# Patient Record
Sex: FEMALE | Race: BLACK OR AFRICAN AMERICAN | NOT HISPANIC OR LATINO | Employment: OTHER | RURAL
[De-identification: names, ages, dates, MRNs, and addresses within clinical notes are randomized per-mention and may not be internally consistent; named-entity substitution may affect disease eponyms.]

---

## 2017-01-19 RX ORDER — METOPROLOL SUCCINATE 25 MG/1
TABLET, EXTENDED RELEASE ORAL
Qty: 30 TABLET | Refills: 0 | Status: SHIPPED | OUTPATIENT
Start: 2017-01-19 | End: 2017-02-16 | Stop reason: SDUPTHER

## 2017-02-16 RX ORDER — METOPROLOL SUCCINATE 25 MG/1
TABLET, EXTENDED RELEASE ORAL
Qty: 30 TABLET | Refills: 0 | Status: SHIPPED | OUTPATIENT
Start: 2017-02-16 | End: 2017-04-13 | Stop reason: SDUPTHER

## 2017-03-13 ENCOUNTER — OFFICE VISIT (OUTPATIENT)
Dept: FAMILY MEDICINE CLINIC | Facility: CLINIC | Age: 82
End: 2017-03-13

## 2017-03-13 VITALS
DIASTOLIC BLOOD PRESSURE: 60 MMHG | TEMPERATURE: 97.9 F | BODY MASS INDEX: 24.66 KG/M2 | HEART RATE: 64 BPM | RESPIRATION RATE: 16 BRPM | WEIGHT: 134 LBS | HEIGHT: 62 IN | SYSTOLIC BLOOD PRESSURE: 120 MMHG

## 2017-03-13 DIAGNOSIS — R53.1 WEAKNESS: Primary | ICD-10-CM

## 2017-03-13 DIAGNOSIS — G89.29 CHRONIC LOW BACK PAIN, UNSPECIFIED BACK PAIN LATERALITY, WITH SCIATICA PRESENCE UNSPECIFIED: ICD-10-CM

## 2017-03-13 DIAGNOSIS — M54.5 CHRONIC LOW BACK PAIN, UNSPECIFIED BACK PAIN LATERALITY, WITH SCIATICA PRESENCE UNSPECIFIED: ICD-10-CM

## 2017-03-13 PROCEDURE — 99213 OFFICE O/P EST LOW 20 MIN: CPT | Performed by: NURSE PRACTITIONER

## 2017-03-14 PROBLEM — R53.1 WEAKNESS: Status: ACTIVE | Noted: 2017-03-14

## 2017-03-14 PROBLEM — G89.29 CHRONIC LOW BACK PAIN: Status: ACTIVE | Noted: 2017-03-14

## 2017-03-14 PROBLEM — M54.50 CHRONIC LOW BACK PAIN: Status: ACTIVE | Noted: 2017-03-14

## 2017-03-14 RX ORDER — METFORMIN HYDROCHLORIDE 500 MG/1
500 TABLET, EXTENDED RELEASE ORAL
COMMUNITY
End: 2017-07-11 | Stop reason: SDUPTHER

## 2017-03-14 RX ORDER — AMLODIPINE BESYLATE 5 MG/1
5 TABLET ORAL DAILY
COMMUNITY
End: 2017-04-13 | Stop reason: SDUPTHER

## 2017-03-14 RX ORDER — OXYBUTYNIN CHLORIDE 10 MG/1
10 TABLET, EXTENDED RELEASE ORAL DAILY
COMMUNITY
End: 2017-04-13

## 2017-03-14 RX ORDER — GABAPENTIN 250 MG/5ML
3 SOLUTION ORAL NIGHTLY
COMMUNITY
End: 2017-10-19

## 2017-03-14 RX ORDER — OMEPRAZOLE 20 MG/1
20 CAPSULE, DELAYED RELEASE ORAL DAILY
COMMUNITY
End: 2017-04-13 | Stop reason: SDUPTHER

## 2017-03-14 NOTE — PROGRESS NOTES
Subjective   Kristy Ramirez is a 83 y.o. female.     HPI Comments: Here today for wili she has recently had back surgery.  Then had some complications from the surgery which required additional hospitalizations and an admission in the nursing home.  She apparently had a severe infection.  She is improved and is home now with the assistance of her daughter and home health.    Back Pain   This is a chronic (recent surgery) problem. The current episode started more than 1 month ago. The problem occurs intermittently. The problem has been gradually improving since onset. The pain does not radiate. The pain is at a severity of 3/10. The pain is moderate. The pain is the same all the time. The symptoms are aggravated by position. Stiffness is present in the morning. Associated symptoms include weight loss. Pertinent negatives include no abdominal pain, bladder incontinence, bowel incontinence, chest pain, dysuria, fever, headaches, leg pain, numbness, paresis, paresthesias, pelvic pain, perianal numbness, tingling or weakness. Treatments tried: surgery. The treatment provided moderate relief.   Weakness - Generalized   This is a new problem. The current episode started more than 1 month ago. The problem occurs constantly. The problem has been gradually improving. Pertinent negatives include no abdominal pain, chest pain, fever, headaches, numbness or weakness. Exacerbated by: recent illness. Treatments tried: physical therapy. The treatment provided significant relief.        The following portions of the patient's history were reviewed and updated as appropriate: allergies, current medications, past family history, past medical history, past social history, past surgical history and problem list.    Review of Systems   Constitutional: Positive for weight loss. Negative for fever.        Weakness   HENT: Negative.    Respiratory: Negative.    Cardiovascular: Negative for chest pain.   Gastrointestinal: Negative for  abdominal pain and bowel incontinence.   Genitourinary: Negative for bladder incontinence, dysuria and pelvic pain.   Musculoskeletal: Positive for back pain.   Skin: Negative.    Neurological: Negative for tingling, weakness, numbness, headaches and paresthesias.   Psychiatric/Behavioral: Negative.        Objective   Physical Exam   Constitutional: She is oriented to person, place, and time. She appears well-developed and well-nourished. No distress.   HENT:   Head: Normocephalic.   Eyes: Pupils are equal, round, and reactive to light.   Neck: Normal range of motion. Neck supple. No thyromegaly present.   Cardiovascular: Normal rate, regular rhythm and normal heart sounds.  Exam reveals no friction rub.    No murmur heard.  Pulmonary/Chest: Breath sounds normal. No respiratory distress. She has no wheezes. She has no rales.   Abdominal: Soft.   Musculoskeletal: Normal range of motion.   Has well healed incision lumbar spine.  Is able to take steps with assistance.   Neurological: She is alert and oriented to person, place, and time.   Skin: Skin is warm and dry.   Psychiatric: She has a normal mood and affect. Thought content normal.   Nursing note and vitals reviewed.      Assessment/Plan   Kristy was seen today for med refill.    Diagnoses and all orders for this visit:    Weakness    Chronic low back pain, unspecified back pain laterality, with sciatica presence unspecified      She will receive home health for physical therapy and poss personal care services.

## 2017-04-13 ENCOUNTER — OFFICE VISIT (OUTPATIENT)
Dept: FAMILY MEDICINE CLINIC | Facility: CLINIC | Age: 82
End: 2017-04-13

## 2017-04-13 VITALS
RESPIRATION RATE: 16 BRPM | OXYGEN SATURATION: 98 % | DIASTOLIC BLOOD PRESSURE: 72 MMHG | HEIGHT: 62 IN | TEMPERATURE: 95.9 F | BODY MASS INDEX: 25.76 KG/M2 | HEART RATE: 64 BPM | SYSTOLIC BLOOD PRESSURE: 164 MMHG | WEIGHT: 140 LBS

## 2017-04-13 DIAGNOSIS — K21.9 GASTROESOPHAGEAL REFLUX DISEASE, ESOPHAGITIS PRESENCE NOT SPECIFIED: ICD-10-CM

## 2017-04-13 DIAGNOSIS — M10.9 GOUT, UNSPECIFIED CAUSE, UNSPECIFIED CHRONICITY, UNSPECIFIED SITE: ICD-10-CM

## 2017-04-13 DIAGNOSIS — M25.551 PAIN OF RIGHT HIP JOINT: ICD-10-CM

## 2017-04-13 DIAGNOSIS — N39.3 STRESS INCONTINENCE: ICD-10-CM

## 2017-04-13 DIAGNOSIS — M25.551 RIGHT HIP PAIN: Primary | ICD-10-CM

## 2017-04-13 DIAGNOSIS — IMO0002 HORMONE DEFICIENCY: ICD-10-CM

## 2017-04-13 DIAGNOSIS — L98.9 SKIN LESION: ICD-10-CM

## 2017-04-13 DIAGNOSIS — Z79.890 ON HORMONE REPLACEMENT THERAPY: ICD-10-CM

## 2017-04-13 DIAGNOSIS — R19.7 DIARRHEA, UNSPECIFIED TYPE: ICD-10-CM

## 2017-04-13 DIAGNOSIS — E83.40 MAGNESIUM DISORDER: ICD-10-CM

## 2017-04-13 DIAGNOSIS — I10 ESSENTIAL HYPERTENSION: ICD-10-CM

## 2017-04-13 DIAGNOSIS — T17.308A CHOKING, INITIAL ENCOUNTER: ICD-10-CM

## 2017-04-13 PROBLEM — E34.9 HORMONE DEFICIENCY: Status: ACTIVE | Noted: 2017-04-13

## 2017-04-13 PROCEDURE — 99214 OFFICE O/P EST MOD 30 MIN: CPT | Performed by: NURSE PRACTITIONER

## 2017-04-13 RX ORDER — ALLOPURINOL 100 MG/1
100 TABLET ORAL DAILY
Qty: 90 TABLET | Refills: 1 | Status: SHIPPED | OUTPATIENT
Start: 2017-04-13 | End: 2018-01-15 | Stop reason: SDUPTHER

## 2017-04-13 RX ORDER — COLCHICINE 0.6 MG/1
0.6 TABLET ORAL DAILY
Qty: 180 TABLET | Refills: 1 | Status: SHIPPED | OUTPATIENT
Start: 2017-04-13 | End: 2018-04-30

## 2017-04-13 RX ORDER — AMLODIPINE BESYLATE 5 MG/1
5 TABLET ORAL DAILY
Qty: 90 TABLET | Refills: 1 | Status: SHIPPED | OUTPATIENT
Start: 2017-04-13 | End: 2017-07-11 | Stop reason: SDUPTHER

## 2017-04-13 RX ORDER — TOLTERODINE 4 MG/1
4 CAPSULE, EXTENDED RELEASE ORAL DAILY
Qty: 90 CAPSULE | Refills: 1 | Status: SHIPPED | OUTPATIENT
Start: 2017-04-13 | End: 2017-07-11 | Stop reason: SDUPTHER

## 2017-04-13 RX ORDER — ESTRADIOL 1 MG/1
1 TABLET ORAL DAILY
Qty: 90 TABLET | Refills: 1 | Status: SHIPPED | OUTPATIENT
Start: 2017-04-13 | End: 2017-07-11 | Stop reason: SDUPTHER

## 2017-04-13 RX ORDER — TRAMADOL HYDROCHLORIDE 50 MG/1
50 TABLET ORAL NIGHTLY
Qty: 30 TABLET | Refills: 0 | Status: SHIPPED | OUTPATIENT
Start: 2017-04-13 | End: 2017-11-27

## 2017-04-13 RX ORDER — OMEPRAZOLE 20 MG/1
20 CAPSULE, DELAYED RELEASE ORAL DAILY
Qty: 90 CAPSULE | Refills: 1 | Status: SHIPPED | OUTPATIENT
Start: 2017-04-13 | End: 2017-07-11 | Stop reason: SDUPTHER

## 2017-04-13 RX ORDER — LISINOPRIL AND HYDROCHLOROTHIAZIDE 25; 20 MG/1; MG/1
1 TABLET ORAL DAILY
Qty: 90 TABLET | Refills: 1 | Status: SHIPPED | OUTPATIENT
Start: 2017-04-13 | End: 2017-06-16 | Stop reason: SINTOL

## 2017-04-13 RX ORDER — METOPROLOL SUCCINATE 25 MG/1
25 TABLET, EXTENDED RELEASE ORAL DAILY
Qty: 90 TABLET | Refills: 1 | Status: SHIPPED | OUTPATIENT
Start: 2017-04-13 | End: 2017-05-15 | Stop reason: SDUPTHER

## 2017-04-13 NOTE — PROGRESS NOTES
Subjective   Kristy Ramirez is a 83 y.o. female.     HPI Comments: Here today for wili.  Has has recent back surgery.  However is having more pain in her right hip.      She has had problems with constipation, however is now having diarrhea.  Took linzess at one time, but is not taking it at present.    She says she has choking sensations when she tries to eat.  She has extremely dry sensation in her throat.    She has a lesion on the top of her head that is very sensitive and there is no hair growing in that area.  She says years ago she touched it with a hot curling iron and thinks this is what caused the problem.    Hip Pain    The incident occurred more than 1 week ago. There was no injury mechanism. The pain is present in the right hip. The quality of the pain is described as aching and shooting. The pain is at a severity of 7/10. The pain is severe. The pain has been constant (worse at night) since onset. Associated symptoms include an inability to bear weight. Pertinent negatives include no loss of motion, loss of sensation, muscle weakness, numbness or tingling. The symptoms are aggravated by weight bearing and movement. Treatments tried: analgesic. The treatment provided moderate relief.   Diarrhea    This is a recurrent problem. The current episode started 1 to 4 weeks ago. Episode frequency: several times a day. The problem has been waxing and waning. The stool consistency is described as watery. Associated symptoms include arthralgias. Pertinent negatives include no abdominal pain, bloating, chills, coughing, fever, headaches, increased  flatus, myalgias, sweats, URI, vomiting or weight loss. She has tried nothing for the symptoms. The treatment provided no relief.   Choking   This is a new problem. The current episode started 1 to 4 weeks ago. The problem occurs constantly. Associated symptoms include arthralgias and a rash. Pertinent negatives include no abdominal pain, chills, coughing, fever,  headaches, myalgias, numbness or vomiting. Nothing aggravates the symptoms. She has tried nothing for the symptoms. The treatment provided no relief.   Rash   This is a chronic (old lesion on the top of her head) problem. The current episode started more than 1 year ago. The problem is unchanged. The affected locations include the scalp. Rash characteristics: painful. She was exposed to nothing. Associated symptoms include diarrhea. Pertinent negatives include no cough, fever or vomiting. Past treatments include nothing. The treatment provided no relief.        The following portions of the patient's history were reviewed and updated as appropriate: allergies, current medications, past family history, past medical history, past social history, past surgical history and problem list.    Review of Systems   Constitutional: Negative.  Negative for chills, fever and weight loss.   HENT: Negative.    Respiratory: Positive for choking. Negative for cough.    Cardiovascular: Negative.    Gastrointestinal: Positive for diarrhea. Negative for abdominal pain, bloating, flatus and vomiting.   Musculoskeletal: Positive for arthralgias and back pain. Negative for myalgias.   Skin: Positive for rash.   Neurological: Negative.  Negative for tingling, numbness and headaches.   Psychiatric/Behavioral: Negative.        Objective   Physical Exam   Constitutional: She is oriented to person, place, and time. She appears well-developed and well-nourished.   HENT:   Head: Normocephalic.   Eyes: Pupils are equal, round, and reactive to light.   Neck: Normal range of motion. Neck supple. No thyromegaly present.   Cardiovascular: Normal rate, regular rhythm and normal heart sounds.  Exam reveals no friction rub.    No murmur heard.  Pulmonary/Chest: Effort normal and breath sounds normal. No respiratory distress. She has no wheezes. She has no rales.   Abdominal: Soft.   Musculoskeletal:   Mod degenerative changes noted on xray of right hip.    Neurological: She is alert and oriented to person, place, and time.   Skin: Skin is warm and dry.   Has a tender area on scalp that has no hair growing.  Is larger that the size of a quarter.  There is no discoloration and no pustules or vesicles.  No open areas noted.   Psychiatric: She has a normal mood and affect. Thought content normal.   Nursing note and vitals reviewed.      Assessment/Plan   Kristy was seen today for follow-up.    Diagnoses and all orders for this visit:    Right hip pain  -     XR Hip With or Without Pelvis 2 - 3 View Right    Stress incontinence  -     tolterodine LA (DETROL LA) 4 MG 24 hr capsule; Take 1 capsule by mouth Daily.    Essential hypertension  -     metoprolol succinate XL (TOPROL-XL) 25 MG 24 hr tablet; Take 1 tablet by mouth Daily.  -     lisinopril-hydrochlorothiazide (PRINZIDE,ZESTORETIC) 20-25 MG per tablet; Take 1 tablet by mouth Daily.  -     amLODIPine (NORVASC) 5 MG tablet; Take 1 tablet by mouth Daily.    Gastroesophageal reflux disease, esophagitis presence not specified  -     omeprazole (priLOSEC) 20 MG capsule; Take 1 capsule by mouth Daily.    Gout, unspecified cause, unspecified chronicity, unspecified site  -     allopurinol (ZYLOPRIM) 100 MG tablet; Take 1 tablet by mouth Daily.  -     colchicine 0.6 MG tablet; Take 1 tablet by mouth Daily.    Hormone deficiency    On hormone replacement therapy  -     estradiol (ESTRACE) 1 MG tablet; Take 1 tablet by mouth Daily.    Magnesium disorder  -     magnesium oxide (MAGOX) 400 (241.3 MG) MG tablet tablet; Take 1 tablet by mouth 2 (Two) Times a Day.    Pain of right hip joint  -     traMADol (ULTRAM) 50 MG tablet; Take 1 tablet by mouth Every Night.    Diarrhea, unspecified type  -     Ambulatory Referral to Gastroenterology    Choking, initial encounter  -     Ambulatory Referral to Gastroenterology    Skin lesion  -     Ambulatory Referral to Dermatology      She is referred to gastro and to derm.  She is given  tramadol for her hip pain since the gabapentin does not seem to help.

## 2017-05-15 ENCOUNTER — OFFICE VISIT (OUTPATIENT)
Dept: FAMILY MEDICINE CLINIC | Facility: CLINIC | Age: 82
End: 2017-05-15

## 2017-05-15 VITALS
HEIGHT: 62 IN | WEIGHT: 141 LBS | BODY MASS INDEX: 25.95 KG/M2 | TEMPERATURE: 98.4 F | HEART RATE: 96 BPM | RESPIRATION RATE: 18 BRPM | OXYGEN SATURATION: 96 % | SYSTOLIC BLOOD PRESSURE: 122 MMHG | DIASTOLIC BLOOD PRESSURE: 64 MMHG

## 2017-05-15 DIAGNOSIS — M54.5 LOW BACK PAIN, UNSPECIFIED BACK PAIN LATERALITY, UNSPECIFIED CHRONICITY, WITH SCIATICA PRESENCE UNSPECIFIED: Primary | ICD-10-CM

## 2017-05-15 DIAGNOSIS — I10 ESSENTIAL HYPERTENSION: ICD-10-CM

## 2017-05-15 PROBLEM — M54.50 LOW BACK PAIN: Status: ACTIVE | Noted: 2017-05-15

## 2017-05-15 LAB
BILIRUB BLD-MCNC: NEGATIVE MG/DL
CLARITY, POC: CLEAR
COLOR UR: YELLOW
GLUCOSE UR STRIP-MCNC: NEGATIVE MG/DL
KETONES UR QL: NEGATIVE
LEUKOCYTE EST, POC: NEGATIVE
NITRITE UR-MCNC: NEGATIVE MG/ML
PH UR: 6 [PH] (ref 5–8)
PROT UR STRIP-MCNC: NEGATIVE MG/DL
RBC # UR STRIP: NEGATIVE /UL
SP GR UR: 1.01 (ref 1–1.03)
UROBILINOGEN UR QL: NORMAL

## 2017-05-15 PROCEDURE — 99213 OFFICE O/P EST LOW 20 MIN: CPT | Performed by: NURSE PRACTITIONER

## 2017-05-15 PROCEDURE — 81002 URINALYSIS NONAUTO W/O SCOPE: CPT | Performed by: NURSE PRACTITIONER

## 2017-05-15 RX ORDER — METHOCARBAMOL 500 MG/1
500 TABLET, FILM COATED ORAL 3 TIMES DAILY
Qty: 30 TABLET | Refills: 3 | Status: SHIPPED | OUTPATIENT
Start: 2017-05-15 | End: 2017-07-11 | Stop reason: SDUPTHER

## 2017-05-15 RX ORDER — METHOCARBAMOL 500 MG/1
500 TABLET, FILM COATED ORAL 3 TIMES DAILY
Qty: 30 TABLET | Refills: 3 | Status: SHIPPED | OUTPATIENT
Start: 2017-05-15 | End: 2017-05-15 | Stop reason: SDUPTHER

## 2017-05-15 RX ORDER — FUROSEMIDE 20 MG/1
20 TABLET ORAL DAILY
Qty: 90 TABLET | Refills: 0 | Status: SHIPPED | OUTPATIENT
Start: 2017-05-15 | End: 2017-07-11 | Stop reason: SDUPTHER

## 2017-05-15 RX ORDER — FLUCONAZOLE 150 MG/1
150 TABLET ORAL ONCE
Qty: 1 TABLET | Refills: 1 | Status: SHIPPED | OUTPATIENT
Start: 2017-05-15 | End: 2017-05-15

## 2017-05-15 RX ORDER — METOPROLOL SUCCINATE 25 MG/1
25 TABLET, EXTENDED RELEASE ORAL DAILY
Qty: 90 TABLET | Refills: 1 | Status: SHIPPED | OUTPATIENT
Start: 2017-05-15 | End: 2017-07-11 | Stop reason: SDUPTHER

## 2017-05-17 ENCOUNTER — TELEPHONE (OUTPATIENT)
Dept: FAMILY MEDICINE CLINIC | Facility: CLINIC | Age: 82
End: 2017-05-17

## 2017-06-16 ENCOUNTER — OFFICE VISIT (OUTPATIENT)
Dept: FAMILY MEDICINE CLINIC | Facility: CLINIC | Age: 82
End: 2017-06-16

## 2017-06-16 VITALS
BODY MASS INDEX: 25.03 KG/M2 | TEMPERATURE: 98.5 F | DIASTOLIC BLOOD PRESSURE: 58 MMHG | WEIGHT: 136 LBS | OXYGEN SATURATION: 94 % | SYSTOLIC BLOOD PRESSURE: 117 MMHG | RESPIRATION RATE: 18 BRPM | HEART RATE: 84 BPM | HEIGHT: 62 IN

## 2017-06-16 DIAGNOSIS — I10 ESSENTIAL HYPERTENSION: ICD-10-CM

## 2017-06-16 DIAGNOSIS — T78.3XXA ANGIOEDEMA, INITIAL ENCOUNTER: Primary | ICD-10-CM

## 2017-06-16 DIAGNOSIS — J02.9 SORE THROAT: ICD-10-CM

## 2017-06-16 DIAGNOSIS — T78.40XA ALLERGIC REACTION, INITIAL ENCOUNTER: ICD-10-CM

## 2017-06-16 LAB
EXPIRATION DATE: NORMAL
INTERNAL CONTROL: NORMAL
Lab: NORMAL
S PYO AG THROAT QL: NEGATIVE

## 2017-06-16 PROCEDURE — 87081 CULTURE SCREEN ONLY: CPT | Performed by: NURSE PRACTITIONER

## 2017-06-16 PROCEDURE — 87880 STREP A ASSAY W/OPTIC: CPT | Performed by: NURSE PRACTITIONER

## 2017-06-16 PROCEDURE — 99214 OFFICE O/P EST MOD 30 MIN: CPT | Performed by: NURSE PRACTITIONER

## 2017-06-16 RX ORDER — DIPHENHYDRAMINE HCL 25 MG
25 TABLET ORAL EVERY 6 HOURS PRN
Qty: 30 TABLET | Refills: 0 | Status: SHIPPED | OUTPATIENT
Start: 2017-06-16 | End: 2017-11-17

## 2017-06-16 RX ORDER — LOSARTAN POTASSIUM AND HYDROCHLOROTHIAZIDE 12.5; 5 MG/1; MG/1
TABLET ORAL
Qty: 90 TABLET | Refills: 3 | Status: SHIPPED | OUTPATIENT
Start: 2017-06-16 | End: 2017-07-11 | Stop reason: SDUPTHER

## 2017-06-16 RX ORDER — LOSARTAN POTASSIUM AND HYDROCHLOROTHIAZIDE 12.5; 5 MG/1; MG/1
1 TABLET ORAL DAILY
Qty: 30 TABLET | Refills: 3 | Status: SHIPPED | OUTPATIENT
Start: 2017-06-16 | End: 2017-06-16 | Stop reason: SDUPTHER

## 2017-06-16 NOTE — PROGRESS NOTES
Subjective   Kristy Ramirez is a 83 y.o. female.     HPI Comments: began having a sore throat about 3 days ago.  She also has swollen lips and says it feels difficult to swallow.  No report of fever.    Sore Throat    This is a new problem. Episode onset: 3 days ago. The problem has been waxing and waning. There has been no fever. The patient is experiencing no pain (has more swelling). Pertinent negatives include no abdominal pain, congestion, coughing, diarrhea, drooling, ear discharge, ear pain, headaches, hoarse voice, plugged ear sensation, neck pain, shortness of breath, stridor, swollen glands, trouble swallowing or vomiting. She has had exposure to strep. She has tried nothing for the symptoms. The treatment provided no relief.        The following portions of the patient's history were reviewed and updated as appropriate: allergies, current medications, past family history, past medical history, past social history, past surgical history and problem list.    Review of Systems   Constitutional: Negative.    HENT: Positive for sore throat. Negative for congestion, drooling, ear discharge, ear pain, hoarse voice and trouble swallowing.    Respiratory: Negative.  Negative for cough, shortness of breath and stridor.    Cardiovascular: Negative.    Gastrointestinal: Negative for abdominal pain, diarrhea and vomiting.   Musculoskeletal: Negative.  Negative for neck pain.   Skin: Negative.    Neurological: Negative.  Negative for headaches.   Psychiatric/Behavioral: Negative.        Objective   Physical Exam   Constitutional: She is oriented to person, place, and time. She appears well-developed and well-nourished. No distress.   HENT:   Head: Normocephalic.   Lips are mildly swollen.  Also pharynix is injected, but there is no swelling on her tongue.   Eyes: Pupils are equal, round, and reactive to light.   Neck: Normal range of motion.   Cardiovascular: Normal rate, regular rhythm and normal heart sounds.  Exam  reveals no friction rub.    No murmur heard.  Pulmonary/Chest: Effort normal and breath sounds normal. No respiratory distress. She has no wheezes. She has no rales.   Abdominal: Soft.   Musculoskeletal: Normal range of motion.   Neurological: She is alert and oriented to person, place, and time.   Skin: Skin is warm and dry.   Psychiatric: She has a normal mood and affect. Thought content normal.   Nursing note and vitals reviewed.      Assessment/Plan   Kristy was seen today for sore throat.    Diagnoses and all orders for this visit:    Angioedema, initial encounter    Essential hypertension  -     losartan-hydrochlorothiazide (HYZAAR) 50-12.5 MG per tablet; Take 1 tablet by mouth Daily.    Allergic reaction, initial encounter  -     diphenhydrAMINE (BENADRYL) 25 MG tablet; Take 1 tablet by mouth Every 6 (Six) Hours As Needed for Itching.    Sore throat  -     Beta Strep Culture, Throat  -     POC Rapid Strep A    have done a strep as a precaution, but as suspected it is negative.  Suspect that her lisinopril is causing angioedema.  Will stop it and begin losartan.  Will wili in 3 days.

## 2017-06-20 ENCOUNTER — OFFICE VISIT (OUTPATIENT)
Dept: FAMILY MEDICINE CLINIC | Facility: CLINIC | Age: 82
End: 2017-06-20

## 2017-06-20 VITALS
WEIGHT: 139 LBS | BODY MASS INDEX: 25.58 KG/M2 | HEIGHT: 62 IN | DIASTOLIC BLOOD PRESSURE: 60 MMHG | TEMPERATURE: 98.6 F | OXYGEN SATURATION: 97 % | SYSTOLIC BLOOD PRESSURE: 128 MMHG | RESPIRATION RATE: 16 BRPM | HEART RATE: 69 BPM

## 2017-06-20 DIAGNOSIS — I10 ESSENTIAL HYPERTENSION: ICD-10-CM

## 2017-06-20 DIAGNOSIS — T78.3XXA ANGIOEDEMA, INITIAL ENCOUNTER: ICD-10-CM

## 2017-06-20 DIAGNOSIS — H61.21 IMPACTED CERUMEN OF RIGHT EAR: Primary | ICD-10-CM

## 2017-06-20 LAB — BACTERIA SPEC AEROBE CULT: NORMAL

## 2017-06-20 PROCEDURE — 99213 OFFICE O/P EST LOW 20 MIN: CPT | Performed by: NURSE PRACTITIONER

## 2017-06-20 RX ORDER — HYDROCODONE BITARTRATE AND ACETAMINOPHEN 5; 325 MG/1; MG/1
TABLET ORAL
Refills: 0 | COMMUNITY
Start: 2017-06-08 | End: 2017-10-19

## 2017-06-20 RX ORDER — DIPHENHYDRAMINE HYDROCHLORIDE 25 MG/1
CAPSULE ORAL
Refills: 0 | COMMUNITY
Start: 2017-06-16 | End: 2017-10-23

## 2017-06-20 NOTE — PROGRESS NOTES
Subjective   Kristy Ramirez is a 83 y.o. female.     HPI Comments: Here for wili on her b/p and angioedema.  She was asked to stop her lisinopril due to her lips swelling.  Is much improved.  Is tolerating the losartan and the angioedema is now gone.    Feels full in her right ear.    Hypertension   This is a chronic problem. The current episode started more than 1 year ago. The problem is controlled. Pertinent negatives include no anxiety, blurred vision, chest pain, headaches, malaise/fatigue, neck pain, orthopnea, palpitations, peripheral edema, PND, shortness of breath or sweats. There are no associated agents to hypertension. Risk factors for coronary artery disease include post-menopausal state and sedentary lifestyle. Past treatments include angiotensin blockers and diuretics. The current treatment provides significant improvement. There are no compliance problems.  Hypertensive end-organ damage includes kidney disease. There is no history of angina, CAD/MI, CVA, heart failure, left ventricular hypertrophy, PVD or retinopathy.   Ear Fullness    There is pain in the right ear. This is a new problem. The current episode started more than 1 month ago. The problem occurs constantly. The problem has been unchanged. There has been no fever. Pertinent negatives include no headaches or neck pain. She has tried nothing for the symptoms. The treatment provided no relief.        The following portions of the patient's history were reviewed and updated as appropriate: allergies, current medications, past family history, past medical history, past social history, past surgical history and problem list.    Review of Systems   Constitutional: Negative.  Negative for malaise/fatigue.   HENT: Negative.    Eyes: Negative for blurred vision.   Respiratory: Negative.  Negative for shortness of breath.    Cardiovascular: Negative.  Negative for chest pain, palpitations, orthopnea and PND.   Musculoskeletal: Negative.  Negative  for neck pain.   Skin: Negative.    Neurological: Negative.  Negative for headaches.       Objective   Physical Exam   Constitutional: She is oriented to person, place, and time. She appears well-developed and well-nourished. No distress.   HENT:   Head: Normocephalic.   No longer has any swelling of lips noted.    Cerumen impaction noted right ear.   Eyes: Pupils are equal, round, and reactive to light.   Neck: Normal range of motion.   Cardiovascular: Normal rate, regular rhythm and normal heart sounds.  Exam reveals no friction rub.    No murmur heard.  Pulmonary/Chest: Effort normal and breath sounds normal. No respiratory distress. She has no wheezes. She has no rales.   Abdominal: Soft.   Musculoskeletal: Normal range of motion.   Neurological: She is alert and oriented to person, place, and time.   Skin: Skin is warm and dry.   Psychiatric: She has a normal mood and affect. Thought content normal.   Nursing note and vitals reviewed.      Assessment/Plan   Kristy was seen today for hypertension.    Diagnoses and all orders for this visit:    Impacted cerumen of right ear  -     carbamide peroxide (DEBROX) 6.5 % otic solution; Administer 5 drops to the right ear 2 (Two) Times a Day.    Essential hypertension    Angioedema, initial encounter

## 2017-06-22 ENCOUNTER — OFFICE VISIT (OUTPATIENT)
Dept: FAMILY MEDICINE CLINIC | Facility: CLINIC | Age: 82
End: 2017-06-22

## 2017-06-22 VITALS
OXYGEN SATURATION: 97 % | TEMPERATURE: 98.6 F | RESPIRATION RATE: 16 BRPM | BODY MASS INDEX: 25.58 KG/M2 | DIASTOLIC BLOOD PRESSURE: 60 MMHG | HEART RATE: 64 BPM | SYSTOLIC BLOOD PRESSURE: 128 MMHG | HEIGHT: 62 IN | WEIGHT: 139 LBS

## 2017-06-22 DIAGNOSIS — H61.21 IMPACTED CERUMEN OF RIGHT EAR: Primary | ICD-10-CM

## 2017-06-22 PROCEDURE — 99213 OFFICE O/P EST LOW 20 MIN: CPT | Performed by: NURSE PRACTITIONER

## 2017-06-22 NOTE — PROGRESS NOTES
Subjective   Kristy Ramirez is a 83 y.o. female.     HPI Comments: Has cerumen impaction on the right.  She has been putting gtts in for the past few days and would like to have her right ear flushed out.    Earache    There is pain in the right ear. This is a recurrent problem. The current episode started 1 to 4 weeks ago. The problem occurs constantly. The problem has been unchanged. There has been no fever. The pain is mild. Pertinent negatives include no abdominal pain, coughing, diarrhea, ear discharge, headaches, hearing loss, neck pain, rash, rhinorrhea, sore throat or vomiting. She has tried ear drops for the symptoms. The treatment provided mild relief.        The following portions of the patient's history were reviewed and updated as appropriate: allergies, current medications, past family history, past medical history, past social history, past surgical history and problem list.    Review of Systems   Constitutional: Negative.    HENT: Positive for ear pain. Negative for ear discharge, hearing loss, rhinorrhea and sore throat.    Respiratory: Negative.  Negative for cough.    Cardiovascular: Negative.    Gastrointestinal: Negative for abdominal pain, diarrhea and vomiting.   Musculoskeletal: Negative.  Negative for neck pain.   Skin: Negative.  Negative for rash.   Neurological: Negative.  Negative for headaches.   Psychiatric/Behavioral: Negative.        Objective   Physical Exam   Constitutional: She is oriented to person, place, and time. She appears well-developed and well-nourished. No distress.   HENT:   Head: Normocephalic.   Right canal is blocked with wax.   Neck: Normal range of motion.   Cardiovascular: Normal rate, regular rhythm and normal heart sounds.  Exam reveals no friction rub.    No murmur heard.  Pulmonary/Chest: Effort normal and breath sounds normal. No respiratory distress. She has no wheezes. She has no rales.   Abdominal: Soft.   Musculoskeletal: Normal range of motion.    Neurological: She is alert and oriented to person, place, and time.   Skin: Skin is warm and dry.   Psychiatric: She has a normal mood and affect. Thought content normal.   Nursing note and vitals reviewed.      Assessment/Plan   Kristy was seen today for ear wash.    Diagnoses and all orders for this visit:    Impacted cerumen of right ear    Right ear was flushed with warm water and the cerumen impaction was relieved.  She tolerated well and stated she felt much better.

## 2017-06-30 ENCOUNTER — OFFICE VISIT (OUTPATIENT)
Dept: FAMILY MEDICINE CLINIC | Facility: CLINIC | Age: 82
End: 2017-06-30

## 2017-06-30 VITALS
SYSTOLIC BLOOD PRESSURE: 122 MMHG | DIASTOLIC BLOOD PRESSURE: 70 MMHG | BODY MASS INDEX: 25.21 KG/M2 | RESPIRATION RATE: 16 BRPM | TEMPERATURE: 98 F | WEIGHT: 137 LBS | OXYGEN SATURATION: 98 % | HEART RATE: 63 BPM | HEIGHT: 62 IN

## 2017-06-30 DIAGNOSIS — H60.501 ACUTE OTITIS EXTERNA OF RIGHT EAR, UNSPECIFIED TYPE: Primary | ICD-10-CM

## 2017-06-30 DIAGNOSIS — H61.21 IMPACTED CERUMEN OF RIGHT EAR: ICD-10-CM

## 2017-06-30 PROCEDURE — 99213 OFFICE O/P EST LOW 20 MIN: CPT | Performed by: NURSE PRACTITIONER

## 2017-06-30 RX ORDER — CHLORTHALIDONE 25 MG/1
25 TABLET ORAL DAILY
Qty: 90 TABLET | Refills: 3 | Status: SHIPPED | OUTPATIENT
Start: 2017-06-30 | End: 2017-07-11 | Stop reason: SDUPTHER

## 2017-06-30 NOTE — PROGRESS NOTES
Subjective   Kristy Ramirez is a 83 y.o. female.     HPI Comments: Cont to have some fullness in her right ear.  Has had ear flushed a few days ago.  Was feeling better and is now feeling full again.    Earache    There is pain in the right ear. This is a recurrent problem. The current episode started in the past 7 days. The problem occurs constantly. The problem has been unchanged. There has been no fever. The patient is experiencing no pain. Treatments tried: had ear flushed a few days ago. The treatment provided mild relief.        The following portions of the patient's history were reviewed and updated as appropriate: allergies, current medications, past family history, past medical history, past social history, past surgical history and problem list.    Review of Systems   Constitutional: Negative.    HENT: Positive for ear pain.    Cardiovascular: Negative.    Musculoskeletal: Negative.    Skin: Negative.    Neurological: Negative.    Psychiatric/Behavioral: Negative.        Objective   Physical Exam   Constitutional: She is oriented to person, place, and time. She appears well-developed and well-nourished. No distress.   HENT:   Head: Normocephalic.   Cont to have some cerumen in right ear.   Eyes: Pupils are equal, round, and reactive to light.   Neck: Normal range of motion.   Cardiovascular: Normal rate, regular rhythm and normal heart sounds.  Exam reveals no friction rub.    No murmur heard.  Pulmonary/Chest: Effort normal and breath sounds normal. No respiratory distress. She has no wheezes. She has no rales.   Musculoskeletal: Normal range of motion.   Neurological: She is alert and oriented to person, place, and time.   Skin: Skin is warm and dry.   Psychiatric: She has a normal mood and affect. Thought content normal.   Nursing note and vitals reviewed.      Assessment/Plan   Kristy was seen today for ear issues.    Diagnoses and all orders for this visit:    Acute otitis externa of right ear,  unspecified type  -     neomycin-polymyxin-hydrocortisone (CORTISPORIN) 3.5-47236-3 otic solution; Administer 3 drops to the right ear 4 (Four) Times a Day.    Impacted cerumen of right ear    Other orders  -     chlorthalidone (HYGROTON) 25 MG tablet; Take 1 tablet by mouth Daily.      There remainder of the cerumen is flushed from the right ear.

## 2017-07-11 ENCOUNTER — OFFICE VISIT (OUTPATIENT)
Dept: FAMILY MEDICINE CLINIC | Facility: CLINIC | Age: 82
End: 2017-07-11

## 2017-07-11 VITALS
HEIGHT: 62 IN | DIASTOLIC BLOOD PRESSURE: 59 MMHG | WEIGHT: 134 LBS | TEMPERATURE: 98.1 F | HEART RATE: 63 BPM | SYSTOLIC BLOOD PRESSURE: 136 MMHG | RESPIRATION RATE: 16 BRPM | BODY MASS INDEX: 24.66 KG/M2 | OXYGEN SATURATION: 98 %

## 2017-07-11 DIAGNOSIS — E83.40 MAGNESIUM DISORDER: ICD-10-CM

## 2017-07-11 DIAGNOSIS — Z79.890 ON HORMONE REPLACEMENT THERAPY: ICD-10-CM

## 2017-07-11 DIAGNOSIS — N39.3 STRESS INCONTINENCE: ICD-10-CM

## 2017-07-11 DIAGNOSIS — F41.9 ANXIETY: ICD-10-CM

## 2017-07-11 DIAGNOSIS — E11.9 TYPE 2 DIABETES MELLITUS WITHOUT COMPLICATION, WITHOUT LONG-TERM CURRENT USE OF INSULIN (HCC): ICD-10-CM

## 2017-07-11 DIAGNOSIS — M54.5 LOW BACK PAIN, UNSPECIFIED BACK PAIN LATERALITY, UNSPECIFIED CHRONICITY, WITH SCIATICA PRESENCE UNSPECIFIED: ICD-10-CM

## 2017-07-11 DIAGNOSIS — I10 ESSENTIAL HYPERTENSION: ICD-10-CM

## 2017-07-11 DIAGNOSIS — M1A.0710 CHRONIC GOUT OF RIGHT ANKLE, UNSPECIFIED CAUSE: Primary | ICD-10-CM

## 2017-07-11 DIAGNOSIS — K21.9 GASTROESOPHAGEAL REFLUX DISEASE, ESOPHAGITIS PRESENCE NOT SPECIFIED: ICD-10-CM

## 2017-07-11 PROCEDURE — 99214 OFFICE O/P EST MOD 30 MIN: CPT | Performed by: NURSE PRACTITIONER

## 2017-07-11 RX ORDER — FUROSEMIDE 20 MG/1
20 TABLET ORAL DAILY
Qty: 90 TABLET | Refills: 0 | Status: SHIPPED | OUTPATIENT
Start: 2017-07-11 | End: 2018-01-23

## 2017-07-11 RX ORDER — OMEPRAZOLE 20 MG/1
20 CAPSULE, DELAYED RELEASE ORAL DAILY
Qty: 90 CAPSULE | Refills: 1 | Status: SHIPPED | OUTPATIENT
Start: 2017-07-11 | End: 2018-01-15 | Stop reason: SDUPTHER

## 2017-07-11 RX ORDER — AMLODIPINE BESYLATE 5 MG/1
5 TABLET ORAL DAILY
Qty: 90 TABLET | Refills: 1 | Status: SHIPPED | OUTPATIENT
Start: 2017-07-11 | End: 2018-06-18 | Stop reason: SDUPTHER

## 2017-07-11 RX ORDER — CHLORTHALIDONE 25 MG/1
25 TABLET ORAL DAILY
Qty: 90 TABLET | Refills: 3 | Status: SHIPPED | OUTPATIENT
Start: 2017-07-11 | End: 2018-04-30

## 2017-07-11 RX ORDER — TOLTERODINE 4 MG/1
4 CAPSULE, EXTENDED RELEASE ORAL DAILY
Qty: 90 CAPSULE | Refills: 1 | Status: SHIPPED | OUTPATIENT
Start: 2017-07-11 | End: 2018-04-30

## 2017-07-11 RX ORDER — METHYLPREDNISOLONE 4 MG/1
TABLET ORAL
Qty: 21 TABLET | Refills: 0 | Status: SHIPPED | OUTPATIENT
Start: 2017-07-11 | End: 2017-10-19

## 2017-07-11 RX ORDER — METFORMIN HYDROCHLORIDE 500 MG/1
500 TABLET, EXTENDED RELEASE ORAL
Qty: 90 TABLET | Refills: 1 | Status: SHIPPED | OUTPATIENT
Start: 2017-07-11 | End: 2018-04-26 | Stop reason: SDUPTHER

## 2017-07-11 RX ORDER — LOSARTAN POTASSIUM AND HYDROCHLOROTHIAZIDE 12.5; 5 MG/1; MG/1
1 TABLET ORAL DAILY
Qty: 90 TABLET | Refills: 3 | Status: SHIPPED | OUTPATIENT
Start: 2017-07-11 | End: 2018-06-18 | Stop reason: SDUPTHER

## 2017-07-11 RX ORDER — METOPROLOL SUCCINATE 25 MG/1
25 TABLET, EXTENDED RELEASE ORAL DAILY
Qty: 90 TABLET | Refills: 1 | Status: SHIPPED | OUTPATIENT
Start: 2017-07-11 | End: 2018-06-18 | Stop reason: SDUPTHER

## 2017-07-11 RX ORDER — BUSPIRONE HYDROCHLORIDE 10 MG/1
10 TABLET ORAL 3 TIMES DAILY
Qty: 180 TABLET | Refills: 1 | Status: SHIPPED | OUTPATIENT
Start: 2017-07-11 | End: 2018-06-18 | Stop reason: SDUPTHER

## 2017-07-11 RX ORDER — METHOCARBAMOL 500 MG/1
500 TABLET, FILM COATED ORAL 3 TIMES DAILY
Qty: 30 TABLET | Refills: 3 | Status: SHIPPED | OUTPATIENT
Start: 2017-07-11 | End: 2018-03-08

## 2017-07-11 RX ORDER — ESTRADIOL 1 MG/1
1 TABLET ORAL DAILY
Qty: 90 TABLET | Refills: 1 | Status: SHIPPED | OUTPATIENT
Start: 2017-07-11 | End: 2018-03-08 | Stop reason: SDUPTHER

## 2017-07-11 NOTE — PROGRESS NOTES
Subjective   Kristy Ramirez is a 83 y.o. female.     HPI Comments: Here today with a flair up of gout in her right ankle.  Has had before.  Started about a week ago.  He was on colchicine and allopurinol at one time and is not taking.  She just started back on it a few days ago and sx are starting to get better.    B/s is elevated as of last week.  Usually runs from 110 to 130.  Last week when the gout flair started her b/s was up to 160.  Is coming back down now.    Diabetes   She presents for her follow-up diabetic visit. She has type 2 diabetes mellitus. Her disease course has been stable. There are no hypoglycemic associated symptoms. Pertinent negatives for hypoglycemia include no headaches. There are no diabetic associated symptoms. Pertinent negatives for diabetes include no chest pain, no fatigue, no visual change and no weakness. There are no hypoglycemic complications. Symptoms are stable. There are no diabetic complications. Risk factors for coronary artery disease include diabetes mellitus, hypertension, sedentary lifestyle and post-menopausal. Current diabetic treatment includes oral agent (monotherapy). She is compliant with treatment all of the time. Her weight is stable. She is following a diabetic diet. When asked about meal planning, she reported none. She has not had a previous visit with a dietitian. She rarely participates in exercise. She monitors blood glucose at home 1-2 x per day. Her breakfast blood glucose is taken between 6-7 am. Her breakfast blood glucose range is generally 110-130 mg/dl. An ACE inhibitor/angiotensin II receptor blocker is being taken. She does not see a podiatrist.Eye exam is current.   Lower Extremity Issue   This is a recurrent (swelling of right ankle) problem. The current episode started in the past 7 days. The problem occurs constantly. The problem has been gradually improving. Associated symptoms include arthralgias. Pertinent negatives include no abdominal  pain, anorexia, change in bowel habit, chest pain, chills, congestion, coughing, diaphoresis, fatigue, fever, headaches, joint swelling, myalgias, nausea, neck pain, numbness, rash, sore throat, swollen glands, urinary symptoms, vertigo, visual change, vomiting or weakness. The symptoms are aggravated by walking. Treatments tried: allopurinol and colchcine. The treatment provided mild relief.        The following portions of the patient's history were reviewed and updated as appropriate: allergies, current medications, past family history, past medical history, past social history, past surgical history and problem list.    Review of Systems   Constitutional: Negative.  Negative for chills, diaphoresis, fatigue and fever.   HENT: Negative.  Negative for congestion and sore throat.    Respiratory: Negative.  Negative for cough.    Cardiovascular: Negative.  Negative for chest pain.   Gastrointestinal: Negative for abdominal pain, anorexia, change in bowel habit, nausea and vomiting.   Musculoskeletal: Positive for arthralgias. Negative for joint swelling, myalgias and neck pain.        Swelling and pain right ankle.   Skin: Negative.  Negative for rash.   Neurological: Negative.  Negative for vertigo, weakness, numbness and headaches.   Psychiatric/Behavioral: Negative.        Objective   Physical Exam   Constitutional: She is oriented to person, place, and time. She appears well-developed and well-nourished. No distress.   HENT:   Head: Normocephalic.   Eyes: Pupils are equal, round, and reactive to light.   Neck: Normal range of motion. Neck supple. No thyromegaly present.   Cardiovascular: Normal rate, regular rhythm and normal heart sounds.  Exam reveals no friction rub.    No murmur heard.  Pulmonary/Chest: Effort normal and breath sounds normal. No respiratory distress. She has no wheezes. She has no rales.   Abdominal: Soft.   Musculoskeletal:        Right ankle: She exhibits decreased range of motion and  swelling. Tenderness.   The whole right ankle is still swollen.  Is slightly tender to the touch.   Neurological: She is alert and oriented to person, place, and time.   Skin: Skin is warm and dry.   Psychiatric: She has a normal mood and affect. Thought content normal.   Nursing note and vitals reviewed.      Assessment/Plan   Kristy was seen today for rt foot swollen and diabetes.    Diagnoses and all orders for this visit:    Chronic gout of right ankle, unspecified cause  -     MethylPREDNISolone (MEDROLRICARDO,) 4 MG tablet; Take as directed on package instructions.    Type 2 diabetes mellitus without complication, without long-term current use of insulin      She should cont to take the allopurinol and she may stop the colchicine when the flair subsides.  She is made aware that the medrol most likely will run her b/s up some.  She verbalizes understanding.

## 2017-08-01 ENCOUNTER — OFFICE VISIT (OUTPATIENT)
Dept: FAMILY MEDICINE CLINIC | Facility: CLINIC | Age: 82
End: 2017-08-01

## 2017-08-01 VITALS
SYSTOLIC BLOOD PRESSURE: 143 MMHG | WEIGHT: 137 LBS | BODY MASS INDEX: 25.21 KG/M2 | HEART RATE: 63 BPM | HEIGHT: 62 IN | RESPIRATION RATE: 16 BRPM | TEMPERATURE: 98.3 F | OXYGEN SATURATION: 97 % | DIASTOLIC BLOOD PRESSURE: 64 MMHG

## 2017-08-01 DIAGNOSIS — H60.501 ACUTE OTITIS EXTERNA OF RIGHT EAR, UNSPECIFIED TYPE: Primary | ICD-10-CM

## 2017-08-01 PROCEDURE — 99213 OFFICE O/P EST LOW 20 MIN: CPT | Performed by: NURSE PRACTITIONER

## 2017-08-01 RX ORDER — CEPHALEXIN 500 MG/1
500 CAPSULE ORAL 3 TIMES DAILY
Qty: 21 CAPSULE | Refills: 0 | Status: SHIPPED | OUTPATIENT
Start: 2017-08-01 | End: 2017-10-19

## 2017-08-02 NOTE — PROGRESS NOTES
Subjective   Kristy Ramirez is a 83 y.o. female.     HPI Comments: Here today with pain in her right ear.  Started up again a few days ago.  She has a hx of otitis ext.    Earache    There is pain in the right ear. This is a recurrent problem. The current episode started in the past 7 days. The problem occurs constantly. The problem has been unchanged. There has been no fever. The pain is at a severity of 5/10. The pain is moderate. Pertinent negatives include no abdominal pain, coughing, diarrhea, ear discharge, headaches, hearing loss, neck pain, rash, rhinorrhea, sore throat or vomiting. She has tried ear drops for the symptoms. The treatment provided mild relief. Her past medical history is significant for a chronic ear infection.        The following portions of the patient's history were reviewed and updated as appropriate: allergies, current medications, past family history, past medical history, past social history, past surgical history and problem list.    Review of Systems   Constitutional: Negative.    HENT: Positive for ear pain. Negative for ear discharge, hearing loss, rhinorrhea and sore throat.    Respiratory: Negative.  Negative for cough.    Cardiovascular: Negative.    Gastrointestinal: Negative for abdominal pain, diarrhea and vomiting.   Musculoskeletal: Negative.  Negative for neck pain.   Skin: Negative.  Negative for rash.   Neurological: Negative.  Negative for headaches.   Psychiatric/Behavioral: Negative.        Objective   Physical Exam   Constitutional: She is oriented to person, place, and time. She appears well-developed and well-nourished. No distress.   HENT:   Head: Normocephalic.   Right canal is injected and slightly swollen   Eyes: Pupils are equal, round, and reactive to light.   Neck: Normal range of motion. Neck supple. No thyromegaly present.   Cardiovascular: Normal rate, regular rhythm and normal heart sounds.  Exam reveals no friction rub.    No murmur  heard.  Pulmonary/Chest: Effort normal and breath sounds normal. No respiratory distress. She has no wheezes. She has no rales.   Abdominal: Soft.   Musculoskeletal: Normal range of motion.   Neurological: She is alert and oriented to person, place, and time.   Skin: Skin is warm and dry.   Psychiatric: She has a normal mood and affect. Thought content normal.   Nursing note and vitals reviewed.      Assessment/Plan   Kristy was seen today for earache and foot swelling.    Diagnoses and all orders for this visit:    Acute otitis externa of right ear, unspecified type  -     ciprofloxacin-hydrocortisone (CIPRO HC) 0.2-1 % otic suspension; Administer 3 drops to the right ear 2 (Two) Times a Day.  -     cephalexin (KEFLEX) 500 MG capsule; Take 1 capsule by mouth 3 (Three) Times a Day.      Will change her to cipro gtts and wili in 5 days.

## 2017-08-08 ENCOUNTER — OFFICE VISIT (OUTPATIENT)
Dept: FAMILY MEDICINE CLINIC | Facility: CLINIC | Age: 82
End: 2017-08-08

## 2017-08-08 VITALS
RESPIRATION RATE: 16 BRPM | WEIGHT: 139 LBS | HEART RATE: 64 BPM | OXYGEN SATURATION: 98 % | TEMPERATURE: 98.4 F | HEIGHT: 62 IN | SYSTOLIC BLOOD PRESSURE: 140 MMHG | DIASTOLIC BLOOD PRESSURE: 62 MMHG | BODY MASS INDEX: 25.58 KG/M2

## 2017-08-08 DIAGNOSIS — H60.501 ACUTE OTITIS EXTERNA OF RIGHT EAR, UNSPECIFIED TYPE: Primary | ICD-10-CM

## 2017-08-08 PROCEDURE — 99213 OFFICE O/P EST LOW 20 MIN: CPT | Performed by: NURSE PRACTITIONER

## 2017-08-08 NOTE — PROGRESS NOTES
Subjective   Kristy Ramirez is a 83 y.o. female.     HPI Comments: Right ear pain is improved with the use of the ear drops    Earache    There is pain in the right ear. The current episode started in the past 7 days. The problem has been resolved. There has been no fever. The pain is at a severity of 0/10. The patient is experiencing no pain. Pertinent negatives include no abdominal pain, coughing, diarrhea, ear discharge, headaches, hearing loss, neck pain, rash, rhinorrhea, sore throat or vomiting. She has tried ear drops for the symptoms. The treatment provided significant relief.        The following portions of the patient's history were reviewed and updated as appropriate: allergies, current medications, past family history, past medical history, past social history, past surgical history and problem list.    Review of Systems   Constitutional: Negative.    HENT: Positive for ear pain. Negative for ear discharge, hearing loss, rhinorrhea and sore throat.    Respiratory: Negative.  Negative for cough.    Cardiovascular: Negative.    Gastrointestinal: Negative for abdominal pain, diarrhea and vomiting.   Musculoskeletal: Negative.  Negative for neck pain.   Skin: Negative.  Negative for rash.   Neurological: Negative.  Negative for headaches.   Psychiatric/Behavioral: Negative.        Objective   Physical Exam   Constitutional: She is oriented to person, place, and time. She appears well-developed and well-nourished. No distress.   HENT:   Head: Normocephalic.   Right Ear: Hearing, tympanic membrane, external ear and ear canal normal.   Left Ear: Hearing, tympanic membrane, external ear and ear canal normal.   Nose: Nose normal.   Mouth/Throat: Oropharynx is clear and moist.   There is no longer any swelling or redness right canal.   Neck: Normal range of motion. Neck supple.   Cardiovascular: Normal rate, regular rhythm and normal heart sounds.  Exam reveals no friction rub.    No murmur  heard.  Pulmonary/Chest: Effort normal and breath sounds normal. No respiratory distress. She has no wheezes. She has no rales.   Neurological: She is alert and oriented to person, place, and time.   Skin: Skin is warm and dry.   Psychiatric: She has a normal mood and affect. Thought content normal.   Nursing note and vitals reviewed.      Assessment/Plan   Kristy was seen today for earache.    Diagnoses and all orders for this visit:    Acute otitis externa of right ear, unspecified type    No further need for the drops since the right canal is normal appearing.

## 2017-10-19 ENCOUNTER — OFFICE VISIT (OUTPATIENT)
Dept: FAMILY MEDICINE CLINIC | Facility: CLINIC | Age: 82
End: 2017-10-19

## 2017-10-19 VITALS
HEART RATE: 67 BPM | HEIGHT: 60 IN | BODY MASS INDEX: 26.11 KG/M2 | OXYGEN SATURATION: 98 % | DIASTOLIC BLOOD PRESSURE: 73 MMHG | SYSTOLIC BLOOD PRESSURE: 150 MMHG | WEIGHT: 133 LBS | TEMPERATURE: 97.9 F

## 2017-10-19 DIAGNOSIS — J06.9 ACUTE URI: Primary | ICD-10-CM

## 2017-10-19 DIAGNOSIS — R05.9 COUGH: ICD-10-CM

## 2017-10-19 DIAGNOSIS — H61.21 IMPACTED CERUMEN OF RIGHT EAR: ICD-10-CM

## 2017-10-19 PROCEDURE — 99213 OFFICE O/P EST LOW 20 MIN: CPT | Performed by: NURSE PRACTITIONER

## 2017-10-19 RX ORDER — AZITHROMYCIN 250 MG/1
TABLET, FILM COATED ORAL
Qty: 6 TABLET | Refills: 0 | Status: SHIPPED | OUTPATIENT
Start: 2017-10-19 | End: 2017-11-17

## 2017-10-19 RX ORDER — GABAPENTIN 600 MG/1
TABLET ORAL
Refills: 5 | COMMUNITY
Start: 2017-10-18 | End: 2018-06-12

## 2017-10-19 RX ORDER — LORATADINE 10 MG/1
10 TABLET ORAL DAILY
Qty: 30 TABLET | Refills: 0 | Status: SHIPPED | OUTPATIENT
Start: 2017-10-19 | End: 2018-01-23

## 2017-10-19 RX ORDER — LANCETS 33 GAUGE
EACH MISCELLANEOUS
Refills: 3 | COMMUNITY
Start: 2017-07-31 | End: 2018-01-31 | Stop reason: ALTCHOICE

## 2017-10-19 RX ORDER — BENZONATATE 100 MG/1
100 CAPSULE ORAL 3 TIMES DAILY PRN
Qty: 30 CAPSULE | Refills: 0 | Status: SHIPPED | OUTPATIENT
Start: 2017-10-19 | End: 2017-11-17

## 2017-10-19 NOTE — PATIENT INSTRUCTIONS
"Upper Respiratory Infection, Adult  Most upper respiratory infections (URIs) are a viral infection of the air passages leading to the lungs. A URI affects the nose, throat, and upper air passages. The most common type of URI is nasopharyngitis and is typically referred to as \"the common cold.\"  URIs run their course and usually go away on their own. Most of the time, a URI does not require medical attention, but sometimes a bacterial infection in the upper airways can follow a viral infection. This is called a secondary infection. Sinus and middle ear infections are common types of secondary upper respiratory infections.  Bacterial pneumonia can also complicate a URI. A URI can worsen asthma and chronic obstructive pulmonary disease (COPD). Sometimes, these complications can require emergency medical care and may be life threatening.   CAUSES  Almost all URIs are caused by viruses. A virus is a type of germ and can spread from one person to another.   RISKS FACTORS  You may be at risk for a URI if:   · You smoke.    · You have chronic heart or lung disease.  · You have a weakened defense (immune) system.    · You are very young or very old.    · You have nasal allergies or asthma.  · You work in crowded or poorly ventilated areas.  · You work in health care facilities or schools.  SIGNS AND SYMPTOMS   Symptoms typically develop 2-3 days after you come in contact with a cold virus. Most viral URIs last 7-10 days. However, viral URIs from the influenza virus (flu virus) can last 14-18 days and are typically more severe. Symptoms may include:   · Runny or stuffy (congested) nose.    · Sneezing.    · Cough.    · Sore throat.    · Headache.    · Fatigue.    · Fever.    · Loss of appetite.    · Pain in your forehead, behind your eyes, and over your cheekbones (sinus pain).  · Muscle aches.    DIAGNOSIS   Your health care provider may diagnose a URI by:  · Physical exam.  · Tests to check that your symptoms are not due to " another condition such as:  ¨ Strep throat.  ¨ Sinusitis.  ¨ Pneumonia.  ¨ Asthma.  TREATMENT   A URI goes away on its own with time. It cannot be cured with medicines, but medicines may be prescribed or recommended to relieve symptoms. Medicines may help:  · Reduce your fever.  · Reduce your cough.  · Relieve nasal congestion.  HOME CARE INSTRUCTIONS   · Take medicines only as directed by your health care provider.    · Gargle warm saltwater or take cough drops to comfort your throat as directed by your health care provider.  · Use a warm mist humidifier or inhale steam from a shower to increase air moisture. This may make it easier to breathe.  · Drink enough fluid to keep your urine clear or pale yellow.    · Eat soups and other clear broths and maintain good nutrition.    · Rest as needed.    · Return to work when your temperature has returned to normal or as your health care provider advises. You may need to stay home longer to avoid infecting others. You can also use a face mask and careful hand washing to prevent spread of the virus.  · Increase the usage of your inhaler if you have asthma.    · Do not use any tobacco products, including cigarettes, chewing tobacco, or electronic cigarettes. If you need help quitting, ask your health care provider.  PREVENTION   The best way to protect yourself from getting a cold is to practice good hygiene.   · Avoid oral or hand contact with people with cold symptoms.    · Wash your hands often if contact occurs.    There is no clear evidence that vitamin C, vitamin E, echinacea, or exercise reduces the chance of developing a cold. However, it is always recommended to get plenty of rest, exercise, and practice good nutrition.   SEEK MEDICAL CARE IF:   · You are getting worse rather than better.    · Your symptoms are not controlled by medicine.    · You have chills.  · You have worsening shortness of breath.  · You have brown or red mucus.  · You have yellow or brown nasal  discharge.  · You have pain in your face, especially when you bend forward.  · You have a fever.  · You have swollen neck glands.  · You have pain while swallowing.  · You have white areas in the back of your throat.  SEEK IMMEDIATE MEDICAL CARE IF:   · You have severe or persistent:    Headache.    Ear pain.    Sinus pain.    Chest pain.  · You have chronic lung disease and any of the following:    Wheezing.    Prolonged cough.    Coughing up blood.    A change in your usual mucus.  · You have a stiff neck.  · You have changes in your:    Vision.    Hearing.    Thinking.    Mood.  MAKE SURE YOU:   · Understand these instructions.  · Will watch your condition.  · Will get help right away if you are not doing well or get worse.     This information is not intended to replace advice given to you by your health care provider. Make sure you discuss any questions you have with your health care provider.     Document Released: 06/13/2002 Document Revised: 05/03/2016 Document Reviewed: 03/25/2015  [employer] Interactive Patient Education ©2017 [employer]

## 2017-10-19 NOTE — PROGRESS NOTES
Subjective   Kristy Ramirez is a 83 y.o. female.     URI    This is a new problem. The current episode started in the past 7 days. The problem has been gradually worsening. Maximum temperature: not measured, but felt feverish at times. Associated symptoms include coughing ( dry), a plugged ear sensation ( right), rhinorrhea, sinus pain ( mild) and a sore throat. Pertinent negatives include no abdominal pain, chest pain, congestion, diarrhea, dysuria, ear pain, headaches, joint pain, joint swelling, nausea, neck pain, rash, sneezing, swollen glands, vomiting or wheezing. Treatments tried: cough drops. The treatment provided mild relief.   Cough   This is a new problem. Episode onset: x 2-3 days. The problem has been gradually worsening. The cough is non-productive. Associated symptoms include chills, ear congestion, myalgias ( mild), rhinorrhea and a sore throat. Pertinent negatives include no chest pain, ear pain, headaches, heartburn, hemoptysis, nasal congestion, postnasal drip, rash, shortness of breath, sweats, weight loss or wheezing. Fever:  not measured, but felt feverish earlier today.        The following portions of the patient's history were reviewed and updated as appropriate: allergies, current medications, past medical history, past social history, past surgical history and problem list.    Review of Systems   Constitutional: Positive for chills. Negative for activity change, appetite change, fatigue and weight loss. Fever:  not measured, but felt feverish earlier today.   HENT: Positive for rhinorrhea, sinus pain ( mild), sinus pressure ( mild maxillary) and sore throat. Negative for congestion, ear pain, postnasal drip and sneezing.    Eyes: Negative for discharge and itching.   Respiratory: Positive for cough ( dry) and chest tightness ( mild). Negative for hemoptysis, shortness of breath and wheezing.    Cardiovascular: Negative for chest pain.   Gastrointestinal: Negative for abdominal pain,  "diarrhea, heartburn, nausea and vomiting.   Genitourinary: Negative for dysuria.   Musculoskeletal: Positive for myalgias ( mild). Negative for joint pain and neck pain.   Skin: Negative for rash.   Neurological: Negative for dizziness and headaches.   Hematological: Negative for adenopathy.       Objective    /73 (BP Location: Left arm, Patient Position: Sitting, Cuff Size: Adult)  Pulse 67  Temp 97.9 °F (36.6 °C) (Tympanic)   Ht 60\" (152.4 cm)  Wt 133 lb (60.3 kg)  SpO2 98%  BMI 25.97 kg/m2    Physical Exam   Constitutional: She is oriented to person, place, and time. No distress.   HENT:   Head: Normocephalic and atraumatic.   Left Ear: Tympanic membrane and ear canal normal.   Nose: Mucosal edema present. Right sinus exhibits maxillary sinus tenderness ( mild). Right sinus exhibits no frontal sinus tenderness. Left sinus exhibits maxillary sinus tenderness ( mild). Left sinus exhibits no frontal sinus tenderness.   Mouth/Throat: Mucous membranes are normal. Posterior oropharyngeal erythema ( mild injection with PND) present.   Right canal impacted with cerumen.  Irrigation with 1:1 solution of warm water/peroxide performed by nurse. Complete removal of cerumen noted.  TM intact, light reflex present, no erythema.  Patient tolerated well.    Eyes: Right eye exhibits no discharge. Left eye exhibits no discharge.   Cardiovascular: Normal rate and regular rhythm.    Pulmonary/Chest: Effort normal. She has no wheezes. She has no rales.   Dry, tight cough noted     Lymphadenopathy:     She has no cervical adenopathy.   Neurological: She is alert and oriented to person, place, and time.   Nursing note and vitals reviewed.      Assessment/Plan   Kristy was seen today for nasal congestion, uri, sore throat, cough and fever.    Diagnoses and all orders for this visit:    Acute URI  -     azithromycin (ZITHROMAX Z-RICARDO) 250 MG tablet; Take 2 tablets the first day, then 1 tablet daily for 4 days.  -     loratadine " (CLARITIN) 10 MG tablet; Take 1 tablet by mouth Daily.    Cough  -     benzonatate (TESSALON PERLES) 100 MG capsule; Take 1 capsule by mouth 3 (Three) Times a Day As Needed for Cough.    Impacted cerumen of right ear    Push fluids  Rest  Rx for Zithromax, tessalon perles, claritin.  May resume Flonase you have at home.     See PCP or RTC if symptoms persist/worsen.

## 2017-10-23 ENCOUNTER — OFFICE VISIT (OUTPATIENT)
Dept: FAMILY MEDICINE CLINIC | Facility: CLINIC | Age: 82
End: 2017-10-23

## 2017-10-23 VITALS
BODY MASS INDEX: 25.97 KG/M2 | TEMPERATURE: 97.7 F | HEART RATE: 67 BPM | SYSTOLIC BLOOD PRESSURE: 161 MMHG | WEIGHT: 133 LBS | DIASTOLIC BLOOD PRESSURE: 73 MMHG

## 2017-10-23 DIAGNOSIS — L29.9 ITCHING: Primary | ICD-10-CM

## 2017-10-23 DIAGNOSIS — R20.9 BILATERAL COLD FEET: ICD-10-CM

## 2017-10-23 PROCEDURE — 99213 OFFICE O/P EST LOW 20 MIN: CPT | Performed by: NURSE PRACTITIONER

## 2017-10-23 RX ORDER — HYDROXYZINE PAMOATE 25 MG/1
25 CAPSULE ORAL
Qty: 30 CAPSULE | Refills: 0 | Status: SHIPPED | OUTPATIENT
Start: 2017-10-23 | End: 2018-04-30

## 2017-10-23 NOTE — PROGRESS NOTES
Subjective   Kristy Ramirez is a 83 y.o. female.     HPI Comments: C/O cold feet, especially at night.  Reports hx of anemia, but not currently not on any type of iron supplement.  Sees Dr. Huffman (nephrology) and reports labs done at that office about a month ago.      Itching   This is a chronic (for several months) problem. Episode frequency: daily, but mostly at night--keeps her awake and only getting 3-4 hours of sleep. The problem has been unchanged. Associated symptoms include coughing ( improving) and fatigue ( daytime fatigue). Pertinent negatives include no abdominal pain, anorexia, arthralgias, change in bowel habit, chest pain, chills, fever, headaches, nausea, rash, urinary symptoms or weakness. Associated symptoms comments: Recent URI, symptoms improving  . Treatments tried: benadryl. The treatment provided no relief.        The following portions of the patient's history were reviewed and updated as appropriate: allergies, current medications, past medical history, past social history, past surgical history and problem list.    Review of Systems   Constitutional: Positive for fatigue ( daytime fatigue). Negative for appetite change, chills and fever.   HENT:        URI symptoms improving significantly     Eyes: Negative for discharge and itching.   Respiratory: Positive for cough ( improving). Negative for chest tightness, shortness of breath and wheezing.    Cardiovascular: Negative for chest pain and leg swelling.   Gastrointestinal: Negative for abdominal pain, anorexia, change in bowel habit, diarrhea and nausea.   Genitourinary: Negative for difficulty urinating.   Musculoskeletal: Negative for arthralgias.        Chronic back pain--sees neurology     Skin: Positive for itching. Negative for rash.        Chronic itching of feet/legs     Neurological: Negative for dizziness, weakness, light-headedness and headaches.   Hematological: Negative for adenopathy.       Objective    /73 (BP  Location: Left arm, Patient Position: Sitting, Cuff Size: Adult)  Pulse 67  Temp 97.7 °F (36.5 °C) (Tympanic)   Wt 133 lb (60.3 kg)  BMI 25.97 kg/m2    Physical Exam   Constitutional: She is oriented to person, place, and time. She appears well-developed and well-nourished. No distress.   HENT:   Head: Normocephalic and atraumatic.   Right Ear: Tympanic membrane and ear canal normal.   Left Ear: Tympanic membrane and ear canal normal.   Eyes: Conjunctivae are normal. Right eye exhibits no discharge. Left eye exhibits no discharge.   Neck: No thyromegaly present.   Cardiovascular: Normal rate and regular rhythm.    Pulmonary/Chest: Effort normal and breath sounds normal. She has no wheezes. She has no rales.   Lymphadenopathy:     She has no cervical adenopathy.   Neurological: She is alert and oriented to person, place, and time.   Skin:   Exam of bilateral feet/lower legs: No rash or scaling noted.  Skin slightly cool to touch. Dorsalis Pedal pulses present bilaterally.     Nursing note and vitals reviewed.      Assessment/Plan   Kristy was seen today for itching and cold feet.    Diagnoses and all orders for this visit:    Itching  -     hydrOXYzine (VISTARIL) 25 MG capsule; Take 1 capsule by mouth every night at bedtime.    Bilateral cold feet    D/C Benadryl, will try Vistaril at bedtime only for itching.    Will request recent labs from nephrologist and review.  Will call patient with those results and discuss any recommended further lab testing.

## 2017-10-26 ENCOUNTER — DOCUMENTATION (OUTPATIENT)
Dept: FAMILY MEDICINE CLINIC | Facility: CLINIC | Age: 82
End: 2017-10-26

## 2017-10-26 NOTE — PROGRESS NOTES
Reviewed recent lab work:   9/28/17: BMP WNL except BUN 24/CR 1.27                CBC: WNL except Hgb/Hct 10.5/33.5    6/20/17:  BMP: BUN/CR: 30/1.78                 CBC: Hgb/Hct: 11.1/34.7    Called and discussed results with patient that labs appear fairly stable.  She is due for repeat blood work in December.    Still c/o itching and reports that Vistaril hasn't helped much.  Reports that she is normally using bath and body works lotions and I have suggested stopping these and switching to Aveeno anti itch lotion daily to see if it of benefit.     Will schedule appt for f/u of itching with CATHERINE Barney for recheck.

## 2017-10-27 DIAGNOSIS — E11.9 TYPE 2 DIABETES MELLITUS WITHOUT COMPLICATION, WITHOUT LONG-TERM CURRENT USE OF INSULIN (HCC): Primary | ICD-10-CM

## 2017-11-07 ENCOUNTER — OFFICE VISIT (OUTPATIENT)
Dept: FAMILY MEDICINE CLINIC | Facility: CLINIC | Age: 82
End: 2017-11-07

## 2017-11-07 VITALS
HEIGHT: 60 IN | TEMPERATURE: 98.5 F | SYSTOLIC BLOOD PRESSURE: 122 MMHG | HEART RATE: 66 BPM | WEIGHT: 138 LBS | RESPIRATION RATE: 18 BRPM | OXYGEN SATURATION: 98 % | BODY MASS INDEX: 27.09 KG/M2 | DIASTOLIC BLOOD PRESSURE: 64 MMHG

## 2017-11-07 DIAGNOSIS — E11.8 TYPE 2 DIABETES MELLITUS WITH COMPLICATION, WITHOUT LONG-TERM CURRENT USE OF INSULIN (HCC): ICD-10-CM

## 2017-11-07 DIAGNOSIS — R09.81 NASAL CONGESTION: Primary | ICD-10-CM

## 2017-11-07 DIAGNOSIS — I10 ESSENTIAL HYPERTENSION: ICD-10-CM

## 2017-11-07 PROCEDURE — 82043 UR ALBUMIN QUANTITATIVE: CPT | Performed by: NURSE PRACTITIONER

## 2017-11-07 PROCEDURE — 83036 HEMOGLOBIN GLYCOSYLATED A1C: CPT | Performed by: NURSE PRACTITIONER

## 2017-11-07 PROCEDURE — 80053 COMPREHEN METABOLIC PANEL: CPT | Performed by: NURSE PRACTITIONER

## 2017-11-07 PROCEDURE — 99214 OFFICE O/P EST MOD 30 MIN: CPT | Performed by: NURSE PRACTITIONER

## 2017-11-07 PROCEDURE — 36415 COLL VENOUS BLD VENIPUNCTURE: CPT | Performed by: NURSE PRACTITIONER

## 2017-11-07 RX ORDER — GUAIFENESIN 600 MG/1
1200 TABLET, EXTENDED RELEASE ORAL 2 TIMES DAILY
Qty: 20 TABLET | Refills: 1 | Status: SHIPPED | OUTPATIENT
Start: 2017-11-07 | End: 2017-11-17

## 2017-11-07 NOTE — PROGRESS NOTES
Subjective   Kristy Ramirez is a 83 y.o. female.     HPI Comments: Here today for wili on her dm.  Her b/s is reported to run about 120 to 130.  She has had an upper resp infection and was treated by the walk in provider.  She still has some cough, but nasal drainage and sputum is clear.    Hypertension   This is a chronic problem. The current episode started more than 1 year ago. The problem is controlled. Pertinent negatives include no anxiety, blurred vision, chest pain, headaches, malaise/fatigue, neck pain, orthopnea, palpitations, peripheral edema, PND, shortness of breath or sweats. There are no associated agents to hypertension. Risk factors for coronary artery disease include diabetes mellitus, post-menopausal state and sedentary lifestyle. Past treatments include angiotensin blockers, calcium channel blockers and diuretics. The current treatment provides significant improvement. There are no compliance problems.  There is no history of angina, kidney disease, CAD/MI, CVA, heart failure, left ventricular hypertrophy, PVD or retinopathy.   URI    This is a new problem. The current episode started in the past 7 days. The problem has been waxing and waning. There has been no fever. Associated symptoms include congestion, coughing and rhinorrhea. Pertinent negatives include no abdominal pain, chest pain, diarrhea, dysuria, ear pain, headaches, joint pain, joint swelling, nausea, neck pain, plugged ear sensation, rash, sinus pain, sneezing, sore throat, swollen glands, vomiting or wheezing.   Diabetes   She presents for her follow-up diabetic visit. She has type 2 diabetes mellitus. Her disease course has been stable. There are no hypoglycemic associated symptoms. Pertinent negatives for hypoglycemia include no headaches or sweats. There are no diabetic associated symptoms. Pertinent negatives for diabetes include no blurred vision and no chest pain. There are no hypoglycemic complications. Symptoms are stable.  There are no diabetic complications. Pertinent negatives for diabetic complications include no CVA, PVD or retinopathy. Risk factors for coronary artery disease include diabetes mellitus, hypertension, sedentary lifestyle and post-menopausal. Current diabetic treatment includes oral agent (monotherapy). She is compliant with treatment all of the time. She is following a diabetic diet. Meal planning includes avoidance of concentrated sweets. She has not had a previous visit with a dietitian. She rarely participates in exercise. She monitors blood glucose at home 1-2 x per day. Her breakfast blood glucose is taken between 7-8 am. Her breakfast blood glucose range is generally 130-140 mg/dl. An ACE inhibitor/angiotensin II receptor blocker is being taken. She does not see a podiatrist.Eye exam is not current.        The following portions of the patient's history were reviewed and updated as appropriate: allergies, current medications, past family history, past medical history, past social history, past surgical history and problem list.    Review of Systems   Constitutional: Negative.  Negative for malaise/fatigue.   HENT: Positive for congestion and rhinorrhea. Negative for ear pain, sinus pain, sinus pressure, sneezing and sore throat.    Eyes: Negative for blurred vision.   Respiratory: Positive for cough. Negative for shortness of breath and wheezing.    Cardiovascular: Negative.  Negative for chest pain, palpitations, orthopnea and PND.   Gastrointestinal: Negative for abdominal pain, diarrhea, nausea and vomiting.   Genitourinary: Negative for dysuria.   Musculoskeletal: Negative.  Negative for joint pain and neck pain.   Skin: Negative.  Negative for rash.   Neurological: Negative for headaches.   Psychiatric/Behavioral: Negative.        Objective   Physical Exam   Constitutional: She is oriented to person, place, and time. She appears well-developed and well-nourished. No distress.   HENT:   Head:  Normocephalic.   Right Ear: Hearing, tympanic membrane, external ear and ear canal normal. Tympanic membrane is not injected.   Left Ear: Hearing, tympanic membrane, external ear and ear canal normal. Tympanic membrane is not injected.   Nose: Rhinorrhea present. Right sinus exhibits no maxillary sinus tenderness and no frontal sinus tenderness. Left sinus exhibits no maxillary sinus tenderness and no frontal sinus tenderness.   Mouth/Throat: Oropharynx is clear and moist. No oropharyngeal exudate.   Eyes: EOM are normal. Pupils are equal, round, and reactive to light.   Neck: Normal range of motion. Neck supple. No thyromegaly present.   Cardiovascular: Normal rate, regular rhythm and normal heart sounds.  Exam reveals no friction rub.    No murmur heard.  Pulmonary/Chest: Effort normal and breath sounds normal. No respiratory distress. She has no wheezes. She has no rales.   Abdominal: Soft.   Musculoskeletal: Normal range of motion.   Neurological: She is alert and oriented to person, place, and time.   Skin: Skin is warm and dry.   Psychiatric: She has a normal mood and affect. Thought content normal.   Nursing note and vitals reviewed.      Assessment/Plan   Kristy was seen today for hypertension, depression and heartburn.    Diagnoses and all orders for this visit:    Nasal congestion  -     guaiFENesin (MUCINEX) 600 MG 12 hr tablet; Take 2 tablets by mouth 2 (Two) Times a Day.    Type 2 diabetes mellitus with complication, without long-term current use of insulin  -     Hemoglobin A1c  -     Comprehensive metabolic panel  -     MicroAlbumin, Urine, Random - Urine, Clean Catch    Essential hypertension      She reports she will make her own eye dr appointment.

## 2017-11-08 LAB
ALBUMIN SERPL-MCNC: 4 G/DL (ref 3.4–4.8)
ALBUMIN UR-MCNC: 1.6 MG/L
ALBUMIN/GLOB SERPL: 1.3 G/DL (ref 1.1–1.8)
ALP SERPL-CCNC: 92 U/L (ref 38–126)
ALT SERPL W P-5'-P-CCNC: 19 U/L (ref 9–52)
ANION GAP SERPL CALCULATED.3IONS-SCNC: 14 MMOL/L (ref 5–15)
AST SERPL-CCNC: 18 U/L (ref 14–36)
BILIRUB SERPL-MCNC: 0.3 MG/DL (ref 0.2–1.3)
BUN BLD-MCNC: 17 MG/DL (ref 7–21)
BUN/CREAT SERPL: 16 (ref 7–25)
CALCIUM SPEC-SCNC: 9.8 MG/DL (ref 8.4–10.2)
CHLORIDE SERPL-SCNC: 99 MMOL/L (ref 95–110)
CO2 SERPL-SCNC: 27 MMOL/L (ref 22–31)
CREAT BLD-MCNC: 1.06 MG/DL (ref 0.5–1)
GFR SERPL CREATININE-BSD FRML MDRD: 60 ML/MIN/1.73 (ref 39–90)
GLOBULIN UR ELPH-MCNC: 3.2 GM/DL (ref 2.3–3.5)
GLUCOSE BLD-MCNC: 104 MG/DL (ref 60–100)
HBA1C MFR BLD: 6 % (ref 4–5.6)
POTASSIUM BLD-SCNC: 3.7 MMOL/L (ref 3.5–5.1)
PROT SERPL-MCNC: 7.2 G/DL (ref 6.3–8.6)
SODIUM BLD-SCNC: 140 MMOL/L (ref 137–145)

## 2017-11-17 ENCOUNTER — OFFICE VISIT (OUTPATIENT)
Dept: FAMILY MEDICINE CLINIC | Facility: CLINIC | Age: 82
End: 2017-11-17

## 2017-11-17 VITALS
BODY MASS INDEX: 26.5 KG/M2 | SYSTOLIC BLOOD PRESSURE: 147 MMHG | HEART RATE: 70 BPM | HEIGHT: 60 IN | DIASTOLIC BLOOD PRESSURE: 69 MMHG | TEMPERATURE: 97.8 F | WEIGHT: 135 LBS | OXYGEN SATURATION: 98 %

## 2017-11-17 DIAGNOSIS — B37.0 ORAL THRUSH: Primary | ICD-10-CM

## 2017-11-17 PROCEDURE — 99213 OFFICE O/P EST LOW 20 MIN: CPT | Performed by: NURSE PRACTITIONER

## 2017-11-17 RX ORDER — FLUCONAZOLE 100 MG/1
100 TABLET ORAL EVERY OTHER DAY
Qty: 3 TABLET | Refills: 0 | Status: SHIPPED | OUTPATIENT
Start: 2017-11-17 | End: 2017-11-27

## 2017-11-17 NOTE — PROGRESS NOTES
"Subjective   Kristy Ramirez is a 83 y.o. female.     HPI Comments: Recently released from hospital for workup from acute onset right arm/shoulder/neck/head pain.  Reports that CT scan was normal and temporal artery biopsy was negative.  Pain is better and has f/u with neurology (Dr. Clark) in a couple of weeks.     Woke up this AM with irritation in throat, roof of mouth, tongue and on lips.  Does wear dentures.     Sore Throat    This is a new problem. The current episode started today. The problem has been unchanged. There has been no fever. The patient is experiencing no pain (\"irritation\"). Associated symptoms include headaches ( getting better). Pertinent negatives include no abdominal pain, congestion, coughing, diarrhea, drooling, ear discharge, ear pain, hoarse voice, plugged ear sensation, neck pain, shortness of breath, stridor, swollen glands, trouble swallowing or vomiting. She has had no exposure to strep or mono. She has tried nothing for the symptoms.        The following portions of the patient's history were reviewed and updated as appropriate: allergies, current medications, past medical history, past social history, past surgical history and problem list.    Review of Systems   Constitutional: Negative for activity change, appetite change, fatigue and fever.   HENT: Positive for mouth sores ( palate) and sore throat. Negative for congestion, drooling, ear discharge, ear pain, hoarse voice and trouble swallowing.         Lips dry and irritated     Eyes: Negative for discharge and itching.   Respiratory: Negative for cough, chest tightness, shortness of breath and stridor.    Cardiovascular: Negative for chest pain.   Gastrointestinal: Negative for abdominal pain, diarrhea, nausea and vomiting.   Musculoskeletal: Negative for neck pain.   Skin: Positive for rash ( around mouth).   Neurological: Positive for headaches ( getting better).       Objective    /69 (BP Location: Left arm, Patient " "Position: Sitting, Cuff Size: Adult)  Pulse 70  Temp 97.8 °F (36.6 °C) (Tympanic)   Ht 60\" (152.4 cm)  Wt 135 lb (61.2 kg)  SpO2 98%  BMI 26.37 kg/m2    Physical Exam   Constitutional: She is oriented to person, place, and time. No distress.   HENT:   Head:       Mouth/Throat: Uvula is midline. She has dentures ( upper removed for exam).       Cardiovascular: Normal rate and regular rhythm.    Pulmonary/Chest: Effort normal and breath sounds normal. She has no wheezes. She has no rales.   Musculoskeletal:   Using cane to help ambulate today     Lymphadenopathy:     She has no cervical adenopathy.   Neurological: She is alert and oriented to person, place, and time.   Nursing note and vitals reviewed.      Assessment/Plan   Kristy was seen today for allergic reaction and sore throat.    Diagnoses and all orders for this visit:    Oral thrush  -     nystatin (MYCOSTATIN) 773451 UNIT/ML suspension; Swish and swallow 5 mL 4 (Four) Times a Day.  -     fluconazole (DIFLUCAN) 100 MG tablet; Take 1 tablet by mouth Every Other Day.    Rx for Diflucan and Nystatin oral rinse.  May soak cotton ball in Nystatin and apply to outer lips following oral rinses.     See PCP if symptoms persist/worsen.            "

## 2017-11-17 NOTE — PATIENT INSTRUCTIONS
Thrush, Adult  Thrush, also called oral candidiasis, is a fungal infection that develops in the mouth and throat and on the tongue. It causes white patches to form on the mouth and tongue. Thrush is most common in older adults, but it can occur at any age.   Many cases of thrush are mild, but this infection can also be more serious. Thrush can be a recurring problem for people who have chronic illnesses or who take medicines that limit the body's ability to fight infection. Because these people have difficulty fighting infections, the fungus that causes thrush can spread throughout the body. This can cause life-threatening blood or organ infections.  CAUSES   Thrush is usually caused by a yeast called Leyla albicans. This fungus is normally present in small amounts in the mouth and on other mucous membranes. It usually causes no harm. However, when conditions are present that allow the fungus to grow uncontrolled, it invades surrounding tissues and becomes an infection. Less often, other Candida species can also lead to thrush.   RISK FACTORS  Thrush is more likely to develop in the following people:  · People with an impaired ability to fight infection (weakened immune system).    · Older adults.    · People with HIV.    · People with diabetes.    · People with dry mouth (xerostomia).    · Pregnant women.    · People with poor dental care, especially those who have false teeth.    · People who use antibiotic medicines.    SIGNS AND SYMPTOMS   Thrush can be a mild infection that causes no symptoms. If symptoms develop, they may include:   · A burning feeling in the mouth and throat. This can occur at the start of a thrush infection.    · White patches that adhere to the mouth and tongue. The tissue around the patches may be red, raw, and painful. If rubbed (during tooth brushing, for example), the patches and the tissue of the mouth may bleed easily.    · A bad taste in the mouth or difficulty tasting foods.     · Cottony feeling in the mouth.    · Pain during eating and swallowing.  DIAGNOSIS   Your health care provider can usually diagnose thrush by looking in your mouth and asking you questions about your health.   TREATMENT   Medicines that help prevent the growth of fungi (antifungals) are the standard treatment for thrush. These medicines are either applied directly to the affected area (topical) or swallowed (oral). The treatment will depend on the severity of the condition.   Mild Thrush  Mild cases of thrush may clear up with the use of an antifungal mouth rinse or lozenges. Treatment usually lasts about 14 days.   Moderate to Severe Thrush  · More severe thrush infections that have spread to the esophagus are treated with an oral antifungal medicine. A topical antifungal medicine may also be used.    · For some severe infections, a treatment period longer than 14 days may be needed.    · Oral antifungal medicines are almost never used during pregnancy because the fetus may be harmed. However, if a pregnant woman has a rare, severe thrush infection that has spread to her blood, oral antifungal medicines may be used. In this case, the risk of harm to the mother and fetus from the severe thrush infection may be greater than the risk posed by the use of antifungal medicines.    Persistent or Recurrent Thrush  For cases of thrush that do not go away or keep coming back, treatment may involve the following:   · Treatment may be needed twice as long as the symptoms last.    · Treatment will include both oral and topical antifungal medicines.    · People with weakened immune systems can take an antifungal medicine on a continuous basis to prevent thrush infections.    It is important to treat conditions that make you more likely to get thrush, such as diabetes or HIV.   HOME CARE INSTRUCTIONS   ·  Only take over-the-counter or prescription medicine as directed by your health care provider. Talk to your health care  provider about an over-the-counter medicine called gentian violet, which kills bacteria and fungi.    · Eat plain, unflavored yogurt as directed by your health care provider. Check the label to make sure the yogurt contains live cultures. This yogurt can help healthy bacteria grow in the mouth that can stop the growth of the fungus that causes thrush.    · Try these measures to help reduce the discomfort of thrush:      Drink cold liquids such as water or iced tea.      Try flavored ice treats or frozen juices.      Eat foods that are easy to swallow, such as gelatin, ice cream, or custard.      If the patches in your mouth are painful, try drinking from a straw.    · Rinse your mouth several times a day with a warm saltwater rinse. You can make the saltwater mixture with 1 tsp (6 g) of salt in 8 fl oz (0.2 L) of warm water.  · If you wear dentures, remove the dentures before going to bed, brush them vigorously, and soak them in a cleaning solution as directed by your health care provider.    · Women who are breastfeeding should clean their nipples with an antifungal medicine as directed by their health care provider. Dry the nipples after breastfeeding. Applying lanolin-containing body lotion may help relieve nipple soreness.    SEEK MEDICAL CARE IF:  · Your symptoms are getting worse or are not improving within 7 days of starting treatment.    · You have symptoms of spreading infection, such as white patches on the skin outside of the mouth.    · You are nursing and you have redness, burning, or pain in the nipples that is not relieved with treatment.    MAKE SURE YOU:  · Understand these instructions.  · Will watch your condition.  · Will get help right away if you are not doing well or get worse.     This information is not intended to replace advice given to you by your health care provider. Make sure you discuss any questions you have with your health care provider.     Document Released: 09/12/2005 Document  Revised: 04/10/2017 Document Reviewed: 07/21/2014  Elsevier Interactive Patient Education ©2017 [employer]

## 2017-11-27 ENCOUNTER — OFFICE VISIT (OUTPATIENT)
Dept: FAMILY MEDICINE CLINIC | Facility: CLINIC | Age: 82
End: 2017-11-27

## 2017-11-27 VITALS
HEIGHT: 60 IN | TEMPERATURE: 98.6 F | HEART RATE: 75 BPM | SYSTOLIC BLOOD PRESSURE: 147 MMHG | OXYGEN SATURATION: 98 % | WEIGHT: 135 LBS | DIASTOLIC BLOOD PRESSURE: 75 MMHG | BODY MASS INDEX: 26.5 KG/M2

## 2017-11-27 DIAGNOSIS — R53.1 WEAKNESS: ICD-10-CM

## 2017-11-27 DIAGNOSIS — R52 PAIN: Primary | ICD-10-CM

## 2017-11-27 DIAGNOSIS — R58 ECCHYMOSIS: ICD-10-CM

## 2017-11-27 PROCEDURE — 99212 OFFICE O/P EST SF 10 MIN: CPT | Performed by: NURSE PRACTITIONER

## 2017-11-27 NOTE — PROGRESS NOTES
Subjective   Kristy Ramirez is a 83 y.o. female.     HPI Comments: She is brought in by her granddaughter who is very concerned about her as she has had intermittent pain over the last year and head, neck and upper back pain the last few weeks.  Was hospitalized for 5 days earlier in the month at [facility] and had temporal artery biopsy done that was negative and was scheduled for f/u appt with neurology tomorrow.  Granddaughter reports worsening pain, weakness and weight loss and that she noted some bruising over her back and neck this AM for the first time and became very concerned.     Pain   This is a recurrent problem. The current episode started more than 1 year ago. Episode frequency: has developed head, neck and back pain over the last few weeks that is constant and has become debilitating. The problem has been rapidly worsening. Associated symptoms include anorexia ( no appetite), arthralgias, fatigue, headaches, neck pain and weakness. Pertinent negatives include no abdominal pain, chest pain, chills, congestion, coughing, diaphoresis, fever, joint swelling, myalgias, nausea, numbness, rash, sore throat, swollen glands, urinary symptoms, vertigo, visual change or vomiting. Treatments tried: gabapentin is the only thing that has been helping.        The following portions of the patient's history were reviewed and updated as appropriate: allergies, current medications, past medical history, past social history, past surgical history and problem list.    Review of Systems   Constitutional: Positive for activity change, appetite change, fatigue and unexpected weight change. Negative for chills, diaphoresis and fever.   HENT: Negative for congestion and sore throat.    Respiratory: Negative for cough, shortness of breath and wheezing.    Cardiovascular: Negative for chest pain, palpitations and leg swelling.   Gastrointestinal: Positive for anorexia ( no appetite). Negative for abdominal pain, nausea and vomiting.  "  Musculoskeletal: Positive for arthralgias, back pain, gait problem ( having to use cane all the time now), neck pain and neck stiffness. Negative for joint swelling and myalgias.   Skin: Negative for rash.        +bruising beginning today     Neurological: Positive for weakness and headaches. Negative for vertigo and numbness.       Objective    /75 (BP Location: Left arm, Patient Position: Sitting, Cuff Size: Adult)  Pulse 75  Temp 98.6 °F (37 °C) (Tympanic)   Ht 60\" (152.4 cm)  Wt 135 lb (61.2 kg)  SpO2 98%  BMI 26.37 kg/m2    Physical Exam   Constitutional: She is oriented to person, place, and time. Distressed: appears chronically ill.   Musculoskeletal:   Very weak, using cane to ambulate--refused wheelchair     Neurological: She is alert and oriented to person, place, and time.   Skin:        Nursing note and vitals reviewed.      Assessment/Plan   Kristy was seen today for back pain and neck pain.    Diagnoses and all orders for this visit:    Pain    Weakness    Ecchymosis      Have recommended f/u in ER for further work up and evaluation especially in light of worsening pain and development of ecchymosis today.  Granddaughter and patient are agreeable and will transfer by private vehicle.  Granddaughter reports she plans to take her to [facility] in [place].         "

## 2017-12-01 ENCOUNTER — OFFICE VISIT (OUTPATIENT)
Dept: FAMILY MEDICINE CLINIC | Facility: CLINIC | Age: 82
End: 2017-12-01

## 2017-12-01 VITALS
TEMPERATURE: 98.1 F | HEIGHT: 60 IN | WEIGHT: 138 LBS | OXYGEN SATURATION: 98 % | DIASTOLIC BLOOD PRESSURE: 64 MMHG | SYSTOLIC BLOOD PRESSURE: 148 MMHG | RESPIRATION RATE: 20 BRPM | BODY MASS INDEX: 27.09 KG/M2 | HEART RATE: 72 BPM

## 2017-12-01 DIAGNOSIS — M54.2 NECK PAIN: Primary | ICD-10-CM

## 2017-12-01 PROCEDURE — 99214 OFFICE O/P EST MOD 30 MIN: CPT | Performed by: NURSE PRACTITIONER

## 2017-12-01 RX ORDER — PREDNISONE 20 MG/1
20 TABLET ORAL
Refills: 1 | COMMUNITY
Start: 2017-11-28 | End: 2018-04-30

## 2017-12-01 RX ORDER — OXYCODONE HYDROCHLORIDE 5 MG/1
5 TABLET ORAL AS NEEDED
Refills: 0 | COMMUNITY
Start: 2017-11-28 | End: 2018-06-08

## 2017-12-01 NOTE — PROGRESS NOTES
Subjective   Kristy Ramirez is a 83 y.o. female.     HPI Comments: Here today for wili.  She has seen dr lane for her neck pain and she is in the process of getting and appointment with a neurosurgeon.    Neck Pain    This is a chronic problem. The current episode started more than 1 year ago. The problem occurs constantly. The problem has been unchanged. The pain is associated with nothing. The pain is present in the anterior neck. The quality of the pain is described as aching. The pain is at a severity of 5/10. The pain is moderate. The symptoms are aggravated by position. The pain is same all the time. Stiffness is present in the morning. Pertinent negatives include no chest pain, fever, headaches, leg pain, pain with swallowing, paresis, photophobia, syncope, trouble swallowing, visual change, weakness or weight loss. She has tried muscle relaxants and oral narcotics for the symptoms. The treatment provided moderate relief.        The following portions of the patient's history were reviewed and updated as appropriate: allergies, current medications, past family history, past medical history, past social history, past surgical history and problem list.    Review of Systems   Constitutional: Negative.  Negative for fever and weight loss.   HENT: Negative.  Negative for trouble swallowing.    Eyes: Negative for photophobia.   Respiratory: Negative.    Cardiovascular: Negative.  Negative for chest pain and syncope.   Musculoskeletal: Positive for neck pain.   Skin: Negative.    Neurological: Negative.  Negative for weakness and headaches.   Psychiatric/Behavioral: Negative.        Objective   Physical Exam   Constitutional: She is oriented to person, place, and time. She appears well-developed and well-nourished. No distress.   HENT:   Head: Normocephalic.   Eyes: Pupils are equal, round, and reactive to light.   Neck: Normal range of motion. No thyromegaly present.   Cardiovascular: Normal rate, regular rhythm  and normal heart sounds.  Exam reveals no friction rub.    No murmur heard.  Pulmonary/Chest: Effort normal and breath sounds normal. No respiratory distress. She has no wheezes. She has no rales.   Abdominal: Soft.   Musculoskeletal:        Cervical back: She exhibits decreased range of motion, tenderness, pain and spasm.   Neurological: She is alert and oriented to person, place, and time.   Skin: Skin is warm and dry.   Psychiatric: She has a normal mood and affect. Thought content normal.   Nursing note and vitals reviewed.      Assessment/Plan   Kristy was seen today for neck pain.    Diagnoses and all orders for this visit:    Neck pain    Have reviewed her mri done by dr lane.  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

## 2017-12-12 ENCOUNTER — TELEPHONE (OUTPATIENT)
Dept: FAMILY MEDICINE CLINIC | Facility: CLINIC | Age: 82
End: 2017-12-12

## 2017-12-12 NOTE — TELEPHONE ENCOUNTER
Patient called left message on answering machine but no updated number tried both numbers on account both dis connected

## 2017-12-14 ENCOUNTER — TELEPHONE (OUTPATIENT)
Dept: FAMILY MEDICINE CLINIC | Facility: CLINIC | Age: 82
End: 2017-12-14

## 2017-12-14 NOTE — TELEPHONE ENCOUNTER
----- Message from Myranda Iniguez LPN sent at 12/14/2017 10:42 AM CST -----  Regarding: Request Call  Patient called requesting a call back re: med for fibromyalgia and unable to sleep, feet hot and cold.    Patient gave call back number:  [phone]

## 2017-12-14 NOTE — TELEPHONE ENCOUNTER
Contacted patient back her feet and legs are bothering her more, can not sleep a lot of tingling and they feel hot one minute and cold the next is there anything else you can give her.

## 2018-01-11 DIAGNOSIS — E11.9 TYPE 2 DIABETES MELLITUS WITHOUT COMPLICATION, WITHOUT LONG-TERM CURRENT USE OF INSULIN (HCC): ICD-10-CM

## 2018-01-15 DIAGNOSIS — M10.9 GOUT, UNSPECIFIED CAUSE, UNSPECIFIED CHRONICITY, UNSPECIFIED SITE: ICD-10-CM

## 2018-01-15 DIAGNOSIS — K21.9 GASTROESOPHAGEAL REFLUX DISEASE, ESOPHAGITIS PRESENCE NOT SPECIFIED: ICD-10-CM

## 2018-01-15 RX ORDER — OMEPRAZOLE 20 MG/1
CAPSULE, DELAYED RELEASE ORAL
Qty: 90 CAPSULE | Refills: 1 | Status: SHIPPED | OUTPATIENT
Start: 2018-01-15 | End: 2018-01-25 | Stop reason: DRUGHIGH

## 2018-01-15 RX ORDER — ALLOPURINOL 100 MG/1
TABLET ORAL
Qty: 90 TABLET | Refills: 1 | Status: SHIPPED | OUTPATIENT
Start: 2018-01-15 | End: 2018-06-18 | Stop reason: SDUPTHER

## 2018-01-18 DIAGNOSIS — E11.9 TYPE 2 DIABETES MELLITUS WITHOUT COMPLICATION, WITHOUT LONG-TERM CURRENT USE OF INSULIN (HCC): Primary | ICD-10-CM

## 2018-01-18 RX ORDER — DIPHENHYDRAMINE HYDROCHLORIDE 25 MG/1
1 CAPSULE, LIQUID FILLED ORAL ONCE
Qty: 1 EACH | Refills: 0 | Status: SHIPPED | OUTPATIENT
Start: 2018-01-18 | End: 2018-01-18

## 2018-01-23 ENCOUNTER — OFFICE VISIT (OUTPATIENT)
Dept: FAMILY MEDICINE CLINIC | Facility: CLINIC | Age: 83
End: 2018-01-23

## 2018-01-23 VITALS
WEIGHT: 151 LBS | HEIGHT: 60 IN | SYSTOLIC BLOOD PRESSURE: 145 MMHG | TEMPERATURE: 97.6 F | DIASTOLIC BLOOD PRESSURE: 66 MMHG | RESPIRATION RATE: 20 BRPM | HEART RATE: 64 BPM | OXYGEN SATURATION: 98 % | BODY MASS INDEX: 29.64 KG/M2

## 2018-01-23 DIAGNOSIS — K21.9 GASTROESOPHAGEAL REFLUX DISEASE, ESOPHAGITIS PRESENCE NOT SPECIFIED: ICD-10-CM

## 2018-01-23 DIAGNOSIS — E11.9 TYPE 2 DIABETES MELLITUS WITHOUT COMPLICATION, WITHOUT LONG-TERM CURRENT USE OF INSULIN (HCC): Primary | ICD-10-CM

## 2018-01-23 DIAGNOSIS — Z12.12 SCREENING FOR RECTAL CANCER: ICD-10-CM

## 2018-01-23 DIAGNOSIS — I10 ESSENTIAL HYPERTENSION: ICD-10-CM

## 2018-01-23 DIAGNOSIS — Z12.39 SCREENING FOR BREAST CANCER: ICD-10-CM

## 2018-01-23 DIAGNOSIS — Z12.72 SCREENING FOR VAGINAL CANCER: ICD-10-CM

## 2018-01-23 LAB
GLUCOSE BLDC GLUCOMTR-MCNC: 115 MG/DL (ref 70–130)
MAGNESIUM SERPL-MCNC: 1.5 MG/DL (ref 1.6–2.3)

## 2018-01-23 PROCEDURE — 99214 OFFICE O/P EST MOD 30 MIN: CPT | Performed by: NURSE PRACTITIONER

## 2018-01-23 PROCEDURE — 36415 COLL VENOUS BLD VENIPUNCTURE: CPT | Performed by: NURSE PRACTITIONER

## 2018-01-23 PROCEDURE — 82962 GLUCOSE BLOOD TEST: CPT | Performed by: NURSE PRACTITIONER

## 2018-01-23 PROCEDURE — 86677 HELICOBACTER PYLORI ANTIBODY: CPT | Performed by: NURSE PRACTITIONER

## 2018-01-23 PROCEDURE — 83735 ASSAY OF MAGNESIUM: CPT | Performed by: NURSE PRACTITIONER

## 2018-01-23 NOTE — PROGRESS NOTES
Subjective   Kristy Ramirez is a 83 y.o. female.     HPI Comments: Here today for wili.  She is having more heart burn than usual.  She has taken 2 or the prilosec at a time and has been somewhat beneficial.  Also family wants her to have update of some of her health screenings.    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  She is requesting a hospital bed for positioning.  There have also been increasing risk of falls due to this .    Heartburn   She complains of belching and heartburn. She reports no abdominal pain, no chest pain, no choking, no coughing, no dysphagia, no early satiety, no globus sensation, no hoarse voice, no nausea, no sore throat, no stridor, no tooth decay, no water brash or no wheezing. This is a chronic problem. The current episode started 1 to 4 weeks ago. The problem occurs constantly. The problem has been unchanged. The heartburn is of moderate intensity. The heartburn does not wake her from sleep. The heartburn does not limit her activity. The heartburn doesn't change with position. Nothing aggravates the symptoms. Pertinent negatives include no anemia, fatigue, melena, muscle weakness, orthopnea or weight loss. There are no known risk factors. She has tried a PPI for the symptoms. The treatment provided moderate relief. Past procedures do not include an abdominal ultrasound, an EGD, esophageal manometry, esophageal pH monitoring, H. pylori antibody titer or a UGI. Past invasive treatments do not include gastroplasty, gastroplication or reflux surgery.        The following portions of the patient's history were reviewed and updated as appropriate: allergies, current medications, past family history, past medical history, past social history, past surgical history and problem list.    Review of Systems   Constitutional: Negative.  Negative for fatigue and weight loss.   HENT: Negative.  Negative for hoarse voice and sore throat.    Respiratory: Negative.  Negative for  cough, choking and wheezing.    Cardiovascular: Negative.  Negative for chest pain.   Gastrointestinal: Positive for heartburn. Negative for abdominal pain, dysphagia, melena and nausea.   Musculoskeletal: Negative.  Negative for muscle weakness.   Skin: Negative.    Neurological: Negative.    Psychiatric/Behavioral: Negative.        Objective   Physical Exam   Constitutional: She appears well-developed and well-nourished. No distress.   HENT:   Head: Normocephalic.   Eyes: Pupils are equal, round, and reactive to light.   Neck: Normal range of motion. Neck supple. No thyromegaly present.   Cardiovascular: Normal rate, regular rhythm and normal heart sounds.  Exam reveals no friction rub.    No murmur heard.  Pulmonary/Chest: Effort normal and breath sounds normal. No respiratory distress. She has no wheezes. She has no rales.   Musculoskeletal: Normal range of motion.   Neurological: She is alert.   Skin: Skin is warm and dry.   Psychiatric: She has a normal mood and affect. Thought content normal.   Nursing note and vitals reviewed.      Assessment/Plan   Kristy was seen today for heartburn.    Diagnoses and all orders for this visit:    Type 2 diabetes mellitus without complication, without long-term current use of insulin  -     POC Glucose  -     glucose blood test strip; Use as instructed to check b/s 1x a day.  Please give strip compatible with monitor.    Gastroesophageal reflux disease, esophagitis presence not specified  -     H.pylori,IgG / IgA Antibodies    Essential hypertension  -     Magnesium

## 2018-01-24 LAB
H PYLORI IGA SER IA-ACNC: <9 UNITS (ref 0–8.9)
H PYLORI IGG SER IA-ACNC: 0.79 INDEX VALUE (ref 0–0.79)

## 2018-01-25 ENCOUNTER — TELEPHONE (OUTPATIENT)
Dept: FAMILY MEDICINE CLINIC | Facility: CLINIC | Age: 83
End: 2018-01-25

## 2018-01-25 DIAGNOSIS — K21.9 GASTROESOPHAGEAL REFLUX DISEASE, ESOPHAGITIS PRESENCE NOT SPECIFIED: ICD-10-CM

## 2018-01-25 RX ORDER — OMEPRAZOLE 20 MG/1
20 CAPSULE, DELAYED RELEASE ORAL 2 TIMES DAILY
Qty: 60 CAPSULE | Refills: 3 | Status: SHIPPED | OUTPATIENT
Start: 2018-01-25 | End: 2018-06-18 | Stop reason: SDUPTHER

## 2018-01-25 NOTE — TELEPHONE ENCOUNTER
----- Message from NADYA Taylor sent at 1/25/2018  7:54 AM CST -----  Let her know she does not have h pylori.  She needs to cont with her prilosec.

## 2018-01-31 DIAGNOSIS — E11.9 TYPE 2 DIABETES MELLITUS WITHOUT COMPLICATION, WITHOUT LONG-TERM CURRENT USE OF INSULIN (HCC): Primary | ICD-10-CM

## 2018-01-31 RX ORDER — LANCETS
1 EACH MISCELLANEOUS DAILY
Qty: 102 EACH | Refills: 12 | Status: SHIPPED | OUTPATIENT
Start: 2018-01-31 | End: 2018-06-12 | Stop reason: SDUPTHER

## 2018-02-02 RX ORDER — FOLIC ACID 1 MG/1
1 TABLET ORAL DAILY
Qty: 90 TABLET | Refills: 0 | Status: SHIPPED | OUTPATIENT
Start: 2018-02-02 | End: 2018-06-12

## 2018-02-02 RX ORDER — MAGNESIUM OXIDE 400 MG/1
400 TABLET ORAL DAILY
Qty: 90 TABLET | Refills: 2 | Status: SHIPPED | OUTPATIENT
Start: 2018-02-02 | End: 2018-10-17

## 2018-02-14 ENCOUNTER — TELEPHONE (OUTPATIENT)
Dept: FAMILY MEDICINE CLINIC | Facility: CLINIC | Age: 83
End: 2018-02-14

## 2018-02-14 DIAGNOSIS — Z12.39 SCREENING FOR BREAST CANCER: Primary | ICD-10-CM

## 2018-02-19 ENCOUNTER — OFFICE VISIT (OUTPATIENT)
Dept: FAMILY MEDICINE CLINIC | Facility: CLINIC | Age: 83
End: 2018-02-19

## 2018-02-19 VITALS
RESPIRATION RATE: 20 BRPM | WEIGHT: 142 LBS | OXYGEN SATURATION: 95 % | HEIGHT: 60 IN | HEART RATE: 67 BPM | SYSTOLIC BLOOD PRESSURE: 146 MMHG | BODY MASS INDEX: 27.88 KG/M2 | DIASTOLIC BLOOD PRESSURE: 66 MMHG | TEMPERATURE: 97.9 F

## 2018-02-19 DIAGNOSIS — J06.9 UPPER RESPIRATORY TRACT INFECTION, UNSPECIFIED TYPE: Primary | ICD-10-CM

## 2018-02-19 PROCEDURE — 99214 OFFICE O/P EST MOD 30 MIN: CPT | Performed by: NURSE PRACTITIONER

## 2018-02-19 RX ORDER — GUAIFENESIN 600 MG/1
600 TABLET, EXTENDED RELEASE ORAL EVERY 12 HOURS SCHEDULED
Qty: 20 TABLET | Refills: 1 | Status: SHIPPED | OUTPATIENT
Start: 2018-02-19 | End: 2018-04-30

## 2018-02-19 RX ORDER — AZITHROMYCIN 250 MG/1
TABLET, FILM COATED ORAL
Qty: 6 TABLET | Refills: 0 | Status: SHIPPED | OUTPATIENT
Start: 2018-02-19 | End: 2018-03-12

## 2018-02-19 NOTE — PROGRESS NOTES
Subjective   Kristy Ramirez is a 84 y.o. female.     HPI Comments: Here today with cough, congestion for the past 1-2 weeks.  Cough is occ productive, and is discolored.  Does not think she has had a fever.  Has taken otc cold tabs and they have not helped.    Cough   This is a new problem. The current episode started in the past 7 days. The problem has been waxing and waning. The problem occurs every few hours. The cough is productive of sputum. Associated symptoms include nasal congestion, rhinorrhea and wheezing. Pertinent negatives include no chest pain, chills, ear congestion, ear pain, fever, headaches, heartburn, hemoptysis, myalgias, postnasal drip, rash, sore throat, shortness of breath, sweats or weight loss. The symptoms are aggravated by cold air and exercise. Treatments tried: otc cough med. The treatment provided no relief. There is no history of asthma, bronchiectasis, bronchitis, COPD, emphysema, environmental allergies or pneumonia.   URI    This is a new problem. The current episode started in the past 7 days. The problem has been waxing and waning. There has been no fever. Associated symptoms include congestion, coughing, rhinorrhea and wheezing. Pertinent negatives include no abdominal pain, chest pain, diarrhea, dysuria, ear pain, headaches, joint pain, joint swelling, nausea, plugged ear sensation, rash, sinus pain, sore throat, swollen glands or vomiting. She has tried antihistamine for the symptoms. The treatment provided no relief.        The following portions of the patient's history were reviewed and updated as appropriate: allergies, current medications, past family history, past medical history, past social history, past surgical history and problem list.    Review of Systems   Constitutional: Negative.  Negative for chills, fever and weight loss.   HENT: Positive for congestion and rhinorrhea. Negative for ear pain, postnasal drip, sinus pain and sore throat.    Respiratory: Positive  for cough and wheezing. Negative for hemoptysis and shortness of breath.    Cardiovascular: Negative.  Negative for chest pain.   Gastrointestinal: Negative for abdominal pain, diarrhea, heartburn, nausea and vomiting.   Genitourinary: Negative for dysuria.   Musculoskeletal: Negative.  Negative for joint pain and myalgias.   Skin: Negative.  Negative for rash.   Allergic/Immunologic: Negative for environmental allergies.   Neurological: Negative.  Negative for headaches.   Psychiatric/Behavioral: Negative.        Objective   Physical Exam   Constitutional: She is oriented to person, place, and time. She appears well-developed and well-nourished. No distress.   HENT:   Head: Normocephalic.   Right Ear: Hearing, tympanic membrane, external ear and ear canal normal. Tympanic membrane is not injected.   Left Ear: Hearing, tympanic membrane, external ear and ear canal normal. Tympanic membrane is not injected.   Nose: Nose normal. Right sinus exhibits no maxillary sinus tenderness and no frontal sinus tenderness. Left sinus exhibits no maxillary sinus tenderness and no frontal sinus tenderness.   Mouth/Throat: Oropharynx is clear and moist. No oropharyngeal exudate.   Eyes: Pupils are equal, round, and reactive to light.   Neck: Normal range of motion. Neck supple. No thyromegaly present.   Cardiovascular: Normal rate, regular rhythm and normal heart sounds.  Exam reveals no friction rub.    No murmur heard.  Pulmonary/Chest: Effort normal and breath sounds normal. No respiratory distress. She has no wheezes.   Abdominal: Soft. She exhibits no distension.   Musculoskeletal: Normal range of motion.   Neurological: She is alert and oriented to person, place, and time.   Skin: Skin is warm.   Psychiatric: She has a normal mood and affect. Thought content normal.   Nursing note and vitals reviewed.      Assessment/Plan   Kristy was seen today for cough and nasal congestion.    Diagnoses and all orders for this visit:    Upper  respiratory tract infection, unspecified type  -     azithromycin (ZITHROMAX Z-RICARDO) 250 MG tablet; Take 2 tablets the first day, then 1 tablet daily for 4 days.  -     guaiFENesin (MUCINEX) 600 MG 12 hr tablet; Take 1 tablet by mouth Every 12 (Twelve) Hours.

## 2018-02-28 ENCOUNTER — TRANSCRIBE ORDERS (OUTPATIENT)
Dept: PHYSICAL THERAPY | Facility: HOSPITAL | Age: 83
End: 2018-02-28

## 2018-02-28 DIAGNOSIS — M54.2 CERVICALGIA: Primary | ICD-10-CM

## 2018-03-01 ENCOUNTER — TELEPHONE (OUTPATIENT)
Dept: FAMILY MEDICINE CLINIC | Facility: CLINIC | Age: 83
End: 2018-03-01

## 2018-03-01 NOTE — TELEPHONE ENCOUNTER
Mole is behind right ear   And also needs referral for pain management her surgeon has put her on coming back every three months and she will need he pain medication

## 2018-03-03 DIAGNOSIS — B37.0 ORAL THRUSH: ICD-10-CM

## 2018-03-07 ENCOUNTER — TELEPHONE (OUTPATIENT)
Dept: FAMILY MEDICINE CLINIC | Facility: CLINIC | Age: 83
End: 2018-03-07

## 2018-03-08 DIAGNOSIS — Z79.890 ON HORMONE REPLACEMENT THERAPY: ICD-10-CM

## 2018-03-08 RX ORDER — TIZANIDINE HYDROCHLORIDE 4 MG/1
4 CAPSULE, GELATIN COATED ORAL 2 TIMES DAILY
Qty: 60 CAPSULE | Refills: 3 | Status: SHIPPED | OUTPATIENT
Start: 2018-03-08 | End: 2018-03-08 | Stop reason: SDUPTHER

## 2018-03-09 ENCOUNTER — OFFICE VISIT (OUTPATIENT)
Dept: FAMILY MEDICINE CLINIC | Facility: CLINIC | Age: 83
End: 2018-03-09

## 2018-03-09 VITALS
HEART RATE: 77 BPM | RESPIRATION RATE: 20 BRPM | DIASTOLIC BLOOD PRESSURE: 62 MMHG | WEIGHT: 149 LBS | TEMPERATURE: 98.1 F | HEIGHT: 60 IN | OXYGEN SATURATION: 98 % | BODY MASS INDEX: 29.25 KG/M2 | SYSTOLIC BLOOD PRESSURE: 133 MMHG

## 2018-03-09 DIAGNOSIS — M54.2 NECK PAIN: Primary | ICD-10-CM

## 2018-03-09 DIAGNOSIS — H91.90 HEARING PROBLEM, UNSPECIFIED LATERALITY: ICD-10-CM

## 2018-03-09 PROCEDURE — 99213 OFFICE O/P EST LOW 20 MIN: CPT | Performed by: NURSE PRACTITIONER

## 2018-03-09 RX ORDER — ESTRADIOL 1 MG/1
TABLET ORAL
Qty: 90 TABLET | Refills: 0 | Status: SHIPPED | OUTPATIENT
Start: 2018-03-09 | End: 2018-12-20 | Stop reason: SDUPTHER

## 2018-03-09 RX ORDER — TIZANIDINE HYDROCHLORIDE 4 MG/1
CAPSULE, GELATIN COATED ORAL
Qty: 180 CAPSULE | Refills: 3 | Status: SHIPPED | OUTPATIENT
Start: 2018-03-09 | End: 2018-04-30

## 2018-03-09 NOTE — PROGRESS NOTES
Subjective   Kristy Ramirez is a 84 y.o. female.     HPI Comments: Here today wanting to be referred to pain management for chronic neck pain.  The neurosurgeon she was seeing was prescribing her oxycodone for her neck pain.  She has had surgery and there is documentation from [place].  Also she is having a hearing problem and wants to see ent/audiology for this.    Neck Pain    This is a chronic problem. The current episode started more than 1 month ago. The problem occurs constantly. The problem has been unchanged. The pain is present in the anterior neck. The quality of the pain is described as aching. The pain is at a severity of 5/10. The pain is moderate. The symptoms are aggravated by position. The pain is same all the time. Stiffness is present in the morning. Pertinent negatives include no chest pain, fever, headaches, leg pain, numbness, pain with swallowing, paresis, photophobia, syncope, tingling, trouble swallowing, visual change, weakness or weight loss. She has tried muscle relaxants and oral narcotics for the symptoms. The treatment provided significant relief.   Hearing Problem   This is a chronic problem. The current episode started more than 1 month ago. The problem occurs constantly. The problem has been unchanged. Associated symptoms include neck pain. Pertinent negatives include no abdominal pain, anorexia, arthralgias, change in bowel habit, chest pain, chills, congestion, coughing, diaphoresis, fatigue, fever, headaches, joint swelling, myalgias, nausea, numbness, rash, sore throat, swollen glands, urinary symptoms, vertigo, visual change, vomiting or weakness. Nothing aggravates the symptoms. She has tried nothing for the symptoms. The treatment provided no relief.        The following portions of the patient's history were reviewed and updated as appropriate: allergies, current medications, past family history, past medical history, past social history, past surgical history and problem  list.    Review of Systems   Constitutional: Negative.  Negative for chills, diaphoresis, fatigue, fever and weight loss.   HENT: Negative.  Negative for congestion, sore throat and trouble swallowing.    Eyes: Negative for photophobia.   Respiratory: Negative.  Negative for cough.    Cardiovascular: Negative.  Negative for chest pain and syncope.   Gastrointestinal: Negative for abdominal pain, anorexia, change in bowel habit, nausea and vomiting.   Musculoskeletal: Positive for neck pain. Negative for arthralgias, joint swelling and myalgias.   Skin: Negative.  Negative for rash.   Neurological: Negative.  Negative for vertigo, tingling, weakness, numbness and headaches.   Psychiatric/Behavioral: Negative.        Objective   Physical Exam   Constitutional: She is oriented to person, place, and time. She appears well-developed and well-nourished. No distress.   HENT:   Head: Normocephalic.   Right Ear: External ear normal.   Left Ear: External ear normal.   Nose: Nose normal.   Mouth/Throat: Oropharynx is clear and moist. No oropharyngeal exudate.   Eyes: Pupils are equal, round, and reactive to light.   Neck: Normal range of motion. Neck supple. No thyromegaly present.   Cardiovascular: Normal rate, regular rhythm and normal heart sounds.  Exam reveals no friction rub.    No murmur heard.  Pulmonary/Chest: Effort normal and breath sounds normal. No respiratory distress. She has no wheezes. She has no rales.   Abdominal: Soft.   Musculoskeletal:        Cervical back: She exhibits decreased range of motion, tenderness, pain and spasm.   Neurological: She is alert and oriented to person, place, and time.   Skin: Skin is warm and dry.   Psychiatric: She has a normal mood and affect. Thought content normal.   Nursing note and vitals reviewed.      Assessment/Plan   Kristy was seen today for suspicious skin lesion.    Diagnoses and all orders for this visit:    Neck pain  -     Ambulatory Referral to Pain  Management    Hearing problem, unspecified laterality  -     Ambulatory Referral to Audiology

## 2018-03-12 ENCOUNTER — OFFICE VISIT (OUTPATIENT)
Dept: FAMILY MEDICINE CLINIC | Facility: CLINIC | Age: 83
End: 2018-03-12

## 2018-03-12 ENCOUNTER — OFFICE VISIT (OUTPATIENT)
Dept: OBSTETRICS AND GYNECOLOGY | Facility: CLINIC | Age: 83
End: 2018-03-12

## 2018-03-12 VITALS
HEIGHT: 60 IN | SYSTOLIC BLOOD PRESSURE: 133 MMHG | BODY MASS INDEX: 30.43 KG/M2 | HEART RATE: 67 BPM | RESPIRATION RATE: 20 BRPM | TEMPERATURE: 97.7 F | DIASTOLIC BLOOD PRESSURE: 59 MMHG | WEIGHT: 155 LBS | OXYGEN SATURATION: 98 %

## 2018-03-12 VITALS
SYSTOLIC BLOOD PRESSURE: 132 MMHG | BODY MASS INDEX: 30.43 KG/M2 | WEIGHT: 155 LBS | HEIGHT: 60 IN | DIASTOLIC BLOOD PRESSURE: 75 MMHG

## 2018-03-12 DIAGNOSIS — R59.9 ADENOPATHY: Primary | ICD-10-CM

## 2018-03-12 DIAGNOSIS — N39.3 STRESS INCONTINENCE: ICD-10-CM

## 2018-03-12 DIAGNOSIS — Z46.89 ENCOUNTER FOR FITTING AND ADJUSTMENT OF PESSARY: ICD-10-CM

## 2018-03-12 DIAGNOSIS — N81.6 POP-Q STAGE 2 RECTOCELE: Chronic | ICD-10-CM

## 2018-03-12 DIAGNOSIS — K64.1 GRADE II HEMORRHOIDS: Chronic | ICD-10-CM

## 2018-03-12 DIAGNOSIS — N81.10 POP-Q STAGE 4 CYSTOCELE: Primary | Chronic | ICD-10-CM

## 2018-03-12 DIAGNOSIS — E66.9 OBESITY (BMI 30.0-34.9): Chronic | ICD-10-CM

## 2018-03-12 PROBLEM — Z96.0 PRESENCE OF PESSARY: Status: ACTIVE | Noted: 2018-03-12

## 2018-03-12 PROCEDURE — 57160 INSERT PESSARY/OTHER DEVICE: CPT | Performed by: OBSTETRICS & GYNECOLOGY

## 2018-03-12 PROCEDURE — A4561 PESSARY RUBBER, ANY TYPE: HCPCS | Performed by: OBSTETRICS & GYNECOLOGY

## 2018-03-12 PROCEDURE — 99204 OFFICE O/P NEW MOD 45 MIN: CPT | Performed by: OBSTETRICS & GYNECOLOGY

## 2018-03-12 PROCEDURE — 99214 OFFICE O/P EST MOD 30 MIN: CPT | Performed by: NURSE PRACTITIONER

## 2018-03-12 RX ORDER — PENICILLIN V POTASSIUM 500 MG/1
500 TABLET ORAL 4 TIMES DAILY
Qty: 40 TABLET | Refills: 0 | Status: SHIPPED | OUTPATIENT
Start: 2018-03-12 | End: 2018-04-30

## 2018-03-12 NOTE — PROGRESS NOTES
Subjective   Kristy Ramirez is a 84 y.o. female.     Here today with c/o a painful swollen area on the right side of her neck that came up suddenly yesterday.      Neck Pain    This is a new problem. The current episode started yesterday. The problem occurs constantly. The problem has been unchanged. The pain is associated with nothing. Pain location: right tonsilar area. The quality of the pain is described as aching (sore). The pain is at a severity of 5/10. The pain is moderate. Nothing aggravates the symptoms. The pain is same all the time. Stiffness is present all day. Associated symptoms include pain with swallowing. Pertinent negatives include no chest pain, fever, headaches, leg pain, numbness, paresis, photophobia, syncope, trouble swallowing, visual change, weakness or weight loss. She has tried heat for the symptoms. The treatment provided no relief.        The following portions of the patient's history were reviewed and updated as appropriate: allergies, current medications, past family history, past medical history, past social history, past surgical history and problem list.    Review of Systems   Constitutional: Negative.  Negative for fever and unexpected weight loss.   HENT: Positive for congestion and rhinorrhea. Negative for trouble swallowing.    Eyes: Negative for photophobia.   Respiratory: Negative.    Cardiovascular: Negative.  Negative for chest pain and syncope.   Musculoskeletal: Positive for neck pain.   Skin: Negative.    Neurological: Negative.  Negative for weakness, numbness and headaches.   Psychiatric/Behavioral: Negative.        Objective   Physical Exam   Constitutional: She is oriented to person, place, and time. She appears well-developed and well-nourished. No distress.   HENT:   Head: Normocephalic.   Right Ear: External ear normal.   Left Ear: External ear normal.   Nose: Nose normal.   Mouth/Throat: Oropharynx is clear and moist. No oropharyngeal exudate.   Eyes: Pupils  are equal, round, and reactive to light.   Neck: Normal range of motion. Neck supple.   Cardiovascular: Normal rate, regular rhythm and normal heart sounds.  Exam reveals no friction rub.    No murmur heard.  Pulmonary/Chest: Effort normal and breath sounds normal. No respiratory distress. She has no rales.   Abdominal: Soft.   Musculoskeletal: Normal range of motion.   Lymphadenopathy:        Head (right side): Tonsillar adenopathy present. No submental, no submandibular, no preauricular, no posterior auricular and no occipital adenopathy present.        Head (left side): No submental, no submandibular, no tonsillar, no preauricular, no posterior auricular and no occipital adenopathy present.   Neurological: She is alert and oriented to person, place, and time.   Skin: Skin is warm and dry.   Psychiatric: She has a normal mood and affect. Thought content normal.   Nursing note and vitals reviewed.        Assessment/Plan   Kristy was seen today for mass.    Diagnoses and all orders for this visit:    Adenopathy    Other orders  -     penicillin v potassium (VEETID) 500 MG tablet; Take 1 tablet by mouth 4 (Four) Times a Day.      Will wili in 1 week.

## 2018-03-12 NOTE — PROGRESS NOTES
Kristy Ramirez is a 84 y.o. y/o female     Chief Complaint: Establish care, women's health issues, pelvic organ prolapse with pressure, stress urinary incontinence, and constipation and large and uncomfortable bulge    HPI: 84-year-old  with seven prior vaginal deliveries who had a hysterectomy with BSO in  for bleeding problems.  She is here to establish care.  She comes to this appointment with her daughter who has strongly encouraged her to seek gynecologic care.    NADYA Gonzales is her primary care provider.      At first she did not want to have a pelvic exam because she was embarrassed that something was falling out.  After a long discussion, she agreed to a complete female exam.    She had a mammogram in 2017.    Medical history significant for hypertension, diabetes, osteoarthritis, obesity, and stress urinary incontinence.    Past surgical history include hysterectomy, surgery, neck surgery, back surgery, ankle surgery.    She is  and retired.    She denies smoking or smokeless tobacco use.    Family history is significant for mother who  at age 95 and a house fire.  Father  at age 76 heart attack.  2 brothers had  of heart attack.  She has 7 children.  There is no family history of breast cancer, uterine cancer, ovarian cancer, or colon cancer.    She has not been sexually active for some time.    After her physical exam, she agreed to be fitted for a pessary.  She was fitted for a #6 ring pessary with support.  She was able to sit and walk comfortably and emptied her bladder without problem.  She was given instructions on what to do if the pessary comes out or if it causes her pain.  I will see her back in a week.    She states that without the pessary, she does not feel that she empties her bladder completely, and she has stress urinary incontinence.  She also complains of constipation and hemorrhoids.    Review of Systems   Constitutional: Positive for fatigue and  unexpected weight change ( Gain). Negative for activity change, appetite change, chills, diaphoresis and fever.   Gastrointestinal: Negative for abdominal pain, constipation, diarrhea and nausea.   Genitourinary: Negative for difficulty urinating, dysuria, pelvic pain, urgency, vaginal bleeding, vaginal discharge and vaginal pain.   Musculoskeletal: Positive for arthralgias, back pain, joint swelling, myalgias, neck pain and neck stiffness.   Neurological: Negative for headaches.   Psychiatric/Behavioral: Positive for sleep disturbance. Negative for dysphoric mood. The patient is not nervous/anxious.    All other systems reviewed and are negative.     Breast ROS: negative    The following portions of the patient's history were reviewed and updated as appropriate: allergies, current medications, past family history, past medical history, past social history, past surgical history and problem list.    Allergies   Allergen Reactions   • Acetaminophen Itching   • Aleve [Naproxen Sodium]    • Atenolol    • Keflex [Cephalexin]    • Lisinopril Angioedema   • Relafen [Nabumetone]    • Sulfa Antibiotics Rash          Current Outpatient Prescriptions:   •  ACCU-CHEK FASTCLIX LANCETS misc, 1 each Daily., Disp: 102 each, Rfl: 12  •  allopurinol (ZYLOPRIM) 100 MG tablet, TAKE 1 TABLET BY MOUTH EVERY DAY, Disp: 90 tablet, Rfl: 1  •  amLODIPine (NORVASC) 5 MG tablet, Take 1 tablet by mouth Daily., Disp: 90 tablet, Rfl: 1  •  aspirin 81 MG tablet, Take 1 tablet by mouth Daily., Disp: 90 tablet, Rfl: 1  •  busPIRone (BUSPAR) 10 MG tablet, Take 1 tablet by mouth 3 (Three) Times a Day., Disp: 180 tablet, Rfl: 1  •  chlorthalidone (HYGROTON) 25 MG tablet, Take 1 tablet by mouth Daily., Disp: 90 tablet, Rfl: 3  •  colchicine 0.6 MG tablet, Take 1 tablet by mouth Daily., Disp: 180 tablet, Rfl: 1  •  estradiol (ESTRACE) 1 MG tablet, TAKE 1 TABLET BY MOUTH DAILY, Disp: 90 tablet, Rfl: 0  •  folic acid (FOLVITE) 1 MG tablet, Take 1 tablet  by mouth Daily., Disp: 90 tablet, Rfl: 0  •  gabapentin (NEURONTIN) 600 MG tablet, TK 1/2 T TO 1 T PO QHS PRN P, Disp: , Rfl: 5  •  glucose blood test strip, Use as instructed to check b/s 1x a day.  Please give strip compatible with monitor., Disp: 100 each, Rfl: 12  •  guaiFENesin (MUCINEX) 600 MG 12 hr tablet, Take 1 tablet by mouth Every 12 (Twelve) Hours., Disp: 20 tablet, Rfl: 1  •  hydrOXYzine (VISTARIL) 25 MG capsule, Take 1 capsule by mouth every night at bedtime., Disp: 30 capsule, Rfl: 0  •  linaclotide (LINZESS) 145 MCG capsule capsule, Take 1 capsule by mouth Every Morning Before Breakfast., Disp: 30 capsule, Rfl: 3  •  losartan-hydrochlorothiazide (HYZAAR) 50-12.5 MG per tablet, Take 1 tablet by mouth Daily., Disp: 90 tablet, Rfl: 3  •  magnesium oxide (MAG-OX) 400 MG tablet, Take 1 tablet by mouth Daily., Disp: 90 tablet, Rfl: 2  •  metFORMIN ER (GLUCOPHAGE-XR) 500 MG 24 hr tablet, Take 1 tablet by mouth Daily With Breakfast., Disp: 90 tablet, Rfl: 1  •  metoprolol succinate XL (TOPROL-XL) 25 MG 24 hr tablet, Take 1 tablet by mouth Daily., Disp: 90 tablet, Rfl: 1  •  nystatin (MYCOSTATIN) 245624 UNIT/ML suspension, SWISH AND SWALLOW 5 ML BY MOUTH FOUR TIMES DAILY, Disp: 240 mL, Rfl: 0  •  omeprazole (PRILOSEC) 20 MG capsule, Take 1 capsule by mouth 2 (Two) Times a Day., Disp: 60 capsule, Rfl: 3  •  oxyCODONE (ROXICODONE) 5 MG immediate release tablet, Take 5 mg by mouth As Needed., Disp: , Rfl: 0  •  penicillin v potassium (VEETID) 500 MG tablet, Take 1 tablet by mouth 4 (Four) Times a Day., Disp: 40 tablet, Rfl: 0  •  predniSONE (DELTASONE) 20 MG tablet, Take 20 mg by mouth., Disp: , Rfl: 1  •  TiZANidine (ZANAFLEX) 4 MG capsule, TAKE 1 CAPSULE BY MOUTH TWICE DAILY, Disp: 180 capsule, Rfl: 3  •  tolterodine LA (DETROL LA) 4 MG 24 hr capsule, Take 1 capsule by mouth Daily., Disp: 90 capsule, Rfl: 1     The patient has a family history of   Family History   Problem Relation Age of Onset   • Diabetes  Other    • Heart disease Other    • Stroke Other         Past Medical History:   Diagnosis Date   • Abnormal CT scan     per patient   • Acute bronchitis    • Acute maxillary sinusitis    • Acute otitis externa    • Acute sinusitis    • Allergic conjunctivitis    • Angular cheilitis    • Ankle edema    • Backache     improved   • Blood in urine      UTI resolved      • Bradycardia    • Chest discomfort     described as heart racing      • Chronic low back pain     RIGHT leg radiation      • Cold feet     c/o - and lowerlegs      • Contusion     of dorsum of foot   • Cough    • Depressive disorder    • Dizziness and giddiness    • Dyspnea    • Dysuria    • Edema    • Edema of foot    • Edema of lower extremity    • Essential hypertension    • Exanthematous disorder    • Fatigue    • Foot pain, left    • Hematoma    • Hip pain, right    • History of palpitations    • Hormone replacement therapy (postmenopausal)    • Hypertensive disorder    • Impacted cerumen    • Joint pain    • Knee pain    • Low back pain    • Malaise and fatigue    • Multiple joint pain    • Muscle pain    • Muscle weakness    • Neck pain    • Obesity (BMI 30.0-34.9) 3/12/2018   • Osteoarthritis of knee    • Osteoarthritis of multiple joints    • Otitis externa    • Pain in lower limb    • Peripheral venous insufficiency    • POP-Q stage 2 rectocele 3/12/2018   • POP-Q stage 4 cystocele 3/12/2018   • Sacroiliitis, not elsewhere classified     R LE   • Shoulder pain    • Upper respiratory infection    • Urinary frequency    • Urinary tract infectious disease    • Wheezing         OB History      Para Term  AB Living    9 7   2 7    SAB TAB Ectopic Molar Multiple Live Births                    Social History     Social History   • Marital status:      Spouse name: N/A   • Number of children: N/A   • Years of education: N/A     Occupational History   • Not on file.     Social History Main Topics   • Smoking status: Never Smoker  "  • Smokeless tobacco: Never Used   • Alcohol use No   • Drug use: No   • Sexual activity: Not on file      Comment: Hysterectomy     Other Topics Concern   • Not on file     Social History Narrative   • No narrative on file        Past Surgical History:   Procedure Laterality Date   • ANKLE SURGERY     • COLONOSCOPY  12/14/2009   • HAMMER TOE REPAIR  08/02/2011    Hammertoe repair, left second toe.   • HYSTERECTOMY     • INCISION AND DRAINAGE ABSCESS  01/21/2015   • INJECTION OF MEDICATION  11/15/2013   • INJECTION OF MEDICATION  05/06/2013    Celestone (betamethasone) (1)      • INJECTION OF MEDICATION  03/17/2016    Kenalog (3)      • UPPER GASTROINTESTINAL ENDOSCOPY  12/14/2009        Patient Active Problem List   Diagnosis   • Weakness   • Chronic low back pain   • Skin lesion   • Choking   • Diarrhea   • Pain of right hip joint   • Magnesium disorder   • On hormone replacement therapy   • Hormone deficiency   • Gout   • Gastroesophageal reflux disease   • Essential hypertension   • Stress incontinence   • Right hip pain   • Low back pain   • Angioedema   • Allergic reaction   • Sore throat   • Impacted cerumen of right ear   • Acute otitis externa of right ear   • Neck pain   • Type 2 diabetes mellitus without complication, without long-term current use of insulin   • Screening for breast cancer   • POP-Q stage 4 cystocele   • POP-Q stage 2 rectocele   • Presence of pessary   • Obesity (BMI 30.0-34.9)        Documented Vitals    03/12/18 1031   BP: 132/75   Weight: 70.3 kg (155 lb)   Height: 152.4 cm (60\")   PainSc: 0-No pain       Physical Exam   Constitutional: She is oriented to person, place, and time. No distress.   Slightly obese black female weighing 155 pounds with BMI 30.3   HENT:   Head: Normocephalic and atraumatic.   Eyes: Conjunctivae and EOM are normal. Pupils are equal, round, and reactive to light.   Neck: Normal range of motion. Neck supple. No JVD present. No tracheal deviation present. No " thyromegaly present.   Cardiovascular: Normal rate, regular rhythm, normal heart sounds and intact distal pulses.  Exam reveals no gallop and no friction rub.    No murmur heard.  Pulmonary/Chest: Effort normal and breath sounds normal. No stridor. No respiratory distress. She has no wheezes. She has no rales. She exhibits no tenderness. Right breast exhibits no inverted nipple, no mass, no nipple discharge, no skin change and no tenderness. Left breast exhibits no inverted nipple, no mass, no nipple discharge, no skin change and no tenderness. Breasts are symmetrical. There is no breast swelling.   Abdominal: Soft. Bowel sounds are normal. She exhibits no distension and no mass. There is no tenderness. There is no rebound and no guarding. No hernia. Hernia confirmed negative in the right inguinal area and confirmed negative in the left inguinal area.   Genitourinary: Rectal exam shows external hemorrhoid. Rectal exam shows no internal hemorrhoid, no fissure, no mass, no tenderness, anal tone normal and guaiac negative stool. No breast tenderness, discharge or bleeding. No labial fusion. There is no rash, tenderness, lesion or injury on the right labia. There is no rash, tenderness, lesion or injury on the left labia. No erythema, tenderness or bleeding in the vagina. No foreign body in the vagina. No signs of injury around the vagina. No vaginal discharge found.   Genitourinary Comments: Cervix surgically absent.  Uterus surgically absent.  Adnexa surgically absent.  No pelvic masses palpated.  Urethral meatus without erythema or prolapse.  Fourth degree cystocele.  Urethrovesical angle change of greater than 30° with Valsalva.  Second-degree rectocele.  Palpable enterocele.  Fair support vaginal cuff.  Grade 2 hemorrhoids.  Vaginal atrophy.   Musculoskeletal: Normal range of motion. She exhibits no edema, tenderness or deformity.   Lymphadenopathy:     She has no cervical adenopathy.        Right: No inguinal  adenopathy present.        Left: No inguinal adenopathy present.   Neurological: She is alert and oriented to person, place, and time. She has normal reflexes. She displays normal reflexes. No cranial nerve deficit. She exhibits normal muscle tone. Coordination normal.   Skin: Skin is warm and dry. No rash noted. She is not diaphoretic. No erythema. No pallor.   Psychiatric: She has a normal mood and affect. Her behavior is normal. Judgment and thought content normal.   Nursing note and vitals reviewed.     She was fitted for a #6 ring pessary with support.  This was inserted with Premarin vaginal cream.  She tolerated insertion without issue.  She was able to sit comfortably.  She was able to ambulate without difficulty.  She emptied her bladder without problem.    Assessment        Diagnosis Plan   1. POP-Q stage 4 cystocele     2. POP-Q stage 2 rectocele     3. Stress incontinence     4. Encounter for fitting and adjustment of pessary     5. Obesity (BMI 30.0-34.9)           Plan      1. We discussed over-the-counter treatments for constipation and hemorrhoids.  2. Kegel exercises.  3. She was instructed on what to do with the pessary comes out or causes pain.  4. Encouraged in diet and exercise.  5. Handouts on depression, hot flashes, exercise, and vitamin use.   6. Follow-up in 1 week.  Follow-up sooner as needed.            This document has been electronically signed by Jack Rosario MD on March 12, 2018 6:50 PM

## 2018-03-14 ENCOUNTER — OFFICE VISIT (OUTPATIENT)
Dept: OBSTETRICS AND GYNECOLOGY | Facility: CLINIC | Age: 83
End: 2018-03-14

## 2018-03-14 ENCOUNTER — HOSPITAL ENCOUNTER (OUTPATIENT)
Dept: PHYSICAL THERAPY | Facility: HOSPITAL | Age: 83
Setting detail: THERAPIES SERIES
Discharge: HOME | End: 2018-03-14

## 2018-03-14 VITALS
HEIGHT: 60 IN | BODY MASS INDEX: 30.43 KG/M2 | DIASTOLIC BLOOD PRESSURE: 76 MMHG | SYSTOLIC BLOOD PRESSURE: 142 MMHG | WEIGHT: 155 LBS

## 2018-03-14 DIAGNOSIS — N81.10 POP-Q STAGE 4 CYSTOCELE: Primary | ICD-10-CM

## 2018-03-14 DIAGNOSIS — N81.6 POP-Q STAGE 2 RECTOCELE: ICD-10-CM

## 2018-03-14 DIAGNOSIS — K64.1 GRADE II HEMORRHOIDS: ICD-10-CM

## 2018-03-14 DIAGNOSIS — M54.2 CERVICALGIA: Primary | ICD-10-CM

## 2018-03-14 DIAGNOSIS — N39.3 STRESS INCONTINENCE: ICD-10-CM

## 2018-03-14 DIAGNOSIS — Z98.1 S/P CERVICAL SPINAL FUSION: ICD-10-CM

## 2018-03-14 DIAGNOSIS — Z46.89 ENCOUNTER FOR FITTING AND ADJUSTMENT OF PESSARY: ICD-10-CM

## 2018-03-14 PROCEDURE — G8982 BODY POS GOAL STATUS: HCPCS | Performed by: PHYSICAL THERAPIST

## 2018-03-14 PROCEDURE — G8981 BODY POS CURRENT STATUS: HCPCS | Performed by: PHYSICAL THERAPIST

## 2018-03-14 PROCEDURE — 97110 THERAPEUTIC EXERCISES: CPT | Performed by: PHYSICAL THERAPIST

## 2018-03-14 PROCEDURE — 99214 OFFICE O/P EST MOD 30 MIN: CPT | Performed by: OBSTETRICS & GYNECOLOGY

## 2018-03-14 PROCEDURE — 97162 PT EVAL MOD COMPLEX 30 MIN: CPT | Performed by: PHYSICAL THERAPIST

## 2018-03-14 NOTE — THERAPY EVALUATION
Outpatient Physical Therapy Ortho Initial Evaluation  [facility]  Hollie Tony, PT, DPT, CSCS       Patient Name: Kristy Ramirez  : 1934  MRN: [id number]  Today's Date: 3/14/2018      Visit Date: 2018     Pt reports 3/10 pain pre treatment, 2/10 pain post treatment  Reports N/A% of improvement.  Attended  visits.  Insurance available: 26 visits  Next MD appt: 2018.  Recertification: 2018.    Patient Active Problem List   Diagnosis   • Weakness   • Chronic low back pain   • Skin lesion   • Choking   • Diarrhea   • Pain of right hip joint   • Magnesium disorder   • On hormone replacement therapy   • Hormone deficiency   • Gout   • Gastroesophageal reflux disease   • Essential hypertension   • Stress incontinence   • Right hip pain   • Low back pain   • Angioedema   • Allergic reaction   • Sore throat   • Impacted cerumen of right ear   • Acute otitis externa of right ear   • Neck pain   • Type 2 diabetes mellitus without complication, without long-term current use of insulin   • Screening for breast cancer   • POP-Q stage 4 cystocele   • POP-Q stage 2 rectocele   • Presence of pessary   • Obesity (BMI 30.0-34.9)   • Grade II hemorrhoids        Past Medical History:   Diagnosis Date   • Abnormal CT scan     per patient   • Acute bronchitis    • Acute maxillary sinusitis    • Acute otitis externa    • Acute sinusitis    • Allergic conjunctivitis    • Angular cheilitis    • Ankle edema    • Backache     improved   • Blood in urine      UTI resolved      • Bradycardia    • Chest discomfort     described as heart racing      • Chronic low back pain     RIGHT leg radiation      • Cold feet     c/o - and lowerlegs      • Contusion     of dorsum of foot   • Cough    • Depressive disorder    • Diabetes mellitus    • Dizziness and giddiness    • Dyspnea    • Dysuria    • Edema    • Edema of foot    • Edema of lower extremity    • Essential hypertension    •  Exanthematous disorder    • Fatigue    • Foot pain, left    • Grade II hemorrhoids 3/12/2018   • Hematoma    • Hip pain, right    • History of palpitations    • Hormone replacement therapy (postmenopausal)    • Hypertensive disorder    • Impacted cerumen    • Joint pain    • Knee pain    • Low back pain    • Malaise and fatigue    • Multiple joint pain    • Muscle pain    • Muscle weakness    • Neck pain    • Obesity (BMI 30.0-34.9) 3/12/2018   • Osteoarthritis of knee    • Osteoarthritis of multiple joints    • Otitis externa    • Pain in lower limb    • Peripheral venous insufficiency    • POP-Q stage 2 rectocele 3/12/2018   • POP-Q stage 4 cystocele 3/12/2018   • Sacroiliitis, not elsewhere classified     R LE   • Shoulder pain    • Upper respiratory infection    • Urinary frequency    • Urinary tract infectious disease    • Wheezing         Past Surgical History:   Procedure Laterality Date   • ANKLE SURGERY     • BACK SURGERY     • COLONOSCOPY  12/14/2009   • EYE SURGERY Bilateral 02/2018    B cataract   • HAMMER TOE REPAIR  08/02/2011    Hammertoe repair, left second toe.   • HYSTERECTOMY     • INCISION AND DRAINAGE ABSCESS  01/21/2015   • INJECTION OF MEDICATION  11/15/2013   • INJECTION OF MEDICATION  05/06/2013    Celestone (betamethasone) (1)      • INJECTION OF MEDICATION  03/17/2016    Kenalog (3)      • NECK SURGERY  01/2018   • UPPER GASTROINTESTINAL ENDOSCOPY  12/14/2009       Visit Dx:     ICD-10-CM ICD-9-CM   1. Cervicalgia M54.2 723.1   2. S/P cervical spinal fusion Z98.1 V45.4     Number of days off work: N/A    Patient is .    Patient has grown children.    Allergies: Tylenol, Aleve, Atenol, Keflex, Lisinopril, Relafen, Sulfa    Medications     ACCU-CHEK FASTCLIX LANCETS misc     allopurinol (ZYLOPRIM) 100 MG tablet     amLODIPine (NORVASC) 5 MG tablet     aspirin 81 MG tablet     busPIRone (BUSPAR) 10 MG tablet     chlorthalidone (HYGROTON) 25 MG tablet     colchicine 0.6 MG tablet      estradiol (ESTRACE) 1 MG tablet     folic acid (FOLVITE) 1 MG tablet     gabapentin (NEURONTIN) 600 MG tablet     glucose blood test strip     guaiFENesin (MUCINEX) 600 MG 12 hr tablet     hydrOXYzine (VISTARIL) 25 MG capsule     linaclotide (LINZESS) 145 MCG capsule capsule     losartan-hydrochlorothiazide (HYZAAR) 50-12.5 MG per tablet     magnesium oxide (MAG-OX) 400 MG tablet     metFORMIN ER (GLUCOPHAGE-XR) 500 MG 24 hr tablet     metoprolol succinate XL (TOPROL-XL) 25 MG 24 hr tablet     nystatin (MYCOSTATIN) 375790 UNIT/ML suspension     omeprazole (PRILOSEC) 20 MG capsule     oxyCODONE (ROXICODONE) 5 MG immediate release tablet     penicillin v potassium (VEETID) 500 MG tablet     predniSONE (DELTASONE) 20 MG tablet     TiZANidine (ZANAFLEX) 4 MG capsule     tolterodine LA (DETROL LA) 4 MG 24 hr capsule                Patient History     Row Name 03/14/18 1300             History    Chief Complaint Pain  -      Type of Pain Neck pain  -      Date Current Problem(s) Began --   January 2018  -AJ      Brief Description of Current Complaint Patient reports about a month of problems prior to surgery. She reports it started with her head. She reports she went to Dr. Pringle and he sent her to [place].. SHe reprots they did a fusion with a plate and 6 screws. Main issue is stiffness and occasional HAs.  -      Previous treatment for THIS PROBLEM Surgery  -      Surgery Date: --   January 2018  -      Patient/Caregiver Goals Improve mobility;Improve strength  -AJ      Current Tobacco Use None  -      Smoking Status Non-smoker  -      Patient's Rating of General Health Good  -      Occupation/sports/leisure activities Occupation: Retired; Hobbies: crossowrd puzzles, coloring, walking  -AJ      Patient seeing anyone else for problem(s)? Yes  -AJ      How has patient tried to help current problem? neurosurgery  -      What clinical tests have you had for this problem? X-ray;CT scan;MRI  -AJ       Results of Clinical Tests DDD and other arthritic changes  -AJ      History of Previous Related Injuries None  -AJ      Are you or can you be pregnant No  -AJ         Pain     Pain Location Neck  -AJ      Pain at Present 3  -AJ      Pain at Best 0  -AJ      Pain at Worst 6  -AJ      Pain Frequency Intermittent  -AJ      Pain Description Aching   Stffness  -AJ      What Performance Factors Make the Current Problem(s) WORSE? Being still too long  -AJ      What Performance Factors Make the Current Problem(s) BETTER? Pain pill  -AJ      Is your sleep disturbed? Yes   every once in a while  -AJ      Is medication used to assist with sleep? Yes  -AJ      What position do you sleep in? Right sidelying;Left sidelying  -AJ      Difficulties at work? N/A  -AJ      Difficulties with ADL's? Slower and more careful  -AJ      Difficulties with recreational activities? Sitting too long  -AJ        User Key  (r) = Recorded By, (t) = Taken By, (c) = Cosigned By    Initials Name Provider Type    AJ Hollie Tony, PT Physical Therapist                PT Ortho     Row Name 03/14/18 1400       Subjective Comments    Subjective Comments Patient reports that she wants to decreasae the stifness fo the neck and improve mobility.  -AJ       Subjective Pain    Able to rate subjective pain? yes  -AJ    Pre-Treatment Pain Level 3  -AJ    Post-Treatment Pain Level 2  -AJ       Posture/Observations    Alignment Options Forward head;Cervical lordosis;Thoracic kyphosis;Rounded shoulders  -AJ    Forward Head Mild;Increased  -AJ    Cervical Lordosis Mild;Increased  -AJ    Thoracic Kyphosis Mild;Moderate;Increased  -AJ    Rounded Shoulders Bilateral:;Mild;Increased  -AJ    Posture/Observations Comments No acute distress, some decrease in posture, incision well healed.  -AJ       Cervical/Thoracic Special Tests    Spurlings (Foraminal Compression) Negative  -AJ    Cervical Compression (Forarminal Compression vs. Facet Pain)  Right:;Positive;Left:;Negative  -AJ    Cervical Distraction (Foraminal Compression vs. Facet Pain) Bilateral:;Negative  -AJ    Vertebral Artery Test (VBI Sign) Bilateral:;Negative  -AJ       Head/Neck/Trunk    Neck Extension AROM 34°  -AJ    Neck Flexion AROM 30°  -AJ    Neck Lt Lateral Flexion AROM 32°  -AJ    Neck Rt Lateral Flexion AROM 20°  -AJ    Neck Lt Rotation AROM 57°  -AJ    Neck Rt Rotation AROM 52°  -AJ       General Assessment (Manual Muscle Testing)    Comment, General Manual Muscle Testing (MMT) Assessment B UE flex/abd 4/5 with increase in R sided neck pain. C-spine guarded grossly 4-/5  -AJ       Sensation    Sensation WNL? WNL  -AJ    Light Touch No apparent deficits  -       Transfers    Comment (Transfers) I with all transfers.  -       Gait/Stairs Assessment/Training    Comment (Gait/Stairs) Ambulating with SC , FWB, non-antalgic gait.  -      User Key  (r) = Recorded By, (t) = Taken By, (c) = Cosigned By    Initials Name Provider Type    ROBERTO CARLOS Tony, PT Physical Therapist              Therapy Education  Given: HEP, Symptoms/condition management, Pain management, Posture/body mechanics (POC)  Program: New  How Provided: Verbal, Demonstration  Provided to: Patient  Level of Understanding: Verbalized, Demonstrated           PT OP Goals     Row Name 03/14/18 1400          PT Short Term Goals    STG Date to Achieve 04/04/18  -     STG 1 I with HEP and have additions/changes by next recertification.  -     STG 2 AROM cervical flex/ext >= 40°.  -     STG 3 AROM cervical SB B >= 30°.  -     STG 4 AROM cervical ROT >=60°.  -     STG 5 Patient to be more aware of posture and posture correction technique.  -        Long Term Goals    LTG Date to Achieve 04/27/18  -     LTG 1 AROM for c-spine all WFL for age and surgical changes with no increase in pain.  -     LTG 2 B UE 5/5.  -     LTG 3 C-spine 4+/5 or greater in all directions.  -     LTG 4 I with self management  of s/s.  -     LTG 5 I with final HEP.  -     LTG 6 D/C with a final HEP and free 30 day fitness formula membership.  -        Time Calculation    PT Goal Re-Cert Due Date 04/04/18  -       User Key  (r) = Recorded By, (t) = Taken By, (c) = Cosigned By    Initials Name Provider Type    ROBERTO CARLOS Tony, PT Physical Therapist         Barriers to Rehab:Include significant or possible arthritic/degenerative changes that have occurred within the spine.    Safety Issues: None noted.          PT Assessment/Plan     Row Name 03/14/18 1400          PT Assessment    Functional Limitations Limitations in community activities;Performance in leisure activities;Performance in self-care ADL  -     Impairments Endurance;Impaired flexibility;Impaired muscle endurance;Impaired muscle length;Impaired muscle power  -     Assessment Comments Patient did wel lwith all ther ex and given written copy of HEP exercises.  -     Rehab Potential Good  -     Patient/caregiver participated in establishment of treatment plan and goals Yes  -     Patient would benefit from skilled therapy intervention Yes  -AJ        PT Plan    PT Frequency 2x/week  -     Predicted Duration of Therapy Intervention (OT Eval) 6 weeks, 12 visits  -     Planned CPT's? PT EVAL MOD COMPLELITY: 19447;PT RE-EVAL: 22597;PT THER PROC EA 15 MIN: 91285;PT THER ACT EA 15 MIN: 76708;PT ELECTRICAL STIM UNATTEND: ;PT THER SUPP EA 15 MIN  -     Physical Therapy Interventions (Optional Details) gross motor skills;home exercise program;manual therapy techniques;modalities;patient/family education;postural re-education;ROM (Range of Motion);stretching;strengthening  -     PT Plan Comments Review HEP and progress overall posture, ROM, and strength  -       User Key  (r) = Recorded By, (t) = Taken By, (c) = Cosigned By    Initials Name Provider Type    ROBERTO CARLOS Tony, PT Physical Therapist       Other therapeutic activities and/or  "exercises will be prescribed depending on the patients progress or lack there of.          Modalities     Row Name 03/14/18 1400             Ice    Patient denies application of Ice Yes  -AJ        User Key  (r) = Recorded By, (t) = Taken By, (c) = Cosigned By    Initials Name Provider Type    ROBERTO CARLOS Tony PT Physical Therapist              Exercises     Row Name 03/14/18 1400             Subjective Comments    Subjective Comments Patient reports that she wants to decreasae the stifness fo the neck and improve mobility.  -AJ         Subjective Pain    Able to rate subjective pain? yes  -AJ      Pre-Treatment Pain Level 3  -AJ      Post-Treatment Pain Level 2  -AJ         Exercise 1    Exercise Name 1 AROm cervical flex/ext  -AJ      Reps 1 10  -AJ      Time 1 3\" hold end of range  -AJ         Exercise 2    Exercise Name 2 AROm cervical SB/ROT  -AJ      Reps 2 10  -AJ      Time 2 3\" hold end of saurabh  -AJ         Exercise 3    Exercise Name 3 AROM B cervical ROT  -AJ      Reps 3 10  -AJ      Time 3 3\" hold end of range  -AJ         Exercise 4    Exercise Name 4 B UT S  -AJ      Reps 4 2  -AJ      Time 4 30 seconds  -AJ      Additional Comments No UE A  -AJ         Exercise 5    Exercise Name 5 B LS S  -AJ      Reps 5 2  -AJ      Time 5 30 seconds  -AJ      Additional Comments No UE A  -AJ        User Key  (r) = Recorded By, (t) = Taken By, (c) = Cosigned By    Initials Name Provider Type    ROBERTO CARLOS Tony PT Physical Therapist                        Outcome Measure Options: Neck Disability Index (NDI)  Neck Disability Index  Section 1 - Pain Intensity: The pain is fairly severe at the moment.  Section 2 - Personal Care: It is painful to look after myself, and I am slow and careful.  Section 3 - Lifting: I can lift only very light weights.  Section 4 - Work: I can't do my usual work.  Section 5 - Headaches: I have moderate headaches that come infrequently.  Section 6 - Concentration: I have a fair " degree of difficulty concentrating.  Section 7 - Sleeping: My sleep is mildly disturbed for up to 1-2 hours.  Section 8 - Driving: I can't drive as long as I want because of moderate neck pain.  Section 9 - Reading: I can't read as much as I want because of moderate neck pain.  Section 10 - Recreation: I can hardly do recreational activities due to neck pain.  Neck Disability Index Score: 28  Neck Disability Index Comments: 56%      Time Calculation:   Start Time: 1350  Stop Time: 1427  Time Calculation (min): 37 min  Total Timed Code Minutes- PT: 10 minute(s)     Therapy Charges for Today     Code Description Service Date Service Provider Modifiers Qty    99619050079 HC PT CHNG MAIN POS CURRENT 3/14/2018 Hollie Tony, PT GP, CK 1    80670989731 HC PT NG MAIN POS PROJECTED 3/14/2018 Hollie Tony PT GP, CJ 1    02853497220 HC PT EVAL MOD COMPLEXITY 2 3/14/2018 Hollie Tony, PT GP 1    54644126087 HC PT THER PROC EA 15 MIN 3/14/2018 Hollie Tony PT GP 1    26412862136 HC PT THER SUPP EA 15 MIN 3/14/2018 Hollie Tony, PT GP 1          PT G-Codes  Outcome Measure Options: Neck Disability Index (NDI)  Functional Limitation: Changing and maintaining body position  Changing and Maintaining Body Position Current Status (): At least 40 percent but less than 60 percent impaired, limited or restricted  Changing and Maintaining Body Position Goal Status (): At least 20 percent but less than 40 percent impaired, limited or restricted         Hollie Tony, PT, DPT, CSCS  3/14/2018

## 2018-03-19 ENCOUNTER — OFFICE VISIT (OUTPATIENT)
Dept: OBSTETRICS AND GYNECOLOGY | Facility: CLINIC | Age: 83
End: 2018-03-19

## 2018-03-19 ENCOUNTER — HOSPITAL ENCOUNTER (OUTPATIENT)
Dept: PHYSICAL THERAPY | Facility: HOSPITAL | Age: 83
Setting detail: THERAPIES SERIES
Discharge: HOME | End: 2018-03-19

## 2018-03-19 ENCOUNTER — OFFICE VISIT (OUTPATIENT)
Dept: FAMILY MEDICINE CLINIC | Facility: CLINIC | Age: 83
End: 2018-03-19

## 2018-03-19 VITALS
WEIGHT: 156 LBS | SYSTOLIC BLOOD PRESSURE: 129 MMHG | HEART RATE: 67 BPM | TEMPERATURE: 97 F | BODY MASS INDEX: 30.63 KG/M2 | HEIGHT: 60 IN | OXYGEN SATURATION: 98 % | RESPIRATION RATE: 20 BRPM | DIASTOLIC BLOOD PRESSURE: 65 MMHG

## 2018-03-19 VITALS
BODY MASS INDEX: 30.63 KG/M2 | WEIGHT: 156 LBS | HEIGHT: 60 IN | DIASTOLIC BLOOD PRESSURE: 73 MMHG | SYSTOLIC BLOOD PRESSURE: 115 MMHG

## 2018-03-19 DIAGNOSIS — Z46.89 ENCOUNTER FOR FITTING AND ADJUSTMENT OF PESSARY: ICD-10-CM

## 2018-03-19 DIAGNOSIS — Z98.1 S/P CERVICAL SPINAL FUSION: ICD-10-CM

## 2018-03-19 DIAGNOSIS — N81.10 POP-Q STAGE 4 CYSTOCELE: Primary | ICD-10-CM

## 2018-03-19 DIAGNOSIS — K64.1 GRADE II HEMORRHOIDS: ICD-10-CM

## 2018-03-19 DIAGNOSIS — E11.9 TYPE 2 DIABETES MELLITUS WITHOUT COMPLICATION, WITHOUT LONG-TERM CURRENT USE OF INSULIN (HCC): Primary | ICD-10-CM

## 2018-03-19 DIAGNOSIS — M54.2 CERVICALGIA: Primary | ICD-10-CM

## 2018-03-19 DIAGNOSIS — N39.3 STRESS INCONTINENCE: ICD-10-CM

## 2018-03-19 DIAGNOSIS — N81.6 POP-Q STAGE 2 RECTOCELE: ICD-10-CM

## 2018-03-19 PROCEDURE — 99213 OFFICE O/P EST LOW 20 MIN: CPT | Performed by: OBSTETRICS & GYNECOLOGY

## 2018-03-19 PROCEDURE — 99214 OFFICE O/P EST MOD 30 MIN: CPT | Performed by: NURSE PRACTITIONER

## 2018-03-19 PROCEDURE — 97110 THERAPEUTIC EXERCISES: CPT

## 2018-03-19 RX ORDER — DOXYCYCLINE HYCLATE 100 MG/1
100 TABLET, DELAYED RELEASE ORAL 2 TIMES DAILY
Qty: 20 TABLET | Refills: 0 | Status: SHIPPED | OUTPATIENT
Start: 2018-03-19 | End: 2018-04-30

## 2018-03-19 NOTE — THERAPY TREATMENT NOTE
Outpatient Physical Therapy Ortho Treatment Note  [facility]  Azul Trinh PTA       Patient Name: Kristy Ramirez  : 1934  MRN: [id number]  Today's Date: 3/19/2018      Visit Date: 2018     Visits: 2/2  Insurance Visits Approved: 26 visits  Recert Due: 2018  MD Appt: 2018  Pain: pretreatment 4/10; post treatment 0/10  Improvement: pt is subjectively reporting 0% improvement since initial evaluation    Visit Dx:    ICD-10-CM ICD-9-CM   1. Cervicalgia M54.2 723.1   2. S/P cervical spinal fusion Z98.1 V45.4       Patient Active Problem List   Diagnosis   • Weakness   • Chronic low back pain   • Skin lesion   • Choking   • Diarrhea   • Pain of right hip joint   • Magnesium disorder   • On hormone replacement therapy   • Hormone deficiency   • Gout   • Gastroesophageal reflux disease   • Essential hypertension   • Stress incontinence   • Right hip pain   • Low back pain   • Angioedema   • Allergic reaction   • Sore throat   • Impacted cerumen of right ear   • Acute otitis externa of right ear   • Neck pain   • Type 2 diabetes mellitus without complication, without long-term current use of insulin   • Screening for breast cancer   • POP-Q stage 4 cystocele   • POP-Q stage 2 rectocele   • Presence of pessary   • Obesity (BMI 30.0-34.9)   • Grade II hemorrhoids        Past Medical History:   Diagnosis Date   • Abnormal CT scan     per patient   • Acute bronchitis    • Acute maxillary sinusitis    • Acute otitis externa    • Acute sinusitis    • Allergic conjunctivitis    • Angular cheilitis    • Ankle edema    • Backache     improved   • Blood in urine      UTI resolved      • Bradycardia    • Chest discomfort     described as heart racing      • Chronic low back pain     RIGHT leg radiation      • Cold feet     c/o - and lowerlegs      • Contusion     of dorsum of foot   • Cough    • Depressive disorder    • Diabetes mellitus    • Dizziness and giddiness    • Dyspnea    •  Dysuria    • Edema    • Edema of foot    • Edema of lower extremity    • Essential hypertension    • Exanthematous disorder    • Fatigue    • Foot pain, left    • Grade II hemorrhoids 3/12/2018   • Hematoma    • Hip pain, right    • History of palpitations    • Hormone replacement therapy (postmenopausal)    • Hypertensive disorder    • Impacted cerumen    • Joint pain    • Knee pain    • Low back pain    • Malaise and fatigue    • Multiple joint pain    • Muscle pain    • Muscle weakness    • Neck pain    • Obesity (BMI 30.0-34.9) 3/12/2018   • Osteoarthritis of knee    • Osteoarthritis of multiple joints    • Otitis externa    • Pain in lower limb    • Peripheral venous insufficiency    • POP-Q stage 2 rectocele 3/12/2018   • POP-Q stage 4 cystocele 3/12/2018   • Sacroiliitis, not elsewhere classified     R LE   • Shoulder pain    • Upper respiratory infection    • Urinary frequency    • Urinary tract infectious disease    • Wheezing         Past Surgical History:   Procedure Laterality Date   • ANKLE SURGERY     • BACK SURGERY     • COLONOSCOPY  12/14/2009   • EYE SURGERY Bilateral 02/2018    B cataract   • HAMMER TOE REPAIR  08/02/2011    Hammertoe repair, left second toe.   • HYSTERECTOMY     • INCISION AND DRAINAGE ABSCESS  01/21/2015   • INJECTION OF MEDICATION  11/15/2013   • INJECTION OF MEDICATION  05/06/2013    Celestone (betamethasone) (1)      • INJECTION OF MEDICATION  03/17/2016    Kenalog (3)      • NECK SURGERY  01/2018   • UPPER GASTROINTESTINAL ENDOSCOPY  12/14/2009             PT Ortho     Row Name 03/19/18 1400       Subjective Comments    Subjective Comments reports that she has a little pulling at the right side of her neck just behind the ear with the initiation of the Pro II today.   -       Precautions and Contraindications    Contraindications pt reports has recent infection if lymph nodes on right side of neck currently  -       Subjective Pain    Able to rate subjective pain? yes   "-    Pre-Treatment Pain Level 4  -    Post-Treatment Pain Level 0  -       Posture/Observations    Posture/Observations Comments pt arrives 10 minutes late for treatment.   -      User Key  (r) = Recorded By, (t) = Taken By, (c) = Cosigned By    Initials Name Provider Type    LUIS ELLISON URVASHI Trinh Physical Therapy Assistant                            PT Assessment/Plan     Row Name 03/19/18 1400          PT Assessment    Assessment Comments utilized ice today vs heat secondary to patient's reports of current infection in lymph nodes on right side. patient requires a lot of verbal and tactile cueing for chin tucks today.   -        PT Plan    PT Frequency 2x/week  -     PT Plan Comments next visit continue to work on chin tucks  -       User Key  (r) = Recorded By, (t) = Taken By, (c) = Cosigned By    Initials Name Provider Type    LUIS ELLISON URVASHI Trinh Physical Therapy Assistant                Modalities     Row Name 03/19/18 1400             Ice    Ice Applied Yes  -      Rx Minutes 15 mins  -      Ice S/P Rx Yes  -        User Key  (r) = Recorded By, (t) = Taken By, (c) = Cosigned By    Initials Name Provider Type    LUIS ELLISON URVASHI Trinh Physical Therapy Assistant                Exercises     Row Name 03/19/18 1400             Subjective Comments    Subjective Comments reports that she has a little pulling at the right side of her neck just behind the ear with the initiation of the Pro II today.   -         Subjective Pain    Able to rate subjective pain? yes  -      Pre-Treatment Pain Level 4  -      Post-Treatment Pain Level 0  -         Exercise 1    Exercise Name 1 Pro II UE/LE  -      Reps 1 8 mins  -      Additional Comments L 1.0  -         Exercise 2    Exercise Name 2 AROM Cervical Flex/Ext \"Yes's\"  -      Reps 2 20  -      Time 2 3 sec hold end of range  -         Exercise 3    Exercise Name 3 AROM C Spine Rotation \"No's\"  -      Reps 3 10  -MH      Time 3 3 sec " hold end of range  -         Exercise 4    Exercise Name 4 AROM C Spine SB/Rot  -      Reps 4 10  -      Time 4 3 sec hold end of range  -         Exercise 5    Exercise Name 5 B UT S   -      Reps 5 2  -      Time 5 30 sec hold   -      Additional Comments No UE Overpressure  -         Exercise 6    Exercise Name 6 B Levator S  -      Reps 6 2  -MH      Time 6 30 sec hold  -      Additional Comments No UE Overpressure  -         Exercise 7    Exercise Name 7 posterior shoulder rolls   -      Reps 7 20   -         Exercise 8    Exercise Name 8 scap squeezes  -      Reps 8 20  -MH         Exercise 9    Exercise Name 9 Chin Tucks  -      Cueing 9 Verbal;Tactile  -      Reps 9 15  -      Time 9 5 sec hold  -        User Key  (r) = Recorded By, (t) = Taken By, (c) = Cosigned By    Initials Name Provider Type    LUIS Trinh, PTA Physical Therapy Assistant                               PT OP Goals     Row Name 03/19/18 1400          PT Short Term Goals    STG Date to Achieve 04/04/18  -     STG 1 I with HEP and have additions/changes by next recertification.  -     STG 1 Progress Progressing  -     STG 2 AROM cervical flex/ext >= 40°.  -     STG 2 Progress Ongoing  -     STG 3 AROM cervical SB B >= 30°.  -     STG 3 Progress Ongoing  -     STG 4 AROM cervical ROT >=60°.  -     STG 4 Progress Ongoing  -     STG 5 Patient to be more aware of posture and posture correction technique.  -     STG 5 Progress Ongoing  -        Long Term Goals    LTG Date to Achieve 04/27/18  -     LTG 1 AROM for c-spine all WFL for age and surgical changes with no increase in pain.  -     LTG 1 Progress Ongoing  -     LTG 2 B UE 5/5.  -     LTG 2 Progress Ongoing  -     LTG 3 C-spine 4+/5 or greater in all directions.  -     LTG 3 Progress Ongoing  -     LTG 4 I with self management of s/s.  -     LTG 4 Progress Ongoing  -     LTG 5 I with final HEP.  -     LTG 5  Progress Ongoing  -     LTG 6 D/C with a final HEP and free 30 day fitness formula membership.  -     LTG 6 Progress Ongoing  -        Time Calculation    PT Goal Re-Cert Due Date 04/04/18  -       User Key  (r) = Recorded By, (t) = Taken By, (c) = Cosigned By    Initials Name Provider Type     Azul Trinh PTA Physical Therapy Assistant          Therapy Education  Given: HEP, Symptoms/condition management, Pain management, Posture/body mechanics  Program: Reinforced  How Provided: Verbal, Demonstration  Provided to: Patient  Level of Understanding: Verbalized, Demonstrated              Time Calculation:   Start Time: 1440  Stop Time: 1543  Time Calculation (min): 63 min  PT Non-Billable Time (min): 15 min  Total Timed Code Minutes- PT: 48 minute(s)    Therapy Charges for Today     Code Description Service Date Service Provider Modifiers Qty    83648907325 HC PT THER PROC EA 15 MIN 3/19/2018 Azul Trinh PTA GP 3    71272421523 HC PT THER SUPP EA 15 MIN 3/19/2018 Azul Trinh PTA GP 1                    Azul Trinh PTA  3/19/2018

## 2018-03-20 NOTE — PROGRESS NOTES
Subjective   Kristy Ramirez is a 84 y.o. female.     Here today stating that she had one episode of her b/s going up to 200.  Did not stay that high long, came back down to 130.  She was concerned.      Diabetes   She presents for her follow-up diabetic visit. She has type 2 diabetes mellitus. Her disease course has been stable. There are no hypoglycemic associated symptoms. There are no diabetic associated symptoms. There are no hypoglycemic complications. Symptoms are stable. There are no diabetic complications. Risk factors for coronary artery disease include diabetes mellitus, dyslipidemia, hypertension, sedentary lifestyle and post-menopausal. Current diabetic treatment includes oral agent (monotherapy). She is compliant with treatment all of the time. Her weight is stable. She is following a diabetic diet. Meal planning includes avoidance of concentrated sweets. She has not had a previous visit with a dietitian. She rarely participates in exercise. She monitors blood glucose at home 1-2 x per day. Her breakfast blood glucose is taken between 7-8 am. Her breakfast blood glucose range is generally 130-140 mg/dl. Her highest blood glucose is >200 mg/dl. An ACE inhibitor/angiotensin II receptor blocker is being taken. She does not see a podiatrist.Eye exam is current.        The following portions of the patient's history were reviewed and updated as appropriate: allergies, current medications, past family history, past medical history, past social history, past surgical history and problem list.    Review of Systems   Constitutional: Negative.    HENT: Negative.    Respiratory: Negative.    Cardiovascular: Negative.    Musculoskeletal: Negative.    Skin: Negative.    Neurological: Negative.    Psychiatric/Behavioral: Negative.        Objective   Physical Exam   Constitutional: She is oriented to person, place, and time. She appears well-developed and well-nourished. No distress.   HENT:   Head: Normocephalic.    Eyes: Pupils are equal, round, and reactive to light.   Neck: Normal range of motion. Neck supple. No thyromegaly present.   Cardiovascular: Normal rate, regular rhythm and normal heart sounds.  Exam reveals no friction rub.    No murmur heard.  Pulmonary/Chest: Effort normal and breath sounds normal. No respiratory distress. She has no wheezes. She has no rales.   Abdominal: Soft.   Musculoskeletal: Normal range of motion.   Neurological: She is alert and oriented to person, place, and time.   Skin: Skin is warm and dry.   Psychiatric: She has a normal mood and affect. Thought content normal.   Nursing note and vitals reviewed.        Assessment/Plan   Kristy was seen today for diabetes.    Diagnoses and all orders for this visit:    Type 2 diabetes mellitus without complication, without long-term current use of insulin    Other orders  -     doxycycline (DORYX) 100 MG enteric coated tablet; Take 1 tablet by mouth 2 (Two) Times a Day.      Was unable to tolerate the pcn for her enlarged lymph node.   Will give her doxy instead.  Also is explained to her that as long as her b/s does not stay at 200, she will be ok.  Will occ spike depending on several factors.  She and her daughter verbalized understanding.

## 2018-03-21 ENCOUNTER — OFFICE VISIT (OUTPATIENT)
Dept: OBSTETRICS AND GYNECOLOGY | Facility: CLINIC | Age: 83
End: 2018-03-21

## 2018-03-21 ENCOUNTER — HOSPITAL ENCOUNTER (OUTPATIENT)
Dept: PHYSICAL THERAPY | Facility: HOSPITAL | Age: 83
Setting detail: THERAPIES SERIES
Discharge: HOME | End: 2018-03-21

## 2018-03-21 VITALS
HEIGHT: 60 IN | WEIGHT: 156 LBS | SYSTOLIC BLOOD PRESSURE: 100 MMHG | BODY MASS INDEX: 30.63 KG/M2 | DIASTOLIC BLOOD PRESSURE: 62 MMHG

## 2018-03-21 DIAGNOSIS — N39.3 STRESS INCONTINENCE: ICD-10-CM

## 2018-03-21 DIAGNOSIS — Z46.89 ENCOUNTER FOR FITTING AND ADJUSTMENT OF PESSARY: ICD-10-CM

## 2018-03-21 DIAGNOSIS — N81.10 POP-Q STAGE 4 CYSTOCELE: Primary | ICD-10-CM

## 2018-03-21 DIAGNOSIS — M54.2 CERVICALGIA: Primary | ICD-10-CM

## 2018-03-21 DIAGNOSIS — K64.1 GRADE II HEMORRHOIDS: ICD-10-CM

## 2018-03-21 DIAGNOSIS — N81.6 POP-Q STAGE 2 RECTOCELE: ICD-10-CM

## 2018-03-21 DIAGNOSIS — Z98.1 S/P CERVICAL SPINAL FUSION: ICD-10-CM

## 2018-03-21 PROCEDURE — 99213 OFFICE O/P EST LOW 20 MIN: CPT | Performed by: OBSTETRICS & GYNECOLOGY

## 2018-03-21 PROCEDURE — 97110 THERAPEUTIC EXERCISES: CPT

## 2018-03-21 NOTE — THERAPY TREATMENT NOTE
Outpatient Physical Therapy Ortho Treatment Note  [facility]     Patient Name: Kristy Ramirez  : 1934  MRN: [id number]  Today's Date: 3/21/2018      Visit Date: 2018  Pt reports 5/10 pain pre treatment, 6/10 pain post treatment  Reports 0% of improvement.  Attended 3/3 visits.  Insurance available:26 visits   Next MD appt:May  2018.  Recertification: 2018.  Visit Dx:    ICD-10-CM ICD-9-CM   1. Cervicalgia M54.2 723.1   2. S/P cervical spinal fusion Z98.1 V45.4       Patient Active Problem List   Diagnosis   • Weakness   • Chronic low back pain   • Skin lesion   • Choking   • Diarrhea   • Pain of right hip joint   • Magnesium disorder   • On hormone replacement therapy   • Hormone deficiency   • Gout   • Gastroesophageal reflux disease   • Essential hypertension   • Stress incontinence   • Right hip pain   • Low back pain   • Angioedema   • Allergic reaction   • Sore throat   • Impacted cerumen of right ear   • Acute otitis externa of right ear   • Neck pain   • Type 2 diabetes mellitus without complication, without long-term current use of insulin   • Screening for breast cancer   • POP-Q stage 4 cystocele   • POP-Q stage 2 rectocele   • Presence of pessary   • Obesity (BMI 30.0-34.9)   • Grade II hemorrhoids        Past Medical History:   Diagnosis Date   • Abnormal CT scan     per patient   • Acute bronchitis    • Acute maxillary sinusitis    • Acute otitis externa    • Acute sinusitis    • Allergic conjunctivitis    • Angular cheilitis    • Ankle edema    • Backache     improved   • Blood in urine      UTI resolved      • Bradycardia    • Chest discomfort     described as heart racing      • Chronic low back pain     RIGHT leg radiation      • Cold feet     c/o - and lowerlegs      • Contusion     of dorsum of foot   • Cough    • Depressive disorder    • Diabetes mellitus    • Dizziness and giddiness    • Dyspnea    • Dysuria    • Edema    • Edema of foot    • Edema of  lower extremity    • Essential hypertension    • Exanthematous disorder    • Fatigue    • Foot pain, left    • Grade II hemorrhoids 3/12/2018   • Hematoma    • Hip pain, right    • History of palpitations    • Hormone replacement therapy (postmenopausal)    • Hypertensive disorder    • Impacted cerumen    • Joint pain    • Knee pain    • Low back pain    • Malaise and fatigue    • Multiple joint pain    • Muscle pain    • Muscle weakness    • Neck pain    • Obesity (BMI 30.0-34.9) 3/12/2018   • Osteoarthritis of knee    • Osteoarthritis of multiple joints    • Otitis externa    • Pain in lower limb    • Peripheral venous insufficiency    • POP-Q stage 2 rectocele 3/12/2018   • POP-Q stage 4 cystocele 3/12/2018   • Sacroiliitis, not elsewhere classified     R LE   • Shoulder pain    • Upper respiratory infection    • Urinary frequency    • Urinary tract infectious disease    • Wheezing         Past Surgical History:   Procedure Laterality Date   • ANKLE SURGERY     • BACK SURGERY     • COLONOSCOPY  12/14/2009   • EYE SURGERY Bilateral 02/2018    B cataract   • HAMMER TOE REPAIR  08/02/2011    Hammertoe repair, left second toe.   • HYSTERECTOMY     • INCISION AND DRAINAGE ABSCESS  01/21/2015   • INJECTION OF MEDICATION  11/15/2013   • INJECTION OF MEDICATION  05/06/2013    Celestone (betamethasone) (1)      • INJECTION OF MEDICATION  03/17/2016    Kenalog (3)      • NECK SURGERY  01/2018   • UPPER GASTROINTESTINAL ENDOSCOPY  12/14/2009             PT Ortho     Row Name 03/21/18 1500       Subjective Comments    Subjective Comments pt stated that she has had a long day with appts.   -TL       Subjective Pain    Able to rate subjective pain? yes  -TL    Pre-Treatment Pain Level 5  -TL    Row Name 03/19/18 1400       Subjective Comments    Subjective Comments reports that she has a little pulling at the right side of her neck just behind the ear with the initiation of the Pro II today.   -       Precautions and  Contraindications    Contraindications pt reports has recent infection if lymph nodes on right side of neck currently  -       Subjective Pain    Able to rate subjective pain? yes  -    Pre-Treatment Pain Level 4  -    Post-Treatment Pain Level 0  -       Posture/Observations    Posture/Observations Comments pt arrives 10 minutes late for treatment.   -      User Key  (r) = Recorded By, (t) = Taken By, (c) = Cosigned By    Initials Name Provider Type     Azul Trinh PTA Physical Therapy Assistant     Nusrat Beckham PTA Physical Therapy Assistant                            PT Assessment/Plan     Row Name 03/21/18 1600          PT Assessment    Assessment Comments pt tolerated new isometric ex of flexion/ext/SB. No new goals met but progressing. Pt given new ex this date.  -TL        PT Plan    PT Frequency 2x/week  -TL     PT Plan Comments start no money ex and cue pt to keep chin neutral vs elevated.  -TL       User Key  (r) = Recorded By, (t) = Taken By, (c) = Cosigned By    Initials Name Provider Type    CATHERINE Beckham PTA Physical Therapy Assistant                Modalities     Row Name 03/21/18 1500             Ice    Ice Applied Yes  -TL      Location c-spine  -TL      Rx Minutes 15 mins  -TL      Ice S/P Rx Yes  -TL        User Key  (r) = Recorded By, (t) = Taken By, (c) = Cosigned By    Initials Name Provider Type    CATHERINE Beckham PTA Physical Therapy Assistant                Exercises     Row Name 03/21/18 1500             Subjective Comments    Subjective Comments pt stated that she has had a long day with appts.   -TL         Subjective Pain    Able to rate subjective pain? yes  -TL      Pre-Treatment Pain Level 5  -TL         Exercise 1    Exercise Name 1 Pro ll UE/LE  -TL      Reps 1 10 min  -TL      Additional Comments 2 level  -TL         Exercise 2    Exercise Name 2 Chin tucks  -TL      Reps 2 10  -TL      Time 2 5 sec hold  -TL         Exercise 3    Exercise Name 3 AROM  c-spine flexion / ext  -TL      Reps 3 15  -TL         Exercise 4    Exercise Name 4 arom c-spine rotation  -TL      Reps 4 15  -TL         Exercise 5    Exercise Name 5 AROM c-spine SB  -TL      Reps 5 10  -TL      Additional Comments secondary to SB hurting right side  -TL         Exercise 6    Exercise Name 6 isometric c-spine flex/ext  -TL      Reps 6 10  -TL      Time 6 5 sec hold  -TL         Exercise 7    Exercise Name 7 isometric c-spine Alt SB  -TL      Reps 7 10  -TL      Time 7 5 sec hold  -TL         Exercise 8    Exercise Name 8 scap squeezes  -TL      Reps 8 20  -TL      Time 8 5 sec hold  -TL         Exercise 9    Exercise Name 9 shld rows posterior  -TL      Reps 9 10  -TL        User Key  (r) = Recorded By, (t) = Taken By, (c) = Cosigned By    Initials Name Provider Type    CATHERINE Beckham, PTA Physical Therapy Assistant                               PT OP Goals     Row Name 03/21/18 1515          PT Short Term Goals    STG Date to Achieve 04/04/18  -TL     STG 1 I with HEP and have additions/changes by next recertification.  -TL     STG 1 Progress Progressing  -TL     STG 2 AROM cervical flex/ext >= 40°.  -TL     STG 2 Progress Ongoing;Progressing  -TL     STG 3 AROM cervical SB B >= 30°.  -TL     STG 3 Progress Ongoing;Progressing  -TL     STG 4 AROM cervical ROT >=60°.  -TL     STG 4 Progress Ongoing;Progressing  -TL     STG 5 Patient to be more aware of posture and posture correction technique.  -TL     STG 5 Progress Ongoing;Progressing  -TL        Long Term Goals    LTG Date to Achieve 04/27/18  -TL     LTG 1 AROM for c-spine all WFL for age and surgical changes with no increase in pain.  -TL     LTG 1 Progress Ongoing  -TL     LTG 2 B UE 5/5.  -TL     LTG 2 Progress Ongoing  -TL     LTG 3 C-spine 4+/5 or greater in all directions.  -TL     LTG 3 Progress Ongoing  -TL     LTG 4 I with self management of s/s.  -TL     LTG 4 Progress Ongoing  -TL     LTG 5 I with final HEP.  -TL     LTG 5  Progress Ongoing  -TL     LTG 6 D/C with a final HEP and free 30 day fitness formula membership.  -TL     LTG 6 Progress Ongoing  -TL        Time Calculation    PT Goal Re-Cert Due Date 04/04/18  -TL       User Key  (r) = Recorded By, (t) = Taken By, (c) = Cosigned By    Initials Name Provider Type    TL Nusrat Beckham PTA Physical Therapy Assistant          Therapy Education  Education Details: isometric ex of flex/ext/sb  Given: HEP, Posture/body mechanics  Program: New  How Provided: Verbal, Demonstration, Written  Provided to: Patient  Level of Understanding: Teach back education performed, Verbalized, Demonstrated              Time Calculation:   Start Time: 1515  Stop Time: 1617  Time Calculation (min): 62 min  PT Non-Billable Time (min): 15 min  Total Timed Code Minutes- PT: 47 minute(s)    Therapy Charges for Today     Code Description Service Date Service Provider Modifiers Qty    96912013221 HC PT THER PROC EA 15 MIN 3/21/2018 Nusrat Beckham PTA GP 3    82893584951 HC PT THER SUPP EA 15 MIN 3/21/2018 Nusrat Beckham PTA GP 1                    Nusrat Beckham PTA  3/21/2018

## 2018-03-25 NOTE — PROGRESS NOTES
Kristy Ramirez is a 84 y.o. y/o female     Chief Complaint: discomfort with pessary    HPI: 84-year-old  with seven prior vaginal deliveries who had a hysterectomy with BSO in  for bleeding problems.  She is here to establish care.  She comes to this appointment with her daughter who has strongly encouraged her to seek gynecologic care.     NADYA Gonzales is her primary care provider.       At first she did not want to have a pelvic exam because she was embarrassed that something was falling out.  After a long discussion, she agreed to a complete female exam.     She had a mammogram in 2017.     Medical history significant for hypertension, diabetes, osteoarthritis, obesity, and stress urinary incontinence.     Past surgical history include hysterectomy, surgery, neck surgery, back surgery, ankle surgery.     She is  and retired.     She denies smoking or smokeless tobacco use.     Family history is significant for mother who  at age 95 and a house fire.  Father  at age 76 heart attack.  2 brothers had  of heart attack.  She has 7 children.  There is no family history of breast cancer, uterine cancer, ovarian cancer, or colon cancer.     She has not been sexually active for some time.     After her physical exam, she agreed to be fitted for a pessary.  She was fitted for a #6 ring pessary with support on 2018.  She was able to sit and walk comfortably and emptied her bladder without problem.  She was given instructions on what to do if the pessary comes out or if it causes her pain.  I will see her back in a week.     She states that without the pessary, she does not feel that she empties her bladder completely, and she has stress urinary incontinence.  She also complains of constipation and hemorrhoids.     She came in 2018 after the pessary came out.  She was fitted with a #7 ring pessary with support and did well with that.    2018 the pessary  was adjusted, and immediately she felt better.    Review of Systems   Constitutional: Positive for fatigue. Negative for activity change, appetite change, chills, diaphoresis, fever and unexpected weight change.   Gastrointestinal: Negative for abdominal pain, constipation, diarrhea and nausea.   Genitourinary: Negative for difficulty urinating, dysuria, pelvic pain, urgency, vaginal bleeding, vaginal discharge and vaginal pain.   Neurological: Negative for headaches.   Psychiatric/Behavioral: Negative for dysphoric mood. The patient is not nervous/anxious.    All other systems reviewed and are negative.     Breast ROS: negative    The following portions of the patient's history were reviewed and updated as appropriate: allergies, current medications, past family history, past medical history, past social history, past surgical history and problem list.    Allergies   Allergen Reactions   • Aleve [Naproxen Sodium]    • Atenolol    • Keflex [Cephalexin]    • Lisinopril Angioedema   • Relafen [Nabumetone]    • Acetaminophen Itching   • Sulfa Antibiotics Rash          Current Outpatient Prescriptions:   •  ACCU-CHEK FASTCLIX LANCETS misc, 1 each Daily., Disp: 102 each, Rfl: 12  •  allopurinol (ZYLOPRIM) 100 MG tablet, TAKE 1 TABLET BY MOUTH EVERY DAY, Disp: 90 tablet, Rfl: 1  •  amLODIPine (NORVASC) 5 MG tablet, Take 1 tablet by mouth Daily., Disp: 90 tablet, Rfl: 1  •  aspirin 81 MG tablet, Take 1 tablet by mouth Daily., Disp: 90 tablet, Rfl: 1  •  busPIRone (BUSPAR) 10 MG tablet, Take 1 tablet by mouth 3 (Three) Times a Day., Disp: 180 tablet, Rfl: 1  •  chlorthalidone (HYGROTON) 25 MG tablet, Take 1 tablet by mouth Daily., Disp: 90 tablet, Rfl: 3  •  colchicine 0.6 MG tablet, Take 1 tablet by mouth Daily., Disp: 180 tablet, Rfl: 1  •  estradiol (ESTRACE) 1 MG tablet, TAKE 1 TABLET BY MOUTH DAILY, Disp: 90 tablet, Rfl: 0  •  folic acid (FOLVITE) 1 MG tablet, Take 1 tablet by mouth Daily., Disp: 90 tablet, Rfl: 0  •   gabapentin (NEURONTIN) 600 MG tablet, TK 1/2 T TO 1 T PO QHS PRN P, Disp: , Rfl: 5  •  glucose blood test strip, Use as instructed to check b/s 1x a day.  Please give strip compatible with monitor., Disp: 100 each, Rfl: 12  •  guaiFENesin (MUCINEX) 600 MG 12 hr tablet, Take 1 tablet by mouth Every 12 (Twelve) Hours., Disp: 20 tablet, Rfl: 1  •  hydrOXYzine (VISTARIL) 25 MG capsule, Take 1 capsule by mouth every night at bedtime., Disp: 30 capsule, Rfl: 0  •  linaclotide (LINZESS) 145 MCG capsule capsule, Take 1 capsule by mouth Every Morning Before Breakfast., Disp: 30 capsule, Rfl: 3  •  losartan-hydrochlorothiazide (HYZAAR) 50-12.5 MG per tablet, Take 1 tablet by mouth Daily., Disp: 90 tablet, Rfl: 3  •  magnesium oxide (MAG-OX) 400 MG tablet, Take 1 tablet by mouth Daily., Disp: 90 tablet, Rfl: 2  •  metFORMIN ER (GLUCOPHAGE-XR) 500 MG 24 hr tablet, Take 1 tablet by mouth Daily With Breakfast., Disp: 90 tablet, Rfl: 1  •  metoprolol succinate XL (TOPROL-XL) 25 MG 24 hr tablet, Take 1 tablet by mouth Daily., Disp: 90 tablet, Rfl: 1  •  nystatin (MYCOSTATIN) 057127 UNIT/ML suspension, SWISH AND SWALLOW 5 ML BY MOUTH FOUR TIMES DAILY, Disp: 240 mL, Rfl: 0  •  omeprazole (PRILOSEC) 20 MG capsule, Take 1 capsule by mouth 2 (Two) Times a Day., Disp: 60 capsule, Rfl: 3  •  oxyCODONE (ROXICODONE) 5 MG immediate release tablet, Take 5 mg by mouth As Needed., Disp: , Rfl: 0  •  penicillin v potassium (VEETID) 500 MG tablet, Take 1 tablet by mouth 4 (Four) Times a Day., Disp: 40 tablet, Rfl: 0  •  predniSONE (DELTASONE) 20 MG tablet, Take 20 mg by mouth., Disp: , Rfl: 1  •  TiZANidine (ZANAFLEX) 4 MG capsule, TAKE 1 CAPSULE BY MOUTH TWICE DAILY, Disp: 180 capsule, Rfl: 3  •  tolterodine LA (DETROL LA) 4 MG 24 hr capsule, Take 1 capsule by mouth Daily., Disp: 90 capsule, Rfl: 1  •  doxycycline (DORYX) 100 MG enteric coated tablet, Take 1 tablet by mouth 2 (Two) Times a Day., Disp: 20 tablet, Rfl: 0     The patient has a  family history of   Family History   Problem Relation Age of Onset   • Diabetes Other    • Heart disease Other    • Stroke Other         Past Medical History:   Diagnosis Date   • Abnormal CT scan     per patient   • Acute bronchitis    • Acute maxillary sinusitis    • Acute otitis externa    • Acute sinusitis    • Allergic conjunctivitis    • Angular cheilitis    • Ankle edema    • Backache     improved   • Blood in urine      UTI resolved      • Bradycardia    • Chest discomfort     described as heart racing      • Chronic low back pain     RIGHT leg radiation      • Cold feet     c/o - and lowerlegs      • Contusion     of dorsum of foot   • Cough    • Depressive disorder    • Diabetes mellitus    • Dizziness and giddiness    • Dyspnea    • Dysuria    • Edema    • Edema of foot    • Edema of lower extremity    • Essential hypertension    • Exanthematous disorder    • Fatigue    • Foot pain, left    • Grade II hemorrhoids 3/12/2018   • Hematoma    • Hip pain, right    • History of palpitations    • Hormone replacement therapy (postmenopausal)    • Hypertensive disorder    • Impacted cerumen    • Joint pain    • Knee pain    • Low back pain    • Malaise and fatigue    • Multiple joint pain    • Muscle pain    • Muscle weakness    • Neck pain    • Obesity (BMI 30.0-34.9) 3/12/2018   • Osteoarthritis of knee    • Osteoarthritis of multiple joints    • Otitis externa    • Pain in lower limb    • Peripheral venous insufficiency    • POP-Q stage 2 rectocele 3/12/2018   • POP-Q stage 4 cystocele 3/12/2018   • Sacroiliitis, not elsewhere classified     R LE   • Shoulder pain    • Upper respiratory infection    • Urinary frequency    • Urinary tract infectious disease    • Wheezing         OB History      Para Term  AB Living    9 7   2 7    SAB TAB Ectopic Molar Multiple Live Births                    Social History     Social History   • Marital status:      Spouse name: N/A   • Number of children:  "N/A   • Years of education: N/A     Occupational History   • Not on file.     Social History Main Topics   • Smoking status: Never Smoker   • Smokeless tobacco: Never Used   • Alcohol use No   • Drug use: No   • Sexual activity: Not on file      Comment: Hysterectomy     Other Topics Concern   • Not on file     Social History Narrative   • No narrative on file        Past Surgical History:   Procedure Laterality Date   • ANKLE SURGERY     • BACK SURGERY     • COLONOSCOPY  12/14/2009   • EYE SURGERY Bilateral 02/2018    B cataract   • HAMMER TOE REPAIR  08/02/2011    Hammertoe repair, left second toe.   • HYSTERECTOMY     • INCISION AND DRAINAGE ABSCESS  01/21/2015   • INJECTION OF MEDICATION  11/15/2013   • INJECTION OF MEDICATION  05/06/2013    Celestone (betamethasone) (1)      • INJECTION OF MEDICATION  03/17/2016    Kenalog (3)      • NECK SURGERY  01/2018   • UPPER GASTROINTESTINAL ENDOSCOPY  12/14/2009        Patient Active Problem List   Diagnosis   • Weakness   • Chronic low back pain   • Skin lesion   • Choking   • Diarrhea   • Pain of right hip joint   • Magnesium disorder   • On hormone replacement therapy   • Hormone deficiency   • Gout   • Gastroesophageal reflux disease   • Essential hypertension   • Stress incontinence   • Right hip pain   • Low back pain   • Angioedema   • Allergic reaction   • Sore throat   • Impacted cerumen of right ear   • Acute otitis externa of right ear   • Neck pain   • Type 2 diabetes mellitus without complication, without long-term current use of insulin   • Screening for breast cancer   • POP-Q stage 4 cystocele   • POP-Q stage 2 rectocele   • Presence of pessary   • Obesity (BMI 30.0-34.9)   • Grade II hemorrhoids        Documented Vitals    03/19/18 1400   BP: 115/73   Weight: 70.8 kg (156 lb)   Height: 152.4 cm (60\")   PainSc: 0-No pain       Physical Exam   Constitutional: She is oriented to person, place, and time. No distress.   Obese black female weighing 156 pounds " with BMI 30.5.   HENT:   Head: Normocephalic and atraumatic.   Eyes: Conjunctivae and EOM are normal. Pupils are equal, round, and reactive to light.   Neck: Normal range of motion. Neck supple. No JVD present. No tracheal deviation present. No thyromegaly present.   Cardiovascular: Normal rate, regular rhythm, normal heart sounds and intact distal pulses.  Exam reveals no gallop and no friction rub.    No murmur heard.  Pulmonary/Chest: Effort normal and breath sounds normal. No stridor. No respiratory distress. She has no wheezes. She has no rales. She exhibits no tenderness.   Abdominal: Soft. Bowel sounds are normal. She exhibits no distension and no mass. There is no tenderness. There is no rebound and no guarding. No hernia. Hernia confirmed negative in the right inguinal area and confirmed negative in the left inguinal area.   Genitourinary: Rectal exam shows external hemorrhoid. No labial fusion. There is no rash, tenderness, lesion or injury on the right labia. There is no rash, tenderness, lesion or injury on the left labia. No erythema, tenderness or bleeding in the vagina. No foreign body in the vagina. No signs of injury around the vagina. No vaginal discharge found.   Genitourinary Comments: #7 pessary with support was readjusted, and immediately the discomfort went away.   Musculoskeletal: Normal range of motion. She exhibits no edema, tenderness or deformity.   Lymphadenopathy:     She has no cervical adenopathy.        Right: No inguinal adenopathy present.        Left: No inguinal adenopathy present.   Neurological: She is alert and oriented to person, place, and time. She has normal reflexes. She displays normal reflexes. No cranial nerve deficit. She exhibits normal muscle tone. Coordination normal.   Skin: Skin is warm and dry. No rash noted. She is not diaphoretic. No erythema. No pallor.   Psychiatric: She has a normal mood and affect. Her behavior is normal. Judgment and thought content  normal.   Nursing note and vitals reviewed.       Assessment        Diagnosis Plan   1. POP-Q stage 4 cystocele     2. POP-Q stage 2 rectocele     3. Stress incontinence     4. Encounter for fitting and adjustment of pessary     5. Grade II hemorrhoids           Plan      1. Continue treatment for constipation and hemorrhoids.  2. Encouraged in diet and exercise.  3. Follow-up in 2 days.  Follow-up sooner as needed.            This document has been electronically signed by Jack Rosario MD on March 25, 2018 4:12 PM

## 2018-03-25 NOTE — PROGRESS NOTES
Kristy Ramirez is a 84 y.o. y/o female     Chief Complaint: pelvic organ prolapse with pressure, stress urinary incontinence and constipation and large an uncomfortable bulge and previous pessary came out.    HPI: 84-year-old  with seven prior vaginal deliveries who had a hysterectomy with BSO in  for bleeding problems.  She is here to establish care.  She comes to this appointment with her daughter who has strongly encouraged her to seek gynecologic care.     NADYA Gonzales is her primary care provider.       At first she did not want to have a pelvic exam because she was embarrassed that something was falling out.  After a long discussion, she agreed to a complete female exam.     She had a mammogram in 2017.     Medical history significant for hypertension, diabetes, osteoarthritis, obesity, and stress urinary incontinence.     Past surgical history include hysterectomy, surgery, neck surgery, back surgery, ankle surgery.     She is  and retired.     She denies smoking or smokeless tobacco use.     Family history is significant for mother who  at age 95 and a house fire.  Father  at age 76 heart attack.  2 brothers had  of heart attack.  She has 7 children.  There is no family history of breast cancer, uterine cancer, ovarian cancer, or colon cancer.     She has not been sexually active for some time.     After her physical exam, she agreed to be fitted for a pessary.  She was fitted for a #6 ring pessary with support on 2018.  She was able to sit and walk comfortably and emptied her bladder without problem.  She was given instructions on what to do if the pessary comes out or if it causes her pain.  I will see her back in a week.     She states that without the pessary, she does not feel that she empties her bladder completely, and she has stress urinary incontinence.  She also complains of constipation and hemorrhoids.    She came in 2018 after the  pessary came out.  She was fitted with a #7 ring pessary with support and did well with that.       Review of Systems   Constitutional: Positive for fatigue. Negative for activity change, appetite change, chills, diaphoresis, fever and unexpected weight change.   Gastrointestinal: Negative for abdominal pain, constipation, diarrhea and nausea.   Genitourinary: Negative for difficulty urinating, dysuria, pelvic pain, urgency, vaginal bleeding, vaginal discharge and vaginal pain.   Neurological: Negative for headaches.   Psychiatric/Behavioral: Negative for dysphoric mood. The patient is not nervous/anxious.    All other systems reviewed and are negative.     Breast ROS: negative    The following portions of the patient's history were reviewed and updated as appropriate: allergies, current medications, past family history, past medical history, past social history, past surgical history and problem list.    Allergies   Allergen Reactions   • Aleve [Naproxen Sodium]    • Atenolol    • Keflex [Cephalexin]    • Lisinopril Angioedema   • Relafen [Nabumetone]    • Acetaminophen Itching   • Sulfa Antibiotics Rash          Current Outpatient Prescriptions:   •  ACCU-CHEK FASTCLIX LANCETS misc, 1 each Daily., Disp: 102 each, Rfl: 12  •  allopurinol (ZYLOPRIM) 100 MG tablet, TAKE 1 TABLET BY MOUTH EVERY DAY, Disp: 90 tablet, Rfl: 1  •  amLODIPine (NORVASC) 5 MG tablet, Take 1 tablet by mouth Daily., Disp: 90 tablet, Rfl: 1  •  aspirin 81 MG tablet, Take 1 tablet by mouth Daily., Disp: 90 tablet, Rfl: 1  •  busPIRone (BUSPAR) 10 MG tablet, Take 1 tablet by mouth 3 (Three) Times a Day., Disp: 180 tablet, Rfl: 1  •  chlorthalidone (HYGROTON) 25 MG tablet, Take 1 tablet by mouth Daily., Disp: 90 tablet, Rfl: 3  •  colchicine 0.6 MG tablet, Take 1 tablet by mouth Daily., Disp: 180 tablet, Rfl: 1  •  estradiol (ESTRACE) 1 MG tablet, TAKE 1 TABLET BY MOUTH DAILY, Disp: 90 tablet, Rfl: 0  •  folic acid (FOLVITE) 1 MG tablet, Take 1  tablet by mouth Daily., Disp: 90 tablet, Rfl: 0  •  gabapentin (NEURONTIN) 600 MG tablet, TK 1/2 T TO 1 T PO QHS PRN P, Disp: , Rfl: 5  •  glucose blood test strip, Use as instructed to check b/s 1x a day.  Please give strip compatible with monitor., Disp: 100 each, Rfl: 12  •  guaiFENesin (MUCINEX) 600 MG 12 hr tablet, Take 1 tablet by mouth Every 12 (Twelve) Hours., Disp: 20 tablet, Rfl: 1  •  hydrOXYzine (VISTARIL) 25 MG capsule, Take 1 capsule by mouth every night at bedtime., Disp: 30 capsule, Rfl: 0  •  linaclotide (LINZESS) 145 MCG capsule capsule, Take 1 capsule by mouth Every Morning Before Breakfast., Disp: 30 capsule, Rfl: 3  •  losartan-hydrochlorothiazide (HYZAAR) 50-12.5 MG per tablet, Take 1 tablet by mouth Daily., Disp: 90 tablet, Rfl: 3  •  magnesium oxide (MAG-OX) 400 MG tablet, Take 1 tablet by mouth Daily., Disp: 90 tablet, Rfl: 2  •  metFORMIN ER (GLUCOPHAGE-XR) 500 MG 24 hr tablet, Take 1 tablet by mouth Daily With Breakfast., Disp: 90 tablet, Rfl: 1  •  metoprolol succinate XL (TOPROL-XL) 25 MG 24 hr tablet, Take 1 tablet by mouth Daily., Disp: 90 tablet, Rfl: 1  •  nystatin (MYCOSTATIN) 796519 UNIT/ML suspension, SWISH AND SWALLOW 5 ML BY MOUTH FOUR TIMES DAILY, Disp: 240 mL, Rfl: 0  •  omeprazole (PRILOSEC) 20 MG capsule, Take 1 capsule by mouth 2 (Two) Times a Day., Disp: 60 capsule, Rfl: 3  •  oxyCODONE (ROXICODONE) 5 MG immediate release tablet, Take 5 mg by mouth As Needed., Disp: , Rfl: 0  •  penicillin v potassium (VEETID) 500 MG tablet, Take 1 tablet by mouth 4 (Four) Times a Day., Disp: 40 tablet, Rfl: 0  •  predniSONE (DELTASONE) 20 MG tablet, Take 20 mg by mouth., Disp: , Rfl: 1  •  TiZANidine (ZANAFLEX) 4 MG capsule, TAKE 1 CAPSULE BY MOUTH TWICE DAILY, Disp: 180 capsule, Rfl: 3  •  tolterodine LA (DETROL LA) 4 MG 24 hr capsule, Take 1 capsule by mouth Daily., Disp: 90 capsule, Rfl: 1  •  doxycycline (DORYX) 100 MG enteric coated tablet, Take 1 tablet by mouth 2 (Two) Times a  Day., Disp: 20 tablet, Rfl: 0     The patient has a family history of   Family History   Problem Relation Age of Onset   • Diabetes Other    • Heart disease Other    • Stroke Other         Past Medical History:   Diagnosis Date   • Abnormal CT scan     per patient   • Acute bronchitis    • Acute maxillary sinusitis    • Acute otitis externa    • Acute sinusitis    • Allergic conjunctivitis    • Angular cheilitis    • Ankle edema    • Backache     improved   • Blood in urine      UTI resolved      • Bradycardia    • Chest discomfort     described as heart racing      • Chronic low back pain     RIGHT leg radiation      • Cold feet     c/o - and lowerlegs      • Contusion     of dorsum of foot   • Cough    • Depressive disorder    • Diabetes mellitus    • Dizziness and giddiness    • Dyspnea    • Dysuria    • Edema    • Edema of foot    • Edema of lower extremity    • Essential hypertension    • Exanthematous disorder    • Fatigue    • Foot pain, left    • Grade II hemorrhoids 3/12/2018   • Hematoma    • Hip pain, right    • History of palpitations    • Hormone replacement therapy (postmenopausal)    • Hypertensive disorder    • Impacted cerumen    • Joint pain    • Knee pain    • Low back pain    • Malaise and fatigue    • Multiple joint pain    • Muscle pain    • Muscle weakness    • Neck pain    • Obesity (BMI 30.0-34.9) 3/12/2018   • Osteoarthritis of knee    • Osteoarthritis of multiple joints    • Otitis externa    • Pain in lower limb    • Peripheral venous insufficiency    • POP-Q stage 2 rectocele 3/12/2018   • POP-Q stage 4 cystocele 3/12/2018   • Sacroiliitis, not elsewhere classified     R LE   • Shoulder pain    • Upper respiratory infection    • Urinary frequency    • Urinary tract infectious disease    • Wheezing         OB History      Para Term  AB Living    9 7   2 7    SAB TAB Ectopic Molar Multiple Live Births                    Social History     Social History   • Marital status:  "     Spouse name: N/A   • Number of children: N/A   • Years of education: N/A     Occupational History   • Not on file.     Social History Main Topics   • Smoking status: Never Smoker   • Smokeless tobacco: Never Used   • Alcohol use No   • Drug use: No   • Sexual activity: Not on file      Comment: Hysterectomy     Other Topics Concern   • Not on file     Social History Narrative   • No narrative on file        Past Surgical History:   Procedure Laterality Date   • ANKLE SURGERY     • BACK SURGERY     • COLONOSCOPY  12/14/2009   • EYE SURGERY Bilateral 02/2018    B cataract   • HAMMER TOE REPAIR  08/02/2011    Hammertoe repair, left second toe.   • HYSTERECTOMY     • INCISION AND DRAINAGE ABSCESS  01/21/2015   • INJECTION OF MEDICATION  11/15/2013   • INJECTION OF MEDICATION  05/06/2013    Celestone (betamethasone) (1)      • INJECTION OF MEDICATION  03/17/2016    Kenalog (3)      • NECK SURGERY  01/2018   • UPPER GASTROINTESTINAL ENDOSCOPY  12/14/2009        Patient Active Problem List   Diagnosis   • Weakness   • Chronic low back pain   • Skin lesion   • Choking   • Diarrhea   • Pain of right hip joint   • Magnesium disorder   • On hormone replacement therapy   • Hormone deficiency   • Gout   • Gastroesophageal reflux disease   • Essential hypertension   • Stress incontinence   • Right hip pain   • Low back pain   • Angioedema   • Allergic reaction   • Sore throat   • Impacted cerumen of right ear   • Acute otitis externa of right ear   • Neck pain   • Type 2 diabetes mellitus without complication, without long-term current use of insulin   • Screening for breast cancer   • POP-Q stage 4 cystocele   • POP-Q stage 2 rectocele   • Presence of pessary   • Obesity (BMI 30.0-34.9)   • Grade II hemorrhoids        Documented Vitals    03/14/18 1458   BP: 142/76   Weight: 70.3 kg (155 lb)   Height: 152.4 cm (60\")       Physical Exam   Constitutional: She is oriented to person, place, and time. No distress. "   Slightly obese white female weighing 155 pounds with BMI 30.3   HENT:   Head: Normocephalic and atraumatic.   Eyes: Conjunctivae and EOM are normal. Pupils are equal, round, and reactive to light.   Neck: Normal range of motion. Neck supple. No JVD present. No tracheal deviation present. No thyromegaly present.   Cardiovascular: Normal rate, regular rhythm, normal heart sounds and intact distal pulses.  Exam reveals no gallop and no friction rub.    No murmur heard.  Pulmonary/Chest: Effort normal and breath sounds normal. No stridor. No respiratory distress. She has no wheezes. She has no rales. She exhibits no tenderness.   Abdominal: Soft. Bowel sounds are normal. She exhibits no distension and no mass. There is no tenderness. There is no rebound and no guarding. No hernia. Hernia confirmed negative in the right inguinal area and confirmed negative in the left inguinal area.   Genitourinary: Rectal exam shows no external hemorrhoid. No labial fusion. There is no rash, tenderness, lesion or injury on the right labia. There is no rash, tenderness, lesion or injury on the left labia. No erythema, tenderness or bleeding in the vagina. No signs of injury around the vagina. No vaginal discharge found.   Genitourinary Comments: #7 ring pessary with support was inserted, and she tolerated it well.   Musculoskeletal: Normal range of motion. She exhibits no edema, tenderness or deformity.   Lymphadenopathy:     She has no cervical adenopathy.        Right: No inguinal adenopathy present.        Left: No inguinal adenopathy present.   Neurological: She is alert and oriented to person, place, and time. She has normal reflexes. She displays normal reflexes. No cranial nerve deficit. She exhibits normal muscle tone. Coordination normal.   Skin: Skin is warm and dry. No rash noted. She is not diaphoretic. No erythema. No pallor.   Psychiatric: She has a normal mood and affect. Her behavior is normal. Judgment and thought  content normal.   Nursing note and vitals reviewed.       Assessment        Diagnosis Plan   1. POP-Q stage 4 cystocele     2. POP-Q stage 2 rectocele     3. Stress incontinence     4. Encounter for fitting and adjustment of pessary     5. Grade II hemorrhoids           Plan      1. Continue over-the-counter treatments for constipation and hemorrhoids.  2. Kegel exercises.  3. Encouraged in diet and exercise.   4. Follow-up in 1 week.  Follow-up sooner as needed.            This document has been electronically signed by Jack Rosario MD on March 25, 2018 3:53 PM

## 2018-03-25 NOTE — PROGRESS NOTES
Kristy Ramirez is a 84 y.o. y/o female     Chief Complaint: Pelvic organ prolapse with pressure, stress urinary incontinence, and constipation and large and uncomfortable bulge.  Some improvement with pessary.    HPI: 84-year-old  with seven prior vaginal deliveries who had a hysterectomy with BSO in  for bleeding problems.  She is here to establish care.  She comes to this appointment with her daughter who has strongly encouraged her to seek gynecologic care.     NADYA Gonzales is her primary care provider.       At first she did not want to have a pelvic exam because she was embarrassed that something was falling out.  After a long discussion, she agreed to a complete female exam.     She had a mammogram in 2017.     Medical history significant for hypertension, diabetes, osteoarthritis, obesity, and stress urinary incontinence.     Past surgical history include hysterectomy, surgery, neck surgery, back surgery, ankle surgery.     She is  and retired.     She denies smoking or smokeless tobacco use.     Family history is significant for mother who  at age 95 and a house fire.  Father  at age 76 heart attack.  2 brothers had  of heart attack.  She has 7 children.  There is no family history of breast cancer, uterine cancer, ovarian cancer, or colon cancer.     She has not been sexually active for some time.     After her physical exam, she agreed to be fitted for a pessary.  She was fitted for a #6 ring pessary with support on 2018.  She was able to sit and walk comfortably and emptied her bladder without problem.  She was given instructions on what to do if the pessary comes out or if it causes her pain.  I will see her back in a week.     She states that without the pessary, she does not feel that she empties her bladder completely, and she has stress urinary incontinence.  She also complains of constipation and hemorrhoids.     She came in 2018 after the  pessary came out.  She was fitted with a #7 ring pessary with support and did well with that.     March 19, 2018 the pessary was adjusted, and immediately she felt better.    March 21, 2018 she has not had any pain or discomfort over the past two days.    Review of Systems   Constitutional: Positive for fatigue. Negative for activity change, appetite change, chills, diaphoresis, fever and unexpected weight change.   Gastrointestinal: Positive for constipation. Negative for abdominal pain, diarrhea and nausea.   Genitourinary: Negative for difficulty urinating, dysuria, pelvic pain, urgency, vaginal bleeding, vaginal discharge and vaginal pain.   Neurological: Negative for headaches.   Psychiatric/Behavioral: Negative for dysphoric mood. The patient is not nervous/anxious.    All other systems reviewed and are negative.     Breast ROS: negative    The following portions of the patient's history were reviewed and updated as appropriate: allergies, current medications, past family history, past medical history, past social history, past surgical history and problem list.    Allergies   Allergen Reactions   • Aleve [Naproxen Sodium]    • Atenolol    • Keflex [Cephalexin]    • Lisinopril Angioedema   • Relafen [Nabumetone]    • Acetaminophen Itching   • Sulfa Antibiotics Rash          Current Outpatient Prescriptions:   •  ACCU-CHEK FASTCLIX LANCETS misc, 1 each Daily., Disp: 102 each, Rfl: 12  •  allopurinol (ZYLOPRIM) 100 MG tablet, TAKE 1 TABLET BY MOUTH EVERY DAY, Disp: 90 tablet, Rfl: 1  •  amLODIPine (NORVASC) 5 MG tablet, Take 1 tablet by mouth Daily., Disp: 90 tablet, Rfl: 1  •  aspirin 81 MG tablet, Take 1 tablet by mouth Daily., Disp: 90 tablet, Rfl: 1  •  busPIRone (BUSPAR) 10 MG tablet, Take 1 tablet by mouth 3 (Three) Times a Day., Disp: 180 tablet, Rfl: 1  •  chlorthalidone (HYGROTON) 25 MG tablet, Take 1 tablet by mouth Daily., Disp: 90 tablet, Rfl: 3  •  colchicine 0.6 MG tablet, Take 1 tablet by mouth  Daily., Disp: 180 tablet, Rfl: 1  •  doxycycline (DORYX) 100 MG enteric coated tablet, Take 1 tablet by mouth 2 (Two) Times a Day., Disp: 20 tablet, Rfl: 0  •  estradiol (ESTRACE) 1 MG tablet, TAKE 1 TABLET BY MOUTH DAILY, Disp: 90 tablet, Rfl: 0  •  folic acid (FOLVITE) 1 MG tablet, Take 1 tablet by mouth Daily., Disp: 90 tablet, Rfl: 0  •  gabapentin (NEURONTIN) 600 MG tablet, TK 1/2 T TO 1 T PO QHS PRN P, Disp: , Rfl: 5  •  glucose blood test strip, Use as instructed to check b/s 1x a day.  Please give strip compatible with monitor., Disp: 100 each, Rfl: 12  •  guaiFENesin (MUCINEX) 600 MG 12 hr tablet, Take 1 tablet by mouth Every 12 (Twelve) Hours., Disp: 20 tablet, Rfl: 1  •  hydrOXYzine (VISTARIL) 25 MG capsule, Take 1 capsule by mouth every night at bedtime., Disp: 30 capsule, Rfl: 0  •  linaclotide (LINZESS) 145 MCG capsule capsule, Take 1 capsule by mouth Every Morning Before Breakfast., Disp: 30 capsule, Rfl: 3  •  losartan-hydrochlorothiazide (HYZAAR) 50-12.5 MG per tablet, Take 1 tablet by mouth Daily., Disp: 90 tablet, Rfl: 3  •  magnesium oxide (MAG-OX) 400 MG tablet, Take 1 tablet by mouth Daily., Disp: 90 tablet, Rfl: 2  •  metFORMIN ER (GLUCOPHAGE-XR) 500 MG 24 hr tablet, Take 1 tablet by mouth Daily With Breakfast., Disp: 90 tablet, Rfl: 1  •  metoprolol succinate XL (TOPROL-XL) 25 MG 24 hr tablet, Take 1 tablet by mouth Daily., Disp: 90 tablet, Rfl: 1  •  nystatin (MYCOSTATIN) [id number] UNIT/ML suspension, SWISH AND SWALLOW 5 ML BY MOUTH FOUR TIMES DAILY, Disp: 240 mL, Rfl: 0  •  omeprazole (PRILOSEC) 20 MG capsule, Take 1 capsule by mouth 2 (Two) Times a Day., Disp: 60 capsule, Rfl: 3  •  oxyCODONE (ROXICODONE) 5 MG immediate release tablet, Take 5 mg by mouth As Needed., Disp: , Rfl: 0  •  penicillin v potassium (VEETID) 500 MG tablet, Take 1 tablet by mouth 4 (Four) Times a Day., Disp: 40 tablet, Rfl: 0  •  predniSONE (DELTASONE) 20 MG tablet, Take 20 mg by mouth., Disp: , Rfl: 1  •  TiZANidine  (ZANAFLEX) 4 MG capsule, TAKE 1 CAPSULE BY MOUTH TWICE DAILY, Disp: 180 capsule, Rfl: 3  •  tolterodine LA (DETROL LA) 4 MG 24 hr capsule, Take 1 capsule by mouth Daily., Disp: 90 capsule, Rfl: 1     The patient has a family history of   Family History   Problem Relation Age of Onset   • Diabetes Other    • Heart disease Other    • Stroke Other         Past Medical History:   Diagnosis Date   • Abnormal CT scan     per patient   • Acute bronchitis    • Acute maxillary sinusitis    • Acute otitis externa    • Acute sinusitis    • Allergic conjunctivitis    • Angular cheilitis    • Ankle edema    • Backache     improved   • Blood in urine      UTI resolved      • Bradycardia    • Chest discomfort     described as heart racing      • Chronic low back pain     RIGHT leg radiation      • Cold feet     c/o - and lowerlegs      • Contusion     of dorsum of foot   • Cough    • Depressive disorder    • Diabetes mellitus    • Dizziness and giddiness    • Dyspnea    • Dysuria    • Edema    • Edema of foot    • Edema of lower extremity    • Essential hypertension    • Exanthematous disorder    • Fatigue    • Foot pain, left    • Grade II hemorrhoids 3/12/2018   • Hematoma    • Hip pain, right    • History of palpitations    • Hormone replacement therapy (postmenopausal)    • Hypertensive disorder    • Impacted cerumen    • Joint pain    • Knee pain    • Low back pain    • Malaise and fatigue    • Multiple joint pain    • Muscle pain    • Muscle weakness    • Neck pain    • Obesity (BMI 30.0-34.9) 3/12/2018   • Osteoarthritis of knee    • Osteoarthritis of multiple joints    • Otitis externa    • Pain in lower limb    • Peripheral venous insufficiency    • POP-Q stage 2 rectocele 3/12/2018   • POP-Q stage 4 cystocele 3/12/2018   • Sacroiliitis, not elsewhere classified     R LE   • Shoulder pain    • Upper respiratory infection    • Urinary frequency    • Urinary tract infectious disease    • Wheezing         OB History       Para Term  AB Living    9 7   2 7    SAB TAB Ectopic Molar Multiple Live Births                    Social History     Social History   • Marital status:      Spouse name: N/A   • Number of children: N/A   • Years of education: N/A     Occupational History   • Not on file.     Social History Main Topics   • Smoking status: Never Smoker   • Smokeless tobacco: Never Used   • Alcohol use No   • Drug use: No   • Sexual activity: Not on file      Comment: Hysterectomy     Other Topics Concern   • Not on file     Social History Narrative   • No narrative on file        Past Surgical History:   Procedure Laterality Date   • ANKLE SURGERY     • BACK SURGERY     • COLONOSCOPY  2009   • EYE SURGERY Bilateral 2018    B cataract   • HAMMER TOE REPAIR  2011    Hammertoe repair, left second toe.   • HYSTERECTOMY     • INCISION AND DRAINAGE ABSCESS  2015   • INJECTION OF MEDICATION  11/15/2013   • INJECTION OF MEDICATION  2013    Celestone (betamethasone) (1)      • INJECTION OF MEDICATION  2016    Kenalog (3)      • NECK SURGERY  2018   • UPPER GASTROINTESTINAL ENDOSCOPY  2009        Patient Active Problem List   Diagnosis   • Weakness   • Chronic low back pain   • Skin lesion   • Choking   • Diarrhea   • Pain of right hip joint   • Magnesium disorder   • On hormone replacement therapy   • Hormone deficiency   • Gout   • Gastroesophageal reflux disease   • Essential hypertension   • Stress incontinence   • Right hip pain   • Low back pain   • Angioedema   • Allergic reaction   • Sore throat   • Impacted cerumen of right ear   • Acute otitis externa of right ear   • Neck pain   • Type 2 diabetes mellitus without complication, without long-term current use of insulin   • Screening for breast cancer   • POP-Q stage 4 cystocele   • POP-Q stage 2 rectocele   • Presence of pessary   • Obesity (BMI 30.0-34.9)   • Grade II hemorrhoids        Documented Vitals    18 1426  "  BP: 100/62   Weight: 70.8 kg (156 lb)   Height: 152.4 cm (60\")   PainSc: 0-No pain       Physical Exam   Constitutional: She is oriented to person, place, and time. No distress.   Obese black female weighing 156 pounds with BMI 30.5   HENT:   Head: Normocephalic and atraumatic.   Eyes: Conjunctivae and EOM are normal. Pupils are equal, round, and reactive to light.   Neck: Normal range of motion. Neck supple. No JVD present. No tracheal deviation present. No thyromegaly present.   Cardiovascular: Normal rate, regular rhythm, normal heart sounds and intact distal pulses.  Exam reveals no gallop and no friction rub.    No murmur heard.  Pulmonary/Chest: Effort normal and breath sounds normal. No stridor. No respiratory distress. She has no wheezes. She has no rales. She exhibits no tenderness.   Abdominal: Soft. Bowel sounds are normal. She exhibits no distension and no mass. There is no tenderness. There is no rebound and no guarding. No hernia.   Musculoskeletal: Normal range of motion. She exhibits no edema, tenderness or deformity.   Lymphadenopathy:     She has no cervical adenopathy.   Neurological: She is alert and oriented to person, place, and time. She has normal reflexes. She displays normal reflexes. No cranial nerve deficit. She exhibits normal muscle tone. Coordination normal.   Skin: Skin is warm and dry. No rash noted. She is not diaphoretic. No erythema. No pallor.   Psychiatric: She has a normal mood and affect. Her behavior is normal. Judgment and thought content normal.   Nursing note and vitals reviewed.       Assessment        Diagnosis Plan   1. POP-Q stage 4 cystocele     2. POP-Q stage 2 rectocele     3. Stress incontinence     4. Encounter for fitting and adjustment of pessary     5. Grade II hemorrhoids           Plan      1. Continue treatment for constipation and hemorrhoids.  2. Kegel exercises  3. Encouraged in diet and exercise.   4. Follow-up in 2 weeks.  Follow-up sooner as " needed.            This document has been electronically signed by Jack Rosario MD on March 25, 2018 3:44 PM

## 2018-03-26 ENCOUNTER — HOSPITAL ENCOUNTER (OUTPATIENT)
Dept: PHYSICAL THERAPY | Facility: HOSPITAL | Age: 83
Setting detail: THERAPIES SERIES
Discharge: HOME | End: 2018-03-26

## 2018-03-26 DIAGNOSIS — Z98.1 S/P CERVICAL SPINAL FUSION: ICD-10-CM

## 2018-03-26 DIAGNOSIS — M54.2 CERVICALGIA: Primary | ICD-10-CM

## 2018-03-26 PROCEDURE — 97110 THERAPEUTIC EXERCISES: CPT

## 2018-03-26 NOTE — THERAPY TREATMENT NOTE
Outpatient Physical Therapy Ortho Treatment Note  [facility]     Patient Name: Kristy Ramirez  : 1934  MRN: [id number]  Today's Date: 3/26/2018      Visit Date: 2018  Pt reports 5/10 pain pre treatment, 3/10 pain post treatment  Reports some % of improvement.  Attended 4/4 visits.  Insurance available: 26 visits  Next MD appt: May 2018.  Recertification: 2018.  Visit Dx:    ICD-10-CM ICD-9-CM   1. Cervicalgia M54.2 723.1   2. S/P cervical spinal fusion Z98.1 V45.4       Patient Active Problem List   Diagnosis   • Weakness   • Chronic low back pain   • Skin lesion   • Choking   • Diarrhea   • Pain of right hip joint   • Magnesium disorder   • On hormone replacement therapy   • Hormone deficiency   • Gout   • Gastroesophageal reflux disease   • Essential hypertension   • Stress incontinence   • Right hip pain   • Low back pain   • Angioedema   • Allergic reaction   • Sore throat   • Impacted cerumen of right ear   • Acute otitis externa of right ear   • Neck pain   • Type 2 diabetes mellitus without complication, without long-term current use of insulin   • Screening for breast cancer   • POP-Q stage 4 cystocele   • POP-Q stage 2 rectocele   • Presence of pessary   • Obesity (BMI 30.0-34.9)   • Grade II hemorrhoids        Past Medical History:   Diagnosis Date   • Abnormal CT scan     per patient   • Acute bronchitis    • Acute maxillary sinusitis    • Acute otitis externa    • Acute sinusitis    • Allergic conjunctivitis    • Angular cheilitis    • Ankle edema    • Backache     improved   • Blood in urine      UTI resolved      • Bradycardia    • Chest discomfort     described as heart racing      • Chronic low back pain     RIGHT leg radiation      • Cold feet     c/o - and lowerlegs      • Contusion     of dorsum of foot   • Cough    • Depressive disorder    • Diabetes mellitus    • Dizziness and giddiness    • Dyspnea    • Dysuria    • Edema    • Edema of foot    • Edema  of lower extremity    • Essential hypertension    • Exanthematous disorder    • Fatigue    • Foot pain, left    • Grade II hemorrhoids 3/12/2018   • Hematoma    • Hip pain, right    • History of palpitations    • Hormone replacement therapy (postmenopausal)    • Hypertensive disorder    • Impacted cerumen    • Joint pain    • Knee pain    • Low back pain    • Malaise and fatigue    • Multiple joint pain    • Muscle pain    • Muscle weakness    • Neck pain    • Obesity (BMI 30.0-34.9) 3/12/2018   • Osteoarthritis of knee    • Osteoarthritis of multiple joints    • Otitis externa    • Pain in lower limb    • Peripheral venous insufficiency    • POP-Q stage 2 rectocele 3/12/2018   • POP-Q stage 4 cystocele 3/12/2018   • Sacroiliitis, not elsewhere classified     R LE   • Shoulder pain    • Upper respiratory infection    • Urinary frequency    • Urinary tract infectious disease    • Wheezing         Past Surgical History:   Procedure Laterality Date   • ANKLE SURGERY     • BACK SURGERY     • COLONOSCOPY  12/14/2009   • EYE SURGERY Bilateral 02/2018    B cataract   • HAMMER TOE REPAIR  08/02/2011    Hammertoe repair, left second toe.   • HYSTERECTOMY     • INCISION AND DRAINAGE ABSCESS  01/21/2015   • INJECTION OF MEDICATION  11/15/2013   • INJECTION OF MEDICATION  05/06/2013    Celestone (betamethasone) (1)      • INJECTION OF MEDICATION  03/17/2016    Kenalog (3)      • NECK SURGERY  01/2018   • UPPER GASTROINTESTINAL ENDOSCOPY  12/14/2009             PT Ortho     Row Name 03/26/18 1400       Subjective Comments    Subjective Comments pt stated that she feels better after having surgery  -TL       Subjective Pain    Able to rate subjective pain? yes  -TL    Pre-Treatment Pain Level 5  -TL      User Key  (r) = Recorded By, (t) = Taken By, (c) = Cosigned By    Initials Name Provider Type    TL Nusrat Beckham, URVASHI Physical Therapy Assistant                            PT Assessment/Plan     Row Name 03/26/18 1400           PT Assessment    Assessment Comments pt tolerated pulleys today. pt stated that felt good using the pulleys. pt tolerated theraband ex..   No new goals met this date but progressing toward goals  -TL        PT Plan    PT Frequency 2x/week  -TL     PT Plan Comments start standing shld rowls  -TL       User Key  (r) = Recorded By, (t) = Taken By, (c) = Cosigned By    Initials Name Provider Type    TL Nusrat Beckham PTA Physical Therapy Assistant                Modalities     Row Name 03/26/18 1400             Ice    Ice Applied Yes  -TL      Location c-spine  -TL      Rx Minutes 15 mins  -TL      Ice S/P Rx Yes  -TL        User Key  (r) = Recorded By, (t) = Taken By, (c) = Cosigned By    Initials Name Provider Type    TL Nusrat Beckham PTA Physical Therapy Assistant                Exercises     Row Name 03/26/18 1400             Subjective Comments    Subjective Comments pt stated that she feels better after having surgery  -TL         Subjective Pain    Able to rate subjective pain? yes  -TL      Pre-Treatment Pain Level 5  -TL         Exercise 1    Exercise Name 1 pro ll UE/LE  -TL      Reps 1 10 mins  -TL         Exercise 2    Exercise Name 2 Pulleys 3-way  -TL      Time 2 1 mins each  -TL      Additional Comments each direction  -TL         Exercise 3    Exercise Name 3 chin tucks  -TL      Reps 3 20   -TL      Time 3 5 sec hold  -TL         Exercise 4    Exercise Name 4 arom c-spine rotation  -TL      Reps 4 20  -TL         Exercise 5    Exercise Name 5 arom c-spine flex/ext  -TL      Reps 5 20  -TL         Exercise 6    Exercise Name 6 Upper trap S  -TL      Reps 6 2  -TL      Time 6 30 sec hold  -TL         Exercise 7    Exercise Name 7 levator S  -TL      Reps 7 2   -TL      Time 7 30 sec hold  -TL         Exercise 8    Exercise Name 8 No money ex YTB  -TL      Sets 8 2  -TL      Reps 8 10  -TL         Exercise 9    Exercise Name 9 Seated shld rows TB  -TL      Sets 9 2  -TL      Reps 9 10  -TL       Additional Comments YTB  -TL         Exercise 10    Exercise Name 10 C-spine isometric  -TL      Reps 10 10  -TL      Additional Comments each direction flex/ext alt sb  -TL        User Key  (r) = Recorded By, (t) = Taken By, (c) = Cosigned By    Initials Name Provider Type    CATHERINE Beckham PTA Physical Therapy Assistant                               PT OP Goals     Row Name 03/26/18 1400          PT Short Term Goals    STG Date to Achieve 04/04/18  -TL     STG 1 I with HEP and have additions/changes by next recertification.  -TL     STG 1 Progress Progressing  -TL     STG 2 AROM cervical flex/ext >= 40°.  -TL     STG 2 Progress Ongoing;Progressing  -TL     STG 3 AROM cervical SB B >= 30°.  -TL     STG 3 Progress Ongoing;Progressing  -TL     STG 4 AROM cervical ROT >=60°.  -TL     STG 4 Progress Ongoing;Progressing  -TL     STG 5 Patient to be more aware of posture and posture correction technique.  -TL     STG 5 Progress Ongoing;Progressing  -TL        Long Term Goals    LTG Date to Achieve 04/27/18  -TL     LTG 1 AROM for c-spine all WFL for age and surgical changes with no increase in pain.  -TL     LTG 1 Progress Ongoing  -TL     LTG 2 B UE 5/5.  -TL     LTG 2 Progress Ongoing  -TL     LTG 3 C-spine 4+/5 or greater in all directions.  -TL     LTG 3 Progress Ongoing  -TL     LTG 4 I with self management of s/s.  -TL     LTG 4 Progress Ongoing  -TL     LTG 5 I with final HEP.  -TL     LTG 5 Progress Ongoing  -TL     LTG 6 D/C with a final HEP and free 30 day fitness formula membership.  -TL     LTG 6 Progress Ongoing  -TL        Time Calculation    PT Goal Re-Cert Due Date 04/04/18  -TL       User Key  (r) = Recorded By, (t) = Taken By, (c) = Cosigned By    Initials Name Provider Type    CATHERINE Beckham PTA Physical Therapy Assistant          Therapy Education  Education Details: pulleys, no money ext with YTB (working on better upright posture vs forward head)  Given: HEP, Symptoms/condition management,  Pain management, Posture/body mechanics  Program: New  How Provided: Verbal, Demonstration, Written  Provided to: Patient  Level of Understanding: Teach back education performed, Verbalized, Demonstrated (pt given pulleys and YTB for HEP)              Time Calculation:   Start Time: 1348  Stop Time: 1445  Time Calculation (min): 57 min  PT Non-Billable Time (min): 15 min  Total Timed Code Minutes- PT: 42 minute(s)    Therapy Charges for Today     Code Description Service Date Service Provider Modifiers Qty    93081004494 HC PT THER PROC EA 15 MIN 3/26/2018 Nusrat Beckham, PTA GP 3     [place] DME SUPPLY OR ACCESSORY, NOS 3/26/2018 Nusrat Beckham PTA  2                    Nusrat Beckham PTA  3/26/2018

## 2018-03-28 ENCOUNTER — APPOINTMENT (OUTPATIENT)
Dept: PHYSICAL THERAPY | Facility: HOSPITAL | Age: 83
End: 2018-03-28

## 2018-03-29 ENCOUNTER — HOSPITAL ENCOUNTER (OUTPATIENT)
Dept: PHYSICAL THERAPY | Facility: HOSPITAL | Age: 83
Setting detail: THERAPIES SERIES
Discharge: HOME | End: 2018-03-29

## 2018-03-29 DIAGNOSIS — M54.2 CERVICALGIA: Primary | ICD-10-CM

## 2018-03-29 DIAGNOSIS — Z98.1 S/P CERVICAL SPINAL FUSION: ICD-10-CM

## 2018-03-29 PROCEDURE — 97110 THERAPEUTIC EXERCISES: CPT

## 2018-03-29 NOTE — THERAPY TREATMENT NOTE
"    Outpatient Physical Therapy Ortho Treatment Note  [facility]     Patient Name: Kristy Ramirez  : 1934  MRN: [id number]  Today's Date: 3/29/2018      Visit Date: 2018  Pt reports 5/10 pain pre treatment, \"Ice feel better\" /10 pain post treatment  Reports some% of improvement.  Attended 5/5 visits.  Insurance available:26 visits   Next MD appt: May  2018.  Recertification: 2018.  Visit Dx:    ICD-10-CM ICD-9-CM   1. Cervicalgia M54.2 723.1   2. S/P cervical spinal fusion Z98.1 V45.4       Patient Active Problem List   Diagnosis   • Weakness   • Chronic low back pain   • Skin lesion   • Choking   • Diarrhea   • Pain of right hip joint   • Magnesium disorder   • On hormone replacement therapy   • Hormone deficiency   • Gout   • Gastroesophageal reflux disease   • Essential hypertension   • Stress incontinence   • Right hip pain   • Low back pain   • Angioedema   • Allergic reaction   • Sore throat   • Impacted cerumen of right ear   • Acute otitis externa of right ear   • Neck pain   • Type 2 diabetes mellitus without complication, without long-term current use of insulin   • Screening for breast cancer   • POP-Q stage 4 cystocele   • POP-Q stage 2 rectocele   • Presence of pessary   • Obesity (BMI 30.0-34.9)   • Grade II hemorrhoids        Past Medical History:   Diagnosis Date   • Abnormal CT scan     per patient   • Acute bronchitis    • Acute maxillary sinusitis    • Acute otitis externa    • Acute sinusitis    • Allergic conjunctivitis    • Angular cheilitis    • Ankle edema    • Backache     improved   • Blood in urine      UTI resolved      • Bradycardia    • Chest discomfort     described as heart racing      • Chronic low back pain     RIGHT leg radiation      • Cold feet     c/o - and lowerlegs      • Contusion     of dorsum of foot   • Cough    • Depressive disorder    • Diabetes mellitus    • Dizziness and giddiness    • Dyspnea    • Dysuria    • Edema    • Edema " "of foot    • Edema of lower extremity    • Essential hypertension    • Exanthematous disorder    • Fatigue    • Foot pain, left    • Grade II hemorrhoids 3/12/2018   • Hematoma    • Hip pain, right    • History of palpitations    • Hormone replacement therapy (postmenopausal)    • Hypertensive disorder    • Impacted cerumen    • Joint pain    • Knee pain    • Low back pain    • Malaise and fatigue    • Multiple joint pain    • Muscle pain    • Muscle weakness    • Neck pain    • Obesity (BMI 30.0-34.9) 3/12/2018   • Osteoarthritis of knee    • Osteoarthritis of multiple joints    • Otitis externa    • Pain in lower limb    • Peripheral venous insufficiency    • POP-Q stage 2 rectocele 3/12/2018   • POP-Q stage 4 cystocele 3/12/2018   • Sacroiliitis, not elsewhere classified     R LE   • Shoulder pain    • Upper respiratory infection    • Urinary frequency    • Urinary tract infectious disease    • Wheezing         Past Surgical History:   Procedure Laterality Date   • ANKLE SURGERY     • BACK SURGERY     • COLONOSCOPY  12/14/2009   • EYE SURGERY Bilateral 02/2018    B cataract   • HAMMER TOE REPAIR  08/02/2011    Hammertoe repair, left second toe.   • HYSTERECTOMY     • INCISION AND DRAINAGE ABSCESS  01/21/2015   • INJECTION OF MEDICATION  11/15/2013   • INJECTION OF MEDICATION  05/06/2013    Celestone (betamethasone) (1)      • INJECTION OF MEDICATION  03/17/2016    Kenalog (3)      • NECK SURGERY  01/2018   • UPPER GASTROINTESTINAL ENDOSCOPY  12/14/2009             PT Ortho     Row Name 03/29/18 1500       Subjective Pain    Able to rate subjective pain? yes  -TL    Pre-Treatment Pain Level 4  -TL    Subjective Pain Comment \"Ice makes it feel good\"  -TL       Head/Neck/Trunk    Neck Extension AROM 50  -TL    Neck Flexion AROM 60  -TL    Neck Lt Lateral Flexion AROM 35  -TL    Neck Rt Lateral Flexion AROM 34  -TL    Neck Lt Rotation AROM 58  -TL    Neck Rt Rotation AROM 50  -TL      User Key  (r) = Recorded By, (t) " "= Taken By, (c) = Cosigned By    Initials Name Provider Type    TL Nusrat Beckham PTA Physical Therapy Assistant                            PT Assessment/Plan     Row Name 03/29/18 1500          PT Assessment    Assessment Comments Pt's measurements has improved with ROM c-spine this date. pt has been doing her HEP per patient.   Pt has met short term goals 1-3 today.  -TL        PT Plan    PT Frequency 2x/week  -TL     PT Plan Comments continue with strengthening ex  -TL       User Key  (r) = Recorded By, (t) = Taken By, (c) = Cosigned By    Initials Name Provider Type    TL Nusrat MARCELLA URVASHI Beckham Physical Therapy Assistant                    Exercises     Row Name 03/29/18 1500             Subjective Pain    Able to rate subjective pain? yes  -TL      Pre-Treatment Pain Level 4  -TL      Subjective Pain Comment \"Ice makes it feel good\"  -TL         Exercise 1    Exercise Name 1 Pro ll UE/LE  -TL      Reps 1 10 mins  -TL      Additional Comments 3.5  -TL         Exercise 2    Exercise Name 2 AROM c-spine rotation  -TL      Reps 2 20  -TL         Exercise 3    Exercise Name 3 AROM flex/ext c-spine  -TL      Reps 3 20  -TL         Exercise 4    Exercise Name 4 AROM SB c-spine  -TL      Reps 4 20  -TL         Exercise 5    Exercise Name 5 Pulley's 3-way  -TL      Time 5 2 mins  -TL         Exercise 6    Exercise Name 6 Upper trap S  -TL      Reps 6 2  -TL      Time 6 30 sec hold  -TL         Exercise 7    Exercise Name 7 levator S  -TL      Reps 7 2  -TL      Time 7 30 sec hold  -TL         Exercise 8    Exercise Name 8 seated shld rows  -TL      Reps 8 10  -TL      Additional Comments RED TB  -TL         Exercise 9    Exercise Name 9 No money ex YTB  -TL      Reps 9 15  -TL        User Key  (r) = Recorded By, (t) = Taken By, (c) = Cosigned By    Initials Name Provider Type    CATHERINE Beckham PTA Physical Therapy Assistant                               PT OP Goals     Row Name 03/29/18 1500          PT Short Term " Goals    STG Date to Achieve 04/04/18  -TL     STG 1 I with HEP and have additions/changes by next recertification.  -TL     STG 1 Progress Met  -TL     STG 2 AROM cervical flex/ext >= 40°.  -TL     STG 2 Progress Met  -TL     STG 3 AROM cervical SB B >= 30°.  -TL     STG 3 Progress Met  -TL     STG 4 AROM cervical ROT >=60°.  -TL     STG 4 Progress Ongoing;Progressing  -TL     STG 4 Progress Comments right 50, left 58  -TL     STG 5 Patient to be more aware of posture and posture correction technique.  -TL     STG 5 Progress Ongoing;Progressing  -TL        Long Term Goals    LTG Date to Achieve 04/27/18  -TL     LTG 1 AROM for c-spine all WFL for age and surgical changes with no increase in pain.  -TL     LTG 1 Progress Ongoing  -TL     LTG 2 B UE 5/5.  -TL     LTG 2 Progress Ongoing  -TL     LTG 3 C-spine 4+/5 or greater in all directions.  -TL     LTG 3 Progress Ongoing  -TL     LTG 4 I with self management of s/s.  -TL     LTG 4 Progress Ongoing  -TL     LTG 5 I with final HEP.  -TL     LTG 5 Progress Ongoing  -TL     LTG 6 D/C with a final HEP and free 30 day fitness formula membership.  -TL     LTG 6 Progress Ongoing  -TL       User Key  (r) = Recorded By, (t) = Taken By, (c) = Cosigned By    Initials Name Provider Type    CATHERINE Beckham PTA Physical Therapy Assistant          Therapy Education  Given: HEP  Program: Reinforced  How Provided: Verbal, Demonstration  Provided to: Patient  Level of Understanding: Verbalized, Demonstrated              Time Calculation:   Start Time: 1520  Stop Time: 1622  Time Calculation (min): 62 min  PT Non-Billable Time (min): 15 min  Total Timed Code Minutes- PT: 47 minute(s)    Therapy Charges for Today     Code Description Service Date Service Provider Modifiers Qty    06271075282 HC PT THER PROC EA 15 MIN 3/29/2018 Nusrat Beckham PTA GP 3    99277577833 HC PT THER SUPP EA 15 MIN 3/29/2018 Nusrat Beckham PTA GP 1                    Nusrat Beckham  PTA  3/29/2018

## 2018-04-02 ENCOUNTER — APPOINTMENT (OUTPATIENT)
Dept: PHYSICAL THERAPY | Facility: HOSPITAL | Age: 83
End: 2018-04-02

## 2018-04-04 ENCOUNTER — TELEPHONE (OUTPATIENT)
Dept: PHYSICAL THERAPY | Facility: HOSPITAL | Age: 83
End: 2018-04-04

## 2018-04-07 DIAGNOSIS — I10 ESSENTIAL HYPERTENSION: ICD-10-CM

## 2018-04-09 ENCOUNTER — OFFICE VISIT (OUTPATIENT)
Dept: OBSTETRICS AND GYNECOLOGY | Facility: CLINIC | Age: 83
End: 2018-04-09

## 2018-04-09 VITALS
HEIGHT: 60 IN | WEIGHT: 156 LBS | DIASTOLIC BLOOD PRESSURE: 62 MMHG | BODY MASS INDEX: 30.63 KG/M2 | SYSTOLIC BLOOD PRESSURE: 136 MMHG

## 2018-04-09 DIAGNOSIS — N81.6 POP-Q STAGE 2 RECTOCELE: ICD-10-CM

## 2018-04-09 DIAGNOSIS — N81.10 POP-Q STAGE 4 CYSTOCELE: ICD-10-CM

## 2018-04-09 DIAGNOSIS — E66.9 OBESITY (BMI 30.0-34.9): ICD-10-CM

## 2018-04-09 DIAGNOSIS — N39.3 STRESS INCONTINENCE: ICD-10-CM

## 2018-04-09 DIAGNOSIS — Z46.89 ENCOUNTER FOR FITTING AND ADJUSTMENT OF PESSARY: Primary | ICD-10-CM

## 2018-04-09 PROCEDURE — 99213 OFFICE O/P EST LOW 20 MIN: CPT | Performed by: OBSTETRICS & GYNECOLOGY

## 2018-04-09 RX ORDER — AMLODIPINE BESYLATE 5 MG/1
5 TABLET ORAL DAILY
Qty: 90 TABLET | Refills: 0 | Status: SHIPPED | OUTPATIENT
Start: 2018-04-09 | End: 2018-04-30 | Stop reason: SDUPTHER

## 2018-04-09 NOTE — PROGRESS NOTES
Kristy Ramirez is a 84 y.o. y/o female     Chief Complaint: Pelvic organ prolapse with pressure, stress urinary incontinence, constipation, and large uncomfortable bulge.    HPI: 84-year-old  with seven prior vaginal deliveries who had a hysterectomy with BSO in 1972 for bleeding problems.  She is here to establish care.  She comes to this appointment with her daughter who has strongly encouraged her to seek gynecologic care.     NADYA Castaneda is her primary care provider.       At first she did not want to have a pelvic exam because she was embarrassed that something was falling out.  After a long discussion, she agreed to a complete female exam.     She had a mammogram in 2017.     Medical history significant for hypertension, diabetes, osteoarthritis, obesity, and stress urinary incontinence.     Past surgical history include hysterectomy, surgery, neck surgery, back surgery, ankle surgery.     She is  and retired.     She denies smoking or smokeless tobacco use.     Family history is significant for mother who  at age 95 and a house fire.  Father  at age 76 heart attack.  2 brothers had  of heart attack.  She has 7 children.  There is no family history of breast cancer, uterine cancer, ovarian cancer, or colon cancer.     She has not been sexually active for some time.     After her physical exam, she agreed to be fitted for a pessary.  She was fitted for a #6 ring pessary with support on 2018.  She was able to sit and walk comfortably and emptied her bladder without problem.  She was given instructions on what to do if the pessary comes out or if it causes her pain.  I will see her back in a week.     She states that without the pessary, she does not feel that she empties her bladder completely, and she has stress urinary incontinence.  She also complains of constipation and hemorrhoids.     She came in 2018 after the pessary came out.  She was fitted with a  "#7 ring pessary with support and did well with that.     March 19, 2018 the pessary was adjusted, and immediately she felt better.     March 21, 2018 she has not had any pain or discomfort over the past two days.    April 3, 2018, she felt weak and tired, and went to the emergency room.  She was not having any symptoms of a urinary tract infection, however the \"clean catch\" urine was positive for bacteria, and she was placed on Macrobid which she took for 1-1/2 days.  She developed a rash and stopped the Macrobid.  She still does not have any urinary complaints.  Her stress urinary incontinence is actually improved.    April 9, 2018, her pessary was removed and cleaned and replaced without discomfort.  There was no evidence of infection.    Review of Systems   Constitutional: Positive for fatigue. Negative for activity change, appetite change, chills, diaphoresis, fever and unexpected weight change.   Gastrointestinal: Positive for constipation ( But improved). Negative for abdominal pain, diarrhea and nausea.   Genitourinary: Negative for difficulty urinating, dyspareunia, dysuria, enuresis, flank pain, frequency, hematuria, pelvic pain, urgency, vaginal bleeding, vaginal discharge and vaginal pain.   Neurological: Negative for headaches.   Psychiatric/Behavioral: Negative for dysphoric mood. The patient is not nervous/anxious.    All other systems reviewed and are negative.     Breast ROS: negative    The following portions of the patient's history were reviewed and updated as appropriate: allergies, current medications, past family history, past medical history, past social history, past surgical history and problem list.    Allergies   Allergen Reactions   • Aleve [Naproxen Sodium]    • Atenolol    • Keflex [Cephalexin]    • Lisinopril Angioedema   • Relafen [Nabumetone]    • Acetaminophen Itching   • Sulfa Antibiotics Rash          Current Outpatient Prescriptions:   •  ACCU-CHEK FASTCLIX LANCETS misc, 1 each " Daily., Disp: 102 each, Rfl: 12  •  allopurinol (ZYLOPRIM) 100 MG tablet, TAKE 1 TABLET BY MOUTH EVERY DAY, Disp: 90 tablet, Rfl: 1  •  amLODIPine (NORVASC) 5 MG tablet, Take 1 tablet by mouth Daily., Disp: 90 tablet, Rfl: 1  •  amLODIPine (NORVASC) 5 MG tablet, TAKE 1 TABLET BY MOUTH DAILY, Disp: 90 tablet, Rfl: 0  •  aspirin 81 MG tablet, Take 1 tablet by mouth Daily., Disp: 90 tablet, Rfl: 1  •  busPIRone (BUSPAR) 10 MG tablet, Take 1 tablet by mouth 3 (Three) Times a Day., Disp: 180 tablet, Rfl: 1  •  chlorthalidone (HYGROTON) 25 MG tablet, Take 1 tablet by mouth Daily., Disp: 90 tablet, Rfl: 3  •  colchicine 0.6 MG tablet, Take 1 tablet by mouth Daily., Disp: 180 tablet, Rfl: 1  •  doxycycline (DORYX) 100 MG enteric coated tablet, Take 1 tablet by mouth 2 (Two) Times a Day., Disp: 20 tablet, Rfl: 0  •  estradiol (ESTRACE) 1 MG tablet, TAKE 1 TABLET BY MOUTH DAILY, Disp: 90 tablet, Rfl: 0  •  folic acid (FOLVITE) 1 MG tablet, Take 1 tablet by mouth Daily., Disp: 90 tablet, Rfl: 0  •  gabapentin (NEURONTIN) 600 MG tablet, TK 1/2 T TO 1 T PO QHS PRN P, Disp: , Rfl: 5  •  glucose blood test strip, Use as instructed to check b/s 1x a day.  Please give strip compatible with monitor., Disp: 100 each, Rfl: 12  •  guaiFENesin (MUCINEX) 600 MG 12 hr tablet, Take 1 tablet by mouth Every 12 (Twelve) Hours., Disp: 20 tablet, Rfl: 1  •  hydrOXYzine (VISTARIL) 25 MG capsule, Take 1 capsule by mouth every night at bedtime., Disp: 30 capsule, Rfl: 0  •  linaclotide (LINZESS) 145 MCG capsule capsule, Take 1 capsule by mouth Every Morning Before Breakfast., Disp: 30 capsule, Rfl: 3  •  losartan-hydrochlorothiazide (HYZAAR) 50-12.5 MG per tablet, Take 1 tablet by mouth Daily., Disp: 90 tablet, Rfl: 3  •  magnesium oxide (MAG-OX) 400 MG tablet, Take 1 tablet by mouth Daily., Disp: 90 tablet, Rfl: 2  •  metFORMIN ER (GLUCOPHAGE-XR) 500 MG 24 hr tablet, Take 1 tablet by mouth Daily With Breakfast., Disp: 90 tablet, Rfl: 1  •   metoprolol succinate XL (TOPROL-XL) 25 MG 24 hr tablet, Take 1 tablet by mouth Daily., Disp: 90 tablet, Rfl: 1  •  nystatin (MYCOSTATIN) 258484 UNIT/ML suspension, SWISH AND SWALLOW 5 ML BY MOUTH FOUR TIMES DAILY, Disp: 240 mL, Rfl: 0  •  omeprazole (PRILOSEC) 20 MG capsule, Take 1 capsule by mouth 2 (Two) Times a Day., Disp: 60 capsule, Rfl: 3  •  oxyCODONE (ROXICODONE) 5 MG immediate release tablet, Take 5 mg by mouth As Needed., Disp: , Rfl: 0  •  penicillin v potassium (VEETID) 500 MG tablet, Take 1 tablet by mouth 4 (Four) Times a Day., Disp: 40 tablet, Rfl: 0  •  predniSONE (DELTASONE) 20 MG tablet, Take 20 mg by mouth., Disp: , Rfl: 1  •  TiZANidine (ZANAFLEX) 4 MG capsule, TAKE 1 CAPSULE BY MOUTH TWICE DAILY, Disp: 180 capsule, Rfl: 3  •  tolterodine LA (DETROL LA) 4 MG 24 hr capsule, Take 1 capsule by mouth Daily., Disp: 90 capsule, Rfl: 1     The patient has a family history of   Family History   Problem Relation Age of Onset   • Diabetes Other    • Heart disease Other    • Stroke Other         Past Medical History:   Diagnosis Date   • Abnormal CT scan     per patient   • Acute bronchitis    • Acute maxillary sinusitis    • Acute otitis externa    • Acute sinusitis    • Allergic conjunctivitis    • Angular cheilitis    • Ankle edema    • Backache     improved   • Blood in urine      UTI resolved      • Bradycardia    • Chest discomfort     described as heart racing      • Chronic low back pain     RIGHT leg radiation      • Cold feet     c/o - and lowerlegs      • Contusion     of dorsum of foot   • Cough    • Depressive disorder    • Diabetes mellitus    • Dizziness and giddiness    • Dyspnea    • Dysuria    • Edema    • Edema of foot    • Edema of lower extremity    • Essential hypertension    • Exanthematous disorder    • Fatigue    • Foot pain, left    • Grade II hemorrhoids 3/12/2018   • Hematoma    • Hip pain, right    • History of palpitations    • Hormone replacement therapy (postmenopausal)    •  Hypertensive disorder    • Impacted cerumen    • Joint pain    • Knee pain    • Low back pain    • Malaise and fatigue    • Multiple joint pain    • Muscle pain    • Muscle weakness    • Neck pain    • Obesity (BMI 30.0-34.9) 3/12/2018   • Osteoarthritis of knee    • Osteoarthritis of multiple joints    • Otitis externa    • Pain in lower limb    • Peripheral venous insufficiency    • POP-Q stage 2 rectocele 3/12/2018   • POP-Q stage 4 cystocele 3/12/2018   • Sacroiliitis, not elsewhere classified     R LE   • Shoulder pain    • Upper respiratory infection    • Urinary frequency    • Urinary tract infectious disease    • Wheezing         OB History      Para Term  AB Living    9 7   2 7    SAB TAB Ectopic Molar Multiple Live Births                    Social History     Social History   • Marital status:      Spouse name: N/A   • Number of children: N/A   • Years of education: N/A     Occupational History   • Not on file.     Social History Main Topics   • Smoking status: Never Smoker   • Smokeless tobacco: Never Used   • Alcohol use No   • Drug use: No   • Sexual activity: Not on file      Comment: Hysterectomy     Other Topics Concern   • Not on file     Social History Narrative   • No narrative on file        Past Surgical History:   Procedure Laterality Date   • ANKLE SURGERY     • BACK SURGERY     • COLONOSCOPY  2009   • EYE SURGERY Bilateral 2018    B cataract   • HAMMER TOE REPAIR  2011    Hammertoe repair, left second toe.   • HYSTERECTOMY     • INCISION AND DRAINAGE ABSCESS  2015   • INJECTION OF MEDICATION  11/15/2013   • INJECTION OF MEDICATION  2013    Celestone (betamethasone) (1)      • INJECTION OF MEDICATION  2016    Kenalog (3)      • NECK SURGERY  2018   • UPPER GASTROINTESTINAL ENDOSCOPY  2009        Patient Active Problem List   Diagnosis   • Weakness   • Chronic low back pain   • Skin lesion   • Choking   • Diarrhea   • Pain of right  "hip joint   • Magnesium disorder   • On hormone replacement therapy   • Hormone deficiency   • Gout   • Gastroesophageal reflux disease   • Essential hypertension   • Stress incontinence   • Right hip pain   • Low back pain   • Angioedema   • Allergic reaction   • Sore throat   • Impacted cerumen of right ear   • Acute otitis externa of right ear   • Neck pain   • Type 2 diabetes mellitus without complication, without long-term current use of insulin   • Screening for breast cancer   • POP-Q stage 4 cystocele   • POP-Q stage 2 rectocele   • Presence of pessary   • Obesity (BMI 30.0-34.9)   • Grade II hemorrhoids        Documented Vitals    04/09/18 1421   BP: 136/62   Weight: 70.8 kg (156 lb)   Height: 152.4 cm (60\")       Physical Exam   Constitutional: She is oriented to person, place, and time. No distress.   Obese black female weighing 156 pounds with BMI 30.5   HENT:   Head: Normocephalic and atraumatic.   Eyes: Conjunctivae and EOM are normal. Pupils are equal, round, and reactive to light.   Neck: Normal range of motion. Neck supple. No JVD present. No tracheal deviation present. No thyromegaly present.   Cardiovascular: Normal rate, regular rhythm, normal heart sounds and intact distal pulses.  Exam reveals no gallop and no friction rub.    No murmur heard.  Pulmonary/Chest: Effort normal and breath sounds normal. No stridor. No respiratory distress. She has no wheezes. She has no rales. She exhibits no tenderness.   Abdominal: Soft. Bowel sounds are normal. She exhibits no distension and no mass. There is no tenderness. There is no rebound and no guarding. No hernia. Hernia confirmed negative in the right inguinal area and confirmed negative in the left inguinal area.   Genitourinary: Vagina normal. No labial fusion. There is no rash, tenderness, lesion or injury on the right labia. There is no rash, tenderness, lesion or injury on the left labia. No erythema, tenderness or bleeding in the vagina. No signs " of injury around the vagina. No vaginal discharge found.   Genitourinary Comments: #7 ring pessary with support was removed, cleaned, and replaced.   Musculoskeletal: Normal range of motion. She exhibits no edema, tenderness or deformity.   Lymphadenopathy:     She has no cervical adenopathy.        Right: No inguinal adenopathy present.        Left: No inguinal adenopathy present.   Neurological: She is alert and oriented to person, place, and time. She has normal reflexes. She displays normal reflexes. No cranial nerve deficit. She exhibits normal muscle tone. Coordination normal.   Skin: Skin is warm and dry. No rash noted. She is not diaphoretic. No erythema. No pallor.   Psychiatric: She has a normal mood and affect. Her behavior is normal. Judgment and thought content normal.   Nursing note and vitals reviewed.       Assessment        Diagnosis Plan   1. Encounter for fitting and adjustment of pessary     2. POP-Q stage 4 cystocele     3. POP-Q stage 2 rectocele     4. Stress incontinence     5. Obesity (BMI 30.0-34.9)           Plan      1. Encouraged in diet and exercise.  2. Follow-up in 4 weeks.  Follow-up sooner as needed.  3. Note to NADYA Castaneda            This document has been electronically signed by Jack Rosario MD on April 9, 2018 3:01 PM

## 2018-04-10 ENCOUNTER — OFFICE VISIT (OUTPATIENT)
Dept: FAMILY MEDICINE CLINIC | Facility: CLINIC | Age: 83
End: 2018-04-10

## 2018-04-10 ENCOUNTER — HOSPITAL ENCOUNTER (OUTPATIENT)
Dept: PHYSICAL THERAPY | Facility: HOSPITAL | Age: 83
Setting detail: THERAPIES SERIES
Discharge: HOME | End: 2018-04-10

## 2018-04-10 VITALS
BODY MASS INDEX: 30.82 KG/M2 | DIASTOLIC BLOOD PRESSURE: 77 MMHG | WEIGHT: 157 LBS | RESPIRATION RATE: 20 BRPM | HEIGHT: 60 IN | SYSTOLIC BLOOD PRESSURE: 133 MMHG | TEMPERATURE: 97.6 F | OXYGEN SATURATION: 98 % | HEART RATE: 81 BPM

## 2018-04-10 DIAGNOSIS — K52.9 GASTROENTERITIS: Primary | ICD-10-CM

## 2018-04-10 DIAGNOSIS — E83.40 MAGNESIUM DISORDER: ICD-10-CM

## 2018-04-10 DIAGNOSIS — M54.2 CERVICALGIA: Primary | ICD-10-CM

## 2018-04-10 DIAGNOSIS — Z13.0 SCREENING FOR DEFICIENCY ANEMIA: ICD-10-CM

## 2018-04-10 DIAGNOSIS — Z13.0 SCREENING FOR IRON DEFICIENCY ANEMIA: ICD-10-CM

## 2018-04-10 DIAGNOSIS — I10 ESSENTIAL HYPERTENSION: ICD-10-CM

## 2018-04-10 DIAGNOSIS — Z13.29 SCREENING FOR THYROID DISORDER: ICD-10-CM

## 2018-04-10 DIAGNOSIS — Z98.1 S/P CERVICAL SPINAL FUSION: ICD-10-CM

## 2018-04-10 DIAGNOSIS — M25.551 RIGHT HIP PAIN: ICD-10-CM

## 2018-04-10 PROCEDURE — 84443 ASSAY THYROID STIM HORMONE: CPT | Performed by: NURSE PRACTITIONER

## 2018-04-10 PROCEDURE — 99214 OFFICE O/P EST MOD 30 MIN: CPT | Performed by: NURSE PRACTITIONER

## 2018-04-10 PROCEDURE — 80053 COMPREHEN METABOLIC PANEL: CPT | Performed by: NURSE PRACTITIONER

## 2018-04-10 PROCEDURE — 85025 COMPLETE CBC W/AUTO DIFF WBC: CPT | Performed by: NURSE PRACTITIONER

## 2018-04-10 PROCEDURE — 83550 IRON BINDING TEST: CPT | Performed by: NURSE PRACTITIONER

## 2018-04-10 PROCEDURE — 97110 THERAPEUTIC EXERCISES: CPT | Performed by: PHYSICAL THERAPY ASSISTANT

## 2018-04-10 PROCEDURE — 82607 VITAMIN B-12: CPT | Performed by: NURSE PRACTITIONER

## 2018-04-10 PROCEDURE — 36415 COLL VENOUS BLD VENIPUNCTURE: CPT | Performed by: NURSE PRACTITIONER

## 2018-04-10 PROCEDURE — 83735 ASSAY OF MAGNESIUM: CPT | Performed by: NURSE PRACTITIONER

## 2018-04-10 PROCEDURE — 83540 ASSAY OF IRON: CPT | Performed by: NURSE PRACTITIONER

## 2018-04-10 RX ORDER — METHYLPREDNISOLONE 4 MG/1
TABLET ORAL
Qty: 21 EACH | Refills: 0 | Status: SHIPPED | OUTPATIENT
Start: 2018-04-10 | End: 2018-04-30

## 2018-04-10 NOTE — THERAPY TREATMENT NOTE
Outpatient Physical Therapy Ortho Treatment Note  [facility]     Patient Name: Kristy Ramirez  : 1934  MRN: [id number]  Today's Date: 4/10/2018      Visit Date: 04/10/2018  Reports some% of improvement.  Attended 6/8 visits.  Insurance available:26 visits   Next MD appt: May  2018.  Recertification: 2018.  Visit Dx:    ICD-10-CM ICD-9-CM   1. Cervicalgia M54.2 723.1   2. S/P cervical spinal fusion Z98.1 V45.4       Patient Active Problem List   Diagnosis   • Weakness   • Chronic low back pain   • Skin lesion   • Choking   • Diarrhea   • Pain of right hip joint   • Magnesium disorder   • On hormone replacement therapy   • Hormone deficiency   • Gout   • Gastroesophageal reflux disease   • Essential hypertension   • Stress incontinence   • Right hip pain   • Low back pain   • Angioedema   • Allergic reaction   • Sore throat   • Impacted cerumen of right ear   • Acute otitis externa of right ear   • Neck pain   • Type 2 diabetes mellitus without complication, without long-term current use of insulin   • Screening for breast cancer   • POP-Q stage 4 cystocele   • POP-Q stage 2 rectocele   • Presence of pessary   • Obesity (BMI 30.0-34.9)   • Grade II hemorrhoids        Past Medical History:   Diagnosis Date   • Abnormal CT scan     per patient   • Acute bronchitis    • Acute maxillary sinusitis    • Acute otitis externa    • Acute sinusitis    • Allergic conjunctivitis    • Angular cheilitis    • Ankle edema    • Backache     improved   • Blood in urine      UTI resolved      • Bradycardia    • Chest discomfort     described as heart racing      • Chronic low back pain     RIGHT leg radiation      • Cold feet     c/o - and lowerlegs      • Contusion     of dorsum of foot   • Cough    • Depressive disorder    • Diabetes mellitus    • Dizziness and giddiness    • Dyspnea    • Dysuria    • Edema    • Edema of foot    • Edema of lower extremity    • Essential hypertension    •  Exanthematous disorder    • Fatigue    • Foot pain, left    • Grade II hemorrhoids 3/12/2018   • Hematoma    • Hip pain, right    • History of palpitations    • Hormone replacement therapy (postmenopausal)    • Hypertensive disorder    • Impacted cerumen    • Joint pain    • Knee pain    • Low back pain    • Malaise and fatigue    • Multiple joint pain    • Muscle pain    • Muscle weakness    • Neck pain    • Obesity (BMI 30.0-34.9) 3/12/2018   • Osteoarthritis of knee    • Osteoarthritis of multiple joints    • Otitis externa    • Pain in lower limb    • Peripheral venous insufficiency    • POP-Q stage 2 rectocele 3/12/2018   • POP-Q stage 4 cystocele 3/12/2018   • Sacroiliitis, not elsewhere classified     R LE   • Shoulder pain    • Upper respiratory infection    • Urinary frequency    • Urinary tract infectious disease    • Wheezing         Past Surgical History:   Procedure Laterality Date   • ANKLE SURGERY     • BACK SURGERY     • COLONOSCOPY  12/14/2009   • EYE SURGERY Bilateral 02/2018    B cataract   • HAMMER TOE REPAIR  08/02/2011    Hammertoe repair, left second toe.   • HYSTERECTOMY     • INCISION AND DRAINAGE ABSCESS  01/21/2015   • INJECTION OF MEDICATION  11/15/2013   • INJECTION OF MEDICATION  05/06/2013    Celestone (betamethasone) (1)      • INJECTION OF MEDICATION  03/17/2016    Kenalog (3)      • NECK SURGERY  01/2018   • UPPER GASTROINTESTINAL ENDOSCOPY  12/14/2009             PT Ortho     Row Name 04/10/18 1400       Subjective Comments    Subjective Comments Pt reports she is having a busy day but fels like she is gettin gbetter  -       Subjective Pain    Able to rate subjective pain? yes  -    Pre-Treatment Pain Level 3  -    Subjective Pain Comment Ice feels better  -      User Key  (r) = Recorded By, (t) = Taken By, (c) = Cosigned By    Initials Name Provider Type     Ashwin Guillermo PTA Physical Therapy Assistant                            PT Assessment/Plan     Row Name 04/10/18  "1400          PT Assessment    Assessment Comments Pt stated she dosen't do standing ther ex, so she was doing seated full cans, Pt had reached about 10-12 and c/o headache on the L side of head, Pt had no visual deficits. Pt sat for about 30\" and proceded to cont and stated she dont want to do any more of those, Pt repetedly asked for the ice during treatment just to relax. no new goals met this date.  -        PT Plan    PT Frequency 2x/week  -     PT Plan Comments recheck  -       User Key  (r) = Recorded By, (t) = Taken By, (c) = Cosigned By    Initials Name Provider Type    GEORGE Guillermo PTA Physical Therapy Assistant                Modalities     Row Name 04/10/18 1400             Ice    Ice Applied Yes  -      Location c-spine  -      Rx Minutes 15 mins  -      Ice S/P Rx Yes  -        User Key  (r) = Recorded By, (t) = Taken By, (c) = Cosigned By    Initials Name Provider Type    GEORGE Guillermo PTA Physical Therapy Assistant                Exercises     Row Name 04/10/18 1400             Subjective Comments    Subjective Comments Pt reports she is having a busy day but fels like she is gettin gbetter  -         Subjective Pain    Able to rate subjective pain? yes  -      Pre-Treatment Pain Level 3  -      Subjective Pain Comment Ice feels better  -         Exercise 1    Exercise Name 1 Pro ll UE/LE  -      Reps 1 10 mins  -      Additional Comments 3.5  -JH         Exercise 2    Exercise Name 2 AROM c-spine rotation  -      Reps 2 20  -JH         Exercise 3    Exercise Name 3 AROM flex/ext c-spine  -      Reps 3 20  -         Exercise 4    Exercise Name 4 AROM SB c-spine  -      Reps 4 10  -JH      Additional Comments stopped 2° pain  -         Exercise 5    Exercise Name 5 Pulley's 3-way  -      Time 5 2 mins  -         Exercise 6    Exercise Name 6 Upper trap S  -JH      Reps 6 2  -      Time 6 30 sec hold  -         Exercise 7    Exercise Name 7 levator S "  -      Reps 7 2  -      Time 7 30 sec hold  -         Exercise 8    Exercise Name 8 3 way full can  -      Reps 8 20  -      Additional Comments seated  -         Exercise 9    Exercise Name 9 no money   -      Cueing 9 Verbal  -      Reps 9 20  -      Additional Comments yelow  -        User Key  (r) = Recorded By, (t) = Taken By, (c) = Cosigned By    Initials Name Provider Type    GEORGE Guillermo PTA Physical Therapy Assistant                               PT OP Goals     Row Name 04/10/18 1400          PT Short Term Goals    STG Date to Achieve 04/04/18  -     STG 1 I with HEP and have additions/changes by next recertification.  -     STG 1 Progress Met  -     STG 2 AROM cervical flex/ext >= 40°.  -     STG 2 Progress Met  -     STG 3 AROM cervical SB B >= 30°.  -     STG 3 Progress Met  -     STG 4 AROM cervical ROT >=60°.  -     STG 4 Progress Ongoing;Progressing  -     STG 5 Patient to be more aware of posture and posture correction technique.  -     STG 5 Progress Ongoing;Progressing  -        Long Term Goals    LTG Date to Achieve 04/27/18  -     LTG 1 AROM for c-spine all WFL for age and surgical changes with no increase in pain.  -     LTG 1 Progress Ongoing  -     LTG 2 B UE 5/5.  -     LTG 2 Progress Ongoing  -     LTG 3 C-spine 4+/5 or greater in all directions.  -     LTG 3 Progress Ongoing  -     LTG 4 I with self management of s/s.  -     LTG 4 Progress Ongoing  -     LTG 5 I with final HEP.  -     LTG 5 Progress Ongoing  -     LTG 6 D/C with a final HEP and free 30 day fitness formula membership.  -     LTG 6 Progress Ongoing  -        Time Calculation    PT Goal Re-Cert Due Date 04/04/18  -       User Key  (r) = Recorded By, (t) = Taken By, (c) = Cosigned By    Initials Name Provider Type    GEORGE Guillermo PTA Physical Therapy Assistant                         Time Calculation:   Start Time: 1406  Stop Time: 1505  Time  Calculation (min): 59 min    Therapy Charges for Today     Code Description Service Date Service Provider Modifiers Qty    91912727724 HC PT THER PROC EA 15 MIN 4/10/2018 Ashwin Guillermo PTA GP, KX 3    17471743947 HC PT THER SUPP EA 15 MIN 4/10/2018 Ashwin Guillermo PTA GP 1                    Ashwin Guillermo PTA  4/10/2018

## 2018-04-10 NOTE — PROGRESS NOTES
Subjective   Kristy Ramirez is a 84 y.o. female.     Here today stating that she thinks she may have eaten something bad about 2 days ago.  She had one episode of vomiting and dizziness.  Has since resolved.  She also is c/o having pain in her right hip.  She does see a rheumatologist.      Dizziness   This is a new problem. The current episode started in the past 7 days. Episode frequency: resolved. The problem has been resolved. Associated symptoms include arthralgias (right hip pain), nausea and vomiting. Pertinent negatives include no abdominal pain, anorexia, change in bowel habit, chest pain, chills, congestion, coughing, diaphoresis, fatigue, fever, headaches, joint swelling, myalgias, neck pain, numbness, rash, sore throat, swollen glands, urinary symptoms, vertigo, visual change or weakness. Nothing aggravates the symptoms. She has tried nothing for the symptoms. The treatment provided significant relief.   Vomiting    This is a new problem. The current episode started in the past 7 days. Episode frequency: happened only 1 x and has had no more episodes. The problem has been resolved. There has been no fever. Associated symptoms include arthralgias (right hip pain) and dizziness. Pertinent negatives include no abdominal pain, chest pain, chills, coughing, fever, headaches or myalgias. Risk factors include suspect food intake. She has tried nothing for the symptoms. The treatment provided significant relief.   Hip Pain    The incident occurred more than 1 week ago. There was no injury mechanism. The pain is present in the right hip. The quality of the pain is described as aching and stabbing. The pain is at a severity of 7/10. Pertinent negatives include no numbness. She reports no foreign bodies present. The symptoms are aggravated by weight bearing and movement. She has tried nothing for the symptoms. The treatment provided no relief.        The following portions of the patient's history were reviewed  and updated as appropriate: allergies, current medications, past family history, past medical history, past social history, past surgical history and problem list.    Review of Systems   Constitutional: Negative.  Negative for chills, diaphoresis, fatigue and fever.   HENT: Negative.  Negative for congestion and sore throat.    Respiratory: Negative.  Negative for cough.    Cardiovascular: Negative.  Negative for chest pain.   Gastrointestinal: Positive for nausea and vomiting. Negative for abdominal pain, anorexia and change in bowel habit.   Musculoskeletal: Positive for arthralgias (right hip pain). Negative for joint swelling, myalgias and neck pain.   Skin: Negative.  Negative for rash.   Neurological: Positive for dizziness. Negative for vertigo, weakness, numbness and headaches.   Psychiatric/Behavioral: Negative.        Objective   Physical Exam   Constitutional: She is oriented to person, place, and time. She appears well-developed and well-nourished. No distress.   HENT:   Head: Normocephalic.   Right Ear: External ear normal.   Left Ear: External ear normal.   Nose: Nose normal.   Mouth/Throat: Oropharynx is clear and moist. No oropharyngeal exudate.   Eyes: Pupils are equal, round, and reactive to light.   Neck: Normal range of motion. Neck supple. No thyromegaly present.   Cardiovascular: Normal rate, regular rhythm and normal heart sounds.  Exam reveals no friction rub.    No murmur heard.  Pulmonary/Chest: Effort normal and breath sounds normal. No respiratory distress. She has no wheezes. She has no rales.   Abdominal: Soft.   Musculoskeletal: Normal range of motion.        Right hip: She exhibits decreased strength, tenderness and crepitus. She exhibits normal range of motion.   Neurological: She is alert and oriented to person, place, and time.   Skin: Skin is warm and dry.   Psychiatric: She has a normal mood and affect. Thought content normal.   Nursing note and vitals  reviewed.        Assessment/Plan   Kristy was seen today for dizziness and hyperemesis gravidarum.    Diagnoses and all orders for this visit:    Gastroenteritis    Right hip pain  -     MethylPREDNISolone (MEDROL, RICARDO,) 4 MG tablet; Take as directed on package instructions.    Screening for iron deficiency anemia  -     CBC & Differential  -     Iron Profile  -     CBC Auto Differential    Screening for thyroid disorder  -     TSH    Essential hypertension  -     Comprehensive metabolic panel    Magnesium disorder  -     Magnesium    Screening for deficiency anemia  -     Vitamin B12      Sx for the gastroenteritis have resolved.  Will have her cont to just watch what she is eating.  Will have her use medrol dose pack for her hip pain.

## 2018-04-11 ENCOUNTER — CLINICAL SUPPORT (OUTPATIENT)
Dept: AUDIOLOGY | Facility: CLINIC | Age: 83
End: 2018-04-11

## 2018-04-11 DIAGNOSIS — Z01.118 ENCOUNTER FOR EXAMINATION OF HEARING WITH ABNORMAL FINDINGS: Primary | ICD-10-CM

## 2018-04-11 LAB
ALBUMIN SERPL-MCNC: 3.4 G/DL (ref 3.4–4.8)
ALBUMIN/GLOB SERPL: 1.1 G/DL (ref 1.1–1.8)
ALP SERPL-CCNC: 88 U/L (ref 38–126)
ALT SERPL W P-5'-P-CCNC: 25 U/L (ref 9–52)
ANION GAP SERPL CALCULATED.3IONS-SCNC: 12 MMOL/L (ref 5–15)
AST SERPL-CCNC: 24 U/L (ref 14–36)
BASOPHILS # BLD AUTO: 0.01 10*3/MM3 (ref 0–0.2)
BASOPHILS NFR BLD AUTO: 0.1 % (ref 0–2)
BILIRUB SERPL-MCNC: 0.1 MG/DL (ref 0.2–1.3)
BUN BLD-MCNC: 19 MG/DL (ref 7–21)
BUN/CREAT SERPL: 14.6 (ref 7–25)
CALCIUM SPEC-SCNC: 9.4 MG/DL (ref 8.4–10.2)
CHLORIDE SERPL-SCNC: 100 MMOL/L (ref 95–110)
CO2 SERPL-SCNC: 26 MMOL/L (ref 22–31)
CREAT BLD-MCNC: 1.3 MG/DL (ref 0.5–1)
DEPRECATED RDW RBC AUTO: 52.6 FL (ref 36.4–46.3)
EOSINOPHIL # BLD AUTO: 0.09 10*3/MM3 (ref 0–0.7)
EOSINOPHIL NFR BLD AUTO: 1.1 % (ref 0–7)
ERYTHROCYTE [DISTWIDTH] IN BLOOD BY AUTOMATED COUNT: 15.4 % (ref 11.5–14.5)
GFR SERPL CREATININE-BSD FRML MDRD: 47 ML/MIN/1.73 (ref 39–90)
GLOBULIN UR ELPH-MCNC: 3 GM/DL (ref 2.3–3.5)
GLUCOSE BLD-MCNC: 104 MG/DL (ref 60–100)
HCT VFR BLD AUTO: 33 % (ref 35–45)
HGB BLD-MCNC: 10.5 G/DL (ref 12–15.5)
IMM GRANULOCYTES # BLD: 0.05 10*3/MM3 (ref 0–0.02)
IMM GRANULOCYTES NFR BLD: 0.6 % (ref 0–0.5)
IRON 24H UR-MRATE: 27 MCG/DL (ref 37–170)
IRON SATN MFR SERPL: 11 % (ref 15–50)
LYMPHOCYTES # BLD AUTO: 1.45 10*3/MM3 (ref 0.6–4.2)
LYMPHOCYTES NFR BLD AUTO: 18 % (ref 10–50)
MAGNESIUM SERPL-MCNC: 1.9 MG/DL (ref 1.6–2.3)
MCH RBC QN AUTO: 29.6 PG (ref 26.5–34)
MCHC RBC AUTO-ENTMCNC: 31.8 G/DL (ref 31.4–36)
MCV RBC AUTO: 93 FL (ref 80–98)
MONOCYTES # BLD AUTO: 0.53 10*3/MM3 (ref 0–0.9)
MONOCYTES NFR BLD AUTO: 6.6 % (ref 0–12)
NEUTROPHILS # BLD AUTO: 5.92 10*3/MM3 (ref 2–8.6)
NEUTROPHILS NFR BLD AUTO: 73.6 % (ref 37–80)
PLATELET # BLD AUTO: 280 10*3/MM3 (ref 150–450)
PMV BLD AUTO: 10.9 FL (ref 8–12)
POTASSIUM BLD-SCNC: 4 MMOL/L (ref 3.5–5.1)
PROT SERPL-MCNC: 6.4 G/DL (ref 6.3–8.6)
RBC # BLD AUTO: 3.55 10*6/MM3 (ref 3.77–5.16)
SODIUM BLD-SCNC: 138 MMOL/L (ref 137–145)
TIBC SERPL-MCNC: 252 MCG/DL (ref 265–497)
TSH SERPL DL<=0.05 MIU/L-ACNC: 0.5 MIU/ML (ref 0.46–4.68)
VIT B12 BLD-MCNC: 931 PG/ML (ref 239–931)
WBC NRBC COR # BLD: 8.05 10*3/MM3 (ref 3.2–9.8)

## 2018-04-11 NOTE — PROGRESS NOTES
STANDARD AUDIOMETRIC EVALUATION      Name:  Kristy Ramirez  :  1934  Age:  84 y.o.  Date of Evaluation:  2018      HISTORY    Reason for visit:  Kristy Ramirez is seen today for a hearing evaluation at the request of NADYA Castaneda.  Patient reports trouble hearing in background noise for the past 2-3 years.  She reports having tinnitus at night.  She reports a history of ear infections. She reports a history of cerumen removal.      EVALUATION    See Audiogram    RESULTS        Otoscopy and Tympanometry 226 Hz :  Right Ear:  Otoscopy:  Clear ear canal          Tympanometry:  Middle ear function within normal limits    Left Ear:   Otoscopy:  Clear ear canal        Tympanometry:  Unable to test due to inability to obtain a seal    Test technique:  Standard Audiometry     Pure Tone Audiometry:   Patient responded to pure tones at 10-55 dB for 250-8000 Hz in right ear, and at 10-40 dB for 250-8000 Hz in left ear.       Speech Audiometry:        Right Ear:  Speech Reception Threshold (SRT) was obtained at 15 dBHL                 Speech Discrimination scores were 100% in quiet when words were presented at 55 dBHL       Left Ear:  Speech Reception Threshold (SRT) was obtained at 15 dBHL                 Speech Discrimination scores were 96% in quiet when words were presented at 55 dBHL    Reliability:   good    IMPRESSIONS:  1.  Tympanometry results are consistent with Middle ear function within normal limits in right ear, and Unable to test due to inability to obtain a seal in left ear.  2.  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       RECOMMENDATIONS:  Test results were reviewed with the parent/caregiver, and all questions were answered at this time.  It was a pleasure seeing Kristy Ramirez in Audiology today.  We would be happy to do further testing or discuss these test as necessary. My thanks to NADYA Castaneda for suggesting that Ms.  Ashley come to our department for her hearing needs.           This document has been electronically signed by CHYNA Ramirez on April 11, 2018 2:15 PM          CHYNA Ramirez  Licensed Audiologist

## 2018-04-12 ENCOUNTER — TELEPHONE (OUTPATIENT)
Dept: FAMILY MEDICINE CLINIC | Facility: CLINIC | Age: 83
End: 2018-04-12

## 2018-04-12 ENCOUNTER — APPOINTMENT (OUTPATIENT)
Dept: PHYSICAL THERAPY | Facility: HOSPITAL | Age: 83
End: 2018-04-12

## 2018-04-12 NOTE — PROGRESS NOTES
Her iron is a little low.  I think she may already be taking iron.  If she is tell her to take 2 a day.

## 2018-04-12 NOTE — TELEPHONE ENCOUNTER
----- Message from NADYA Ford sent at 4/12/2018  2:37 PM CDT -----  Her iron is a little low.  I think she may already be taking iron.  If she is tell her to take 2 a day.

## 2018-04-16 ENCOUNTER — TELEPHONE (OUTPATIENT)
Dept: FAMILY MEDICINE CLINIC | Facility: CLINIC | Age: 83
End: 2018-04-16

## 2018-04-17 ENCOUNTER — HOSPITAL ENCOUNTER (OUTPATIENT)
Dept: PHYSICAL THERAPY | Facility: HOSPITAL | Age: 83
Setting detail: THERAPIES SERIES
Discharge: HOME | End: 2018-04-17

## 2018-04-17 DIAGNOSIS — M54.2 CERVICALGIA: Primary | ICD-10-CM

## 2018-04-17 DIAGNOSIS — Z12.39 SCREENING FOR BREAST CANCER: ICD-10-CM

## 2018-04-17 DIAGNOSIS — Z98.1 S/P CERVICAL SPINAL FUSION: ICD-10-CM

## 2018-04-17 PROCEDURE — 97110 THERAPEUTIC EXERCISES: CPT | Performed by: PHYSICAL THERAPIST

## 2018-04-17 PROCEDURE — G8981 BODY POS CURRENT STATUS: HCPCS | Performed by: PHYSICAL THERAPIST

## 2018-04-17 PROCEDURE — G8982 BODY POS GOAL STATUS: HCPCS | Performed by: PHYSICAL THERAPIST

## 2018-04-17 NOTE — THERAPY PROGRESS REPORT/RE-CERT
"    Outpatient Physical Therapy Ortho Progress Note  [facility]  Hollie Tony, PT, DPT, CSCS       Patient Name: Kristy Ramirez  : 1934  MRN: [id number]  Today's Date: 2018      Visit Date: 2018     Pt reports 4/10 pain pre treatment, \"numb\"/10 pain post treatment  Reports 65-70% of improvement.  Attended 7/10 visits.  Insurance available: medical necessity and medicare cap  Next MD appt: 2018.  Recertification: N/A    Visit Dx:    ICD-10-CM ICD-9-CM   1. Cervicalgia M54.2 723.1   2. S/P cervical spinal fusion Z98.1 V45.4       Patient Active Problem List   Diagnosis   • Weakness   • Chronic low back pain   • Skin lesion   • Choking   • Diarrhea   • Pain of right hip joint   • Magnesium disorder   • On hormone replacement therapy   • Hormone deficiency   • Gout   • Gastroesophageal reflux disease   • Essential hypertension   • Stress incontinence   • Right hip pain   • Low back pain   • Angioedema   • Allergic reaction   • Sore throat   • Impacted cerumen of right ear   • Acute otitis externa of right ear   • Neck pain   • Type 2 diabetes mellitus without complication, without long-term current use of insulin   • Screening for breast cancer   • POP-Q stage 4 cystocele   • POP-Q stage 2 rectocele   • Presence of pessary   • Obesity (BMI 30.0-34.9)   • Grade II hemorrhoids        Past Medical History:   Diagnosis Date   • Abnormal CT scan     per patient   • Acute bronchitis    • Acute maxillary sinusitis    • Acute otitis externa    • Acute sinusitis    • Allergic conjunctivitis    • Angular cheilitis    • Ankle edema    • Backache     improved   • Blood in urine      UTI resolved      • Bradycardia    • Chest discomfort     described as heart racing      • Chronic low back pain     RIGHT leg radiation      • Cold feet     c/o - and lowerlegs      • Contusion     of dorsum of foot   • Cough    • Depressive disorder    • Diabetes mellitus    • Dizziness and giddiness  "   • Dyspnea    • Dysuria    • Edema    • Edema of foot    • Edema of lower extremity    • Essential hypertension    • Exanthematous disorder    • Fatigue    • Foot pain, left    • Grade II hemorrhoids 3/12/2018   • Hematoma    • Hip pain, right    • History of palpitations    • Hormone replacement therapy (postmenopausal)    • Hypertensive disorder    • Impacted cerumen    • Joint pain    • Knee pain    • Low back pain    • Malaise and fatigue    • Multiple joint pain    • Muscle pain    • Muscle weakness    • Neck pain    • Obesity (BMI 30.0-34.9) 3/12/2018   • Osteoarthritis of knee    • Osteoarthritis of multiple joints    • Otitis externa    • Pain in lower limb    • Peripheral venous insufficiency    • POP-Q stage 2 rectocele 3/12/2018   • POP-Q stage 4 cystocele 3/12/2018   • Sacroiliitis, not elsewhere classified     R LE   • Shoulder pain    • Upper respiratory infection    • Urinary frequency    • Urinary tract infectious disease    • Wheezing         Past Surgical History:   Procedure Laterality Date   • ANKLE SURGERY     • BACK SURGERY     • COLONOSCOPY  12/14/2009   • EYE SURGERY Bilateral 02/2018    B cataract   • HAMMER TOE REPAIR  08/02/2011    Hammertoe repair, left second toe.   • HYSTERECTOMY     • INCISION AND DRAINAGE ABSCESS  01/21/2015   • INJECTION OF MEDICATION  11/15/2013   • INJECTION OF MEDICATION  05/06/2013    Celestone (betamethasone) (1)      • INJECTION OF MEDICATION  03/17/2016    Kenalog (3)      • NECK SURGERY  01/2018   • UPPER GASTROINTESTINAL ENDOSCOPY  12/14/2009     Number of days off work: N/A    Changes to medications: None noted.    Changes to MD orders: None noted.          PT Ortho     Row Name 04/17/18 1500       Subjective Comments    Subjective Comments Patient rpeorts she is doing pretty good except for sleeping., She reports she has been using a round cervical pillow to sleep.  -AJ       Subjective Pain    Able to rate subjective pain? yes  -AJ    Post-Treatment Pain  "Level --   \"numb\"  -       Posture/Observations    Alignment Options Forward head;Cervical lordosis;Thoracic kyphosis;Rounded shoulders  -AJ    Forward Head Mild;Increased  -AJ    Cervical Lordosis Mild;Increased  -AJ    Thoracic Kyphosis Mild;Moderate;Increased  -AJ    Rounded Shoulders Bilateral:;Mild;Increased  -AJ       Cervical/Thoracic Special Tests    Spurlings (Foraminal Compression) Negative  -AJ    Cervical Compression (Forarminal Compression vs. Facet Pain) Bilateral:;Negative  -AJ    Cervical Distraction (Foraminal Compression vs. Facet Pain) Bilateral:;Negative  -AJ    Vertebral Artery Test (VBI Sign) Bilateral:;Negative  -AJ       Head/Neck/Trunk    Neck Extension AROM 50°  -AJ    Neck Flexion AROM 65°  -AJ    Neck Lt Lateral Flexion AROM 38°  -AJ    Neck Rt Lateral Flexion AROM 32°  -AJ    Neck Lt Rotation AROM 78°  -AJ    Neck Rt Rotation AROM 75°  -AJ       General Assessment (Manual Muscle Testing)    Comment, General Manual Muscle Testing (MMT) Assessment B IUE 5/5, except B should abduction 4/5 with neck pain; c-spine 4+/5 in all directions  -       Transfers    Comment (Transfers) I with all transfers.  -       Gait/Stairs Assessment/Training    Comment (Gait/Stairs) FWB, non-antalgic gait, no distress.  -      User Key  (r) = Recorded By, (t) = Taken By, (c) = Cosigned By    Initials Name Provider Type    ROBERTO CARLOS Tony, PT Physical Therapist         Barriers to Rehab: Include significant or possible arthritic/degenerative changes that have occurred within the spine, The patient's generally deconditioned state.    Safety Issues: None noted.          PT Assessment/Plan     Row Name 04/17/18 1500          PT Assessment    Functional Limitations Performance in leisure activities   Sleeping  -     Impairments Endurance;Impaired flexibility;Impaired muscle endurance;Pain;Muscle strength  -     Assessment Comments Discussed and demonstrated good c-spine alignment with sleeping. " "patient has normal AROM, some strength limitations due to pain today.  -AJ     Rehab Potential Good  -AJ     Patient/caregiver participated in establishment of treatment plan and goals Yes  -AJ     Patient would benefit from skilled therapy intervention Yes  -AJ        PT Plan    PT Frequency 2x/week  -AJ     Predicted Duration of Therapy Intervention (OT Eval) 1-2 more weeks, 1-3 more visits  -AJ     Planned CPT's? PT THER PROC EA 15 MIN: [id number];PT ELECTRICAL STIM UNATTEND: ;PT THER SUPP EA 15 MIN  -AJ     Physical Therapy Interventions (Optional Details) home exercise program;modalities;patient/family education;postural re-education;ROM (Range of Motion);strengthening;stretching  -AJ     PT Plan Comments Inquire about sleeping and if has improved. If doing better, D/C to I program at next visist, if stilltrouble sleeping, see 1 more week.  -AJ       User Key  (r) = Recorded By, (t) = Taken By, (c) = Cosigned By    Initials Name Provider Type    ROBERTO CARLOS Tony, PT Physical Therapist                Modalities     Row Name 04/17/18 1500             Ice    Ice Applied Yes  -AJ      Location c-spine  -AJ      Rx Minutes 15 mins  -AJ      Ice S/P Rx Yes  -AJ        User Key  (r) = Recorded By, (t) = Taken By, (c) = Cosigned By    Initials Name Provider Type    ROBERTO CARLOS Tony, PT Physical Therapist       Other therapeutic activities and/or exercises will be prescribed depending on the patients progress or lack there of.          Exercises     Row Name 04/17/18 1500             Subjective Comments    Subjective Comments Patient rpeorts she is doing pretty good except for sleeping., She reports she has been using a round cervical pillow to sleep.  -AJ         Subjective Pain    Able to rate subjective pain? yes  -AJ      Pre-Treatment Pain Level 4  -AJ      Post-Treatment Pain Level --   \"numb\"  -         Exercise 1    Exercise Name 1 Pro ll UE/LE  -AJ      Reps 1 10 mins  -AJ      Additional " "Comments L 4.0  -AJ         Exercise 2    Exercise Name 2 B UT S  -AJ      Reps 2 2  -AJ      Time 2 30 seconds  -AJ         Exercise 3    Exercise Name 3 B LS S  -AJ      Reps 3 2  -AJ      Time 3 30 seconds  -AJ         Exercise 4    Exercise Name 4 Sleeping ergonomics and discussion of posture.  -AJ      Time 4 --  -AJ         Exercise 5    Exercise Name 5 Pulley's 3-way  -AJ      Time 5 2 min each  -AJ         Exercise 6    Exercise Name 6 Chin tucks  -AJ      Sets 6 2  -AJ      Reps 6 10  -AJ      Time 6 5\" hold  -AJ         Exercise 7    Exercise Name 7 Posterior shoulder rolls between exercises prn  -AJ      Reps 7 10  -AJ      Time 7 --  -AJ         Exercise 8    Exercise Name 8 YTB no $  -AJ      Reps 8 20  -AJ      Time 8 5\" hold  -AJ      Additional Comments seated  -AJ         Exercise 9    Exercise Name 9 YTB Rows: low, Mid  -AJ      Reps 9 20  -AJ      Additional Comments seated  -AJ         Exercise 10    Exercise Name 10 Full can flex/abd  -AJ      Reps 10 10 each  -AJ      Additional Comments seated  -AJ        User Key  (r) = Recorded By, (t) = Taken By, (c) = Cosigned By    Initials Name Provider Type    ROBERTO CARLOS Tony, PT Physical Therapist                               PT OP Goals     Row Name 04/17/18 1500          PT Short Term Goals    STG Date to Achieve 04/04/18  -AJ     STG 1 I with HEP and have additions/changes by next recertification.  -AJ     STG 1 Progress Met  -AJ     STG 2 AROM cervical flex/ext >= 40°.  -AJ     STG 2 Progress Met  -     STG 3 AROM cervical SB B >= 30°.  -AJ     STG 3 Progress Met  -     STG 4 AROM cervical ROT >=60°.  -AJ     STG 4 Progress Met  -AJ     STG 5 Patient to be more aware of posture and posture correction technique.  -AJ     STG 5 Progress Ongoing;Progressing;Partially Met  -        Long Term Goals    LTG Date to Achieve 04/27/18  -AJ     LTG 1 AROM for c-spine all WFL for age and surgical changes with no increase in pain.  -AJ     LTG 1 " Progress Met  -     LTG 2 B UE 5/5.  -     LTG 2 Progress Ongoing;Progressing;Not Met  -     LTG 3 C-spine 4+/5 or greater in all directions.  -     LTG 3 Progress Met  -     LTG 4 I with self management of s/s.  -     LTG 4 Progress Ongoing;Partially Met  -     LTG 5 I with final HEP.  -     LTG 5 Progress Ongoing  -     LTG 6 D/C with a final HEP and free 30 day fitness formula membership.  -     LTG 6 Progress Ongoing  -        Time Calculation    PT Goal Re-Cert Due Date --   N/A  -       User Key  (r) = Recorded By, (t) = Taken By, (c) = Cosigned By    Initials Name Provider Type    ROBERTO CARLOS Tony, PT Physical Therapist          Therapy Education  Given: HEP, Symptoms/condition management, Posture/body mechanics (POC; ergonomics for sleeping)  Program: Reinforced  How Provided: Verbal, Demonstration  Provided to: Patient  Level of Understanding: Verbalized, Demonstrated    Outcome Measure Options: Neck Disability Index (NDI)  Neck Disability Index  Section 1 - Pain Intensity: The pain is moderate at the moment.  Section 2 - Personal Care: It is painful to look after myself, and I am slow and careful.  Section 3 - Lifting: I can lift only very light weights.  Section 4 - Work: I can hardly do any work at all.  Section 5 - Headaches: I have moderate headaches that come infrequently.  Section 6 - Concentration: I have a fair degree of difficulty concentrating.  Section 7 - Sleeping: My sleep is slightly disturbed for less than 1 hour.  Section 8 - Driving: I can't drive as long as I want because of moderate neck pain.  Section 9 - Reading: I can't read as much as I want because of moderate neck pain.  Section 10 - Recreation: I have neck pain with most recreational activities.  Neck Disability Index Score: 26  Neck Disability Index Comments: 52%      Time Calculation:   Start Time: 1455  Stop Time: 1605  Time Calculation (min): 70 min  PT Non-Billable Time (min): 15 min  Total  Timed Code Minutes- PT: 55 minute(s)    Therapy Charges for Today     Code Description Service Date Service Provider Modifiers Qty    39873202606 HC PT CHNG MAIN POS CURRENT 4/17/2018 Hollie Tony, PT GP, CJ 1    89995778151 HC PT CHNG MAIN POS PROJECTED 4/17/2018 Hollie Tony, PT GP, CI 1    30609637107 HC PT THER PROC EA 15 MIN 4/17/2018 Hollie Tony, PT GP 4    52123527512 HC PT THER SUPP EA 15 MIN 4/17/2018 Hollie Tony, PT GP 1          PT G-Codes  Outcome Measure Options: Neck Disability Index (NDI)  Functional Limitation: Changing and maintaining body position  Changing and Maintaining Body Position Current Status (): At least 20 percent but less than 40 percent impaired, limited or restricted  Changing and Maintaining Body Position Goal Status (): At least 1 percent but less than 20 percent impaired, limited or restricted         Hollie Tony, PT, DPT, CSCS  4/17/2018

## 2018-04-19 ENCOUNTER — HOSPITAL ENCOUNTER (OUTPATIENT)
Dept: PHYSICAL THERAPY | Facility: HOSPITAL | Age: 83
Setting detail: THERAPIES SERIES
Discharge: HOME | End: 2018-04-19

## 2018-04-19 DIAGNOSIS — Z98.1 S/P CERVICAL SPINAL FUSION: ICD-10-CM

## 2018-04-19 DIAGNOSIS — M54.2 CERVICALGIA: Primary | ICD-10-CM

## 2018-04-19 PROCEDURE — G8983 BODY POS D/C STATUS: HCPCS

## 2018-04-19 PROCEDURE — G8982 BODY POS GOAL STATUS: HCPCS

## 2018-04-19 PROCEDURE — 97110 THERAPEUTIC EXERCISES: CPT

## 2018-04-19 NOTE — THERAPY DISCHARGE NOTE
Outpatient Physical Therapy Ortho Treatment Note/Discharge Summary  [facility]  Azul Trinh PTA       Patient Name: Kristy Ramirez  : 1934  MRN: [id number]  Today's Date: 2018      Visit Date: 2018     Visits:   Insurance Visits Approved: 26 visits   Recert Due: ZULY FRANCISCO Appt: 2018  Pain: pretreatment 2/10; post treatment 2/10  Improvement: pt is subjectively reporting 70% improvement since initial evaluation    Visit Dx:    ICD-10-CM ICD-9-CM   1. Cervicalgia M54.2 723.1   2. S/P cervical spinal fusion Z98.1 V45.4       Patient Active Problem List   Diagnosis   • Weakness   • Chronic low back pain   • Skin lesion   • Choking   • Diarrhea   • Pain of right hip joint   • Magnesium disorder   • On hormone replacement therapy   • Hormone deficiency   • Gout   • Gastroesophageal reflux disease   • Essential hypertension   • Stress incontinence   • Right hip pain   • Low back pain   • Angioedema   • Allergic reaction   • Sore throat   • Impacted cerumen of right ear   • Acute otitis externa of right ear   • Neck pain   • Type 2 diabetes mellitus without complication, without long-term current use of insulin   • Screening for breast cancer   • POP-Q stage 4 cystocele   • POP-Q stage 2 rectocele   • Presence of pessary   • Obesity (BMI 30.0-34.9)   • Grade II hemorrhoids        Past Medical History:   Diagnosis Date   • Abnormal CT scan     per patient   • Acute bronchitis    • Acute maxillary sinusitis    • Acute otitis externa    • Acute sinusitis    • Allergic conjunctivitis    • Angular cheilitis    • Ankle edema    • Backache     improved   • Blood in urine      UTI resolved      • Bradycardia    • Chest discomfort     described as heart racing      • Chronic low back pain     RIGHT leg radiation      • Cold feet     c/o - and lowerlegs      • Contusion     of dorsum of foot   • Cough    • Depressive disorder    • Diabetes mellitus    • Dizziness and giddiness    •  Dyspnea    • Dysuria    • Edema    • Edema of foot    • Edema of lower extremity    • Essential hypertension    • Exanthematous disorder    • Fatigue    • Foot pain, left    • Grade II hemorrhoids 3/12/2018   • Hematoma    • Hip pain, right    • History of palpitations    • Hormone replacement therapy (postmenopausal)    • Hypertensive disorder    • Impacted cerumen    • Joint pain    • Knee pain    • Low back pain    • Malaise and fatigue    • Multiple joint pain    • Muscle pain    • Muscle weakness    • Neck pain    • Obesity (BMI 30.0-34.9) 3/12/2018   • Osteoarthritis of knee    • Osteoarthritis of multiple joints    • Otitis externa    • Pain in lower limb    • Peripheral venous insufficiency    • POP-Q stage 2 rectocele 3/12/2018   • POP-Q stage 4 cystocele 3/12/2018   • Sacroiliitis, not elsewhere classified     R LE   • Shoulder pain    • Upper respiratory infection    • Urinary frequency    • Urinary tract infectious disease    • Wheezing         Past Surgical History:   Procedure Laterality Date   • ANKLE SURGERY     • BACK SURGERY     • COLONOSCOPY  12/14/2009   • EYE SURGERY Bilateral 02/2018    B cataract   • HAMMER TOE REPAIR  08/02/2011    Hammertoe repair, left second toe.   • HYSTERECTOMY     • INCISION AND DRAINAGE ABSCESS  01/21/2015   • INJECTION OF MEDICATION  11/15/2013   • INJECTION OF MEDICATION  05/06/2013    Celestone (betamethasone) (1)      • INJECTION OF MEDICATION  03/17/2016    Kenalog (3)      • NECK SURGERY  01/2018   • UPPER GASTROINTESTINAL ENDOSCOPY  12/14/2009             PT Ortho     Row Name 04/19/18 1300       Subjective Pain    Able to rate subjective pain? yes  -MH    Pre-Treatment Pain Level 2  -MH    Post-Treatment Pain Level 2  -    Row Name 04/17/18 1500       Subjective Comments    Subjective Comments Patient rpeorts she is doing pretty good except for sleeping., She reports she has been using a round cervical pillow to sleep.  -AJ       Subjective Pain    Able to  "rate subjective pain? yes  -    Post-Treatment Pain Level --   \"numb\"  -       Posture/Observations    Alignment Options Forward head;Cervical lordosis;Thoracic kyphosis;Rounded shoulders  -    Forward Head Mild;Increased  -    Cervical Lordosis Mild;Increased  -    Thoracic Kyphosis Mild;Moderate;Increased  -AJ    Rounded Shoulders Bilateral:;Mild;Increased  -       Cervical/Thoracic Special Tests    Spurlings (Foraminal Compression) Negative  -    Cervical Compression (Forarminal Compression vs. Facet Pain) Bilateral:;Negative  -AJ    Cervical Distraction (Foraminal Compression vs. Facet Pain) Bilateral:;Negative  -    Vertebral Artery Test (VBI Sign) Bilateral:;Negative  -AJ       Head/Neck/Trunk    Neck Extension AROM 50°  -    Neck Flexion AROM 65°  -    Neck Lt Lateral Flexion AROM 38°  -    Neck Rt Lateral Flexion AROM 32°  -    Neck Lt Rotation AROM 78°  -    Neck Rt Rotation AROM 75°  -       General Assessment (Manual Muscle Testing)    Comment, General Manual Muscle Testing (MMT) Assessment B IUE 5/5, except B should abduction 4/5 with neck pain; c-spine 4+/5 in all directions  -       Transfers    Comment (Transfers) I with all transfers.  -       Gait/Stairs Assessment/Training    Comment (Gait/Stairs) FWB, non-antalgic gait, no distress.  -      User Key  (r) = Recorded By, (t) = Taken By, (c) = Cosigned By    Initials Name Provider Type     Azul Trinh, PTA Physical Therapy Assistant     Hollie Tony, PT Physical Therapist                            PT Assessment/Plan     Row Name 04/19/18 1300          PT Assessment    Assessment Comments patient able to demonstrate functional exercises and functional movement with no difficulty. no increase in complaints   -        PT Plan    PT Frequency 2x/week  -     PT Plan Comments discharge to independent [facility]  -       User Key  (r) = Recorded By, (t) = Taken By, (c) = Cosigned By    Initials Name Provider " Type     Azul Trinh PTA Physical Therapy Assistant                Modalities     Row Name 04/19/18 1300             Ice    Ice Applied Yes   to go  -MH        User Key  (r) = Recorded By, (t) = Taken By, (c) = Cosigned By    Initials Name Provider Type    LUIS ELLISON URVASHI Trinh Physical Therapy Assistant                Exercises     Row Name 04/19/18 1300             Subjective Comments    Subjective Comments states that since the therapist told her about the pillow reports that she is sleeping better now. reports that she feels good about discharging today.    -MH         Subjective Pain    Able to rate subjective pain? yes  -MH      Pre-Treatment Pain Level 2  -MH      Post-Treatment Pain Level 2  -MH         Exercise 1    Exercise Name 1 Pro II UE/LE  -MH      Reps 1 10 mins  -MH      Additional Comments L 5.0  -MH         Exercise 2    Exercise Name 2 B UT S  -MH      Reps 2 2  -MH      Time 2 30 sec hold  -MH         Exercise 3    Exercise Name 3 B Levator S  -MH      Reps 3 2  -MH      Time 3 30 sec hold  -MH         Exercise 4    Exercise Name 4 Chin Tucks  -MH      Reps 4 20  -MH      Time 4 5 sec hold  -MH         Exercise 5    Exercise Name 5 No Money's   -MH      Reps 5 20  -MH      Additional Comments Yellow  -MH         Exercise 6    Exercise Name 6 Tband Rows low and Mid  -MH      Additional Comments Yellow  -MH         Exercise 7    Exercise Name 7 Tband Horizontal ABD  -MH      Reps 7 20  -MH      Additional Comments Yellow  -MH         Exercise 8    Exercise Name 8 AROM Cspine Flex/Ext  -MH      Reps 8 20  -MH         Exercise 9    Exercise Name 9 AROM Cspine Rotation  -MH      Reps 9 20  -MH         Exercise 10    Exercise Name 10 Full Can Flex/Abd  -MH      Reps 10 10 each   -MH        User Key  (r) = Recorded By, (t) = Taken By, (c) = Cosigned By    Initials Name Provider Type    LUIS Liang TERRA Trinh PTA Physical Therapy Assistant                               PT OP Goals     Row Name 04/19/18  1300          PT Short Term Goals    STG Date to Achieve 04/04/18  -     STG 1 I with HEP and have additions/changes by next recertification.  -     STG 1 Progress Met  -     STG 2 AROM cervical flex/ext >= 40°.  -     STG 2 Progress Met  -     STG 3 AROM cervical SB B >= 30°.  -     STG 3 Progress Met  -     STG 4 AROM cervical ROT >=60°.  -     STG 4 Progress Met  -     STG 5 Patient to be more aware of posture and posture correction technique.  -     STG 5 Progress Ongoing;Progressing;Partially Met  [facility]        Long Term Goals    LTG Date to Achieve 04/27/18  -     LTG 1 AROM for c-spine all WFL for age and surgical changes with no increase in pain.  -     LTG 1 Progress Met  -     LTG 2 B UE 5/5.  -     LTG 2 Progress Progressing;Not Met  -     LTG 3 C-spine 4+/5 or greater in all directions.  -     LTG 3 Progress Met  [facility]     LTG 4 I with self management of s/s.  -     LTG 4 Progress Partially Met  [facility]     LTG 5 I with final HEP.  -     LTG 5 Progress Met  [facility]     LTG 6 D/C with a final HEP and free 30 day fitness [employer] membership.  -     LTG 6 Progress Met  -        Time Calculation    PT Goal Re-Cert Due Date --   N/A  -       User Key  (r) = Recorded By, (t) = Taken By, (c) = Cosigned By    Initials Name Provider Type     Azul Trinh PTA Physical Therapy Assistant          Therapy Education  Given: HEP, Symptoms/condition management, Pain management, Posture/body mechanics  Program: Reinforced  How Provided: Verbal, Demonstration  Provided to: Patient  Level of Understanding: Verbalized, Demonstrated              Time Calculation:   Start Time: 1348  Stop Time: 1430  Time Calculation (min): 42 min  Total Timed Code Minutes- PT: 42 minute(s)    Therapy Charges for Today     Code Description Service Date Service Provider Modifiers Qty    91667974869  PT [facility] MAIN POS PROJECTED 4/19/2018 Azul Trinh PTA GP, CI 1    84054678792 HC PT [facility] MAIN POS DISCHARGE  4/19/2018 Azul Trinh PTA GP, CJ 1    66417386907 HC PT THER PROC EA 15 MIN 4/19/2018 Azul Trinh PTA GP 3    08484628941 HC PT THER SUPP EA 15 MIN 4/19/2018 Azul Trinh PTA GP 1          PT G-Codes  Functional Limitation: Changing and maintaining body position  Changing and Maintaining Body Position Goal Status (): At least 1 percent but less than 20 percent impaired, limited or restricted  Changing and Maintaining Body Position Discharge Status (): At least 20 percent but less than 40 percent impaired, limited or restricted     OP PT Discharge Summary  Date of Discharge: 04/19/18  Reason for Discharge: Maximum functional potential achieved  Outcomes Achieved: Patient able to partially acheive established goals, Refer to plan of care for updates on goals achieved  Discharge Destination: Home with home program  Discharge Instructions/Additional Comments: free 30 days fitness membership      Azul Trinh PTA  4/19/2018

## 2018-04-26 DIAGNOSIS — E11.9 TYPE 2 DIABETES MELLITUS WITHOUT COMPLICATION, WITHOUT LONG-TERM CURRENT USE OF INSULIN (HCC): ICD-10-CM

## 2018-04-26 NOTE — TELEPHONE ENCOUNTER
Patient called thinks she has a UTI again wants to know if you can call something in for her or if she needs to be seen

## 2018-04-27 DIAGNOSIS — E11.9 TYPE 2 DIABETES MELLITUS WITHOUT COMPLICATION, WITHOUT LONG-TERM CURRENT USE OF INSULIN (HCC): ICD-10-CM

## 2018-04-27 RX ORDER — METFORMIN HYDROCHLORIDE 500 MG/1
TABLET, EXTENDED RELEASE ORAL
Qty: 30 TABLET | Refills: 0 | Status: SHIPPED | OUTPATIENT
Start: 2018-04-27 | End: 2018-04-27 | Stop reason: SDUPTHER

## 2018-04-27 RX ORDER — METFORMIN HYDROCHLORIDE 500 MG/1
TABLET, EXTENDED RELEASE ORAL
Qty: 90 TABLET | Refills: 0 | Status: SHIPPED | OUTPATIENT
Start: 2018-04-27 | End: 2018-08-02 | Stop reason: SDUPTHER

## 2018-04-30 ENCOUNTER — OFFICE VISIT (OUTPATIENT)
Dept: FAMILY MEDICINE CLINIC | Facility: CLINIC | Age: 83
End: 2018-04-30

## 2018-04-30 VITALS
HEIGHT: 60 IN | SYSTOLIC BLOOD PRESSURE: 128 MMHG | TEMPERATURE: 98.1 F | BODY MASS INDEX: 30.43 KG/M2 | WEIGHT: 155 LBS | DIASTOLIC BLOOD PRESSURE: 66 MMHG | OXYGEN SATURATION: 96 % | HEART RATE: 70 BPM

## 2018-04-30 DIAGNOSIS — N76.0 ACUTE VAGINITIS: Primary | ICD-10-CM

## 2018-04-30 DIAGNOSIS — Z96.0 PRESENCE OF PESSARY: ICD-10-CM

## 2018-04-30 PROCEDURE — 87510 GARDNER VAG DNA DIR PROBE: CPT | Performed by: NURSE PRACTITIONER

## 2018-04-30 PROCEDURE — 99213 OFFICE O/P EST LOW 20 MIN: CPT | Performed by: NURSE PRACTITIONER

## 2018-04-30 PROCEDURE — 87660 TRICHOMONAS VAGIN DIR PROBE: CPT | Performed by: NURSE PRACTITIONER

## 2018-04-30 PROCEDURE — 87480 CANDIDA DNA DIR PROBE: CPT | Performed by: NURSE PRACTITIONER

## 2018-04-30 RX ORDER — NITROFURANTOIN 25; 75 MG/1; MG/1
CAPSULE ORAL
Refills: 0 | COMMUNITY
Start: 2018-04-03 | End: 2018-04-30

## 2018-04-30 NOTE — PROGRESS NOTES
"Subjective   Kristy Ramirez is a 84 y.o. female.     Vaginitis   This is a new problem. The current episode started in the past 7 days. The problem occurs daily. The problem has been unchanged. Pertinent negatives include no abdominal pain, anorexia, arthralgias, change in bowel habit, chest pain, chills, congestion, coughing, diaphoresis, fatigue, fever, headaches, joint swelling, myalgias, nausea, neck pain, numbness, rash, sore throat, swollen glands, urinary symptoms, vertigo, visual change, vomiting or weakness. Exacerbated by: symptoms started after having pesarry placed a month or so ago--has recheck next week. Treatments tried: reports she was treated once at [facility] for similar symptoms, but not known what meds she took.        The following portions of the patient's history were reviewed and updated as appropriate: allergies, current medications, past medical history, past social history, past surgical history and problem list.    Review of Systems   Constitutional: Negative for appetite change, chills, diaphoresis, fatigue and fever.   HENT: Negative for congestion and sore throat.    Respiratory: Negative for cough.    Cardiovascular: Negative for chest pain.   Gastrointestinal: Negative for abdominal pain, anorexia, change in bowel habit, nausea and vomiting.   Genitourinary: Positive for vaginal discharge ( yellow). Negative for difficulty urinating, dysuria and frequency.        +vaginal itching   Musculoskeletal: Negative for arthralgias, joint swelling, myalgias and neck pain.   Skin: Negative for rash.   Neurological: Negative for vertigo, weakness, numbness and headaches.       Objective    /66 (BP Location: Left arm, Patient Position: Sitting, Cuff Size: Adult)   Pulse 70   Temp 98.1 °F (36.7 °C) (Tympanic)   Ht 152.4 cm (60\")   Wt 70.3 kg (155 lb)   SpO2 96%   BMI 30.27 kg/m²     Physical Exam   Constitutional: She is oriented to person, place, and time. She appears well-developed and " well-nourished. No distress.   Cardiovascular: Normal rate and regular rhythm.    Pulmonary/Chest: Effort normal and breath sounds normal. She has no wheezes. She has no rales.   Abdominal: Soft. Bowel sounds are normal. There is no tenderness. There is no rebound.   Genitourinary:   Genitourinary Comments: External vaginal exam with no obvious odor or discharge noted. Culture for vaginosis panel obtained.  Internal/speculum exam deferred at this time.    Neurological: She is alert and oriented to person, place, and time.   Nursing note and vitals reviewed.        Assessment/Plan   Kristy was seen today for vaginal discharge and vaginal itching.    Diagnoses and all orders for this visit:    Acute vaginitis  -     Gardnerella vaginalis, Trichomonas vaginalis, Candida albicans, PCR - Swab, Vagina    Presence of pessary    Will call with culture results and treat accordingly.  [phone].    Keep appt with GYN as scheduled next week for pessary check.

## 2018-05-01 LAB
CANDIDA ALBICANS: POSITIVE
GARDNERELLA VAGINALIS: NEGATIVE
TRICHOMONAS VAGINALIS PCR: NEGATIVE

## 2018-05-02 DIAGNOSIS — B37.31 VAGINAL CANDIDIASIS: Primary | ICD-10-CM

## 2018-05-02 RX ORDER — FLUCONAZOLE 150 MG/1
150 TABLET ORAL ONCE
Qty: 2 TABLET | Refills: 0 | Status: SHIPPED | OUTPATIENT
Start: 2018-05-02 | End: 2018-05-02

## 2018-05-07 ENCOUNTER — OFFICE VISIT (OUTPATIENT)
Dept: OBSTETRICS AND GYNECOLOGY | Facility: CLINIC | Age: 83
End: 2018-05-07

## 2018-05-07 VITALS
SYSTOLIC BLOOD PRESSURE: 122 MMHG | DIASTOLIC BLOOD PRESSURE: 75 MMHG | HEIGHT: 60 IN | BODY MASS INDEX: 30.43 KG/M2 | WEIGHT: 155 LBS

## 2018-05-07 DIAGNOSIS — E66.9 OBESITY (BMI 30.0-34.9): ICD-10-CM

## 2018-05-07 DIAGNOSIS — N39.3 STRESS INCONTINENCE: ICD-10-CM

## 2018-05-07 DIAGNOSIS — N81.10 POP-Q STAGE 4 CYSTOCELE: Primary | ICD-10-CM

## 2018-05-07 DIAGNOSIS — N81.6 POP-Q STAGE 2 RECTOCELE: ICD-10-CM

## 2018-05-07 PROCEDURE — 99213 OFFICE O/P EST LOW 20 MIN: CPT | Performed by: OBSTETRICS & GYNECOLOGY

## 2018-05-07 RX ORDER — FLUCONAZOLE 150 MG/1
TABLET ORAL
Qty: 2 TABLET | Refills: 11 | Status: SHIPPED | OUTPATIENT
Start: 2018-05-07 | End: 2018-05-21

## 2018-05-07 NOTE — PROGRESS NOTES
Kristy Ramirez is a 84 y.o. y/o female     Chief Complaint: Pelvic organ prolapse with pelvic pressure, stress urinary incontinence, constipation, all improved with pessary use.    HPI: 84-year-old  with seven prior vaginal deliveries who had a hysterectomy with BSO in 1972 for bleeding problems.  She is here to establish care.  She comes to this appointment with her daughter who has strongly encouraged her to seek gynecologic care.     NADYA Castaneda is her primary care provider.       At first she did not want to have a pelvic exam because she was embarrassed that something was falling out.  After a long discussion, she agreed to a complete female exam.     She had a mammogram in 2017.     Medical history significant for hypertension, diabetes, osteoarthritis, obesity, and stress urinary incontinence.     Past surgical history include hysterectomy, surgery, neck surgery, back surgery, ankle surgery.     She is  and retired.     She denies smoking or smokeless tobacco use.     Family history is significant for mother who  at age 95 and a house fire.  Father  at age 76 heart attack.  2 brothers had  of heart attack.  She has 7 children.  There is no family history of breast cancer, uterine cancer, ovarian cancer, or colon cancer.     She has not been sexually active for some time.     After her physical exam, she agreed to be fitted for a pessary.  She was fitted for a #6 ring pessary with support on 2018.  She was able to sit and walk comfortably and emptied her bladder without problem.  She was given instructions on what to do if the pessary comes out or if it causes her pain.  I will see her back in a week.     She states that without the pessary, she does not feel that she empties her bladder completely, and she has stress urinary incontinence.  She also complains of constipation and hemorrhoids.     She came in 2018 after the pessary came out.  She was fitted  "with a #7 ring pessary with support and did well with that.     March 19, 2018 the pessary was adjusted, and immediately she felt better.     March 21, 2018 she has not had any pain or discomfort over the past two days.     April 3, 2018, she felt weak and tired, and went to the emergency room.  She was not having any symptoms of a urinary tract infection, however the \"clean catch\" urine was positive for bacteria, and she was placed on Macrobid which she took for 1-1/2 days.  She developed a rash and stopped the Macrobid.  She still does not have any urinary complaints.  Her stress urinary incontinence is actually improved.     April 9, 2018, her pessary was removed and cleaned and replaced without discomfort.  There was no evidence of infection.    April 30, 2018 she presented with complaints of vaginal itching and vaginal swab was positive for candidal cans negative for Gardnerella and Trichomonas.  She was prescribed Diflucan, and her itching has since resolved.    May 7, 2018 pessary was removed and cleaned and replaced, and there was no evidence of any infection.  I ordered her Diflucan to have on hand that she could take up to 2 pills each month if needed.    Review of Systems   Constitutional: Positive for fatigue. Negative for activity change, appetite change, chills, diaphoresis, fever and unexpected weight change.   Gastrointestinal: Positive for constipation. Negative for abdominal pain, diarrhea and nausea.   Genitourinary: Negative for difficulty urinating, dysuria, pelvic pain, urgency, vaginal bleeding, vaginal discharge and vaginal pain.   Neurological: Negative for headaches.   Psychiatric/Behavioral: Negative for dysphoric mood. The patient is not nervous/anxious.    All other systems reviewed and are negative.     Breast ROS: negative    The following portions of the patient's history were reviewed and updated as appropriate: allergies, current medications, past family history, past medical " history, past social history, past surgical history and problem list.    Allergies   Allergen Reactions   • Aleve [Naproxen Sodium]    • Atenolol    • Keflex [Cephalexin]    • Lisinopril Angioedema   • Relafen [Nabumetone]    • Zanaflex [Tizanidine] Itching   • Acetaminophen Itching   • Sulfa Antibiotics Rash          Current Outpatient Prescriptions:   •  ACCU-CHEK FASTCLIX LANCETS misc, 1 each Daily., Disp: 102 each, Rfl: 12  •  allopurinol (ZYLOPRIM) 100 MG tablet, TAKE 1 TABLET BY MOUTH EVERY DAY, Disp: 90 tablet, Rfl: 1  •  amLODIPine (NORVASC) 5 MG tablet, Take 1 tablet by mouth Daily., Disp: 90 tablet, Rfl: 1  •  aspirin 81 MG tablet, Take 1 tablet by mouth Daily., Disp: 90 tablet, Rfl: 1  •  busPIRone (BUSPAR) 10 MG tablet, Take 1 tablet by mouth 3 (Three) Times a Day., Disp: 180 tablet, Rfl: 1  •  estradiol (ESTRACE) 1 MG tablet, TAKE 1 TABLET BY MOUTH DAILY, Disp: 90 tablet, Rfl: 0  •  fluconazole (DIFLUCAN) 150 MG tablet, Take one po today and take one po in 4 days., Disp: 2 tablet, Rfl: 11  •  folic acid (FOLVITE) 1 MG tablet, Take 1 tablet by mouth Daily., Disp: 90 tablet, Rfl: 0  •  gabapentin (NEURONTIN) 600 MG tablet, TK 1/2 T TO 1 T PO QHS PRN P, Disp: , Rfl: 5  •  glucose blood test strip, Use as instructed to check b/s 1x a day.  Please give strip compatible with monitor., Disp: 100 each, Rfl: 12  •  losartan-hydrochlorothiazide (HYZAAR) 50-12.5 MG per tablet, Take 1 tablet by mouth Daily., Disp: 90 tablet, Rfl: 3  •  magnesium oxide (MAG-OX) 400 MG tablet, Take 1 tablet by mouth Daily., Disp: 90 tablet, Rfl: 2  •  metFORMIN ER (GLUCOPHAGE-XR) 500 MG 24 hr tablet, TAKE 1 TABLET BY MOUTH DAILY WITH THE EVENING MEAL, Disp: 90 tablet, Rfl: 0  •  metoprolol succinate XL (TOPROL-XL) 25 MG 24 hr tablet, Take 1 tablet by mouth Daily., Disp: 90 tablet, Rfl: 1  •  omeprazole (PRILOSEC) 20 MG capsule, Take 1 capsule by mouth 2 (Two) Times a Day., Disp: 60 capsule, Rfl: 3  •  oxyCODONE (ROXICODONE) 5 MG  immediate release tablet, Take 5 mg by mouth As Needed., Disp: , Rfl: 0     The patient has a family history of   Family History   Problem Relation Age of Onset   • Diabetes Other    • Heart disease Other    • Stroke Other         Past Medical History:   Diagnosis Date   • Abnormal CT scan     per patient   • Acute bronchitis    • Acute maxillary sinusitis    • Acute otitis externa    • Acute sinusitis    • Allergic conjunctivitis    • Angular cheilitis    • Ankle edema    • Backache     improved   • Blood in urine      UTI resolved      • Bradycardia    • Chest discomfort     described as heart racing      • Chronic low back pain     RIGHT leg radiation      • Cold feet     c/o - and lowerlegs      • Contusion     of dorsum of foot   • Cough    • Depressive disorder    • Diabetes mellitus    • Dizziness and giddiness    • Dyspnea    • Dysuria    • Edema    • Edema of foot    • Edema of lower extremity    • Essential hypertension    • Exanthematous disorder    • Fatigue    • Foot pain, left    • Grade II hemorrhoids 3/12/2018   • Hematoma    • Hip pain, right    • History of palpitations    • Hormone replacement therapy (postmenopausal)    • Hypertensive disorder    • Impacted cerumen    • Joint pain    • Knee pain    • Low back pain    • Malaise and fatigue    • Multiple joint pain    • Muscle pain    • Muscle weakness    • Neck pain    • Obesity (BMI 30.0-34.9) 3/12/2018   • Osteoarthritis of knee    • Osteoarthritis of multiple joints    • Otitis externa    • Pain in lower limb    • Peripheral venous insufficiency    • POP-Q stage 2 rectocele 3/12/2018   • POP-Q stage 4 cystocele 3/12/2018   • Sacroiliitis, not elsewhere classified     R LE   • Shoulder pain    • Upper respiratory infection    • Urinary frequency    • Urinary tract infectious disease    • Wheezing         OB History      Para Term  AB Living    9 7   2 7    SAB TAB Ectopic Molar Multiple Live Births                    Social History  "    Social History   • Marital status:      Spouse name: N/A   • Number of children: N/A   • Years of education: N/A     Occupational History   • Not on file.     Social History Main Topics   • Smoking status: Never Smoker   • Smokeless tobacco: Never Used   • Alcohol use No   • Drug use: No   • Sexual activity: Not on file      Comment: Hysterectomy     Other Topics Concern   • Not on file     Social History Narrative   • No narrative on file        Past Surgical History:   Procedure Laterality Date   • ANKLE SURGERY     • BACK SURGERY     • COLONOSCOPY  12/14/2009   • EYE SURGERY Bilateral 02/2018    B cataract   • HAMMER TOE REPAIR  08/02/2011    Hammertoe repair, left second toe.   • HYSTERECTOMY     • INCISION AND DRAINAGE ABSCESS  01/21/2015   • INJECTION OF MEDICATION  11/15/2013   • INJECTION OF MEDICATION  05/06/2013    Celestone (betamethasone) (1)      • INJECTION OF MEDICATION  03/17/2016    Kenalog (3)      • NECK SURGERY  01/2018   • UPPER GASTROINTESTINAL ENDOSCOPY  12/14/2009        Patient Active Problem List   Diagnosis   • Weakness   • Chronic low back pain   • Skin lesion   • Choking   • Diarrhea   • Pain of right hip joint   • Magnesium disorder   • On hormone replacement therapy   • Hormone deficiency   • Gout   • Gastroesophageal reflux disease   • Essential hypertension   • Stress incontinence   • Right hip pain   • Low back pain   • Angioedema   • Allergic reaction   • Sore throat   • Impacted cerumen of right ear   • Acute otitis externa of right ear   • Neck pain   • Type 2 diabetes mellitus without complication, without long-term current use of insulin   • Screening for breast cancer   • POP-Q stage 4 cystocele   • POP-Q stage 2 rectocele   • Presence of pessary   • Obesity (BMI 30.0-34.9)   • Grade II hemorrhoids        Documented Vitals    05/07/18 1412   BP: 122/75   Weight: 70.3 kg (155 lb)   Height: 152.4 cm (60\")   PainSc: 0-No pain       Physical Exam   Constitutional: She " is oriented to person, place, and time. No distress.   Slightly obese black female weighing 155 pounds with BMI 30.3.   HENT:   Head: Normocephalic and atraumatic.   Eyes: Conjunctivae and EOM are normal. Pupils are equal, round, and reactive to light.   Neck: Normal range of motion. Neck supple. No JVD present. No tracheal deviation present. No thyromegaly present.   Cardiovascular: Normal rate, regular rhythm, normal heart sounds and intact distal pulses.  Exam reveals no gallop and no friction rub.    No murmur heard.  Pulmonary/Chest: Effort normal and breath sounds normal. No stridor. No respiratory distress. She has no wheezes. She has no rales. She exhibits no tenderness.   Abdominal: Soft. Bowel sounds are normal. She exhibits no distension and no mass. There is no tenderness. There is no rebound and no guarding. No hernia. Hernia confirmed negative in the right inguinal area and confirmed negative in the left inguinal area.   Genitourinary: No labial fusion. There is no rash, tenderness, lesion or injury on the right labia. There is no rash, tenderness, lesion or injury on the left labia.   Genitourinary Comments: #7 ring pessary with support was removed, cleaned, and replaced.  There was no evidence of any infection.   Musculoskeletal: Normal range of motion. She exhibits no edema, tenderness or deformity.   Lymphadenopathy:     She has no cervical adenopathy.        Right: No inguinal adenopathy present.        Left: No inguinal adenopathy present.   Neurological: She is alert and oriented to person, place, and time. She has normal reflexes. She displays normal reflexes. No cranial nerve deficit. She exhibits normal muscle tone. Coordination normal.   Skin: Skin is warm and dry. No rash noted. She is not diaphoretic. No erythema. No pallor.   Psychiatric: She has a normal mood and affect. Her behavior is normal. Judgment and thought content normal.   Nursing note and vitals reviewed.       Assessment         Diagnosis Plan   1. POP-Q stage 4 cystocele     2. POP-Q stage 2 rectocele     3. Stress incontinence     4. Obesity (BMI 30.0-34.9)           Plan      1. Diflucan Rx when necessary.  2. Encouraged in diet and exercise.   3. Follow-up in 2 months.  Follow-up sooner as needed.  4. Note to NADYA Blas            This document has been electronically signed by Jack Rosario MD on May 7, 2018 3:05 PM

## 2018-05-21 ENCOUNTER — OFFICE VISIT (OUTPATIENT)
Dept: FAMILY MEDICINE CLINIC | Facility: CLINIC | Age: 83
End: 2018-05-21

## 2018-05-21 VITALS
WEIGHT: 150 LBS | SYSTOLIC BLOOD PRESSURE: 142 MMHG | OXYGEN SATURATION: 98 % | BODY MASS INDEX: 29.45 KG/M2 | TEMPERATURE: 97.8 F | DIASTOLIC BLOOD PRESSURE: 64 MMHG | HEIGHT: 60 IN | HEART RATE: 59 BPM

## 2018-05-21 DIAGNOSIS — H61.20 IMPACTED CERUMEN, UNSPECIFIED LATERALITY: ICD-10-CM

## 2018-05-21 DIAGNOSIS — H66.91 RIGHT OTITIS MEDIA, UNSPECIFIED OTITIS MEDIA TYPE: Primary | ICD-10-CM

## 2018-05-21 DIAGNOSIS — H65.90 SEROUS OTITIS MEDIA, UNSPECIFIED CHRONICITY, UNSPECIFIED LATERALITY: ICD-10-CM

## 2018-05-21 PROCEDURE — 99214 OFFICE O/P EST MOD 30 MIN: CPT | Performed by: NURSE PRACTITIONER

## 2018-05-21 RX ORDER — FEXOFENADINE HCL 180 MG/1
180 TABLET ORAL DAILY
Qty: 30 TABLET | Refills: 1 | Status: SHIPPED | OUTPATIENT
Start: 2018-05-21 | End: 2018-06-18 | Stop reason: SDUPTHER

## 2018-05-21 RX ORDER — AMOXICILLIN 500 MG/1
500 CAPSULE ORAL 2 TIMES DAILY
Qty: 20 CAPSULE | Refills: 0 | Status: SHIPPED | OUTPATIENT
Start: 2018-05-21 | End: 2018-06-08

## 2018-05-21 NOTE — PROGRESS NOTES
"Subjective   Kristy Ramirez is a 84 y.o. female.     Earache    There is pain in both ears. This is a new problem. The current episode started in the past 7 days. The problem occurs hourly. The problem has been gradually worsening (over the last 3 days). There has been no fever. The pain is at a severity of 10/10. Associated symptoms include hearing loss ( roaring, muffled). Pertinent negatives include no abdominal pain, coughing, diarrhea, ear discharge, headaches, neck pain, rash, rhinorrhea, sore throat or vomiting. Treatments tried: peroxide last night. The treatment provided mild relief. does report impactions requiring flushing in the past        The following portions of the patient's history were reviewed and updated as appropriate: allergies, current medications, past medical history, past social history, past surgical history and problem list.    Review of Systems   Constitutional: Negative for appetite change, chills, fatigue and fever.   HENT: Positive for ear pain and hearing loss ( roaring, muffled). Negative for congestion, ear discharge, nosebleeds, postnasal drip, rhinorrhea, sinus pressure, sneezing and sore throat.    Eyes: Negative for blurred vision, discharge and itching.   Respiratory: Negative for cough, chest tightness, shortness of breath and wheezing.    Cardiovascular: Negative for chest pain and leg swelling.   Gastrointestinal: Negative for abdominal pain, diarrhea, nausea and vomiting.   Musculoskeletal: Negative for myalgias, neck pain and neck stiffness.   Skin: Negative for rash.   Neurological: Negative for dizziness and headache.   Hematological: Negative for adenopathy.       Objective    /64 (BP Location: Left arm, Patient Position: Sitting, Cuff Size: Adult)   Pulse 59   Temp 97.8 °F (36.6 °C) (Tympanic)   Ht 152.4 cm (60\")   Wt 68 kg (150 lb)   SpO2 98%   BMI 29.29 kg/m²     Physical Exam   Constitutional: She is oriented to person, place, and time. She appears " well-developed and well-nourished. No distress.   HENT:   Head: Normocephalic and atraumatic.   Right Ear: cerumen impaction is present.  Left Ear: Tympanic membrane is not erythematous. A middle ear effusion is present. Impacted cerumen:  not impacted, but moderate cerumen to canal.  Nose: Nose normal. Right sinus exhibits no maxillary sinus tenderness and no frontal sinus tenderness. Left sinus exhibits no maxillary sinus tenderness and no frontal sinus tenderness.   Mouth/Throat: Uvula is midline, oropharynx is clear and moist and mucous membranes are normal.   Debrox drops instilled and irrigation with 1:1 solution warm water/peroxide performed by the nurse. Good removal of cerumen.  Patient experienced slight dizziness, but it resolved.  After cerumen removed, right TM is noted to be slightly bulging, erythemic.    Eyes: Conjunctivae are normal. Right eye exhibits no discharge. Left eye exhibits no discharge.   Neck: Neck supple.   Cardiovascular: Normal rate and regular rhythm.    Pulmonary/Chest: Effort normal and breath sounds normal. She has no wheezes. She has no rales.   Musculoskeletal:   Using cane to help with ambulation   Lymphadenopathy:     She has no cervical adenopathy.   Neurological: She is alert and oriented to person, place, and time.   Nursing note and vitals reviewed.        Assessment/Plan   Kristy was seen today for earache.    Diagnoses and all orders for this visit:    Right otitis media, unspecified otitis media type  -     amoxicillin (AMOXIL) 500 MG capsule; Take 1 capsule by mouth 2 (Two) Times a Day.    Impacted cerumen, unspecified laterality    Serous otitis media, unspecified chronicity, unspecified laterality  -     fexofenadine (ALLEGRA ALLERGY) 180 MG tablet; Take 1 tablet by mouth Daily.    Push fluids  Rest  Tylenol as needed  Moist heat to outer ear as needed  Rx for Amoxil, Allegra  Change positions slowly if dizziness recurs    See PCP or RTC if persistent problems.

## 2018-05-25 DIAGNOSIS — N39.3 STRESS INCONTINENCE: ICD-10-CM

## 2018-06-04 RX ORDER — TOLTERODINE 4 MG/1
4 CAPSULE, EXTENDED RELEASE ORAL DAILY
Qty: 90 CAPSULE | Refills: 0 | Status: SHIPPED | OUTPATIENT
Start: 2018-06-04 | End: 2018-06-08 | Stop reason: SDUPTHER

## 2018-06-08 ENCOUNTER — OFFICE VISIT (OUTPATIENT)
Dept: FAMILY MEDICINE CLINIC | Facility: CLINIC | Age: 83
End: 2018-06-08

## 2018-06-08 VITALS
OXYGEN SATURATION: 96 % | BODY MASS INDEX: 30.63 KG/M2 | HEIGHT: 60 IN | WEIGHT: 156 LBS | DIASTOLIC BLOOD PRESSURE: 58 MMHG | TEMPERATURE: 97.3 F | HEART RATE: 60 BPM | SYSTOLIC BLOOD PRESSURE: 130 MMHG

## 2018-06-08 DIAGNOSIS — H65.93 BILATERAL SEROUS OTITIS MEDIA, UNSPECIFIED CHRONICITY: ICD-10-CM

## 2018-06-08 DIAGNOSIS — H69.83 EUSTACHIAN TUBE DYSFUNCTION, BILATERAL: Primary | ICD-10-CM

## 2018-06-08 DIAGNOSIS — G62.9 NEUROPATHY: ICD-10-CM

## 2018-06-08 DIAGNOSIS — N39.3 STRESS INCONTINENCE: ICD-10-CM

## 2018-06-08 DIAGNOSIS — H69.83 EUSTACHIAN TUBE DYSFUNCTION, BILATERAL: ICD-10-CM

## 2018-06-08 DIAGNOSIS — R60.9 DEPENDENT EDEMA: ICD-10-CM

## 2018-06-08 PROBLEM — H69.93 EUSTACHIAN TUBE DYSFUNCTION, BILATERAL: Status: ACTIVE | Noted: 2018-06-08

## 2018-06-08 PROCEDURE — 99214 OFFICE O/P EST MOD 30 MIN: CPT | Performed by: NURSE PRACTITIONER

## 2018-06-08 RX ORDER — HYDROCHLOROTHIAZIDE 12.5 MG/1
12.5 TABLET ORAL DAILY PRN
Qty: 30 TABLET | Refills: 0 | Status: SHIPPED | OUTPATIENT
Start: 2018-06-08 | End: 2018-06-12

## 2018-06-08 RX ORDER — FLUTICASONE PROPIONATE 50 MCG
2 SPRAY, SUSPENSION (ML) NASAL DAILY
Qty: 16 G | Refills: 1 | Status: SHIPPED | OUTPATIENT
Start: 2018-06-08 | End: 2018-06-08 | Stop reason: SDUPTHER

## 2018-06-08 RX ORDER — TOLTERODINE 4 MG/1
4 CAPSULE, EXTENDED RELEASE ORAL DAILY
Qty: 90 CAPSULE | Refills: 0 | Status: SHIPPED | OUTPATIENT
Start: 2018-06-08 | End: 2018-10-17 | Stop reason: SDUPTHER

## 2018-06-08 RX ORDER — FLUTICASONE PROPIONATE 50 MCG
SPRAY, SUSPENSION (ML) NASAL
Qty: 48 ML | Refills: 1 | Status: SHIPPED | OUTPATIENT
Start: 2018-06-08 | End: 2018-10-17

## 2018-06-08 NOTE — PATIENT INSTRUCTIONS
Eustachian Tube Dysfunction  The eustachian tube connects the middle ear to the back of the nose. It regulates air pressure in the middle ear by allowing air to move between the ear and nose. It also helps to drain fluid from the middle ear space. When the eustachian tube does not function properly, air pressure, fluid, or both can build up in the middle ear.  Eustachian tube dysfunction can affect one or both ears.  What are the causes?  This condition happens when the eustachian tube becomes blocked or cannot open normally. This may result from:  · Ear infections.  · Colds and other upper respiratory infections.  · Allergies.  · Irritation, such as from cigarette smoke or acid from the stomach coming up into the esophagus (gastroesophageal reflux).  · Sudden changes in air pressure, such as from descending in an airplane.  · Abnormal growths in the nose or throat, such as nasal polyps, tumors, or enlarged tissue at the back of the throat (adenoids).  What increases the risk?  This condition may be more likely to develop in people who smoke and people who are overweight. Eustachian tube dysfunction may also be more likely to develop in children, especially children who have:  · Certain birth defects of the mouth, such as cleft palate.  · Large tonsils and adenoids.  What are the signs or symptoms?  Symptoms of this condition may include:  · A feeling of fullness in the ear.  · Ear pain.  · Clicking or popping noises in the ear.  · Ringing in the ear.  · Hearing loss.  · Loss of balance.  Symptoms may get worse when the air pressure around you changes, such as when you travel to an area of high elevation or fly on an airplane.  How is this diagnosed?  This condition may be diagnosed based on:  · Your symptoms.  · A physical exam of your ear, nose, and throat.  · Tests, such as those that measure:  ¨ The movement of your eardrum (tympanogram).  ¨ Your hearing (audiometry).  How is this treated?  Treatment depends on  "the cause and severity of your condition. If your symptoms are mild, you may be able to relieve your symptoms by moving air into (\"popping\") your ears. If you have symptoms of fluid in your ears, treatment may include:  · Decongestants.  · Antihistamines.  · Nasal sprays or ear drops that contain medicines that reduce swelling (steroids).  In some cases, you may need to have a procedure to drain the fluid in your eardrum (myringotomy). In this procedure, a small tube is placed in the eardrum to:  · Drain the fluid.  · Restore the air in the middle ear space.  Follow these instructions at home:  · Take over-the-counter and prescription medicines only as told by your health care provider.  · Use techniques to help pop your ears as recommended by your health care provider. These may include:  ¨ Chewing gum.  ¨ Yawning.  ¨ Frequent, forceful swallowing.  ¨ Closing your mouth, holding your nose closed, and gently blowing as if you are trying to blow air out of your nose.  · Do not do any of the following until your health care provider approves:  ¨ Travel to high altitudes.  ¨ Fly in airplanes.  ¨ Work in a pressurized cabin or room.  ¨ Scuba dive.  · Keep your ears dry. Dry your ears completely after showering or bathing.  · Do not smoke.  · Keep all follow-up visits as told by your health care provider. This is important.  Contact a health care provider if:  · Your symptoms do not go away after treatment.  · Your symptoms come back after treatment.  · You are unable to pop your ears.  · You have:  ¨ A fever.  ¨ Pain in your ear.  ¨ Pain in your head or neck.  ¨ Fluid draining from your ear.  · Your hearing suddenly changes.  · You become very dizzy.  · You lose your balance.  This information is not intended to replace advice given to you by your health care provider. Make sure you discuss any questions you have with your health care provider.  Document Released: 01/13/2017 Document Revised: 05/25/2017 Document " Reviewed: 01/06/2016  [employer] Patient Education © 2017 [employer]

## 2018-06-08 NOTE — PROGRESS NOTES
"Subjective   Kristy Ramirez is a 84 y.o. female.     Ear Fullness    There is pain in both ears. Chronicity: seen and treated for serous om/OM on 5-21-18, some improvement, but still having fullness, occaisonal popping and \"roaring\" sound. The current episode started 1 to 4 weeks ago. Episode frequency: daily. The problem has been gradually improving (after taking Amoxil, Allegra). There has been no fever. The patient is experiencing no pain. Associated symptoms include neck pain. Pertinent negatives include no abdominal pain, coughing, diarrhea, ear discharge, headaches, hearing loss, rash, rhinorrhea, sore throat or vomiting. Treatments tried: currently still taking Allegra.   Lower Extremity Issue   This is a new problem. The current episode started in the past 7 days (noticing more persistent foot/lower leg swelling that worsens as the day goes on). The problem occurs daily. The problem has been unchanged. Associated symptoms include arthralgias and neck pain. Pertinent negatives include no abdominal pain, change in bowel habit, chest pain, coughing, fatigue, fever, headaches, rash, sore throat or vomiting. The symptoms are aggravated by standing (better in the AM, worse by end of the day). Treatments tried: on 12.5 mg HCTZ with b/p meds. The treatment provided no relief.   Pain   This is a recurrent (neuropathy from neck and low back) problem. The current episode started more than 1 year ago. The problem occurs intermittently (seems worse in left arm the last couple of days with burning, numbness, tingling in left arm). Associated symptoms include arthralgias and neck pain. Pertinent negatives include no abdominal pain, change in bowel habit, chest pain, coughing, fatigue, fever, headaches, rash, sore throat or vomiting. Nothing aggravates the symptoms. Treatments tried: had neck surgery back in March, but still has some neck pain.  On Neurontin 600 mg at bedime. The treatment provided mild relief.   Urinary " "Frequency    This is a chronic (stress incontinence/overactive bladder) problem. The current episode started more than 1 year ago. The problem has been gradually worsening (ran out of Detrol about a week ago and symptoms have increased ). The pain is at a severity of 7/10 (generalized). There has been no fever. She is not sexually active. Associated symptoms include frequency and urgency. Pertinent negatives include no vomiting.        The following portions of the patient's history were reviewed and updated as appropriate: allergies, current medications, past medical history, past social history, past surgical history and problem list.    Review of Systems   Constitutional: Negative for appetite change, fatigue and fever.   HENT: Positive for ear pain ( fullness). Negative for ear discharge, hearing loss, rhinorrhea and sore throat.    Eyes: Negative for double vision, discharge and itching.   Respiratory: Negative for cough, chest tightness, shortness of breath and wheezing.    Cardiovascular: Positive for leg swelling. Negative for chest pain.   Gastrointestinal: Negative for abdominal pain, change in bowel habit, diarrhea and vomiting.   Genitourinary: Positive for urinary incontinence ( stress), frequency and urgency.   Musculoskeletal: Positive for arthralgias, back pain and neck pain.   Skin: Negative for rash.   Neurological: Negative for dizziness and headache.   Hematological: Negative for adenopathy.       Objective    /58 (BP Location: Right arm, Patient Position: Sitting, Cuff Size: Adult)   Pulse 60   Temp 97.3 °F (36.3 °C) (Tympanic)   Ht 152.4 cm (60\")   Wt 70.8 kg (156 lb)   SpO2 96%   BMI 30.47 kg/m²     Physical Exam   Constitutional: She is oriented to person, place, and time. She appears well-developed and well-nourished. No distress.   HENT:   Head: Normocephalic and atraumatic.   Right Ear: Ear canal normal. Tympanic membrane is not erythematous. A middle ear effusion (dull, small " effusions) is present.   Left Ear: Ear canal normal. Tympanic membrane is not erythematous. A middle ear effusion (dull, small effusions) is present.   Nose: Nose normal. Right sinus exhibits no maxillary sinus tenderness and no frontal sinus tenderness. Left sinus exhibits no maxillary sinus tenderness and no frontal sinus tenderness.   Mouth/Throat: Uvula is midline, oropharynx is clear and moist and mucous membranes are normal.   Eyes: Conjunctivae are normal. Right eye exhibits no discharge.   Neck: Neck supple.   Cardiovascular: Normal rate and regular rhythm.    Pulmonary/Chest: Effort normal and breath sounds normal. She has no wheezes. She has no rales.   Abdominal: Soft. Bowel sounds are normal. There is no tenderness.   Musculoskeletal: She exhibits tenderness ( left posterior neck, right lower back ).   Lymphadenopathy:     She has no cervical adenopathy.   Neurological: She is alert and oriented to person, place, and time.   Normal/equal  of upper extremities     Nursing note and vitals reviewed.        Assessment/Plan   Kristy was seen today for arthritis, foot swelling, ear problem and hand problem.    Diagnoses and all orders for this visit:    Eustachian tube dysfunction, bilateral  -     fluticasone (FLONASE) 50 MCG/ACT nasal spray; 2 sprays into each nostril Daily.    Stress incontinence  -     tolterodine LA (DETROL LA) 4 MG 24 hr capsule; Take 1 capsule by mouth Daily.    Bilateral serous otitis media, unspecified chronicity  -     fluticasone (FLONASE) 50 MCG/ACT nasal spray; 2 sprays into each nostril Daily.    Dependent edema  -     hydrochlorothiazide (HYDRODIURIL) 12.5 MG tablet; Take 1 tablet by mouth Daily As Needed (leg swelling).    Neuropathy      Continue with allegra, add Flonase for ears.  If symptoms persist, may need to see ENT.  Refill of Detrol provided.  Limit caffeine or irritating foods/drinks.   Add additional HCTZ 12.5 daily PRN for days when lower extremity edema is  worse.  Limit sodium intake.  Increase water intake.      Patient requested being started on NSAID for her pain, but review of last Cr was 1.3.  Do not recommend starting NSAID at this time.  This was also discussed with patient PCP and she agreed.   For neuropathy pain, may increase Neurontin to BID on days when pain is worse.      See PCP for ongoing care/chronic disease management.

## 2018-06-12 ENCOUNTER — OFFICE VISIT (OUTPATIENT)
Dept: FAMILY MEDICINE CLINIC | Facility: CLINIC | Age: 83
End: 2018-06-12

## 2018-06-12 VITALS
OXYGEN SATURATION: 95 % | HEART RATE: 61 BPM | WEIGHT: 156 LBS | HEIGHT: 60 IN | SYSTOLIC BLOOD PRESSURE: 124 MMHG | TEMPERATURE: 96.6 F | BODY MASS INDEX: 30.63 KG/M2 | DIASTOLIC BLOOD PRESSURE: 57 MMHG

## 2018-06-12 DIAGNOSIS — E11.9 TYPE 2 DIABETES MELLITUS WITHOUT COMPLICATION, WITHOUT LONG-TERM CURRENT USE OF INSULIN (HCC): ICD-10-CM

## 2018-06-12 DIAGNOSIS — D50.9 IRON DEFICIENCY ANEMIA, UNSPECIFIED IRON DEFICIENCY ANEMIA TYPE: ICD-10-CM

## 2018-06-12 DIAGNOSIS — G62.9 NEUROPATHY: Primary | ICD-10-CM

## 2018-06-12 DIAGNOSIS — H66.90 ACUTE OTITIS MEDIA, UNSPECIFIED OTITIS MEDIA TYPE: ICD-10-CM

## 2018-06-12 DIAGNOSIS — R60.9 EDEMA, UNSPECIFIED TYPE: ICD-10-CM

## 2018-06-12 PROCEDURE — 99214 OFFICE O/P EST MOD 30 MIN: CPT | Performed by: NURSE PRACTITIONER

## 2018-06-12 RX ORDER — CARVEDILOL 3.12 MG/1
TABLET ORAL
COMMUNITY
End: 2018-06-12 | Stop reason: ALTCHOICE

## 2018-06-12 RX ORDER — BACLOFEN 10 MG/1
TABLET ORAL
COMMUNITY
End: 2018-06-12

## 2018-06-12 RX ORDER — GABAPENTIN 600 MG/1
TABLET ORAL
COMMUNITY
End: 2018-06-12 | Stop reason: SDUPTHER

## 2018-06-12 RX ORDER — FLUCONAZOLE 100 MG/1
TABLET ORAL
COMMUNITY
End: 2018-06-12

## 2018-06-12 RX ORDER — NAPROXEN 500 MG/1
TABLET ORAL
COMMUNITY
End: 2018-10-17

## 2018-06-12 RX ORDER — AMOXICILLIN AND CLAVULANATE POTASSIUM 875; 125 MG/1; MG/1
1 TABLET, FILM COATED ORAL 2 TIMES DAILY
Qty: 20 TABLET | Refills: 0 | Status: SHIPPED | OUTPATIENT
Start: 2018-06-12 | End: 2018-09-11

## 2018-06-12 RX ORDER — LISINOPRIL AND HYDROCHLOROTHIAZIDE 25; 20 MG/1; MG/1
TABLET ORAL
COMMUNITY
End: 2018-10-17

## 2018-06-12 RX ORDER — FUROSEMIDE 20 MG/1
TABLET ORAL
COMMUNITY
End: 2018-10-17 | Stop reason: SDUPTHER

## 2018-06-12 RX ORDER — FUROSEMIDE 20 MG/1
20 TABLET ORAL DAILY
Qty: 30 TABLET | Refills: 3 | Status: SHIPPED | OUTPATIENT
Start: 2018-06-12 | End: 2018-06-18 | Stop reason: SDUPTHER

## 2018-06-12 RX ORDER — HYDRALAZINE HYDROCHLORIDE 10 MG/1
TABLET, FILM COATED ORAL
COMMUNITY
End: 2018-06-12

## 2018-06-12 RX ORDER — DOXYCYCLINE HYCLATE 100 MG
TABLET ORAL
COMMUNITY
End: 2018-06-12

## 2018-06-12 RX ORDER — DIFLUPREDNATE OPHTHALMIC 0.5 MG/ML
EMULSION OPHTHALMIC
COMMUNITY
End: 2018-06-12

## 2018-06-12 RX ORDER — HYDROCODONE BITARTRATE AND ACETAMINOPHEN 5; 325 MG/1; MG/1
TABLET ORAL
COMMUNITY
End: 2018-06-12

## 2018-06-12 RX ORDER — METHOTREXATE 2.5 MG/1
TABLET ORAL
COMMUNITY
End: 2018-10-17

## 2018-06-12 RX ORDER — DIPHENHYDRAMINE HCL 25 MG
CAPSULE ORAL
COMMUNITY
End: 2020-12-30

## 2018-06-12 RX ORDER — DOCUSATE SODIUM 100 MG/1
CAPSULE, LIQUID FILLED ORAL
COMMUNITY
End: 2019-04-29 | Stop reason: SDUPTHER

## 2018-06-12 RX ORDER — CLONIDINE HYDROCHLORIDE 0.1 MG/1
TABLET ORAL
COMMUNITY
End: 2018-06-12 | Stop reason: ALTCHOICE

## 2018-06-12 RX ORDER — MAGNESIUM OXIDE 400 MG/1
TABLET ORAL
COMMUNITY
End: 2018-10-17 | Stop reason: SDUPTHER

## 2018-06-12 RX ORDER — PREDNISOLONE ACETATE 10 MG/ML
1 SUSPENSION/ DROPS OPHTHALMIC 4 TIMES DAILY
COMMUNITY
End: 2019-04-29

## 2018-06-12 RX ORDER — LANCETS
1 EACH MISCELLANEOUS DAILY
Qty: 102 EACH | Refills: 12 | Status: SHIPPED | OUTPATIENT
Start: 2018-06-12 | End: 2020-08-03 | Stop reason: SDUPTHER

## 2018-06-12 RX ORDER — CEPHALEXIN 500 MG/1
CAPSULE ORAL
COMMUNITY
End: 2018-06-12

## 2018-06-12 RX ORDER — CHOLECALCIFEROL (VITAMIN D3) 125 MCG
CAPSULE ORAL
COMMUNITY
End: 2019-04-29

## 2018-06-12 RX ORDER — LIDOCAINE AND PRILOCAINE 25; 25 MG/G; MG/G
CREAM TOPICAL
COMMUNITY
End: 2019-04-29

## 2018-06-12 RX ORDER — CEFUROXIME AXETIL 500 MG/1
TABLET ORAL
COMMUNITY
End: 2018-06-12

## 2018-06-12 RX ORDER — PREGABALIN 75 MG/1
75 CAPSULE ORAL 2 TIMES DAILY
Qty: 60 CAPSULE | Refills: 0 | Status: SHIPPED | OUTPATIENT
Start: 2018-06-12 | End: 2018-10-17

## 2018-06-12 RX ORDER — LANCING DEVICE/LANCETS
KIT MISCELLANEOUS
COMMUNITY
End: 2020-08-03 | Stop reason: SDUPTHER

## 2018-06-12 RX ORDER — COLCHICINE 0.6 MG/1
TABLET ORAL
COMMUNITY
End: 2018-06-12

## 2018-06-12 RX ORDER — HYDROXYZINE PAMOATE 25 MG/1
CAPSULE ORAL
COMMUNITY
End: 2018-06-12

## 2018-06-12 RX ORDER — METHOCARBAMOL 500 MG/1
TABLET, FILM COATED ORAL
COMMUNITY
End: 2018-09-11 | Stop reason: SDUPTHER

## 2018-06-12 RX ORDER — BACLOFEN 20 MG/1
TABLET ORAL
COMMUNITY
End: 2018-06-12

## 2018-06-12 RX ORDER — LISINOPRIL 20 MG/1
TABLET ORAL
COMMUNITY
End: 2018-10-17

## 2018-06-12 RX ORDER — FAMOTIDINE 20 MG/1
TABLET, FILM COATED ORAL
COMMUNITY
End: 2018-06-12

## 2018-06-12 RX ORDER — LORATADINE 10 MG/1
TABLET ORAL
COMMUNITY
End: 2018-10-17

## 2018-06-12 RX ORDER — FLUCONAZOLE 150 MG/1
TABLET ORAL
COMMUNITY
End: 2018-06-12

## 2018-06-12 RX ORDER — MELOXICAM 7.5 MG/1
TABLET ORAL
COMMUNITY
End: 2019-04-29

## 2018-06-12 RX ORDER — CHLORTHALIDONE 25 MG/1
TABLET ORAL
COMMUNITY
End: 2018-06-18 | Stop reason: SDUPTHER

## 2018-06-12 NOTE — PROGRESS NOTES
Subjective   Kristy Ramirez is a 84 y.o. female.     Here today with several problems.  Cont to have edema in her lower ext. She was given a low dose of hctz for her edema and is not helping.   She had [facility] nurse come to her home yesterday.  She had ms ramirez inquire about the possibility of something other than gabapentin for her neuropathy.    She was treated for otitis media and her left ear is still throbbing.  Got slightly better with the use of the antibiotic prescribed by the walk in provider.      Leg Swelling   This is a recurrent problem. The current episode started in the past 7 days. The problem occurs constantly. The problem has been unchanged. Pertinent negatives include no abdominal pain, anorexia, arthralgias, change in bowel habit, chest pain, chills, congestion, coughing, diaphoresis, fatigue, fever, headaches, joint swelling, myalgias, nausea, neck pain, numbness, rash, sore throat, swollen glands, urinary symptoms, vertigo, visual change, vomiting or weakness. Nothing aggravates the symptoms. Treatments tried: low dose of hctz. The treatment provided no relief.   Lower Extremity Issue   This is a chronic (neuropathy of lower ext.) problem. The current episode started more than 1 year ago. The problem occurs constantly. The problem has been unchanged. Pertinent negatives include no abdominal pain, anorexia, arthralgias, change in bowel habit, chest pain, chills, congestion, coughing, diaphoresis, fatigue, fever, headaches, joint swelling, myalgias, nausea, neck pain, numbness, rash, sore throat, swollen glands, urinary symptoms, vertigo, visual change, vomiting or weakness. Nothing aggravates the symptoms. Treatments tried: gabapentin. The treatment provided moderate relief.   Earache    There is pain in the left ear. This is a recurrent problem. The current episode started in the past 7 days. The problem occurs constantly. The problem has been unchanged. There has been no fever. The pain is  mild. Pertinent negatives include no abdominal pain, coughing, diarrhea, ear discharge, headaches, hearing loss, neck pain, rash, rhinorrhea, sore throat or vomiting. Her past medical history is significant for a chronic ear infection.        The following portions of the patient's history were reviewed and updated as appropriate: allergies, current medications, past family history, past medical history, past social history, past surgical history and problem list.    Review of Systems   Constitutional: Negative.  Negative for chills, diaphoresis, fatigue and fever.   HENT: Positive for ear pain. Negative for congestion, ear discharge, hearing loss, rhinorrhea and sore throat.    Respiratory: Negative.  Negative for cough.    Cardiovascular: Negative.  Negative for chest pain.   Gastrointestinal: Negative for abdominal pain, anorexia, change in bowel habit, diarrhea, nausea and vomiting.   Musculoskeletal: Negative.  Negative for arthralgias, joint swelling, myalgias and neck pain.   Skin: Negative.  Negative for rash.   Neurological: Negative.  Negative for vertigo, weakness and numbness.   Psychiatric/Behavioral: Negative.        Objective   Physical Exam   Constitutional: She appears well-developed and well-nourished. No distress.   HENT:   Head: Normocephalic.   Right Ear: Hearing, tympanic membrane, external ear and ear canal normal. Tympanic membrane is not injected.   Left Ear: Hearing, external ear and ear canal normal. Tympanic membrane is injected.   Nose: Nose normal.   Mouth/Throat: Oropharynx is clear and moist. No oropharyngeal exudate.   Eyes: Pupils are equal, round, and reactive to light.   Neck: Normal range of motion. Neck supple. No thyromegaly present.   Cardiovascular: Normal rate, regular rhythm and normal heart sounds.  Exam reveals no friction rub.    No murmur heard.  1 + edema in lower ext   Pulmonary/Chest: Effort normal and breath sounds normal. No respiratory distress. She has no  wheezes. She has no rales.   Abdominal: Soft. Bowel sounds are normal. She exhibits no distension. There is no tenderness. There is no guarding.   Musculoskeletal: Normal range of motion.       Neurological Sensory Findings -  Altered sharp/dull right ankle/foot discrimination and altered sharp/dull left ankle/foot discrimination.  Vascular Status -  Her right foot exhibits normal foot vasculature  and no edema. Her left foot exhibits normal foot vasculature  and no edema.  Skin Integrity  -  Her right foot skin is intact.Her left foot skin is intact..  Neurological: She is alert.   Skin: Skin is warm and dry.   Psychiatric: She has a normal mood and affect.   Nursing note and vitals reviewed.        Assessment/Plan   Kristy was seen today for med refill, edema, follow-up and ear fullness.    Diagnoses and all orders for this visit:    Neuropathy  -     pregabalin (LYRICA) 75 MG capsule; Take 1 capsule by mouth 2 (Two) Times a Day.    Acute otitis media, unspecified otitis media type  -     amoxicillin-clavulanate (AUGMENTIN) 875-125 MG per tablet; Take 1 tablet by mouth 2 (Two) Times a Day.  -     Ambulatory Referral to ENT (Otolaryngology)    Edema, unspecified type    Iron deficiency anemia, unspecified iron deficiency anemia type  -     Iron Profile    Type 2 diabetes mellitus without complication, without long-term current use of insulin  -     glucose blood test strip; Use as instructed to check b/s 1x a day.  Please give strip compatible with monitor.  -     ACCU-CHEK FASTCLIX LANCETS misc; 1 each Daily.    Other orders  -     furosemide (LASIX) 20 MG tablet; Take 1 tablet by mouth Daily.      She will stop the hctz and begin lasix for her foot and ankle edema.  Will also stop the gabapentin and begin lyrica for the neuropathy.

## 2018-06-18 DIAGNOSIS — H65.90 SEROUS OTITIS MEDIA, UNSPECIFIED CHRONICITY, UNSPECIFIED LATERALITY: ICD-10-CM

## 2018-06-18 DIAGNOSIS — F41.9 ANXIETY: ICD-10-CM

## 2018-06-18 DIAGNOSIS — M10.9 GOUT, UNSPECIFIED CAUSE, UNSPECIFIED CHRONICITY, UNSPECIFIED SITE: ICD-10-CM

## 2018-06-18 DIAGNOSIS — I10 ESSENTIAL HYPERTENSION: ICD-10-CM

## 2018-06-18 RX ORDER — OMEPRAZOLE 20 MG/1
20 CAPSULE, DELAYED RELEASE ORAL 2 TIMES DAILY
Qty: 60 CAPSULE | Refills: 3 | Status: SHIPPED | OUTPATIENT
Start: 2018-06-18 | End: 2018-12-20 | Stop reason: SDUPTHER

## 2018-06-18 RX ORDER — FEXOFENADINE HCL 180 MG/1
180 TABLET ORAL DAILY
Qty: 30 TABLET | Refills: 1 | Status: SHIPPED | OUTPATIENT
Start: 2018-06-18 | End: 2018-12-20 | Stop reason: SDUPTHER

## 2018-06-18 RX ORDER — LOSARTAN POTASSIUM AND HYDROCHLOROTHIAZIDE 12.5; 5 MG/1; MG/1
1 TABLET ORAL DAILY
Qty: 90 TABLET | Refills: 3 | Status: SHIPPED | OUTPATIENT
Start: 2018-06-18 | End: 2019-03-21 | Stop reason: SDUPTHER

## 2018-06-18 RX ORDER — FUROSEMIDE 20 MG/1
20 TABLET ORAL DAILY
Qty: 30 TABLET | Refills: 3 | Status: SHIPPED | OUTPATIENT
Start: 2018-06-18 | End: 2019-03-21 | Stop reason: SDUPTHER

## 2018-06-18 RX ORDER — AMLODIPINE BESYLATE 5 MG/1
5 TABLET ORAL DAILY
Qty: 90 TABLET | Refills: 1 | Status: SHIPPED | OUTPATIENT
Start: 2018-06-18 | End: 2018-12-20 | Stop reason: SDUPTHER

## 2018-06-18 RX ORDER — ALLOPURINOL 100 MG/1
100 TABLET ORAL DAILY
Qty: 90 TABLET | Refills: 1 | Status: SHIPPED | OUTPATIENT
Start: 2018-06-18 | End: 2018-12-20 | Stop reason: SDUPTHER

## 2018-06-18 RX ORDER — BUSPIRONE HYDROCHLORIDE 10 MG/1
10 TABLET ORAL 3 TIMES DAILY
Qty: 180 TABLET | Refills: 1 | Status: SHIPPED | OUTPATIENT
Start: 2018-06-18 | End: 2019-03-21 | Stop reason: SDUPTHER

## 2018-06-18 RX ORDER — CHLORTHALIDONE 25 MG/1
25 TABLET ORAL DAILY
Qty: 30 TABLET | Refills: 3 | Status: SHIPPED | OUTPATIENT
Start: 2018-06-18 | End: 2019-03-21 | Stop reason: SDUPTHER

## 2018-06-18 RX ORDER — METOPROLOL SUCCINATE 25 MG/1
25 TABLET, EXTENDED RELEASE ORAL DAILY
Qty: 90 TABLET | Refills: 1 | Status: SHIPPED | OUTPATIENT
Start: 2018-06-18 | End: 2018-09-11 | Stop reason: SDUPTHER

## 2018-06-21 DIAGNOSIS — Z79.890 ON HORMONE REPLACEMENT THERAPY: ICD-10-CM

## 2018-06-25 RX ORDER — ESTRADIOL 1 MG/1
1 TABLET ORAL DAILY
Qty: 90 TABLET | Refills: 0 | Status: SHIPPED | OUTPATIENT
Start: 2018-06-25 | End: 2018-10-17

## 2018-07-05 DIAGNOSIS — R60.9 DEPENDENT EDEMA: ICD-10-CM

## 2018-07-05 RX ORDER — HYDROCHLOROTHIAZIDE 12.5 MG/1
TABLET ORAL
Qty: 30 TABLET | Refills: 0 | OUTPATIENT
Start: 2018-07-05

## 2018-07-09 RX ORDER — GABAPENTIN 100 MG/1
CAPSULE ORAL
Qty: 60 CAPSULE | Refills: 0 | OUTPATIENT
Start: 2018-07-09

## 2018-07-26 ENCOUNTER — TELEPHONE (OUTPATIENT)
Dept: FAMILY MEDICINE CLINIC | Facility: CLINIC | Age: 83
End: 2018-07-26

## 2018-07-26 DIAGNOSIS — H66.90 ACUTE OTITIS MEDIA, UNSPECIFIED OTITIS MEDIA TYPE: ICD-10-CM

## 2018-07-26 RX ORDER — AMOXICILLIN AND CLAVULANATE POTASSIUM 875; 125 MG/1; MG/1
TABLET, FILM COATED ORAL
Qty: 20 TABLET | Refills: 0 | OUTPATIENT
Start: 2018-07-26

## 2018-08-02 DIAGNOSIS — E11.9 TYPE 2 DIABETES MELLITUS WITHOUT COMPLICATION, WITHOUT LONG-TERM CURRENT USE OF INSULIN (HCC): ICD-10-CM

## 2018-08-02 RX ORDER — METFORMIN HYDROCHLORIDE 500 MG/1
TABLET, EXTENDED RELEASE ORAL
Qty: 90 TABLET | Refills: 0 | Status: SHIPPED | OUTPATIENT
Start: 2018-08-02 | End: 2018-10-17 | Stop reason: SDUPTHER

## 2018-09-10 ENCOUNTER — OFFICE VISIT (OUTPATIENT)
Dept: FAMILY MEDICINE CLINIC | Facility: CLINIC | Age: 83
End: 2018-09-10

## 2018-09-10 VITALS
HEIGHT: 60 IN | HEART RATE: 73 BPM | DIASTOLIC BLOOD PRESSURE: 61 MMHG | SYSTOLIC BLOOD PRESSURE: 133 MMHG | OXYGEN SATURATION: 95 % | TEMPERATURE: 97.7 F | WEIGHT: 148 LBS | RESPIRATION RATE: 20 BRPM | BODY MASS INDEX: 29.06 KG/M2

## 2018-09-10 DIAGNOSIS — N76.0 ACUTE VAGINITIS: ICD-10-CM

## 2018-09-10 DIAGNOSIS — M54.5 LOW BACK PAIN, UNSPECIFIED BACK PAIN LATERALITY, UNSPECIFIED CHRONICITY, WITH SCIATICA PRESENCE UNSPECIFIED: ICD-10-CM

## 2018-09-10 DIAGNOSIS — H92.09 EARACHE: Primary | ICD-10-CM

## 2018-09-10 DIAGNOSIS — M25.532 LEFT WRIST PAIN: ICD-10-CM

## 2018-09-10 DIAGNOSIS — E11.9 TYPE 2 DIABETES MELLITUS WITHOUT COMPLICATION, WITHOUT LONG-TERM CURRENT USE OF INSULIN (HCC): ICD-10-CM

## 2018-09-10 DIAGNOSIS — I10 ESSENTIAL HYPERTENSION: ICD-10-CM

## 2018-09-10 PROCEDURE — 99214 OFFICE O/P EST MOD 30 MIN: CPT | Performed by: NURSE PRACTITIONER

## 2018-09-10 RX ORDER — METHOCARBAMOL 500 MG/1
500 TABLET, FILM COATED ORAL 3 TIMES DAILY
Qty: 90 TABLET | Refills: 3 | Status: SHIPPED | OUTPATIENT
Start: 2018-09-10 | End: 2018-10-04 | Stop reason: CLARIF

## 2018-09-10 RX ORDER — METOPROLOL SUCCINATE 25 MG/1
25 TABLET, EXTENDED RELEASE ORAL DAILY
Qty: 90 TABLET | Refills: 1 | Status: SHIPPED | OUTPATIENT
Start: 2018-09-10 | End: 2019-03-21 | Stop reason: SDUPTHER

## 2018-09-10 RX ORDER — METRONIDAZOLE 250 MG/1
250 TABLET ORAL 2 TIMES DAILY
Qty: 14 TABLET | Refills: 0 | Status: SHIPPED | OUTPATIENT
Start: 2018-09-10 | End: 2018-10-17

## 2018-09-11 NOTE — PROGRESS NOTES
Subjective   Kristy Ramirez is a 84 y.o. female.     Here today for wili.  She has several problems.  She cont to have right ear pain.  Did have an appointment with ent, but was out of town and was unable to keep the appointment.  Has had some problems with otitis in the past.      B/s is reported to be running 100 to 120.    She has numbness and in her left arm.  It runs up her arm and into her shoulder.      She also has chronic pain in her hip and back and chronic vaginal d/c.  Has been on flagyl in the past.      Diabetes   She presents for her follow-up diabetic visit. She has type 2 diabetes mellitus. Her disease course has been stable. There are no hypoglycemic associated symptoms. Pertinent negatives for hypoglycemia include no headaches. Associated symptoms include foot paresthesias. Pertinent negatives for diabetes include no chest pain, no fatigue, no visual change, no weakness and no weight loss. There are no hypoglycemic complications. Symptoms are stable. There are no diabetic complications. Risk factors for coronary artery disease include diabetes mellitus, hypertension, sedentary lifestyle and post-menopausal. Current diabetic treatment includes oral agent (monotherapy). She is compliant with treatment all of the time. Her weight is stable. She is following a diabetic diet. Meal planning includes avoidance of concentrated sweets. She has not had a previous visit with a dietitian. She rarely participates in exercise. She monitors blood glucose at home 1-2 x per day. Her breakfast blood glucose is taken between 7-8 am. Her breakfast blood glucose range is generally 110-130 mg/dl. Her overall blood glucose range is 110-130 mg/dl. An ACE inhibitor/angiotensin II receptor blocker is being taken. She does not see a podiatrist.Eye exam is current.   Back Pain   This is a chronic problem. The current episode started more than 1 year ago. The problem occurs constantly. The problem is unchanged. The pain is  present in the lumbar spine. The pain radiates to the right thigh. The pain is at a severity of 6/10. The pain is severe. The pain is the same all the time. The symptoms are aggravated by lying down, sitting, standing and twisting. Stiffness is present in the morning. Associated symptoms include leg pain and numbness. Pertinent negatives include no abdominal pain, bladder incontinence, bowel incontinence, chest pain, dysuria, fever, headaches, paresis, paresthesias, pelvic pain, perianal numbness, tingling, weakness or weight loss. Risk factors include menopause and lack of exercise. She has tried analgesics for the symptoms. The treatment provided moderate relief.   Earache    There is pain in the right ear. This is a chronic problem. The current episode started more than 1 month ago. The problem occurs constantly. The problem has been waxing and waning. There has been no fever. The pain is at a severity of 3/10. The pain is moderate. Pertinent negatives include no abdominal pain, coughing, diarrhea, ear discharge, headaches, hearing loss, neck pain, rash, rhinorrhea, sore throat or vomiting. She has tried antibiotics and ear drops for the symptoms. The treatment provided mild relief.   Upper Extremity Issue   This is a recurrent problem. The current episode started 1 to 4 weeks ago. The problem occurs constantly. The problem has been unchanged. Associated symptoms include numbness. Pertinent negatives include no abdominal pain, anorexia, arthralgias, change in bowel habit, chest pain, chills, congestion, coughing, diaphoresis, fatigue, fever, headaches, joint swelling, myalgias, nausea, neck pain, rash, sore throat, swollen glands, urinary symptoms, vertigo, visual change, vomiting or weakness. Nothing aggravates the symptoms. She has tried nothing for the symptoms. The treatment provided no relief.        The following portions of the patient's history were reviewed and updated as appropriate: allergies, current  medications, past family history, past medical history, past social history, past surgical history and problem list.    Review of Systems   Constitutional: Negative.  Negative for chills, diaphoresis, fatigue, fever and unexpected weight loss.   HENT: Positive for ear pain. Negative for congestion, ear discharge, hearing loss, rhinorrhea and sore throat.    Respiratory: Negative.  Negative for cough.    Cardiovascular: Negative.  Negative for chest pain.   Gastrointestinal: Negative for abdominal pain, anorexia, bowel incontinence, change in bowel habit, diarrhea, nausea and vomiting.   Genitourinary: Negative for urinary incontinence, dysuria and pelvic pain.   Musculoskeletal: Positive for back pain. Negative for arthralgias, joint swelling, myalgias and neck pain.   Skin: Negative.  Negative for rash.   Neurological: Positive for numbness. Negative for vertigo, tingling, weakness and paresthesias.   Psychiatric/Behavioral: Negative.        Objective   Physical Exam   Constitutional: She is oriented to person, place, and time. She appears well-developed and well-nourished. No distress.   HENT:   Head: Normocephalic.   Right Ear: Hearing, tympanic membrane, external ear and ear canal normal.   Left Ear: Hearing, tympanic membrane, external ear and ear canal normal.   Nose: Nose normal.   Mouth/Throat: Oropharynx is clear and moist. No oropharyngeal exudate.   Eyes: Pupils are equal, round, and reactive to light.   Neck: Normal range of motion. Neck supple.   Cardiovascular: Normal rate, regular rhythm and normal heart sounds.  Exam reveals no friction rub.    No murmur heard.  Pulmonary/Chest: Effort normal and breath sounds normal. No respiratory distress. She has no wheezes. She has no rales.   Abdominal: Soft.   Musculoskeletal:        Right wrist: She exhibits normal range of motion and no tenderness.        Right hip: She exhibits decreased range of motion and tenderness.   + phalen and tinels signs    Neurological: She is alert and oriented to person, place, and time.   Skin: Skin is warm and dry.   Psychiatric: She has a normal mood and affect. Thought content normal.   Nursing note and vitals reviewed.        Assessment/Plan   Kristy was seen today for diabetes, hypertension and heartburn.    Diagnoses and all orders for this visit:    Earache  -     Ambulatory Referral to ENT (Otolaryngology)    Left wrist pain  -     Ambulatory Referral to Neurology    Low back pain, unspecified back pain laterality, unspecified chronicity, with sciatica presence unspecified  -     methocarbamol (ROBAXIN) 500 MG tablet; Take 1 tablet by mouth 3 (Three) Times a Day.    Essential hypertension  -     metoprolol succinate XL (TOPROL-XL) 25 MG 24 hr tablet; Take 1 tablet by mouth Daily.    Acute vaginitis  -     metroNIDAZOLE (FLAGYL) 250 MG tablet; Take 1 tablet by mouth 2 (Two) Times a Day.    Type 2 diabetes mellitus without complication, without long-term current use of insulin (CMS/[facility])

## 2018-09-17 ENCOUNTER — OFFICE VISIT (OUTPATIENT)
Dept: OBSTETRICS AND GYNECOLOGY | Facility: CLINIC | Age: 83
End: 2018-09-17

## 2018-09-17 VITALS
DIASTOLIC BLOOD PRESSURE: 56 MMHG | BODY MASS INDEX: 29.06 KG/M2 | HEART RATE: 68 BPM | SYSTOLIC BLOOD PRESSURE: 122 MMHG | HEIGHT: 60 IN | WEIGHT: 148 LBS

## 2018-09-17 DIAGNOSIS — N81.10 POP-Q STAGE 4 CYSTOCELE: Primary | Chronic | ICD-10-CM

## 2018-09-17 DIAGNOSIS — Z46.89 PESSARY MAINTENANCE: ICD-10-CM

## 2018-09-17 PROCEDURE — 99213 OFFICE O/P EST LOW 20 MIN: CPT | Performed by: NURSE PRACTITIONER

## 2018-09-17 NOTE — PROGRESS NOTES
"Subjective   Chief Complaint   Patient presents with   • Follow-up     pessary check     Kristy Ramirez is a 84 y.o. year old who presents to be seen for follow-up of her pessary.  Currently she is using Foldable ring w/ support - #7 w/o urethral bar.  She reports no major issues but still having vaginal discharge. Was prescribed Diflucan on two separate occasions recently but had body itching both times she tried to take it. Also was given a vaginal cream but didn't use it because it is so messy.    No Additional Complaints Reported    The following portions of the patient's history were reviewed and updated as appropriate:problem list, current medications, allergies and past surgical history    Review of Systems   Constitutional: Negative for activity change, chills, diaphoresis, fever and unexpected weight change.   Gastrointestinal: Negative for abdominal distention, abdominal pain, blood in stool, constipation and diarrhea.   Genitourinary: Positive for vaginal discharge. Negative for difficulty urinating, dysuria, enuresis, frequency, hematuria, pelvic pain, urgency, vaginal bleeding and vaginal pain.   Psychiatric/Behavioral: Negative for agitation and confusion.         Objective   /56   Pulse 68   Ht 152.4 cm (60\")   Wt 67.1 kg (148 lb)   Breastfeeding? No   BMI 28.90 kg/m²     General:  well developed; well nourished  no acute distress  appears stated age  not distressed   Pelvis: Clinical staff was present for exam  External genitalia:  normal appearance of the external genitalia including Bartholin's and Dutch John's glands.  :  urethral meatus normal;  Vaginal:  atrophic mucosal changes are present;  Cervix:  absent.  Uterus:  absent.  Adnexa:  absent, bilateral.  Rectal:  digital rectal exam not performed; anus visually normal appearing.  Cystocele GRADE 4  Pelvic floor pain: pelvic floor is nontender to palpation in 4 quadrants.   Pessary removed and cleaned with betadine. Placed back into " the vagina without difficulty.     Lab Review   vag panel + for yeast    Imaging   No data reviewed         Diagnoses and all orders for this visit:    POP-Q stage 4 cystocele    Pessary maintenance    Other orders  -     terconazole (TERAZOL 3) 0.8 % vaginal cream; Insert 1 applicator into the vagina Every Night for 3 days.        New Medications Ordered This Visit   Medications   • terconazole (TERAZOL 3) 0.8 % vaginal cream     Sig: Insert 1 applicator into the vagina Every Night for 3 days.     Dispense:  20 g     Refill:  0       This document was electronically signed.    Gladys Soni, NADYA  September 17, 2018

## 2018-09-20 DIAGNOSIS — N39.3 STRESS INCONTINENCE: ICD-10-CM

## 2018-09-20 RX ORDER — TOLTERODINE 4 MG/1
4 CAPSULE, EXTENDED RELEASE ORAL DAILY
Qty: 90 CAPSULE | Refills: 1 | Status: SHIPPED | OUTPATIENT
Start: 2018-09-20 | End: 2019-03-21 | Stop reason: SDUPTHER

## 2018-10-03 ENCOUNTER — TELEPHONE (OUTPATIENT)
Dept: FAMILY MEDICINE CLINIC | Facility: CLINIC | Age: 83
End: 2018-10-03

## 2018-10-03 PROBLEM — M62.838 MUSCLE SPASM: Status: ACTIVE | Noted: 2018-10-03

## 2018-10-03 NOTE — TELEPHONE ENCOUNTER
INSURANCE WANTS BACLOFEN ,THEY ARE REJECTING DUE TO METHOCARBAMOL NOT RECOMMENDED FOR PATIENTS OVER THE AGE OF 65

## 2018-10-04 RX ORDER — BACLOFEN 10 MG/1
10 TABLET ORAL 3 TIMES DAILY
Qty: 90 TABLET | Refills: 5 | Status: ON HOLD | OUTPATIENT
Start: 2018-10-04 | End: 2020-11-08

## 2018-10-17 ENCOUNTER — OFFICE VISIT (OUTPATIENT)
Dept: OTOLARYNGOLOGY | Facility: CLINIC | Age: 83
End: 2018-10-17

## 2018-10-17 VITALS — WEIGHT: 148 LBS | HEIGHT: 60 IN | TEMPERATURE: 96.4 F | BODY MASS INDEX: 29.06 KG/M2

## 2018-10-17 DIAGNOSIS — E11.9 TYPE 2 DIABETES MELLITUS WITHOUT COMPLICATION, WITHOUT LONG-TERM CURRENT USE OF INSULIN (HCC): ICD-10-CM

## 2018-10-17 DIAGNOSIS — H60.331 CHRONIC SWIMMER'S EAR OF RIGHT SIDE: ICD-10-CM

## 2018-10-17 DIAGNOSIS — H61.23 BILATERAL IMPACTED CERUMEN: ICD-10-CM

## 2018-10-17 DIAGNOSIS — H92.01 RIGHT EAR PAIN: Primary | ICD-10-CM

## 2018-10-17 PROCEDURE — 99203 OFFICE O/P NEW LOW 30 MIN: CPT | Performed by: OTOLARYNGOLOGY

## 2018-10-17 PROCEDURE — 69210 REMOVE IMPACTED EAR WAX UNI: CPT | Performed by: OTOLARYNGOLOGY

## 2018-10-17 RX ORDER — GABAPENTIN 300 MG/1
300 CAPSULE ORAL 3 TIMES DAILY
COMMUNITY
End: 2019-04-29 | Stop reason: ALTCHOICE

## 2018-10-17 RX ORDER — OXYCODONE HYDROCHLORIDE 5 MG/1
TABLET ORAL
Refills: 0 | COMMUNITY
Start: 2018-10-11 | End: 2019-04-29

## 2018-10-17 RX ORDER — CIPROFLOXACIN AND DEXAMETHASONE 3; 1 MG/ML; MG/ML
4 SUSPENSION/ DROPS AURICULAR (OTIC) 2 TIMES DAILY
Qty: 7.5 ML | Refills: 0 | Status: SHIPPED | OUTPATIENT
Start: 2018-10-17 | End: 2019-03-21

## 2018-10-17 NOTE — PROGRESS NOTES
Subjective   Kristy Ramirez is a 84 y.o. female.   Ear problems  History of Present Illness   Is had intermittent ear pain for the last 5 months is been primarily on the left side she can't take many medications is not been treated with any medications she's been out of town.  She admits to using Q-tips.  Her pain is more frequent and consistent that was a few months ago.  She has no ear drainage no major change in hearing assist sounds 10 to make her symptoms worse she does have decreased hearing overall but hasn't particularly changed recently she has no jaw joint complaints neck swelling or mass no trouble swallowing or breathing no sore throat.  She is edentulous and denies ear problems      The following portions of the patient's history were reviewed and updated as appropriate: allergies, current medications, past family history, past medical history, past social history, past surgical history and problem list.      Kristy Ramirez reports that she has quit smoking. She has never used smokeless tobacco. She reports that she does not drink alcohol or use drugs.  Patient is not a tobacco user and has been counseled for use of tobacco products    Family History   Problem Relation Age of Onset   • Diabetes Other    • Heart disease Other    • Stroke Other          Current Outpatient Prescriptions:   •  ACCU-CHEK FASTCLIX LANCETS misc, 1 each Daily., Disp: 102 each, Rfl: 12  •  allopurinol (ZYLOPRIM) 100 MG tablet, Take 1 tablet by mouth Daily., Disp: 90 tablet, Rfl: 1  •  amLODIPine (NORVASC) 5 MG tablet, Take 1 tablet by mouth Daily., Disp: 90 tablet, Rfl: 1  •  aspirin 81 MG tablet, Take 1 tablet by mouth Daily., Disp: 90 tablet, Rfl: 1  •  baclofen (LIORESAL) 10 MG tablet, Take 1 tablet by mouth 3 (Three) Times a Day., Disp: 90 tablet, Rfl: 5  •  busPIRone (BUSPAR) 10 MG tablet, Take 1 tablet by mouth 3 (Three) Times a Day., Disp: 180 tablet, Rfl: 1  •  chlorthalidone (HYGROTON) 25 MG tablet, Take 1 tablet by  mouth Daily., Disp: 30 tablet, Rfl: 3  •  diphenhydrAMINE (BANOPHEN) 25 mg capsule, Banophen 25 mg capsule  TK 1 C PO Q 6 H PRN FOR ITCHING, Disp: , Rfl:   •  docusate sodium (DOK) 100 MG capsule,  mg capsule  TK ONE C PO  BID, Disp: , Rfl:   •  estradiol (ESTRACE) 1 MG tablet, TAKE 1 TABLET BY MOUTH DAILY, Disp: 90 tablet, Rfl: 0  •  fexofenadine (ALLEGRA ALLERGY) 180 MG tablet, Take 1 tablet by mouth Daily., Disp: 30 tablet, Rfl: 1  •  furosemide (LASIX) 20 MG tablet, Take 1 tablet by mouth Daily., Disp: 30 tablet, Rfl: 3  •  gabapentin (NEURONTIN) 300 MG capsule, Take 300 mg by mouth 3 (Three) Times a Day., Disp: , Rfl:   •  glucose blood test strip, Use as instructed to check b/s 1x a day.  Please give strip compatible with monitor., Disp: 100 each, Rfl: 12  •  Lancets Misc. (ACCU-CHEK FASTCLIX LANCET) kit, Accu-Chek FastClix, Disp: , Rfl:   •  lidocaine-prilocaine (EMLA) 2.5-2.5 % cream, lidocaine-prilocaine 2.5 %-2.5 % topical cream, Disp: , Rfl:   •  losartan-hydrochlorothiazide (HYZAAR) 50-12.5 MG per tablet, Take 1 tablet by mouth Daily., Disp: 90 tablet, Rfl: 3  •  melatonin 5 MG tablet tablet, melatonin 5 mg tablet  TK 1 T PO QD HS WF PRN, Disp: , Rfl:   •  meloxicam (MOBIC) 7.5 MG tablet, meloxicam 7.5 mg tablet, Disp: , Rfl:   •  metFORMIN ER (GLUCOPHAGE-XR) 500 MG 24 hr tablet, TAKE 1 TABLET BY MOUTH DAILY WITH THE EVENING MEAL, Disp: 90 tablet, Rfl: 0  •  metoprolol succinate XL (TOPROL-XL) 25 MG 24 hr tablet, Take 1 tablet by mouth Daily., Disp: 90 tablet, Rfl: 1  •  omeprazole (PRILOSEC) 20 MG capsule, Take 1 capsule by mouth 2 (Two) Times a Day., Disp: 60 capsule, Rfl: 3  •  oxyCODONE (ROXICODONE) 5 MG immediate release tablet, TAKE 1/2 TO 1 TABLET EVERY 8-12 HOURS AS NEEDED FOR PAIN., Disp: , Rfl: 0  •  prednisoLONE acetate (PRED FORTE) 1 % ophthalmic suspension, 1 drop 4 (Four) Times a Day., Disp: , Rfl:   •  tolterodine LA (DETROL LA) 4 MG 24 hr capsule, TAKE 1 CAPSULE BY MOUTH DAILY,  Disp: 90 capsule, Rfl: 1  •  ciprofloxacin-dexamethasone (CIPRODEX) 0.3-0.1 % otic suspension, Administer 4 drops into ear(s) as directed by provider 2 (Two) Times a Day. Use for 10 days, Disp: 7.5 mL, Rfl: 0    Allergies   Allergen Reactions   • Aleve [Naproxen Sodium] Itching   • Atenolol    • Keflex [Cephalexin] Itching   • Lisinopril Angioedema   • Relafen [Nabumetone] Itching   • Zanaflex [Tizanidine] Itching   • Acetaminophen Itching   • Aspirin Itching and Rash   • Diflucan [Fluconazole] Itching   • Sulfa Antibiotics Rash and Itching       Past Medical History:   Diagnosis Date   • Abnormal CT scan     per patient   • Acute bronchitis    • Acute maxillary sinusitis    • Acute otitis externa    • Acute sinusitis    • Allergic conjunctivitis    • Angular cheilitis    • Ankle edema    • Backache     improved   • Blood in urine      UTI resolved      • Bradycardia    • Chest discomfort     described as heart racing      • Chronic low back pain     RIGHT leg radiation      • Cold feet     c/o - and lowerlegs      • Contusion     of dorsum of foot   • Cough    • Depressive disorder    • Diabetes mellitus (CMS/HCC)    • Dizziness and giddiness    • Dyspnea    • Dysuria    • Edema    • Edema of foot    • Edema of lower extremity    • Essential hypertension    • Exanthematous disorder    • Fatigue    • Foot pain, left    • Grade II hemorrhoids 3/12/2018   • Hematoma    • Hip pain, right    • History of palpitations    • Hormone replacement therapy (postmenopausal)    • Hypertensive disorder    • Impacted cerumen    • Joint pain    • Knee pain    • Low back pain    • Malaise and fatigue    • Multiple joint pain    • Muscle pain    • Muscle weakness    • Neck pain    • Obesity (BMI 30.0-34.9) 3/12/2018   • Osteoarthritis of knee    • Osteoarthritis of multiple joints    • Otitis externa    • Pain in lower limb    • Peripheral venous insufficiency    • POP-Q stage 2 rectocele 3/12/2018   • POP-Q stage 4 cystocele  3/12/2018   • Sacroiliitis, not elsewhere classified (CMS/HCC)     R LE   • Shoulder pain    • Upper respiratory infection    • Urinary frequency    • Urinary tract infectious disease    • Wheezing          Review of Systems   Constitutional: Negative.    HENT: Positive for sore throat, trouble swallowing and voice change.    Eyes: Positive for discharge and itching.   Respiratory: Negative.    Cardiovascular: Positive for chest pain and palpitations.   Gastrointestinal: Positive for diarrhea.   Endocrine: Negative.    Genitourinary: Negative.    Musculoskeletal: Positive for back pain, neck pain and neck stiffness.        Arthritis  Neck pain  Neck stiffness   Skin: Negative.    Allergic/Immunologic: Negative.    Neurological: Negative.    Hematological: Negative.    Psychiatric/Behavioral: Negative.    All other systems reviewed and are negative.          Objective   Physical Exam   Constitutional: She appears well-developed and well-nourished.   HENT:   Head: Normocephalic.   Right Ear: Tympanic membrane and external ear normal.   Left Ear: Tympanic membrane and external ear normal.   Ears:    Nose: Nose normal.   Mouth/Throat: Uvula is midline, oropharynx is clear and moist and mucous membranes are normal. She has dentures. Tonsils are 1+ on the right. Tonsils are 1+ on the left. No tonsillar exudate.   Eyes: Conjunctivae are normal.   Neck: Trachea normal, normal range of motion and phonation normal. No JVD present.   Pulmonary/Chest: Effort normal.   Musculoskeletal: Normal range of motion.   Lymphadenopathy:     She has no cervical adenopathy.   Skin: Skin is warm.   Psychiatric: She has a normal mood and affect.         PROCEDURE :CErumenimopaction  B/l -[place] wax was suctioned from the ears and used forceps to remove it was done atraumatically and there is no evidence of granulation or any middle ear process mild inflammation of the skin of the ear was noted left worse than right    Assessment/Plan   Kristy  was seen today for ear problem.    Diagnoses and all orders for this visit:    Right ear pain    Bilateral impacted cerumen    Chronic swimmer's ear of right side    Other orders  -     ciprofloxacin-dexamethasone (CIPRODEX) 0.3-0.1 % otic suspension; Administer 4 drops into ear(s) as directed by provider 2 (Two) Times a Day. Use for 10 days         Do not use Q-tips in the use of peroxide to Clean the ears   Use antibiotic drops and recheck in 2 weeks she is to call for any problems or questions in the interim  Keep ears dry other than eardrops

## 2018-10-17 NOTE — PATIENT INSTRUCTIONS

## 2018-10-18 DIAGNOSIS — E11.9 TYPE 2 DIABETES MELLITUS WITHOUT COMPLICATION, WITHOUT LONG-TERM CURRENT USE OF INSULIN (HCC): ICD-10-CM

## 2018-10-18 RX ORDER — METFORMIN HYDROCHLORIDE 500 MG/1
TABLET, EXTENDED RELEASE ORAL
Qty: 90 TABLET | Refills: 0 | Status: SHIPPED | OUTPATIENT
Start: 2018-10-18 | End: 2018-10-18 | Stop reason: SDUPTHER

## 2018-10-18 RX ORDER — METFORMIN HYDROCHLORIDE 500 MG/1
500 TABLET, EXTENDED RELEASE ORAL
Qty: 90 TABLET | Refills: 3 | Status: SHIPPED | OUTPATIENT
Start: 2018-10-18 | End: 2018-12-20 | Stop reason: SDUPTHER

## 2018-11-12 RX ORDER — CHLORTHALIDONE 25 MG/1
25 TABLET ORAL DAILY
Qty: 90 TABLET | Refills: 1 | Status: SHIPPED | OUTPATIENT
Start: 2018-11-12 | End: 2019-03-21 | Stop reason: SDUPTHER

## 2018-12-13 RX ORDER — FUROSEMIDE 20 MG/1
20 TABLET ORAL DAILY
Qty: 90 TABLET | Refills: 1 | Status: SHIPPED | OUTPATIENT
Start: 2018-12-13 | End: 2019-03-21 | Stop reason: SDUPTHER

## 2018-12-17 ENCOUNTER — OFFICE VISIT (OUTPATIENT)
Dept: OBSTETRICS AND GYNECOLOGY | Facility: CLINIC | Age: 83
End: 2018-12-17

## 2018-12-17 VITALS
SYSTOLIC BLOOD PRESSURE: 146 MMHG | WEIGHT: 140 LBS | HEIGHT: 60 IN | HEART RATE: 60 BPM | BODY MASS INDEX: 27.48 KG/M2 | DIASTOLIC BLOOD PRESSURE: 73 MMHG

## 2018-12-17 DIAGNOSIS — Z46.89 PESSARY MAINTENANCE: ICD-10-CM

## 2018-12-17 DIAGNOSIS — N89.8 VAGINAL DISCHARGE: ICD-10-CM

## 2018-12-17 DIAGNOSIS — N81.6 POP-Q STAGE 2 RECTOCELE: Primary | Chronic | ICD-10-CM

## 2018-12-17 DIAGNOSIS — N81.10 POP-Q STAGE 4 CYSTOCELE: Chronic | ICD-10-CM

## 2018-12-17 PROCEDURE — 87480 CANDIDA DNA DIR PROBE: CPT | Performed by: NURSE PRACTITIONER

## 2018-12-17 PROCEDURE — 87660 TRICHOMONAS VAGIN DIR PROBE: CPT | Performed by: NURSE PRACTITIONER

## 2018-12-17 PROCEDURE — 99213 OFFICE O/P EST LOW 20 MIN: CPT | Performed by: NURSE PRACTITIONER

## 2018-12-17 PROCEDURE — 87510 GARDNER VAG DNA DIR PROBE: CPT | Performed by: NURSE PRACTITIONER

## 2018-12-17 NOTE — PROGRESS NOTES
"Subjective   Chief Complaint   Patient presents with   • Pessary Check     Kristy Ramirez is a 84 y.o. year old who presents to be seen for follow-up of her pessary.  Currently she is using Foldable ring w/ support - #7 w/o urethral bar.  She reports more discharge than usual but no odor or discomfort.    No Additional Complaints Reported    The following portions of the patient's history were reviewed and updated as appropriate:problem list, current medications, allergies, past medical history and past surgical history    Review of Systems   Constitutional: Negative for chills and fever.   Gastrointestinal: Negative for abdominal distention, abdominal pain, constipation and diarrhea.   Genitourinary: Positive for vaginal discharge. Negative for decreased urine volume, difficulty urinating, dysuria, enuresis, flank pain, frequency, pelvic pain, urgency, vaginal bleeding and vaginal pain.         Objective   /73   Pulse 60   Ht 152.4 cm (60\")   Wt 63.5 kg (140 lb)   BMI 27.34 kg/m²     General:  well developed; well nourished  no acute distress  appears stated age   Pelvis: Clinical staff was present for exam  External genitalia:  normal appearance of the external genitalia including Bartholin's and Sutherland's glands.  :  urethral meatus normal; urethra hypermobile;  Vaginal:  atrophic mucosal changes are present; discharge present -  watery and white; vag panel obtained  Cervix:  absent.  Cystocele GRADE 4  Rectocele GRADE 2     Lab Review   No data reviewed    Imaging   No data reviewed         Diagnoses and all orders for this visit:    POP-Q stage 2 rectocele    POP-Q stage 4 cystocele    Pessary maintenance        No orders of the defined types were placed in this encounter.      This document was electronically signed.    Gladys Soni, APRN  December 17, 2018    "

## 2018-12-18 LAB
CANDIDA ALBICANS: POSITIVE
GARDNERELLA VAGINALIS: NEGATIVE
TRICHOMONAS VAGINALIS PCR: NEGATIVE

## 2018-12-20 ENCOUNTER — OFFICE VISIT (OUTPATIENT)
Dept: FAMILY MEDICINE CLINIC | Facility: CLINIC | Age: 83
End: 2018-12-20

## 2018-12-20 ENCOUNTER — APPOINTMENT (OUTPATIENT)
Dept: LAB | Facility: HOSPITAL | Age: 83
End: 2018-12-20

## 2018-12-20 VITALS
WEIGHT: 141 LBS | BODY MASS INDEX: 27.68 KG/M2 | RESPIRATION RATE: 20 BRPM | TEMPERATURE: 98.2 F | HEART RATE: 87 BPM | OXYGEN SATURATION: 98 % | HEIGHT: 60 IN | SYSTOLIC BLOOD PRESSURE: 150 MMHG | DIASTOLIC BLOOD PRESSURE: 64 MMHG

## 2018-12-20 DIAGNOSIS — Z79.890 ON HORMONE REPLACEMENT THERAPY: ICD-10-CM

## 2018-12-20 DIAGNOSIS — E11.9 TYPE 2 DIABETES MELLITUS WITHOUT COMPLICATION, WITHOUT LONG-TERM CURRENT USE OF INSULIN (HCC): Primary | ICD-10-CM

## 2018-12-20 DIAGNOSIS — H65.90 SEROUS OTITIS MEDIA, UNSPECIFIED CHRONICITY, UNSPECIFIED LATERALITY: ICD-10-CM

## 2018-12-20 DIAGNOSIS — K21.9 GASTROESOPHAGEAL REFLUX DISEASE, ESOPHAGITIS PRESENCE NOT SPECIFIED: ICD-10-CM

## 2018-12-20 DIAGNOSIS — M10.9 GOUT, UNSPECIFIED CAUSE, UNSPECIFIED CHRONICITY, UNSPECIFIED SITE: ICD-10-CM

## 2018-12-20 DIAGNOSIS — I10 ESSENTIAL HYPERTENSION: ICD-10-CM

## 2018-12-20 PROCEDURE — 83540 ASSAY OF IRON: CPT | Performed by: NURSE PRACTITIONER

## 2018-12-20 PROCEDURE — 99214 OFFICE O/P EST MOD 30 MIN: CPT | Performed by: NURSE PRACTITIONER

## 2018-12-20 PROCEDURE — 80053 COMPREHEN METABOLIC PANEL: CPT | Performed by: NURSE PRACTITIONER

## 2018-12-20 PROCEDURE — 83550 IRON BINDING TEST: CPT | Performed by: NURSE PRACTITIONER

## 2018-12-20 PROCEDURE — 82043 UR ALBUMIN QUANTITATIVE: CPT | Performed by: NURSE PRACTITIONER

## 2018-12-20 PROCEDURE — 83036 HEMOGLOBIN GLYCOSYLATED A1C: CPT | Performed by: NURSE PRACTITIONER

## 2018-12-20 RX ORDER — AMLODIPINE BESYLATE 5 MG/1
5 TABLET ORAL DAILY
Qty: 90 TABLET | Refills: 1 | Status: SHIPPED | OUTPATIENT
Start: 2018-12-20 | End: 2019-03-21 | Stop reason: SDUPTHER

## 2018-12-20 RX ORDER — ESTRADIOL 1 MG/1
1 TABLET ORAL DAILY
Qty: 90 TABLET | Refills: 1 | Status: SHIPPED | OUTPATIENT
Start: 2018-12-20 | End: 2019-03-21 | Stop reason: SDUPTHER

## 2018-12-20 RX ORDER — FEXOFENADINE HCL 180 MG/1
180 TABLET ORAL DAILY
Qty: 30 TABLET | Refills: 1 | Status: SHIPPED | OUTPATIENT
Start: 2018-12-20 | End: 2019-03-21 | Stop reason: SDUPTHER

## 2018-12-20 RX ORDER — OMEPRAZOLE 20 MG/1
20 CAPSULE, DELAYED RELEASE ORAL 2 TIMES DAILY
Qty: 60 CAPSULE | Refills: 3 | Status: SHIPPED | OUTPATIENT
Start: 2018-12-20 | End: 2019-03-21 | Stop reason: SDUPTHER

## 2018-12-20 RX ORDER — ALLOPURINOL 100 MG/1
100 TABLET ORAL DAILY
Qty: 90 TABLET | Refills: 1 | Status: SHIPPED | OUTPATIENT
Start: 2018-12-20 | End: 2019-03-21 | Stop reason: SDUPTHER

## 2018-12-20 RX ORDER — METFORMIN HYDROCHLORIDE 500 MG/1
500 TABLET, EXTENDED RELEASE ORAL
Qty: 90 TABLET | Refills: 3 | Status: SHIPPED | OUTPATIENT
Start: 2018-12-20 | End: 2019-03-21 | Stop reason: SDUPTHER

## 2018-12-21 LAB
ALBUMIN SERPL-MCNC: 4.1 G/DL (ref 3.4–4.8)
ALBUMIN UR-MCNC: <0.6 MG/L
ALBUMIN/GLOB SERPL: 1.4 G/DL (ref 1.1–1.8)
ALP SERPL-CCNC: 92 U/L (ref 38–126)
ALT SERPL W P-5'-P-CCNC: 13 U/L (ref 9–52)
ANION GAP SERPL CALCULATED.3IONS-SCNC: 10 MMOL/L (ref 5–15)
AST SERPL-CCNC: 16 U/L (ref 14–36)
BILIRUB SERPL-MCNC: 0.2 MG/DL (ref 0.2–1.3)
BUN BLD-MCNC: 17 MG/DL (ref 7–21)
BUN/CREAT SERPL: 13.4 (ref 7–25)
CALCIUM SPEC-SCNC: 10 MG/DL (ref 8.4–10.2)
CHLORIDE SERPL-SCNC: 98 MMOL/L (ref 95–110)
CO2 SERPL-SCNC: 27 MMOL/L (ref 22–31)
CREAT BLD-MCNC: 1.27 MG/DL (ref 0.5–1)
GFR SERPL CREATININE-BSD FRML MDRD: 49 ML/MIN/1.73 (ref 39–90)
GLOBULIN UR ELPH-MCNC: 3 GM/DL (ref 2.3–3.5)
GLUCOSE BLD-MCNC: 97 MG/DL (ref 60–100)
HBA1C MFR BLD: 6 % (ref 4–5.6)
IRON 24H UR-MRATE: 62 MCG/DL (ref 37–170)
IRON SATN MFR SERPL: 24 % (ref 15–50)
POTASSIUM BLD-SCNC: 3.8 MMOL/L (ref 3.5–5.1)
PROT SERPL-MCNC: 7.1 G/DL (ref 6.3–8.6)
SODIUM BLD-SCNC: 135 MMOL/L (ref 137–145)
TIBC SERPL-MCNC: 261 MCG/DL (ref 265–497)

## 2018-12-21 NOTE — PROGRESS NOTES
Subjective   Kristy Ramirez is a 84 y.o. female.     Here today for wili and management of her dm and her htn.  She reports b/s of110 to 120.  She feels well.  Is seeing pain management about her chronic neck pain.      Diabetes   She presents for her follow-up diabetic visit. She has type 2 diabetes mellitus. Her disease course has been stable. There are no hypoglycemic associated symptoms. Pertinent negatives for hypoglycemia include no headaches or sweats. Associated symptoms include foot paresthesias. Pertinent negatives for diabetes include no blurred vision, no chest pain, no fatigue, no foot ulcerations, no polydipsia, no polyphagia, no polyuria, no visual change, no weakness and no weight loss. There are no hypoglycemic complications. Symptoms are stable. Diabetic complications include a CVA, heart disease and peripheral neuropathy. Pertinent negatives for diabetic complications include no PVD or retinopathy. Risk factors for coronary artery disease include diabetes mellitus, dyslipidemia, hypertension, post-menopausal and sedentary lifestyle. Current diabetic treatment includes oral agent (monotherapy). She is compliant with treatment all of the time. Her weight is stable. She is following a diabetic diet. Meal planning includes avoidance of concentrated sweets. She has had a previous visit with a dietitian. She rarely participates in exercise. She monitors blood glucose at home 1-2 x per day. Her breakfast blood glucose is taken between 7-8 am. Her breakfast blood glucose range is generally 110-130 mg/dl. Her overall blood glucose range is 110-130 mg/dl. An ACE inhibitor/angiotensin II receptor blocker is being taken. She does not see a podiatrist.Eye exam is current.   Hypertension   This is a chronic problem. The current episode started more than 1 year ago. The problem is controlled. Pertinent negatives include no anxiety, blurred vision, chest pain, headaches, malaise/fatigue, neck pain, orthopnea,  palpitations, peripheral edema, PND, shortness of breath or sweats. Agents associated with hypertension include estrogens. Risk factors for coronary artery disease include diabetes mellitus, dyslipidemia, post-menopausal state and sedentary lifestyle. Past treatments include beta blockers, angiotensin blockers, diuretics and calcium channel blockers. Current antihypertension treatment includes beta blockers, angiotensin blockers, diuretics and calcium channel blockers. The current treatment provides significant improvement. There are no compliance problems.  Hypertensive end-organ damage includes CAD/MI and CVA. There is no history of angina, kidney disease, heart failure, left ventricular hypertrophy, PVD or retinopathy.        The following portions of the patient's history were reviewed and updated as appropriate: allergies, current medications, past family history, past medical history, past social history, past surgical history and problem list.    Review of Systems   Constitutional: Negative.  Negative for fatigue, malaise/fatigue and unexpected weight loss.   HENT: Negative.    Eyes: Negative for blurred vision.   Respiratory: Negative.  Negative for shortness of breath.    Cardiovascular: Negative.  Negative for chest pain, palpitations, orthopnea and PND.   Endocrine: Negative for polydipsia, polyphagia and polyuria.   Musculoskeletal: Negative.  Negative for neck pain.   Skin: Negative.    Neurological: Negative.  Negative for weakness.   Psychiatric/Behavioral: Negative.        Objective   Physical Exam   Constitutional: She is oriented to person, place, and time. She appears well-developed and well-nourished. No distress.   HENT:   Head: Normocephalic and atraumatic.   Right Ear: External ear normal.   Left Ear: External ear normal.   Mouth/Throat: Oropharynx is clear and moist. No oropharyngeal exudate.   Eyes: Pupils are equal, round, and reactive to light.   Neck: Normal range of motion. Neck supple.  No thyromegaly present.   Cardiovascular: Normal rate, regular rhythm and normal heart sounds. Exam reveals no friction rub.   No murmur heard.  Pulmonary/Chest: Effort normal and breath sounds normal. No stridor. No respiratory distress. She has no wheezes. She has no rales.   Abdominal: Soft.   Musculoskeletal: Normal range of motion.    Kristy had a diabetic foot exam performed today.    Neurological Sensory Findings -  Altered sharp/dull right ankle/foot discrimination and altered sharp/dull left ankle/foot discrimination.  Vascular Status -  Her right foot exhibits normal foot vasculature  and no edema. Her left foot exhibits normal foot vasculature  and no edema.  Skin Integrity  -  Her right foot skin is intact..  Neurological: She is alert and oriented to person, place, and time.   Skin: Skin is warm and dry.   Psychiatric: She has a normal mood and affect. Thought content normal.   Vitals reviewed.        Assessment/Plan   Kristy was seen today for hypertension, diabetes and peripheral neuropathy.    Diagnoses and all orders for this visit:    Type 2 diabetes mellitus without complication, without long-term current use of insulin (CMS/[facility])  -     metFORMIN ER (GLUCOPHAGE-XR) 500 MG 24 hr tablet; Take 1 tablet by mouth Daily With Breakfast.  -     MicroAlbumin, Urine, Random - Urine, Clean Catch  -     Hemoglobin A1c    Essential hypertension  -     amLODIPine (NORVASC) 5 MG tablet; Take 1 tablet by mouth Daily.  -     Comprehensive metabolic panel    On hormone replacement therapy  -     estradiol (ESTRACE) 1 MG tablet; Take 1 tablet by mouth Daily.    Serous otitis media, unspecified chronicity, unspecified laterality  -     fexofenadine (ALLEGRA ALLERGY) 180 MG tablet; Take 1 tablet by mouth Daily.    Gout, unspecified cause, unspecified chronicity, unspecified site  -     allopurinol (ZYLOPRIM) 100 MG tablet; Take 1 tablet by mouth Daily.    Gastroesophageal reflux disease, esophagitis presence not  specified  -     omeprazole (PRILOSEC) 20 MG capsule; Take 1 capsule by mouth 2 (Two) Times a Day.

## 2019-03-21 ENCOUNTER — OFFICE VISIT (OUTPATIENT)
Dept: FAMILY MEDICINE CLINIC | Facility: CLINIC | Age: 84
End: 2019-03-21

## 2019-03-21 VITALS
SYSTOLIC BLOOD PRESSURE: 125 MMHG | WEIGHT: 133 LBS | DIASTOLIC BLOOD PRESSURE: 58 MMHG | HEIGHT: 60 IN | TEMPERATURE: 97.8 F | RESPIRATION RATE: 20 BRPM | BODY MASS INDEX: 26.11 KG/M2 | OXYGEN SATURATION: 98 %

## 2019-03-21 DIAGNOSIS — E11.9 TYPE 2 DIABETES MELLITUS WITHOUT COMPLICATION, WITHOUT LONG-TERM CURRENT USE OF INSULIN (HCC): Primary | ICD-10-CM

## 2019-03-21 DIAGNOSIS — I10 ESSENTIAL HYPERTENSION: ICD-10-CM

## 2019-03-21 DIAGNOSIS — Z79.890 ON HORMONE REPLACEMENT THERAPY: ICD-10-CM

## 2019-03-21 DIAGNOSIS — M10.9 GOUT, UNSPECIFIED CAUSE, UNSPECIFIED CHRONICITY, UNSPECIFIED SITE: ICD-10-CM

## 2019-03-21 DIAGNOSIS — K21.9 GASTROESOPHAGEAL REFLUX DISEASE, ESOPHAGITIS PRESENCE NOT SPECIFIED: ICD-10-CM

## 2019-03-21 DIAGNOSIS — N39.3 STRESS INCONTINENCE: ICD-10-CM

## 2019-03-21 DIAGNOSIS — H65.90 SEROUS OTITIS MEDIA, UNSPECIFIED CHRONICITY, UNSPECIFIED LATERALITY: ICD-10-CM

## 2019-03-21 DIAGNOSIS — F41.9 ANXIETY: ICD-10-CM

## 2019-03-21 PROCEDURE — 99214 OFFICE O/P EST MOD 30 MIN: CPT | Performed by: NURSE PRACTITIONER

## 2019-03-21 RX ORDER — OMEPRAZOLE 20 MG/1
20 CAPSULE, DELAYED RELEASE ORAL 2 TIMES DAILY
Qty: 60 CAPSULE | Refills: 3 | Status: SHIPPED | OUTPATIENT
Start: 2019-03-21 | End: 2020-03-11

## 2019-03-21 RX ORDER — BUSPIRONE HYDROCHLORIDE 10 MG/1
10 TABLET ORAL 3 TIMES DAILY
Qty: 180 TABLET | Refills: 1 | Status: ON HOLD | OUTPATIENT
Start: 2019-03-21 | End: 2020-11-08

## 2019-03-21 RX ORDER — FEXOFENADINE HCL 180 MG/1
180 TABLET ORAL DAILY
Qty: 30 TABLET | Refills: 1 | Status: SHIPPED | OUTPATIENT
Start: 2019-03-21 | End: 2019-04-29

## 2019-03-21 RX ORDER — AMLODIPINE BESYLATE 5 MG/1
5 TABLET ORAL DAILY
Qty: 90 TABLET | Refills: 1 | Status: SHIPPED | OUTPATIENT
Start: 2019-03-21 | End: 2020-03-04

## 2019-03-21 RX ORDER — FUROSEMIDE 20 MG/1
20 TABLET ORAL DAILY
Qty: 90 TABLET | Refills: 1 | Status: SHIPPED | OUTPATIENT
Start: 2019-03-21 | End: 2020-06-04

## 2019-03-21 RX ORDER — ESTRADIOL 1 MG/1
1 TABLET ORAL DAILY
Qty: 90 TABLET | Refills: 1 | Status: SHIPPED | OUTPATIENT
Start: 2019-03-21 | End: 2020-12-30

## 2019-03-21 RX ORDER — CHLORTHALIDONE 25 MG/1
25 TABLET ORAL DAILY
Qty: 90 TABLET | Refills: 1 | Status: SHIPPED | OUTPATIENT
Start: 2019-03-21 | End: 2019-12-03 | Stop reason: SDUPTHER

## 2019-03-21 RX ORDER — ALLOPURINOL 100 MG/1
100 TABLET ORAL DAILY
Qty: 90 TABLET | Refills: 1 | Status: SHIPPED | OUTPATIENT
Start: 2019-03-21 | End: 2020-03-04

## 2019-03-21 RX ORDER — METOPROLOL SUCCINATE 25 MG/1
25 TABLET, EXTENDED RELEASE ORAL DAILY
Qty: 90 TABLET | Refills: 1 | Status: SHIPPED | OUTPATIENT
Start: 2019-03-21 | End: 2019-11-04 | Stop reason: SDUPTHER

## 2019-03-21 RX ORDER — TOLTERODINE 4 MG/1
4 CAPSULE, EXTENDED RELEASE ORAL DAILY
Qty: 90 CAPSULE | Refills: 1 | Status: SHIPPED | OUTPATIENT
Start: 2019-03-21 | End: 2020-08-03 | Stop reason: SDUPTHER

## 2019-03-21 RX ORDER — METFORMIN HYDROCHLORIDE 500 MG/1
500 TABLET, EXTENDED RELEASE ORAL
Qty: 90 TABLET | Refills: 3 | Status: SHIPPED | OUTPATIENT
Start: 2019-03-21 | End: 2020-09-21

## 2019-03-21 RX ORDER — LOSARTAN POTASSIUM AND HYDROCHLOROTHIAZIDE 12.5; 5 MG/1; MG/1
1 TABLET ORAL DAILY
Qty: 90 TABLET | Refills: 3 | Status: SHIPPED | OUTPATIENT
Start: 2019-03-21 | End: 2019-12-09 | Stop reason: RX

## 2019-03-22 NOTE — PROGRESS NOTES
Subjective   Kristy Ramirez is a 85 y.o. female.     Here today for wili on her dm and her htn.  She says she is doing well right now.  Her b/s is running between 110 and 120.  It was 110 this am.        Diabetes   She presents for her follow-up diabetic visit. She has type 2 diabetes mellitus. Her disease course has been stable. There are no hypoglycemic associated symptoms. Pertinent negatives for hypoglycemia include no headaches or sweats. Associated symptoms include foot paresthesias. Pertinent negatives for diabetes include no blurred vision and no chest pain. There are no hypoglycemic complications. Symptoms are stable. Diabetic complications include heart disease and peripheral neuropathy. Pertinent negatives for diabetic complications include no CVA, PVD or retinopathy. Risk factors for coronary artery disease include diabetes mellitus, hypertension, sedentary lifestyle and post-menopausal. Current diabetic treatment includes oral agent (monotherapy). She is compliant with treatment all of the time. Her weight is stable. She is following a diabetic diet. Meal planning includes avoidance of concentrated sweets. She has not had a previous visit with a dietitian. She rarely participates in exercise. She monitors blood glucose at home 1-2 x per day. Her breakfast blood glucose is taken between 7-8 am. Her breakfast blood glucose range is generally 110-130 mg/dl. Her overall blood glucose range is 110-130 mg/dl. An ACE inhibitor/angiotensin II receptor blocker is being taken. She does not see a podiatrist.Eye exam is current.   Hypertension   This is a chronic problem. The current episode started more than 1 year ago. The problem is controlled. Pertinent negatives include no anxiety, blurred vision, chest pain, headaches, malaise/fatigue, neck pain, orthopnea, palpitations, peripheral edema, PND, shortness of breath or sweats. There are no associated agents to hypertension. Risk factors for coronary artery  disease include diabetes mellitus, post-menopausal state and sedentary lifestyle. Past treatments include angiotensin blockers, diuretics, calcium channel blockers and beta blockers. Current antihypertension treatment includes angiotensin blockers, beta blockers, calcium channel blockers and diuretics. The current treatment provides significant improvement. There are no compliance problems.  There is no history of angina, kidney disease, CAD/MI, CVA, heart failure, left ventricular hypertrophy, PVD or retinopathy.        The following portions of the patient's history were reviewed and updated as appropriate: allergies, current medications, past family history, past medical history, past social history, past surgical history and problem list.    Review of Systems   Constitutional: Negative.  Negative for malaise/fatigue.   HENT: Negative.    Eyes: Negative for blurred vision.   Respiratory: Negative.  Negative for shortness of breath.    Cardiovascular: Negative.  Negative for chest pain, palpitations, orthopnea and PND.   Musculoskeletal: Negative.  Negative for neck pain.   Skin: Negative.    Neurological: Negative.    Psychiatric/Behavioral: Negative.        Objective   Physical Exam   Constitutional: She is oriented to person, place, and time. She appears well-developed and well-nourished. No distress.   HENT:   Head: Normocephalic.   Eyes: Pupils are equal, round, and reactive to light.   Neck: Normal range of motion. Neck supple. No thyromegaly present.   Cardiovascular: Normal rate, regular rhythm and normal heart sounds. Exam reveals no friction rub.   No murmur heard.  Pulmonary/Chest: Effort normal and breath sounds normal. No stridor. No respiratory distress. She has no wheezes. She has no rales.   Abdominal: Soft.   Musculoskeletal: Normal range of motion.       Neurological Sensory Findings -  Altered sharp/dull right ankle/foot discrimination and altered sharp/dull left ankle/foot  discrimination.  Vascular Status -  Her right foot exhibits normal foot vasculature  and no edema. Her left foot exhibits normal foot vasculature  and no edema.  Skin Integrity  -  Her right foot skin is intact.Her left foot skin is intact..  Neurological: She is alert and oriented to person, place, and time.   Skin: Skin is warm and dry.   Psychiatric: She has a normal mood and affect. Thought content normal.   Nursing note and vitals reviewed.        Assessment/Plan   Kristy was seen today for diabetes, hypertension and heartburn.    Diagnoses and all orders for this visit:    Type 2 diabetes mellitus without complication, without long-term current use of insulin (CMS/[facility])  -     metFORMIN ER (GLUCOPHAGE-XR) 500 MG 24 hr tablet; Take 1 tablet by mouth Daily With Breakfast.    Essential hypertension  -     metoprolol succinate XL (TOPROL-XL) 25 MG 24 hr tablet; Take 1 tablet by mouth Daily.  -     furosemide (LASIX) 20 MG tablet; Take 1 tablet by mouth Daily.  -     chlorthalidone (HYGROTON) 25 MG tablet; Take 1 tablet by mouth Daily.  -     amLODIPine (NORVASC) 5 MG tablet; Take 1 tablet by mouth Daily.  -     losartan-hydrochlorothiazide (HYZAAR) 50-12.5 MG per tablet; Take 1 tablet by mouth Daily.    Gastroesophageal reflux disease, esophagitis presence not specified  -     omeprazole (PRILOSEC) 20 MG capsule; Take 1 capsule by mouth 2 (Two) Times a Day.    Stress incontinence  -     tolterodine LA (DETROL LA) 4 MG 24 hr capsule; Take 1 capsule by mouth Daily.    Gout, unspecified cause, unspecified chronicity, unspecified site  -     allopurinol (ZYLOPRIM) 100 MG tablet; Take 1 tablet by mouth Daily.    Serous otitis media, unspecified chronicity, unspecified laterality  -     fexofenadine (ALLEGRA ALLERGY) 180 MG tablet; Take 1 tablet by mouth Daily.    On hormone replacement therapy  -     estradiol (ESTRACE) 1 MG tablet; Take 1 tablet by mouth Daily.    Anxiety  -     busPIRone (BUSPAR) 10 MG tablet; Take 1  tablet by mouth 3 (Three) Times a Day.      Lab Results   Component Value Date    HGBA1C 6.0 (H) 12/20/2018

## 2019-03-25 ENCOUNTER — OFFICE VISIT (OUTPATIENT)
Dept: OBSTETRICS AND GYNECOLOGY | Facility: CLINIC | Age: 84
End: 2019-03-25

## 2019-03-25 VITALS
DIASTOLIC BLOOD PRESSURE: 56 MMHG | HEART RATE: 59 BPM | BODY MASS INDEX: 26.11 KG/M2 | SYSTOLIC BLOOD PRESSURE: 126 MMHG | WEIGHT: 133 LBS | HEIGHT: 60 IN

## 2019-03-25 DIAGNOSIS — N81.10 POP-Q STAGE 4 CYSTOCELE: Chronic | ICD-10-CM

## 2019-03-25 DIAGNOSIS — Z46.89 PESSARY MAINTENANCE: Primary | ICD-10-CM

## 2019-03-25 DIAGNOSIS — N81.6 POP-Q STAGE 2 RECTOCELE: Chronic | ICD-10-CM

## 2019-03-25 PROCEDURE — 99213 OFFICE O/P EST LOW 20 MIN: CPT | Performed by: NURSE PRACTITIONER

## 2019-03-25 NOTE — PROGRESS NOTES
"Subjective   Chief Complaint   Patient presents with   • Pessary Check     Kristy Ramirez is a 85 y.o. year old who presents to be seen for follow-up of her pessary.  Currently she is using Foldable ring w/ support - #7 w/o urethral bar.  She reports that it feels like it is slipping down some but otherwise not having any problems.    No Additional Complaints Reported    The following portions of the patient's history were reviewed and updated as appropriate:problem list, current medications and allergies    Review of Systems   Constitutional: Negative for chills, fever, unexpected weight gain and unexpected weight loss.   Gastrointestinal: Positive for constipation and diarrhea. Negative for abdominal distention and abdominal pain.        Alternates btw constipation and diarrhea. Has had recent issues with constipation.   Genitourinary: Negative for urinary incontinence, decreased urine volume, difficulty urinating, dysuria, frequency, pelvic pain, urgency, vaginal bleeding, vaginal discharge and vaginal pain.        Objective   /56   Pulse 59   Ht 152.4 cm (60\")   Wt 60.3 kg (133 lb)   BMI 25.97 kg/m²     General:  well developed; well nourished  no acute distress  appears stated age   Pelvis: Clinical staff was present for exam  External genitalia:  normal appearance of the external genitalia including Bartholin's and Pinedale's glands.  :  urethral meatus normal; urethra hypermobile;  Vaginal:  atrophic mucosal changes are present;  Cervix:  absent.  Uterus:  absent.  Adnexa:  absent, bilateral.  Cystocele GRADE 4  Rectocele GRADE 2   Pessary was removed and cleaned with betadine, rinsed well and placed back into the vagina without difficulty.     Lab Review   No data reviewed    Imaging   No data reviewed         Diagnoses and all orders for this visit:    Pessary maintenance    POP-Q stage 2 rectocele    POP-Q stage 4 cystocele    F/U in 8 weeks for routine pessary maintenance.  No orders of the " defined types were placed in this encounter.      This document was electronically signed.    Gladys Soni, ANDYA  March 25, 2019

## 2019-04-05 DIAGNOSIS — K21.9 GASTROESOPHAGEAL REFLUX DISEASE, ESOPHAGITIS PRESENCE NOT SPECIFIED: Primary | ICD-10-CM

## 2019-04-29 ENCOUNTER — OFFICE VISIT (OUTPATIENT)
Dept: FAMILY MEDICINE CLINIC | Facility: CLINIC | Age: 84
End: 2019-04-29

## 2019-04-29 VITALS
OXYGEN SATURATION: 98 % | TEMPERATURE: 96.9 F | SYSTOLIC BLOOD PRESSURE: 128 MMHG | HEART RATE: 68 BPM | WEIGHT: 131 LBS | DIASTOLIC BLOOD PRESSURE: 58 MMHG | HEIGHT: 60 IN | BODY MASS INDEX: 25.72 KG/M2

## 2019-04-29 DIAGNOSIS — M62.838 MUSCLE SPASM: ICD-10-CM

## 2019-04-29 DIAGNOSIS — G62.9 NEUROPATHY: Primary | ICD-10-CM

## 2019-04-29 DIAGNOSIS — M54.5 CHRONIC LOW BACK PAIN, UNSPECIFIED BACK PAIN LATERALITY, WITH SCIATICA PRESENCE UNSPECIFIED: ICD-10-CM

## 2019-04-29 DIAGNOSIS — G89.29 CHRONIC LOW BACK PAIN, UNSPECIFIED BACK PAIN LATERALITY, WITH SCIATICA PRESENCE UNSPECIFIED: ICD-10-CM

## 2019-04-29 DIAGNOSIS — K13.0 CHEILITIS: ICD-10-CM

## 2019-04-29 DIAGNOSIS — K59.00 CONSTIPATION, UNSPECIFIED CONSTIPATION TYPE: ICD-10-CM

## 2019-04-29 DIAGNOSIS — Z87.39 HISTORY OF TMJ DISORDER: ICD-10-CM

## 2019-04-29 PROCEDURE — 99214 OFFICE O/P EST MOD 30 MIN: CPT | Performed by: NURSE PRACTITIONER

## 2019-04-29 RX ORDER — GABAPENTIN 600 MG/1
600 TABLET ORAL NIGHTLY
COMMUNITY
End: 2019-05-29 | Stop reason: SDUPTHER

## 2019-04-29 RX ORDER — DOCUSATE SODIUM 100 MG/1
100 CAPSULE, LIQUID FILLED ORAL 2 TIMES DAILY
Qty: 60 CAPSULE | Refills: 1 | Status: SHIPPED | OUTPATIENT
Start: 2019-04-29 | End: 2021-07-19

## 2019-04-29 RX ORDER — GABAPENTIN 600 MG/1
600 TABLET ORAL 3 TIMES DAILY
COMMUNITY
End: 2019-04-29 | Stop reason: ALTCHOICE

## 2019-04-29 RX ORDER — GABAPENTIN 300 MG/1
300 CAPSULE ORAL EVERY MORNING
Qty: 30 CAPSULE | Refills: 0 | Status: SHIPPED | OUTPATIENT
Start: 2019-04-29 | End: 2019-05-29 | Stop reason: SDUPTHER

## 2019-04-29 RX ORDER — METRONIDAZOLE 500 MG/1
500 TABLET ORAL 2 TIMES DAILY
Qty: 14 TABLET | Refills: 0 | Status: SHIPPED | OUTPATIENT
Start: 2019-04-29 | End: 2019-05-06

## 2019-04-29 NOTE — PROGRESS NOTES
"Subjective   Kristy Ramirez is a 85 y.o. female.     [facility] Visit    PCP: NADYA Rodriguez    CC: \"burning sensation in legs and feet; sore back; ear throbbing; corner of mouth sore\"    Has multiple complaints today:    Neuropathy: history of neuropathy and followed by neurology, Dr. Pringle.  Currently taking 600 mg at night time only.  Verified with the pharmacy.  Having some increased symptoms and doesn't feel like the nightly dose is controlling symptoms.  Does make her drowsy, but would like to try lower dose in the AM.  Reports she has tried Lyrica, but it caused swelling.     Chronic low back pain:  Has had surgery and epidural injections with no improvement in symptoms.  Reports she was shopping about 3 weeks ago and was putting a cart away and holding the door open with her back side and the handle caught her back and she has had a tender spot since that time.  Uses ice occasionally which is somewhat beneficial.    Chronic right ear pain:  Has had for several months and seen by ENT.  Treated with ear drops for externa, but reports pain is still present.  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  Unsure if she clenches.  Does have full set of dentures and reports that they fit well.  Does chew gum from time to time.      Sores on mouth: C/O cracking in corners of mouth x 1 week that get red and scaly at times.  Has not used anything to assist at this point.       History of diabetes.  Reports blood sugars running 100-115.  Last A1C was 6.0 in 12/2018.      Earache    There is pain in the right ear. This is a chronic problem. The current episode started more than 1 year ago. The problem has been waxing and waning. There has been no fever. Associated symptoms include a rash ( corners of mouth). Pertinent negatives include no coughing, diarrhea, ear discharge, rhinorrhea or vomiting.   Back Pain   This is a chronic problem. " "The current episode started more than 1 year ago. The problem occurs daily. The problem has been gradually worsening since onset. The pain is present in the lumbar spine. The quality of the pain is described as aching and burning. The pain is at a severity of 8/10. Pertinent negatives include no chest pain, fever or numbness.        The following portions of the patient's history were reviewed and updated as appropriate: allergies, current medications, past medical history, past social history, past surgical history and problem list.    Review of Systems   Constitutional: Negative for appetite change, diaphoresis, fatigue and fever.   HENT: Positive for ear pain. Negative for congestion, ear discharge, postnasal drip, rhinorrhea, sinus pressure, sneezing and trouble swallowing.    Eyes: Negative for discharge and itching.   Respiratory: Negative for cough, chest tightness, shortness of breath and wheezing.    Cardiovascular: Negative for chest pain and leg swelling.   Gastrointestinal: Positive for constipation ( needs refill of Colace). Negative for diarrhea, nausea and vomiting.   Genitourinary: Negative for difficulty urinating.   Musculoskeletal: Positive for back pain ( chronic).   Skin: Positive for rash ( corners of mouth).   Neurological: Negative for dizziness, numbness and headache.     /58 (BP Location: Left arm, Patient Position: Sitting, Cuff Size: Adult)   Pulse 68   Temp 96.9 °F (36.1 °C) (Tympanic)   Ht 151.1 cm (59.5\")   Wt 59.4 kg (131 lb)   SpO2 98%   BMI 26.02 kg/m²     Objective   Physical Exam   Constitutional: She is oriented to person, place, and time. She appears well-developed and well-nourished. No distress.   HENT:   Head: Normocephalic and atraumatic.   Right Ear: Tympanic membrane and ear canal normal.   Left Ear: Tympanic membrane and ear canal normal.   Ears:    Nose: Nose normal. Right sinus exhibits no maxillary sinus tenderness and no frontal sinus tenderness. Left " sinus exhibits no maxillary sinus tenderness and no frontal sinus tenderness.   Mouth/Throat: Uvula is midline, oropharynx is clear and moist and mucous membranes are normal.   Eyes: Conjunctivae are normal. Right eye exhibits no discharge. Left eye exhibits no discharge.   Neck: Neck supple.   Cardiovascular: Normal rate and regular rhythm.   Pulmonary/Chest: Effort normal and breath sounds normal. She has no wheezes. She has no rales.   Musculoskeletal:        Lumbar back: She exhibits tenderness and spasm.        Back:    Lymphadenopathy:     She has no cervical adenopathy.   Neurological: She is alert and oriented to person, place, and time.   Skin: Rash noted.        Nursing note and vitals reviewed.    No results found for this or any previous visit (from the past 24 hour(s)).  No Images in the past 120 days found..      Assessment/Plan   Krisyt was seen today for back pain, leg problem, foot problem, earache and mouth lesions.    Diagnoses and all orders for this visit:    Neuropathy  -     gabapentin (NEURONTIN) 300 MG capsule; Take 1 capsule by mouth Every Morning. May take in addition to 600 mg nightly as previously prescribed    Muscle spasm  -     Menthol, Topical Analgesic, (BIOFREEZE) 4 % gel; Apply to affected area on left back 2-3 times per day x 1week    Chronic low back pain, unspecified back pain laterality, with sciatica presence unspecified  -     gabapentin (NEURONTIN) 300 MG capsule; Take 1 capsule by mouth Every Morning. May take in addition to 600 mg nightly as previously prescribed    History of TMJ disorder  Comments:  arthritis on right as reported by patient      Cheilitis  -     nystatin (MYCOSTATIN) 381993 UNIT/ML suspension; Apply to corners of mouth 3-4 times per day until rash is gone    Constipation, unspecified constipation type  -     docusate sodium (DOK) 100 MG capsule; Take 1 capsule by mouth 2 (Two) Times a Day.      Continue with Neurontin 600 mg at night, will add AM dose of  300 mg to see if she can tolerate without making her too drowsy--Shaan [phone] requested, but no available for review    Rx for Biofreeze to muscle spasm on back.  Continue with ice packs as needed.     For TMJ discomfort, recommend soft diet, avoiding gum chewing.  If symptoms persist, check with dentist regarding possible denture adjustment.     For chelitis, suspect fungal, Rx for Nystatin provided    Refill of Colace for constipation    Continue with all other regular medications as prescribed.     Recheck with PCP in 1 month for above problems, sooner if worsening symptoms    See PCP or RTC if symptoms persist/worsen  See PCP for routine f/u visit and management of chronic medical conditions

## 2019-05-06 ENCOUNTER — TELEPHONE (OUTPATIENT)
Dept: FAMILY MEDICINE CLINIC | Facility: CLINIC | Age: 84
End: 2019-05-06

## 2019-05-06 NOTE — TELEPHONE ENCOUNTER
----- Message from Hannah Horton MA sent at 5/6/2019 12:00 PM CDT -----  Pt left VM stating has an appointment with an ENT today, cannot recall who and did not have number, she had diarrhea and will not be able to make it. Asking for office to call and cancel todays appt for 3pm and reschedule for her please.

## 2019-05-29 ENCOUNTER — OFFICE VISIT (OUTPATIENT)
Dept: FAMILY MEDICINE CLINIC | Facility: CLINIC | Age: 84
End: 2019-05-29

## 2019-05-29 VITALS
OXYGEN SATURATION: 98 % | SYSTOLIC BLOOD PRESSURE: 122 MMHG | HEIGHT: 60 IN | TEMPERATURE: 98 F | WEIGHT: 130 LBS | DIASTOLIC BLOOD PRESSURE: 70 MMHG | BODY MASS INDEX: 25.52 KG/M2 | HEART RATE: 61 BPM

## 2019-05-29 DIAGNOSIS — M54.5 CHRONIC LOW BACK PAIN, UNSPECIFIED BACK PAIN LATERALITY, WITH SCIATICA PRESENCE UNSPECIFIED: ICD-10-CM

## 2019-05-29 DIAGNOSIS — G62.9 NEUROPATHY: ICD-10-CM

## 2019-05-29 DIAGNOSIS — G89.29 CHRONIC LOW BACK PAIN, UNSPECIFIED BACK PAIN LATERALITY, WITH SCIATICA PRESENCE UNSPECIFIED: ICD-10-CM

## 2019-05-29 PROCEDURE — 99213 OFFICE O/P EST LOW 20 MIN: CPT | Performed by: NURSE PRACTITIONER

## 2019-05-29 RX ORDER — FOLIC ACID 1 MG/1
1 TABLET ORAL DAILY
COMMUNITY
End: 2021-07-19

## 2019-05-29 RX ORDER — GABAPENTIN 300 MG/1
300 CAPSULE ORAL EVERY MORNING
Qty: 30 CAPSULE | Refills: 0 | Status: SHIPPED | OUTPATIENT
Start: 2019-05-29 | End: 2019-07-08 | Stop reason: SDUPTHER

## 2019-05-29 RX ORDER — GABAPENTIN 600 MG/1
600 TABLET ORAL NIGHTLY
Qty: 30 TABLET | Refills: 0 | Status: SHIPPED | OUTPATIENT
Start: 2019-05-29 | End: 2020-02-27 | Stop reason: SDUPTHER

## 2019-05-29 NOTE — PROGRESS NOTES
"Subjective   Kristy Areli Ramirez is a 85 y.o. female.     [facility] Visit    PCP: NADYA Rodriguez--has appt next month for /fu    CC: \"f/u on neuropathy\"    Seen by me on 4-29-19 for neuropathy and made some adjustments to her Neurontin dosing.  Was asked to f/u with PCP for recheck in 1 month, but almost out of meds and PCP is out of the office.  She is scheduled for f/u on 6-21-19.  Does report that adding the 300 mg dose in the AM has helped with the pain in her legs and denies any numbness/tingling.  Reports she is tolerating the dose without too much drowsiness.  Continues to take the 600 mg at bedtime.  Reports that the Neurontin hasn't made much of a difference in her chronic low back pain.  PMH of back surgery and doesn't wish to have surgery again.  Has tried other oral meds as well as topical meds with little relief.  Reports she has also been using Jergens lotion to bilateral lower legs and massaging them twice daily and this has been beneficial as well.     Also has chronic neck pain with occasional neuropathy in arms, mostly left.       Leg Pain    Incident onset: chronic low back pain with neuropathy. Pain location: bilateral legs, low back. The quality of the pain is described as aching and burning. The patient is experiencing no pain (denies pain in legs at this time). Pertinent negatives include no inability to bear weight, loss of motion, loss of sensation, muscle weakness, numbness or tingling. Treatments tried: neurontin as above. The treatment provided moderate relief.        The following portions of the patient's history were reviewed and updated as appropriate: allergies, current medications, past medical history, past social history, past surgical history and problem list.    Review of Systems   Constitutional: Negative.    Respiratory: Negative.    Cardiovascular: Negative.    Gastrointestinal: Negative.    Genitourinary: Negative for difficulty urinating.   Musculoskeletal: Positive " "for back pain ( chronic low back pain).   Skin: Negative for rash.   Neurological: Negative for tingling, numbness and headache.     /70 (BP Location: Right arm, Patient Position: Sitting, Cuff Size: Adult)   Pulse 61   Temp 98 °F (36.7 °C) (Tympanic)   Ht 151.1 cm (59.5\")   Wt 59 kg (130 lb)   SpO2 98%   BMI 25.82 kg/m²     Objective   Physical Exam   Constitutional: She is oriented to person, place, and time. She appears well-developed and well-nourished. No distress.   Cardiovascular: Normal rate and regular rhythm.   No peripheral edema     Pulmonary/Chest: Effort normal and breath sounds normal. She has no wheezes. She has no rales.   Musculoskeletal:        Lumbar back: She exhibits decreased range of motion and tenderness.        Back:    Uses cane to help with ambulation   Neurological: She is alert and oriented to person, place, and time.   Skin: No rash noted.   Nursing note and vitals reviewed.    No results found for this or any previous visit (from the past 24 hour(s)).  No Images in the past 120 days found..      Assessment/Plan   Kristy was seen today for peripheral neuropathy.    Diagnoses and all orders for this visit:    Neuropathy  -     gabapentin (NEURONTIN) 600 MG tablet; Take 1 tablet by mouth Every Night.  -     gabapentin (NEURONTIN) 300 MG capsule; Take 1 capsule by mouth Every Morning. May take in addition to 600 mg nightly as previously prescribed    Chronic low back pain, unspecified back pain laterality, with sciatica presence unspecified  -     gabapentin (NEURONTIN) 600 MG tablet; Take 1 tablet by mouth Every Night.  -     gabapentin (NEURONTIN) 300 MG capsule; Take 1 capsule by mouth Every Morning. May take in addition to 600 mg nightly as previously prescribed      Continue with Neurontin at current dosing--refills given--[facility]# [id number] reviewed  May continue with lotion and massage of legs as this seems to be beneficial  Declines any further treatment for low back pain " at this time  May use muscle rubs as needed to back  See PCP next month for recheck as scheduled

## 2019-06-05 ENCOUNTER — APPOINTMENT (OUTPATIENT)
Dept: CT IMAGING | Facility: HOSPITAL | Age: 84
End: 2019-06-05

## 2019-06-05 ENCOUNTER — HOSPITAL ENCOUNTER (EMERGENCY)
Facility: HOSPITAL | Age: 84
Discharge: HOME | End: 2019-06-06
Attending: EMERGENCY MEDICINE | Admitting: EMERGENCY MEDICINE

## 2019-06-05 ENCOUNTER — APPOINTMENT (OUTPATIENT)
Dept: GENERAL RADIOLOGY | Facility: HOSPITAL | Age: 84
End: 2019-06-05

## 2019-06-05 VITALS
WEIGHT: 131 LBS | TEMPERATURE: 98.1 F | RESPIRATION RATE: 18 BRPM | BODY MASS INDEX: 26.41 KG/M2 | HEIGHT: 59 IN | DIASTOLIC BLOOD PRESSURE: 77 MMHG | SYSTOLIC BLOOD PRESSURE: 178 MMHG | OXYGEN SATURATION: 98 % | HEART RATE: 62 BPM

## 2019-06-05 DIAGNOSIS — H60.61 CHRONIC OTITIS EXTERNA OF RIGHT EAR, UNSPECIFIED TYPE: Primary | ICD-10-CM

## 2019-06-05 DIAGNOSIS — J18.9 PNEUMONIA DUE TO INFECTIOUS ORGANISM, UNSPECIFIED LATERALITY, UNSPECIFIED PART OF LUNG: ICD-10-CM

## 2019-06-05 DIAGNOSIS — M54.2 NECK PAIN: ICD-10-CM

## 2019-06-05 DIAGNOSIS — M54.9 CHRONIC MIDLINE BACK PAIN, UNSPECIFIED BACK LOCATION: ICD-10-CM

## 2019-06-05 DIAGNOSIS — G89.29 CHRONIC MIDLINE BACK PAIN, UNSPECIFIED BACK LOCATION: ICD-10-CM

## 2019-06-05 LAB
ANION GAP SERPL CALCULATED.3IONS-SCNC: 15 MMOL/L
BASOPHILS # BLD AUTO: 0.03 10*3/MM3 (ref 0–0.2)
BASOPHILS NFR BLD AUTO: 0.3 % (ref 0–1.5)
BUN BLD-MCNC: 20 MG/DL (ref 8–23)
BUN/CREAT SERPL: 17.1 (ref 7–25)
CALCIUM SPEC-SCNC: 9.2 MG/DL (ref 8.6–10.5)
CHLORIDE SERPL-SCNC: 98 MMOL/L (ref 98–107)
CO2 SERPL-SCNC: 24 MMOL/L (ref 22–29)
CREAT BLD-MCNC: 1.17 MG/DL (ref 0.57–1)
DEPRECATED RDW RBC AUTO: 46 FL (ref 37–54)
EOSINOPHIL # BLD AUTO: 0.08 10*3/MM3 (ref 0–0.4)
EOSINOPHIL NFR BLD AUTO: 0.8 % (ref 0.3–6.2)
ERYTHROCYTE [DISTWIDTH] IN BLOOD BY AUTOMATED COUNT: 14.4 % (ref 12.3–15.4)
GFR SERPL CREATININE-BSD FRML MDRD: 53 ML/MIN/1.73
GLUCOSE BLD-MCNC: 97 MG/DL (ref 65–99)
HCT VFR BLD AUTO: 32.1 % (ref 34–46.6)
HGB BLD-MCNC: 10.3 G/DL (ref 12–15.9)
IMM GRANULOCYTES # BLD AUTO: 0.04 10*3/MM3 (ref 0–0.05)
IMM GRANULOCYTES NFR BLD AUTO: 0.4 % (ref 0–0.5)
LYMPHOCYTES # BLD AUTO: 1.63 10*3/MM3 (ref 0.7–3.1)
LYMPHOCYTES NFR BLD AUTO: 15.7 % (ref 19.6–45.3)
MCH RBC QN AUTO: 28.1 PG (ref 26.6–33)
MCHC RBC AUTO-ENTMCNC: 32.1 G/DL (ref 31.5–35.7)
MCV RBC AUTO: 87.5 FL (ref 79–97)
MONOCYTES # BLD AUTO: 0.69 10*3/MM3 (ref 0.1–0.9)
MONOCYTES NFR BLD AUTO: 6.7 % (ref 5–12)
NEUTROPHILS # BLD AUTO: 7.88 10*3/MM3 (ref 1.7–7)
NEUTROPHILS NFR BLD AUTO: 76.1 % (ref 42.7–76)
NRBC BLD AUTO-RTO: 0 /100 WBC (ref 0–0.2)
PLATELET # BLD AUTO: 280 10*3/MM3 (ref 140–450)
PMV BLD AUTO: 10.6 FL (ref 6–12)
POTASSIUM BLD-SCNC: 3.6 MMOL/L (ref 3.5–5.2)
RBC # BLD AUTO: 3.67 10*6/MM3 (ref 3.77–5.28)
SODIUM BLD-SCNC: 137 MMOL/L (ref 136–145)
TROPONIN T SERPL-MCNC: <0.01 NG/ML (ref 0–0.03)
WBC NRBC COR # BLD: 10.35 10*3/MM3 (ref 3.4–10.8)

## 2019-06-05 PROCEDURE — 85025 COMPLETE CBC W/AUTO DIFF WBC: CPT | Performed by: EMERGENCY MEDICINE

## 2019-06-05 PROCEDURE — 96374 THER/PROPH/DIAG INJ IV PUSH: CPT

## 2019-06-05 PROCEDURE — 25010000002 MORPHINE PER 10 MG: Performed by: EMERGENCY MEDICINE

## 2019-06-05 PROCEDURE — 93005 ELECTROCARDIOGRAM TRACING: CPT | Performed by: EMERGENCY MEDICINE

## 2019-06-05 PROCEDURE — 72128 CT CHEST SPINE W/O DYE: CPT

## 2019-06-05 PROCEDURE — 93010 ELECTROCARDIOGRAM REPORT: CPT | Performed by: INTERNAL MEDICINE

## 2019-06-05 PROCEDURE — 73030 X-RAY EXAM OF SHOULDER: CPT

## 2019-06-05 PROCEDURE — 72131 CT LUMBAR SPINE W/O DYE: CPT

## 2019-06-05 PROCEDURE — 80048 BASIC METABOLIC PNL TOTAL CA: CPT | Performed by: EMERGENCY MEDICINE

## 2019-06-05 PROCEDURE — 72125 CT NECK SPINE W/O DYE: CPT

## 2019-06-05 PROCEDURE — 71046 X-RAY EXAM CHEST 2 VIEWS: CPT

## 2019-06-05 PROCEDURE — 93005 ELECTROCARDIOGRAM TRACING: CPT

## 2019-06-05 PROCEDURE — 25010000002 ONDANSETRON PER 1 MG: Performed by: EMERGENCY MEDICINE

## 2019-06-05 PROCEDURE — 99284 EMERGENCY DEPT VISIT MOD MDM: CPT

## 2019-06-05 PROCEDURE — 84484 ASSAY OF TROPONIN QUANT: CPT | Performed by: EMERGENCY MEDICINE

## 2019-06-05 PROCEDURE — 96375 TX/PRO/DX INJ NEW DRUG ADDON: CPT

## 2019-06-05 RX ORDER — AZITHROMYCIN 250 MG/1
250 TABLET, FILM COATED ORAL DAILY
Qty: 4 TABLET | Refills: 0 | Status: SHIPPED | OUTPATIENT
Start: 2019-06-05 | End: 2019-06-09

## 2019-06-05 RX ORDER — SODIUM CHLORIDE 0.9 % (FLUSH) 0.9 %
10 SYRINGE (ML) INJECTION AS NEEDED
Status: DISCONTINUED | OUTPATIENT
Start: 2019-06-05 | End: 2019-06-06 | Stop reason: HOSPADM

## 2019-06-05 RX ORDER — ONDANSETRON 2 MG/ML
4 INJECTION INTRAMUSCULAR; INTRAVENOUS ONCE
Status: COMPLETED | OUTPATIENT
Start: 2019-06-05 | End: 2019-06-05

## 2019-06-05 RX ORDER — AZITHROMYCIN 250 MG/1
500 TABLET, FILM COATED ORAL ONCE
Status: COMPLETED | OUTPATIENT
Start: 2019-06-05 | End: 2019-06-05

## 2019-06-05 RX ORDER — MORPHINE SULFATE 2 MG/ML
2 INJECTION, SOLUTION INTRAMUSCULAR; INTRAVENOUS ONCE
Status: COMPLETED | OUTPATIENT
Start: 2019-06-05 | End: 2019-06-05

## 2019-06-05 RX ORDER — OFLOXACIN 3 MG/ML
10 SOLUTION AURICULAR (OTIC) DAILY
Qty: 5 ML | Refills: 0 | Status: SHIPPED | OUTPATIENT
Start: 2019-06-05 | End: 2019-06-12

## 2019-06-05 RX ADMIN — MORPHINE SULFATE 2 MG: 2 INJECTION, SOLUTION INTRAMUSCULAR; INTRAVENOUS at 23:12

## 2019-06-05 RX ADMIN — AZITHROMYCIN 500 MG: 250 TABLET, FILM COATED ORAL at 23:46

## 2019-06-05 RX ADMIN — ONDANSETRON 4 MG: 2 INJECTION INTRAMUSCULAR; INTRAVENOUS at 23:10

## 2019-06-05 NOTE — ED TRIAGE NOTES
Pt presents c/o chronic back pain and an earache that has been going on for 3-4 months. Pt states that today, she began having pain to her right shoulder and neck.

## 2019-06-06 NOTE — ED PROVIDER NOTES
"Subjective   85-year-old -American female with history of multiple medical problems presents to the emergency department with chief complaint of neck pain back pain shoulder pain and earache.  Patient complains of right earache.  Patient states \"I have had an earache for a couple months and I went to a specialist and he gave me some drops.\"  Patient complains of neck and back pain.  She relates she has had surgery on both her neck and her back and has chronic pain.  Patient states \"I have a prescription for oxycodone from the pain management center in [place] but it does not do any good.\"  Patient also complains of right shoulder pain since this morning but she denies any injury.  Patient denies fever sweats or chills.            Review of Systems   Constitutional: Negative for diaphoresis, fatigue and fever.   HENT: Positive for ear pain. Negative for ear discharge and sore throat.    Respiratory: Positive for cough. Negative for shortness of breath.    Cardiovascular: Negative for chest pain and leg swelling.   Gastrointestinal: Negative for abdominal pain, nausea and vomiting.   Genitourinary: Negative for dysuria.   Musculoskeletal: Positive for arthralgias, back pain and neck pain.   Neurological: Positive for numbness. Negative for syncope, speech difficulty, weakness and headaches.   All other systems reviewed and are negative.      Past Medical History:   Diagnosis Date   • Abnormal CT scan     per patient   • Acute bronchitis    • Acute maxillary sinusitis    • Acute otitis externa    • Acute sinusitis    • Allergic conjunctivitis    • Angular cheilitis    • Ankle edema    • Backache     improved   • Blood in urine      UTI resolved      • Bradycardia    • Chest discomfort     described as heart racing      • Chronic low back pain     RIGHT leg radiation      • Cold feet     c/o - and lowerlegs      • Contusion     of dorsum of foot   • Cough    • Depressive disorder    • Diabetes mellitus " (CMS/[facility])    • Dizziness and giddiness    • Dyspnea    • Dysuria    • Edema    • Edema of foot    • Edema of lower extremity    • Essential hypertension    • Exanthematous disorder    • Fatigue    • Foot pain, left    • Grade II hemorrhoids 3/12/2018   • Hematoma    • Hip pain, right    • History of palpitations    • Hormone replacement therapy (postmenopausal)    • Hypertensive disorder    • Impacted cerumen    • Joint pain    • Knee pain    • Low back pain    • Malaise and fatigue    • Multiple joint pain    • Muscle pain    • Muscle weakness    • Neck pain    • Obesity (BMI 30.0-34.9) 3/12/2018   • Osteoarthritis of knee    • Osteoarthritis of multiple joints    • Otitis externa    • Pain in lower limb    • Peripheral venous insufficiency    • POP-Q stage 2 rectocele 3/12/2018   • POP-Q stage 4 cystocele 3/12/2018   • Sacroiliitis, not elsewhere classified (CMS/[facility])     R LE   • Shoulder pain    • Upper respiratory infection    • Urinary frequency    • Urinary tract infectious disease    • Wheezing        Allergies   Allergen Reactions   • Aleve [Naproxen Sodium] Itching   • Atenolol    • Keflex [Cephalexin] Itching   • Lisinopril Angioedema   • Relafen [Nabumetone] Itching   • Zanaflex [Tizanidine] Itching   • Acetaminophen Itching   • Aspirin Itching and Rash   • Diflucan [Fluconazole] Itching   • Sulfa Antibiotics Rash and Itching       Past Surgical History:   Procedure Laterality Date   • ANKLE SURGERY     • BACK SURGERY     • COLONOSCOPY  12/14/2009   • EYE SURGERY Bilateral 02/2018    B cataract   • HAMMER TOE REPAIR  08/02/2011    Hammertoe repair, left second toe.   • HYSTERECTOMY     • INCISION AND DRAINAGE ABSCESS  01/21/2015   • INJECTION OF MEDICATION  11/15/2013   • INJECTION OF MEDICATION  05/06/2013    Celestone (betamethasone) (1)      • INJECTION OF MEDICATION  03/17/2016    Kenalog (3)      • NECK SURGERY  01/2018   • UPPER GASTROINTESTINAL ENDOSCOPY  12/14/2009       Family History   Problem  Relation Age of Onset   • Diabetes Other    • Heart disease Other    • Stroke Other        Social History     Socioeconomic History   • Marital status:      Spouse name: Not on file   • Number of children: Not on file   • Years of education: Not on file   • Highest education level: Not on file   Tobacco Use   • Smoking status: Former Smoker   • Smokeless tobacco: Never Used   Substance and Sexual Activity   • Alcohol use: No   • Drug use: No           Objective   Physical Exam   Constitutional: She is oriented to person, place, and time. No distress.   HENT:   Head: Normocephalic and atraumatic.   Right Ear: External ear normal.   Left Ear: External ear normal.   Nose: Nose normal.   Mouth/Throat: Oropharynx is clear and moist.   Right external auditory canal is erythematous and edematous.  Left external auditory canal is normal.  Both tympanic membranes are normal.   Eyes: Conjunctivae and EOM are normal. Pupils are equal, round, and reactive to light.   Neck:   Diffuse tenderness of the cervical spine.   Cardiovascular: Normal rate, regular rhythm, normal heart sounds and intact distal pulses.   Pulmonary/Chest: Effort normal and breath sounds normal.   Abdominal: Soft. Bowel sounds are normal. She exhibits no distension and no mass. There is no tenderness. There is no guarding.   Musculoskeletal: She exhibits no edema or deformity.   Diffuse tenderness of the thoracal lumbar spine.  Tender right shoulder with full range of motion.  Neurovascular intact distally.   Neurological: She is alert and oriented to person, place, and time. No cranial nerve deficit or sensory deficit. She exhibits normal muscle tone.   Skin: Skin is warm and dry. She is not diaphoretic.   Psychiatric: She has a normal mood and affect. Her behavior is normal.   Nursing note and vitals reviewed.      ECG 12 Lead    Date/Time: 6/5/2019 5:42 PM  Performed by: Vinh Valente MD  Authorized by: Vinh Valente MD   Interpreted by  physician  Clinical impression: abnormal ECG  Comments: Normal sinus rhythm rate of 64.  No ST elevation.  Nonspecific findings.                 ED Course  ED Course as of Jun 05 2305 Wed Jun 05, 2019   2303 Patient is alert and resting comfortably in no acute distress.  I reviewed the results of her evaluation with her and recommended primary care and ENT follow-up.  Advised to return the emergency department if her symptoms change or worsen.  [DR]      ED Course User Index  [DR] Vinh Valente MD      Labs Reviewed   BASIC METABOLIC PANEL - Abnormal; Notable for the following components:       Result Value    Creatinine 1.17 (*)     eGFR   Amer 53 (*)     All other components within normal limits    Narrative:     GFR Normal >60  Chronic Kidney Disease <60  Kidney Failure <15   CBC WITH AUTO DIFFERENTIAL - Abnormal; Notable for the following components:    RBC 3.67 (*)     Hemoglobin 10.3 (*)     Hematocrit 32.1 (*)     Neutrophil % 76.1 (*)     Lymphocyte % 15.7 (*)     Neutrophils, Absolute 7.88 (*)     All other components within normal limits   TROPONIN (IN-HOUSE) - Normal    Narrative:     Troponin T Reference Range:  <= 0.03 ng/mL-   Negative for AMI  >0.03 ng/mL-     Abnormal for myocardial necrosis.  Clinicians would have to utilize clinical acumen, EKG, Troponin and serial changes to determine if it is an Acute Myocardial Infarction or myocardial injury due to an underlying chronic condition.    CBC AND DIFFERENTIAL    Narrative:     The following orders were created for panel order CBC & Differential.  Procedure                               Abnormality         Status                     ---------                               -----------         ------                     CBC Auto Differential[[id number]]        Abnormal            Final result                 Please view results for these tests on the individual orders.     Xr Chest 2 View    Result Date: 6/5/2019  Narrative: Chest 2 view on   6/5/2019 CLINICAL INDICATION: Cough COMPARISON: 4/19/2016 FINDINGS: Mild vascular calcification is noted in the aorta. The lungs are clear. There is mild kyphosis of the spine. Cardiac, hilar and mediastinal contours are within normal limits. Degenerative changes are noted in the spine.     Impression: No acute disease. Electronically signed by:  Malik Son  6/5/2019 9:51 PM CDT Workstation: [id number]    Xr Shoulder 2+ View Right    Result Date: 6/5/2019  Narrative: Right shoulder three view on 6/5/2019 CLINICAL INDICATION: Right shoulder pain COMPARISON: None FINDINGS: Degenerative changes are noted in the AC joint. The AC joint is well aligned. The humeral head is high riding consistent with a chronic rotator cuff tear. The glenohumeral joint is well located. There are no fractures. Postsurgical changes are noted in the cervical spine.     Impression: High riding humeral head consistent with a chronic rotator cuff tear with other degenerative changes and no acute bony abnormality. Electronically signed by:  Malik Son  6/5/2019 9:50 PM CDT Workstation: [id number]    Ct Cervical Spine Without Contrast    Result Date: 6/5/2019  Narrative: CT cervical spine without contrast on 6/5/2019 CLINICAL INDICATION: Neck pain TECHNIQUE: Multiple axial images are obtained throughout the cervical spine without the administration of contrast. Sagittal and coronal reformatted images are also performed and reviewed. This exam was performed according to our departmental dose-optimization program, which includes automated exposure control, adjustment of the mA and/or kV according to patient size and/or use of iterative reconstruction technique. Total DLP is 182.3 mGy*cm. COMPARISON: 3/8/2014 FINDINGS: The patient is status post interval anterior cervical disc fusion from C3 through C5 with disc spacers at C3-4 and C4-5. Degenerative disc disease is noted throughout the cervical spine. Reformatted images reveal  normal alignment of the cervical spine. Degenerative facet disease is noted bilaterally. There are no acute fracture lines. At the C3-4 level, asymmetric right osteophyte formation produces mild right-sided foraminal narrowing. At the C5-6 level, broad-based disc osteophyte complex produces mild canal stenosis and mild to moderate right and moderate left foraminal narrowing. At the C6-7 level, broad-based disc osteophyte complex produces minimal canal stenosis and mild left foraminal narrowing. No definite disc herniations are noted.     Impression: Degenerative and postoperative changes as above with no acute abnormality. Electronically signed by:  Malik Son  6/5/2019 10:15 PM CDT Workstation: [id number]    Ct Thoracic Spine Without Contrast    Result Date: 6/5/2019  Narrative: CT thoracic spine without contrast on  6/5/2019 CLINICAL INDICATION: Back pain TECHNIQUE: Multiple axial images are obtained throughout the thoracic spine without the administration of contrast. Sagittal and coronal reformatted images are also performed and reviewed. This exam was performed according to our departmental dose-optimization program, which includes automated exposure control, adjustment of the mA and/or kV according to patient size and/or use of iterative reconstruction technique. Total DLP is 1119.5 mGy*cm. COMPARISON: 3/8/2014 FINDINGS: There is kyphosis of the spine. Diffuse degenerative disc disease is noted throughout the mid to lower thoracic spine. Reformatted images reveal otherwise normal alignment of the thoracic spine. There are no acute fracture lines. No definite disc herniation is noted. There is evidence of calcified granulomatous disease in the chest. There are mild patchy opacities in the lingula and left lower lobe consistent with early pneumonia. Other minimal groundglass opacities are noted in the right lung also likely infectious in nature.     Impression: 1. Findings most consistent with early  pneumonia greater on the left. 2. Degenerative changes with no acute bony abnormality. Electronically signed by:  Malik Son  6/5/2019 10:22 PM CDT Workstation: [id number]    Ct Lumbar Spine Without Contrast    Result Date: 6/5/2019  Narrative: CT lumbar spine without contrast on  6/5/2019 CLINICAL INDICATION: Low back pain TECHNIQUE: Multiple axial images are obtained throughout the lumbar spine without the administration of contrast. Sagittal and coronal reformatted images are also performed and reviewed. This exam was performed according to our departmental dose-optimization program, which includes automated exposure control, adjustment of the mA and/or kV according to patient size and/or use of iterative reconstruction technique. Total DLP is 1119.5 mGy*cm. COMPARISON: None FINDINGS: There is a transitional lumbosacral vertebral body. Right-sided iliolumbar ligament is well visualized on axial image 39 labeling this as L5. This makes some partial sacralization of L5. The patient is status post L4 and L5 laminectomies. There is grade 2 spondylolisthesis at L4-5 with retrolisthesis of L5 in relation to L4 secondary to bilateral pars defects at L4. Diffuse degenerative disc disease and degenerative facet disease is noted throughout the lumbar spine. There is mild levoscoliosis of the lumbar spine. Reformatted images reveal otherwise normal alignment of the lumbar spine. There are no acute fracture lines. The SI joints are well aligned. At the L1-2 level, mild broad-based disc bulge produces mild canal stenosis. At the L2-3 level, broad-based disc osteophyte complex and facet arthropathy produces mild to moderate canal stenosis and very mild bilateral foraminal narrowing. At the L3-4 level, broad-based disc osteophyte complex and facet arthropathy produces moderate canal stenosis and mild left and mild to moderate right foraminal narrowing. At the L4-5 level, the spondylolisthesis produces no significant  canal stenosis but there is moderate bilateral foraminal narrowing. At the L5-S1 level, broad-based disc osteophyte complex and facet arthropathy produces mild canal stenosis and mild to moderate bilateral foraminal narrowing.     Impression: Multilevel degenerative disc disease and facet arthropathy producing some levels of canal stenosis and foraminal narrowing as above. There is grade 2 spondylolisthesis at L4-5 secondary to bilateral pars defects at L4. Electronically signed by:  Malik Son  6/5/2019 10:29 PM CDT Workstation: [id number]              [facility]      Final diagnoses:   Chronic otitis externa of right ear, unspecified type   Pneumonia due to infectious organism, unspecified laterality, unspecified part of lung   Neck pain   Chronic midline back pain, unspecified back location            Vinh Valente MD  06/05/19 [id number]

## 2019-06-06 NOTE — ED NOTES
"Pt reports right ear pain for the past two months however the pain has \"went into her right neck and right shoulder this morning\" Pt reports she is \"already being treated for chronic back pain.\"     Vamsi Pretty RN  06/05/19 [id number]    "

## 2019-06-13 ENCOUNTER — OFFICE VISIT (OUTPATIENT)
Dept: FAMILY MEDICINE CLINIC | Facility: CLINIC | Age: 84
End: 2019-06-13

## 2019-06-13 VITALS
WEIGHT: 126 LBS | DIASTOLIC BLOOD PRESSURE: 58 MMHG | SYSTOLIC BLOOD PRESSURE: 120 MMHG | BODY MASS INDEX: 25.4 KG/M2 | OXYGEN SATURATION: 98 % | TEMPERATURE: 98.2 F | HEART RATE: 70 BPM | HEIGHT: 59 IN

## 2019-06-13 DIAGNOSIS — S40.862A INSECT BITE OF LEFT UPPER EXTREMITY, INITIAL ENCOUNTER: Primary | ICD-10-CM
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
## 2019-06-13 DIAGNOSIS — E11.9 TYPE 2 DIABETES MELLITUS WITHOUT COMPLICATION, WITHOUT LONG-TERM CURRENT USE OF INSULIN (HCC): ICD-10-CM

## 2019-06-13 DIAGNOSIS — N76.0 ACUTE VAGINITIS: ICD-10-CM

## 2019-06-13 DIAGNOSIS — R63.4 WEIGHT LOSS: ICD-10-CM

## 2019-06-13 PROCEDURE — 99214 OFFICE O/P EST MOD 30 MIN: CPT | Performed by: NURSE PRACTITIONER

## 2019-06-13 PROCEDURE — 96372 THER/PROPH/DIAG INJ SC/IM: CPT | Performed by: NURSE PRACTITIONER

## 2019-06-13 RX ORDER — METRONIDAZOLE 500 MG/1
500 TABLET ORAL 2 TIMES DAILY
Qty: 14 TABLET | Refills: 0 | Status: SHIPPED | OUTPATIENT
Start: 2019-06-13 | End: 2019-06-24

## 2019-06-13 RX ORDER — DEXAMETHASONE SODIUM PHOSPHATE 4 MG/ML
4 INJECTION, SOLUTION INTRA-ARTICULAR; INTRALESIONAL; INTRAMUSCULAR; INTRAVENOUS; SOFT TISSUE ONCE
Status: COMPLETED | OUTPATIENT
Start: 2019-06-13 | End: 2019-06-13

## 2019-06-13 RX ADMIN — DEXAMETHASONE SODIUM PHOSPHATE 4 MG: 4 INJECTION, SOLUTION INTRA-ARTICULAR; INTRALESIONAL; INTRAMUSCULAR; INTRAVENOUS; SOFT TISSUE at 13:58

## 2019-06-13 NOTE — PATIENT INSTRUCTIONS
Insect Bite, Adult  An insect bite can make your skin red, itchy, and swollen. An insect bite is different from an insect sting, which happens when an insect injects poison (venom) into the skin.  Some insects can spread disease to people through a bite. However, most insect bites do not lead to disease and are not serious.  What are the causes?  Insects may bite for a variety of reasons, including:  · Hunger.  · To defend themselves.    Insects that bite include:  · Spiders.  · Mosquitoes.  · Ticks.  · Fleas.  · Ants.  · Flies.  · Bedbugs.    What are the signs or symptoms?  Symptoms of this condition include:  · Itching or pain in the bite area.  · Redness and swelling in the bite area.  · An open wound (skin ulcer).    In many cases, symptoms last for 2-4 days.  How is this diagnosed?  This condition is usually diagnosed based on symptoms and a physical exam.  How is this treated?  Treatment is usually not needed. Symptoms often go away on their own. When treatment is recommended, it may involve:  · Applying a cream or lotion to the bitten area. This treatment helps with itching.  · Taking an antibiotic medicine. This treatment is needed if the bite area gets infected.  · Getting a tetanus shot.  · Applying ice to the affected area.  · Medicines called antihistamines. This treatment is needed if you develop an allergic reaction to the insect bite.    Follow these instructions at home:  Bite area care  · Do not scratch the bite area.  · Keep the bite area clean and dry. Wash it every day with soap and water as told by your health care provider.  · Check the bite area every day for signs of infection. Check for:  ? More redness, swelling, or pain.  ? Fluid or blood.  ? Warmth.  ? Pus.  Managing pain, itching, and swelling    · You may apply a baking soda paste, cortisone cream, or calamine lotion to the bite area as told by your health care provider.  · If directed, apply ice to the bite area.  ? Put ice in a  plastic bag.  ? Place a towel between your skin and the bag.  ? Leave the ice on for 20 minutes, 2-3 times per day.  Medicines  · Apply or take over-the-counter and prescription medicines only as told by your health care provider.  · If you were prescribed an antibiotic medicine, use it as told by your health care provider. Do not stop using the antibiotic even if your condition improves.  General instructions  · Keep all follow-up visits as told by your health care provider. This is important.  How is this prevented?  To help reduce your risk of insect bites:  · When you are outdoors, wear clothing that covers your arms and legs.  · Use insect repellent. The best insect repellents contain:  ? DEET, picaridin, oil of lemon eucalyptus (OLE), or [id number].  ? Higher amounts of an active ingredient.  · If your home windows do not have screens, consider installing them.    Contact a health care provider if:  · You have more redness, swelling, or pain in the bite area.  · You have fluid, blood, or pus coming from the bite area.  · The bite area feels warm to the touch.  · You have a fever.  Get help right away if:  · You have joint pain.  · You have a rash.  · You have shortness of breath.  · You feel unusually tired or sleepy.  · You have neck pain.  · You have a headache.  · You have unusual weakness.  · You have chest pain.  · You have nausea, vomiting, or pain in the abdomen.  This information is not intended to replace advice given to you by your health care provider. Make sure you discuss any questions you have with your health care provider.  Document Released: 01/25/2006 Document Revised: 08/16/2017 Document Reviewed: 06/26/2017  V[employer] Interactive Patient Education © 2019 [employer]

## 2019-06-13 NOTE — PROGRESS NOTES
"Subjective   Kristy Ramirez is a 85 y.o. female.     [facility] Visit    PCP: NADYA Quiroz    CC: \"some sort of bite\"    Recently seen in ER for pneumonia and chronic right otitis externa.  Finished Zithromax and using Ofloxacin drops.  Ear is somewhat improved, follow up has already been scheduled with ENT.      Requesting refill of Flagyl for recurrent vaginitis symptoms secondary to pessary use.    Diabetic: no recent glucose readings over 110.  A1C 6.0.        Insect Bite   This is a new problem. The current episode started yesterday (while working in her yard). The problem occurs daily. The problem has been gradually worsening. Pertinent negatives include no abdominal pain, congestion, coughing, fever, headaches, myalgias, nausea, sore throat, swollen glands, urinary symptoms, visual change, vomiting or weakness. Treatments tried: rubbing alcohol.        The following portions of the patient's history were reviewed and updated as appropriate: allergies, current medications, past medical history, past social history, past surgical history and problem list.    Review of Systems   Constitutional: Positive for appetite change ( decreased) and unexpected weight loss ( down 5 pounds in the last 10-11 days). Negative for fever.   HENT: Positive for ear pain (right, chronic). Negative for congestion, ear discharge, sinus pressure, sneezing and sore throat.    Eyes: Negative.    Respiratory: Negative for cough, chest tightness, shortness of breath and wheezing.    Cardiovascular: Negative.    Gastrointestinal: Negative for abdominal pain, blood in stool, nausea and vomiting.   Genitourinary: Positive for vaginal discharge ( with odor, itching). Negative for difficulty urinating.   Musculoskeletal: Negative for myalgias.   Skin: Positive for skin lesions ( insect bite).   Neurological: Negative for dizziness, weakness and headache.     /58 (BP Location: Right arm, Patient Position: Sitting, Cuff Size: " "Adult)   Pulse 70   Temp 98.2 °F (36.8 °C) (Tympanic)   Ht 149.9 cm (59\")   Wt 57.2 kg (126 lb)   SpO2 98%   BMI 25.45 kg/m²     Objective   Physical Exam   Constitutional: She is oriented to person, place, and time. She appears well-developed and well-nourished. No distress.   HENT:   Head: Normocephalic and atraumatic.   Right Ear: There is drainage ( mild), swelling ( mild) and tenderness ( mild). Tympanic membrane is not erythematous.   Left Ear: Tympanic membrane and ear canal normal.   Mouth/Throat: Oropharynx is clear and moist.   Eyes: Conjunctivae are normal. Right eye exhibits no discharge. Left eye exhibits no discharge.   Cardiovascular: Normal rate and regular rhythm.   Pulmonary/Chest: Effort normal and breath sounds normal. She has no wheezes. She has no rales.   Genitourinary:   Genitourinary Comments: Vaginal exam deferred     Musculoskeletal:   Using cane to help with ambulation     Lymphadenopathy:     She has no cervical adenopathy.   Neurological: She is alert and oriented to person, place, and time.   Skin: Lesion noted. No rash noted.        Nursing note and vitals reviewed.    No results found for this or any previous visit (from the past 24 hour(s)).        Assessment/Plan   Kristy was seen today for insect bite and earache.    Diagnoses and all orders for this visit:    Insect bite of left upper extremity, initial encounter  -     dexamethasone (DECADRON) injection 4 mg    Acute vaginitis  -     metroNIDAZOLE (FLAGYL) 500 MG tablet; Take 1 tablet by mouth 2 (Two) Times a Day for 7 days.    Type 2 diabetes mellitus without complication, without long-term current use of insulin (CMS/[facility])    Weight loss    Decadron 4 mg IM x 1 -- cautioned that she may see temporary increase in glucose level  Benadryl at night as needed for itching  Cool compresses over the bites to help with itching/inflammation  S/S of secondary bacterial infection discussed and when to recheck    Rx for Flagyl for " recurrent vaginitis symptoms    Continue with all other regular prescribed medications, including diabetic medications    Will need to keep an eye on weight loss.  Might want to try Glucerna shakes.  If weight loss persists, needs to see PCP.     See PCP or RTC if symptoms persist/worsen  See PCP for routine f/u visit and management of chronic medical conditions

## 2019-06-21 ENCOUNTER — OFFICE VISIT (OUTPATIENT)
Dept: FAMILY MEDICINE CLINIC | Facility: CLINIC | Age: 84
End: 2019-06-21

## 2019-06-21 VITALS
HEIGHT: 59 IN | TEMPERATURE: 98.4 F | HEART RATE: 65 BPM | WEIGHT: 130 LBS | RESPIRATION RATE: 20 BRPM | OXYGEN SATURATION: 94 % | DIASTOLIC BLOOD PRESSURE: 67 MMHG | SYSTOLIC BLOOD PRESSURE: 129 MMHG | BODY MASS INDEX: 26.21 KG/M2

## 2019-06-21 DIAGNOSIS — E11.9 TYPE 2 DIABETES MELLITUS WITHOUT COMPLICATION, WITHOUT LONG-TERM CURRENT USE OF INSULIN (HCC): ICD-10-CM

## 2019-06-21 DIAGNOSIS — M54.5 CHRONIC LOW BACK PAIN, UNSPECIFIED BACK PAIN LATERALITY, WITH SCIATICA PRESENCE UNSPECIFIED: ICD-10-CM

## 2019-06-21 DIAGNOSIS — G89.29 CHRONIC LOW BACK PAIN, UNSPECIFIED BACK PAIN LATERALITY, WITH SCIATICA PRESENCE UNSPECIFIED: ICD-10-CM

## 2019-06-21 DIAGNOSIS — I10 ESSENTIAL HYPERTENSION: Primary | ICD-10-CM

## 2019-06-21 PROCEDURE — 99214 OFFICE O/P EST MOD 30 MIN: CPT | Performed by: NURSE PRACTITIONER

## 2019-06-21 NOTE — PROGRESS NOTES
Subjective   Kristy Ramriez is a 85 y.o. female.     Here today for wili.  She has a history of htn and dm.  She also has had back surgery and she reports her current pain management is not helping her.  They are giving her injections and they do not seem to help.  She wants to be referred elsewhere.  B/s is averaging about 100.  Last a1c was 6.0.      Hypertension   This is a chronic problem. The current episode started more than 1 year ago. The problem is controlled. Associated symptoms include neck pain (chronic). Pertinent negatives include no anxiety, blurred vision, chest pain, headaches, malaise/fatigue, orthopnea, palpitations, peripheral edema, PND, shortness of breath or sweats. Agents associated with hypertension include estrogens. Risk factors for coronary artery disease include diabetes mellitus, dyslipidemia, post-menopausal state and sedentary lifestyle. Past treatments include diuretics, beta blockers and angiotensin blockers. Current antihypertension treatment includes diuretics, beta blockers and angiotensin blockers. The current treatment provides significant improvement. There are no compliance problems.  Hypertensive end-organ damage includes CAD/MI. There is no history of angina, kidney disease, CVA, heart failure, left ventricular hypertrophy, PVD or retinopathy.   Diabetes   She presents for her follow-up diabetic visit. She has type 2 diabetes mellitus. Her disease course has been stable. There are no hypoglycemic associated symptoms. Pertinent negatives for hypoglycemia include no headaches or sweats. Associated symptoms include foot paresthesias. Pertinent negatives for diabetes include no blurred vision, no chest pain, no fatigue, no foot ulcerations, no polydipsia, no polyphagia, no polyuria, no visual change, no weakness and no weight loss. There are no hypoglycemic complications. Symptoms are stable. Diabetic complications include peripheral neuropathy. Pertinent negatives for  diabetic complications include no CVA, PVD or retinopathy. Risk factors for coronary artery disease include diabetes mellitus, dyslipidemia, hypertension and post-menopausal. Current diabetic treatment includes oral agent (monotherapy). She is compliant with treatment all of the time. Her weight is stable. She is following a diabetic diet. Meal planning includes avoidance of concentrated sweets. She has not had a previous visit with a dietitian. She rarely participates in exercise. She monitors blood glucose at home 1-2 x per day. Her breakfast blood glucose is taken between 6-7 am. Her breakfast blood glucose range is generally  mg/dl. Her overall blood glucose range is  mg/dl. An ACE inhibitor/angiotensin II receptor blocker is being taken. She does not see a podiatrist.Eye exam is current.   Back Pain   This is a chronic problem. The current episode started more than 1 year ago. The problem occurs constantly. The problem has been waxing and waning since onset. The pain is present in the lumbar spine. The quality of the pain is described as aching. The pain does not radiate. The pain is at a severity of 7/10. The pain is moderate. The pain is the same all the time. The symptoms are aggravated by standing, sitting and twisting. Stiffness is present in the morning. Pertinent negatives include no chest pain, headaches, weakness or weight loss. Risk factors include menopause and sedentary lifestyle. She has tried muscle relaxant and NSAIDs for the symptoms. The treatment provided mild relief.        The following portions of the patient's history were reviewed and updated as appropriate: allergies, current medications, past family history, past medical history, past social history, past surgical history and problem list.    Review of Systems   Constitutional: Negative.  Negative for fatigue, malaise/fatigue and unexpected weight loss.   HENT: Negative.    Eyes: Negative.  Negative for blurred vision.    Respiratory: Negative.  Negative for shortness of breath.    Cardiovascular: Negative.  Negative for chest pain, palpitations, orthopnea and PND.   Gastrointestinal: Negative.    Endocrine: Negative.  Negative for polydipsia, polyphagia and polyuria.   Genitourinary: Negative.    Musculoskeletal: Positive for back pain and neck pain (chronic).   Skin: Negative.    Allergic/Immunologic: Negative.    Neurological: Negative.  Negative for weakness.   Hematological: Negative.    Psychiatric/Behavioral: Negative.        Objective   Physical Exam   Constitutional: She is oriented to person, place, and time. She appears well-developed and well-nourished. No distress.   HENT:   Head: Normocephalic and atraumatic.   Mouth/Throat: No oropharyngeal exudate.   Eyes: Pupils are equal, round, and reactive to light.   Neck: Normal range of motion. Neck supple. No thyromegaly present.   Cardiovascular: Normal rate, regular rhythm and normal heart sounds. Exam reveals no friction rub.   No murmur heard.  Pulmonary/Chest: Effort normal and breath sounds normal. No respiratory distress. She has no wheezes. She has no rales.   Abdominal: Soft.   Musculoskeletal:        Lumbar back: She exhibits decreased range of motion, pain and spasm.    Kristy had a diabetic foot exam performed today.    Neurological Sensory Findings -  Altered sharp/dull right ankle/foot discrimination and altered sharp/dull left ankle/foot discrimination.  Vascular Status -  Her right foot exhibits normal foot vasculature  and no edema. Her left foot exhibits normal foot vasculature  and no edema.  Skin Integrity  -  Her right foot skin is intact.Her left foot skin is intact..  Neurological: She is alert and oriented to person, place, and time.   Skin: Skin is warm and dry.   Psychiatric: She has a normal mood and affect. Thought content normal.   Nursing note and vitals reviewed.        Assessment/Plan   Kristy was seen today for hypertension, diabetes, heartburn  and gout.    Diagnoses and all orders for this visit:    Essential hypertension    Type 2 diabetes mellitus without complication, without long-term current use of insulin (CMS/[facility])    Chronic low back pain, unspecified back pain laterality, with sciatica presence unspecified  Comments:  referring to [facility] to see dr osorio.  Orders:  -     Ambulatory Referral to Pain Management    no changes in other meds.

## 2019-06-24 NOTE — PATIENT INSTRUCTIONS
Calorie Counting for Weight Loss  Calories are units of energy. Your body needs a certain amount of calories from food to keep you going throughout the day. When you eat more calories than your body needs, your body stores the extra calories as fat. When you eat fewer calories than your body needs, your body burns fat to get the energy it needs.  Calorie counting means keeping track of how many calories you eat and drink each day. Calorie counting can be helpful if you need to lose weight. If you make sure to eat fewer calories than your body needs, you should lose weight. Ask your health care provider what a healthy weight is for you.  For calorie counting to work, you will need to eat the right number of calories in a day in order to lose a healthy amount of weight per week. A dietitian can help you determine how many calories you need in a day and will give you suggestions on how to reach your calorie goal.  · A healthy amount of weight to lose per week is usually 1-2 lb (0.5-0.9 kg). This usually means that your daily calorie intake should be reduced by 500-750 calories.  · Eating 1,200 - 1,500 calories per day can help most women lose weight.  · Eating 1,500 - 1,800 calories per day can help most men lose weight.    What is my plan?  My goal is to have __________ calories per day.  If I have this many calories per day, I should lose around __________ pounds per week.  What do I need to know about calorie counting?  In order to meet your daily calorie goal, you will need to:  · Find out how many calories are in each food you would like to eat. Try to do this before you eat.  · Decide how much of the food you plan to eat.  · Write down what you ate and how many calories it had. Doing this is called keeping a food log.    To successfully lose weight, it is important to balance calorie counting with a healthy lifestyle that includes regular activity. Aim for 150 minutes of moderate exercise (such as walking) or 75  minutes of vigorous exercise (such as running) each week.  Where do I find calorie information?    The number of calories in a food can be found on a Nutrition Facts label. If a food does not have a Nutrition Facts label, try to look up the calories online or ask your dietitian for help.  Remember that calories are listed per serving. If you choose to have more than one serving of a food, you will have to multiply the calories per serving by the amount of servings you plan to eat. For example, the label on a package of bread might say that a serving size is 1 slice and that there are 90 calories in a serving. If you eat 1 slice, you will have eaten 90 calories. If you eat 2 slices, you will have eaten 180 calories.  How do I keep a food log?  Immediately after each meal, record the following information in your food log:  · What you ate. Don't forget to include toppings, sauces, and other extras on the food.  · How much you ate. This can be measured in cups, ounces, or number of items.  · How many calories each food and drink had.  · The total number of calories in the meal.    Keep your food log near you, such as in a small notebook in your pocket, or use a mobile sinai or website. Some programs will calculate calories for you and show you how many calories you have left for the day to meet your goal.  What are some calorie counting tips?  · Use your calories on foods and drinks that will fill you up and not leave you hungry:  ? Some examples of foods that fill you up are nuts and nut butters, vegetables, lean proteins, and high-fiber foods like whole grains. High-fiber foods are foods with more than 5 g fiber per serving.  ? Drinks such as sodas, specialty coffee drinks, alcohol, and juices have a lot of calories, yet do not fill you up.  · Eat nutritious foods and avoid empty calories. Empty calories are calories you get from foods or beverages that do not have many vitamins or protein, such as candy, sweets, and  "soda. It is better to have a nutritious high-calorie food (such as an avocado) than a food with few nutrients (such as a bag of chips).  · Know how many calories are in the foods you eat most often. This will help you calculate calorie counts faster.  · Pay attention to calories in drinks. Low-calorie drinks include water and unsweetened drinks.  · Pay attention to nutrition labels for \"low fat\" or \"fat free\" foods. These foods sometimes have the same amount of calories or more calories than the full fat versions. They also often have added sugar, starch, or salt, to make up for flavor that was removed with the fat.  · Find a way of tracking calories that works for you. Get creative. Try different apps or programs if writing down calories does not work for you.  What are some portion control tips?  · Know how many calories are in a serving. This will help you know how many servings of a certain food you can have.  · Use a measuring cup to measure serving sizes. You could also try weighing out portions on a kitchen scale. With time, you will be able to estimate serving sizes for some foods.  · Take some time to put servings of different foods on your favorite plates, bowls, and cups so you know what a serving looks like.  · Try not to eat straight from a bag or box. Doing this can lead to overeating. Put the amount you would like to eat in a cup or on a plate to make sure you are eating the right portion.  · Use smaller plates, glasses, and bowls to prevent overeating.  · Try not to multitask (for example, watch TV or use your computer) while eating. If it is time to eat, sit down at a table and enjoy your food. This will help you to know when you are full. It will also help you to be aware of what you are eating and how much you are eating.  What are tips for following this plan?  Reading food labels  · Check the calorie count compared to the serving size. The serving size may be smaller than what you are used to " eating.  · Check the source of the calories. Make sure the food you are eating is high in vitamins and protein and low in saturated and trans fats.  Shopping  · Read nutrition labels while you shop. This will help you make healthy decisions before you decide to purchase your food.  · Make a grocery list and stick to it.  Cooking  · Try to cook your favorite foods in a healthier way. For example, try baking instead of frying.  · Use low-fat dairy products.  Meal planning  · Use more fruits and vegetables. Half of your plate should be fruits and vegetables.  · Include lean proteins like poultry and fish.  How do I count calories when eating out?  · Ask for smaller portion sizes.  · Consider sharing an entree and sides instead of getting your own entree.  · If you get your own entree, eat only half. Ask for a box at the beginning of your meal and put the rest of your entree in it so you are not tempted to eat it.  · If calories are listed on the menu, choose the lower calorie options.  · Choose dishes that include vegetables, fruits, whole grains, low-fat dairy products, and lean protein.  · Choose items that are boiled, broiled, grilled, or steamed. Stay away from items that are buttered, battered, fried, or served with cream sauce. Items labeled “crispy” are usually fried, unless stated otherwise.  · Choose water, low-fat milk, unsweetened iced tea, or other drinks without added sugar. If you want an alcoholic beverage, choose a lower calorie option such as a glass of wine or light beer.  · Ask for dressings, sauces, and syrups on the side. These are usually high in calories, so you should limit the amount you eat.  · If you want a salad, choose a garden salad and ask for grilled meats. Avoid extra toppings like rico, cheese, or fried items. Ask for the dressing on the side, or ask for olive oil and vinegar or lemon to use as dressing.  · Estimate how many servings of a food you are given. For example, a serving of  cooked rice is ½ cup or about the size of half a baseball. Knowing serving sizes will help you be aware of how much food you are eating at restaurants. The list below tells you how big or small some common portion sizes are based on everyday objects:  ? 1 oz--4 stacked dice.  ? 3 oz--1 deck of cards.  ? 1 tsp--1 die.  ? 1 Tbsp--½ a ping-pong ball.  ? 2 Tbsp--1 ping-pong ball.  ? ½ cup--½ baseball.  ? 1 cup--1 baseball.  Summary  · Calorie counting means keeping track of how many calories you eat and drink each day. If you eat fewer calories than your body needs, you should lose weight.  · A healthy amount of weight to lose per week is usually 1-2 lb (0.5-0.9 kg). This usually means reducing your daily calorie intake by 500-750 calories.  · The number of calories in a food can be found on a Nutrition Facts label. If a food does not have a Nutrition Facts label, try to look up the calories online or ask your dietitian for help.  · Use your calories on foods and drinks that will fill you up, and not on foods and drinks that will leave you hungry.  · Use smaller plates, glasses, and bowls to prevent overeating.  This information is not intended to replace advice given to you by your health care provider. Make sure you discuss any questions you have with your health care provider.  Document Released: 12/18/2006 Document Revised: 11/17/2017 Document Reviewed: 11/17/2017  [employer] Interactive Patient Education © 2019 [employer]      Exercising to Lose Weight  Exercising can help you to lose weight. In order to lose weight through exercise, you need to do vigorous-intensity exercise. You can tell that you are exercising with vigorous intensity if you are breathing very hard and fast and cannot hold a conversation while exercising.  Moderate-intensity exercise helps to maintain your current weight. You can tell that you are exercising at a moderate level if you have a higher heart rate and faster breathing, but you are  still able to hold a conversation.  How often should I exercise?  Choose an activity that you enjoy and set realistic goals. Your health care provider can help you to make an activity plan that works for you. Exercise regularly as directed by your health care provider. This may include:  · Doing resistance training twice each week, such as:  ? Push-ups.  ? Sit-ups.  ? Lifting weights.  ? Using resistance bands.  · Doing a given intensity of exercise for a given amount of time. Choose from these options:  ? 150 minutes of moderate-intensity exercise every week.  ? 75 minutes of vigorous-intensity exercise every week.  ? A mix of moderate-intensity and vigorous-intensity exercise every week.    Children, pregnant women, people who are out of shape, people who are overweight, and older adults may need to consult a health care provider for individual recommendations. If you have any sort of medical condition, be sure to consult your health care provider before starting a new exercise program.  What are some activities that can help me to lose weight?  · Walking at a rate of at least 4.5 miles an hour.  · Jogging or running at a rate of 5 miles per hour.  · Biking at a rate of at least 10 miles per hour.  · Lap swimming.  · Roller-skating or in-line skating.  · Cross-country skiing.  · Vigorous competitive sports, such as football, basketball, and soccer.  · Jumping rope.  · Aerobic dancing.  How can I be more active in my day-to-day activities?  · Use the stairs instead of the elevator.  · Take a walk during your lunch break.  · If you drive, park your car farther away from work or school.  · If you take public transportation, get off one stop early and walk the rest of the way.  · Make all of your phone calls while standing up and walking around.  · Get up, stretch, and walk around every 30 minutes throughout the day.  What guidelines should I follow while exercising?  · Do not exercise so much that you hurt yourself,  feel dizzy, or get very short of breath.  · Consult your health care provider prior to starting a new exercise program.  · Wear comfortable clothes and shoes with good support.  · Drink plenty of water while you exercise to prevent dehydration or heat stroke. Body water is lost during exercise and must be replaced.  · Work out until you breathe faster and your heart beats faster.  This information is not intended to replace advice given to you by your health care provider. Make sure you discuss any questions you have with your health care provider.  Document Released: 01/20/2012 Document Revised: 05/25/2017 Document Reviewed: 05/21/2015  [employer] Interactive Patient Education © 2019 [employer]

## 2019-07-08 DIAGNOSIS — G62.9 NEUROPATHY: ICD-10-CM

## 2019-07-08 DIAGNOSIS — M54.5 CHRONIC LOW BACK PAIN, UNSPECIFIED BACK PAIN LATERALITY, WITH SCIATICA PRESENCE UNSPECIFIED: ICD-10-CM

## 2019-07-08 DIAGNOSIS — G89.29 CHRONIC LOW BACK PAIN, UNSPECIFIED BACK PAIN LATERALITY, WITH SCIATICA PRESENCE UNSPECIFIED: ICD-10-CM

## 2019-07-08 RX ORDER — GABAPENTIN 300 MG/1
CAPSULE ORAL
Qty: 30 CAPSULE | Refills: 0 | Status: SHIPPED | OUTPATIENT
Start: 2019-07-08 | End: 2020-02-27 | Stop reason: SDUPTHER

## 2019-07-09 ENCOUNTER — TELEPHONE (OUTPATIENT)
Dept: FAMILY MEDICINE CLINIC | Facility: CLINIC | Age: 84
End: 2019-07-09

## 2019-07-09 NOTE — TELEPHONE ENCOUNTER
Patient is scheduled for a new patient appointment with  on   March 12,2020. Would you like to send her somewhere else that can get her in sooner?

## 2019-07-11 DIAGNOSIS — N39.3 STRESS INCONTINENCE: ICD-10-CM

## 2019-07-11 RX ORDER — TOLTERODINE 4 MG/1
4 CAPSULE, EXTENDED RELEASE ORAL DAILY
Qty: 90 CAPSULE | Refills: 3 | Status: SHIPPED | OUTPATIENT
Start: 2019-07-11 | End: 2020-09-22

## 2019-07-25 ENCOUNTER — OFFICE VISIT (OUTPATIENT)
Dept: OTOLARYNGOLOGY | Facility: CLINIC | Age: 84
End: 2019-07-25

## 2019-07-25 VITALS — WEIGHT: 128 LBS | HEIGHT: 60 IN | BODY MASS INDEX: 25.13 KG/M2

## 2019-07-25 DIAGNOSIS — H93.11 TINNITUS OF RIGHT EAR: ICD-10-CM

## 2019-07-25 DIAGNOSIS — H92.01 RIGHT EAR PAIN: Primary | ICD-10-CM

## 2019-07-25 PROCEDURE — 99214 OFFICE O/P EST MOD 30 MIN: CPT | Performed by: OTOLARYNGOLOGY

## 2019-07-29 NOTE — PROGRESS NOTES
"Subjective   Kristy Ramirez is a 85 y.o. female.       History of Present Illness   Patient is here for evaluation of her right ear.  Says she has had pain for several months.  Saw Dr. Ernst back in October who diagnosed her with cerumen impaction and otitis externa and treated her with Ciprodex.  She recently went to the emergency room with a variety of complaints including right ear pain and was told to follow-up with me.  She is not having any otorrhea.  She says she hears noises in her ears from time to time.  Sometimes this is a roaring noise other times it is a \"bumping\" sound however it is not pulsatile nor is it in time with her heartbeat.  Ear pain is an aching quality.  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  No previous otologic surgery.  No otorrhea.      The following portions of the patient's history were reviewed and updated as appropriate: allergies, current medications, past family history, past medical history, past social history, past surgical history and problem list.     reports that she has quit smoking. She has never used smokeless tobacco. She reports that she does not drink alcohol or use drugs.   Patient is not a tobacco user and has not been counseled for use of tobacco products      Review of Systems   HENT: Positive for ear pain, trouble swallowing and voice change.    Cardiovascular: Positive for palpitations.   Gastrointestinal: Positive for diarrhea.   Endocrine: Positive for heat intolerance.   Musculoskeletal: Positive for arthralgias, back pain and neck stiffness.   Neurological: Positive for weakness.           Objective   Physical Exam  General: Well-developed well-nourished female in no acute distress.  Alert and oriented x-3. Head: Normocephalic. Face: Symmetrical strength and appearance. PERRL. EOMI. Voice:Strong. Speech:Fluent  Ears: External ears no deformity, canals no discharge, tympanic membranes intact clear and mobile bilaterally.  Nose: Nares show no " discharge mass polyp or purulence.  Boggy mucosa is present.  No gross external deformity.  Septum: Midline  Oral cavity: Lips and gums without lesions.  Tongue and floor of mouth without lesions.  Parotid and submandibular ducts unobstructed.  No mucosal lesions on the buccal mucosa or vestibule of the mouth.  Pharynx: No erythema exudate mass or ulcer  Neck: No lymphadenopathy.  No thyromegaly.  Trachea and larynx midline.  No masses in the parotid or submandibular glands.  On palpation the right temporomandibular joint had both crepitus and subjective tenderness to palpation.  The roaring the patient notices does not extinguish with gentle compression of the right jugular vein.      Assessment/Plan   Kristy was seen today for tinnitus and earache.    Diagnoses and all orders for this visit:    Right ear pain    Tinnitus of right ear        Plan: Explained to the patient that her otalgia is musculoskeletal in nature.  I advised her to use a soft diet and take what ever she would normally take for arthritis or minor aches and pains.  Can consider dental evaluation.  I explained the nature of tinnitus to the patient.  I suspect her tinnitus is related to hearing loss.  I told her if it worsened or if she developed significant dizziness to return for further evaluation otherwise she may follow-up with me as needed.

## 2019-08-05 ENCOUNTER — TELEPHONE (OUTPATIENT)
Dept: FAMILY MEDICINE CLINIC | Facility: CLINIC | Age: 84
End: 2019-08-05

## 2019-08-05 NOTE — TELEPHONE ENCOUNTER
Spoke with patient and gave her appointment information for [facility]. She is scheduled with Dr. Pollock on August 22 at 2:30pm

## 2019-08-13 ENCOUNTER — TELEPHONE (OUTPATIENT)
Dept: FAMILY MEDICINE CLINIC | Facility: CLINIC | Age: 84
End: 2019-08-13

## 2019-08-14 ENCOUNTER — OFFICE VISIT (OUTPATIENT)
Dept: OBSTETRICS AND GYNECOLOGY | Facility: CLINIC | Age: 84
End: 2019-08-14

## 2019-08-14 VITALS — HEIGHT: 60 IN | WEIGHT: 128 LBS | BODY MASS INDEX: 25.13 KG/M2

## 2019-08-14 DIAGNOSIS — Z46.89 PESSARY MAINTENANCE: Primary | ICD-10-CM

## 2019-08-14 DIAGNOSIS — N89.8 VAGINAL DISCHARGE: ICD-10-CM

## 2019-08-14 PROCEDURE — 87660 TRICHOMONAS VAGIN DIR PROBE: CPT | Performed by: NURSE PRACTITIONER

## 2019-08-14 PROCEDURE — 99213 OFFICE O/P EST LOW 20 MIN: CPT | Performed by: NURSE PRACTITIONER

## 2019-08-14 PROCEDURE — 87510 GARDNER VAG DNA DIR PROBE: CPT | Performed by: NURSE PRACTITIONER

## 2019-08-14 PROCEDURE — 87480 CANDIDA DNA DIR PROBE: CPT | Performed by: NURSE PRACTITIONER

## 2019-08-14 NOTE — PROGRESS NOTES
"Subjective   Chief Complaint   Patient presents with   • Pessary Check     Kristy Ramirez is a 85 y.o. year old who presents to be seen for follow-up of her pessary.  Currently she is using Foldable ring w/ support - #7 w/o urethral bar.  She reports vaginal discharge, itching and pelvic pressure.    No Additional Complaints Reported    The following portions of the patient's history were reviewed and updated as appropriate:problem list, current medications and allergies    Review of Systems   Constitutional: Negative for chills, fever, unexpected weight gain and unexpected weight loss.   Genitourinary: Positive for pelvic pressure and vaginal discharge. Negative for decreased urine volume, difficulty urinating, pelvic pain, urgency, urinary incontinence, vaginal bleeding and vaginal pain.        Objective   Ht 152.4 cm (60\")   Wt 58.1 kg (128 lb)   BMI 25.00 kg/m²     General:  well developed; well nourished  no acute distress   Pelvis: Clinical staff was present for exam  External genitalia:  normal appearance of the external genitalia including Bartholin's and Orosi's glands.  :  urethral meatus normal; urethra hypermobile; prominent caruncle present;  Vaginal:  atrophic mucosal changes are present; discharge present -  white, thick and vag panel obtained;  Cervix:  absent.  Uterus:  absent.  Adnexa:  non palpable bilaterally.  Cystocele GRADE 2  Rectocele GRADE 2  Pessary removed and cleaned. After vaginal inspection the pessary was lubricated and placed back into the vaginal vault.     Lab Review   No data reviewed    Imaging   No data reviewed         Diagnoses and all orders for this visit:    Pessary maintenance    Vaginal discharge  -     Gardnerella vaginalis, Trichomonas vaginalis, Candida albicans, DNA - Swab, Vagina    Other orders  -     terconazole (TERAZOL 3) 0.8 % vaginal cream; Insert 1 applicator into the vagina Every Night for 3 days.        New Medications Ordered This Visit   Medications "   • terconazole (TERAZOL 3) 0.8 % vaginal cream     Sig: Insert 1 applicator into the vagina Every Night for 3 days.     Dispense:  20 g     Refill:  0     Empirical tx for yeast sent. Will call with any other abnormal results. F/U in 8 weeks for routine pessary maintenance.   This document was electronically signed.    Gladys Soni, NADYA  August 14, 2019

## 2019-08-15 LAB
CANDIDA ALBICANS: NEGATIVE
GARDNERELLA VAGINALIS: NEGATIVE
T VAGINALIS DNA VAG QL PROBE+SIG AMP: NEGATIVE

## 2019-08-16 DIAGNOSIS — G62.9 NEUROPATHY: ICD-10-CM

## 2019-08-16 DIAGNOSIS — M54.5 CHRONIC LOW BACK PAIN, UNSPECIFIED BACK PAIN LATERALITY, WITH SCIATICA PRESENCE UNSPECIFIED: ICD-10-CM

## 2019-08-16 DIAGNOSIS — G89.29 CHRONIC LOW BACK PAIN, UNSPECIFIED BACK PAIN LATERALITY, WITH SCIATICA PRESENCE UNSPECIFIED: ICD-10-CM

## 2019-08-19 RX ORDER — GABAPENTIN 300 MG/1
CAPSULE ORAL
Qty: 30 CAPSULE | Refills: 0 | OUTPATIENT
Start: 2019-08-19

## 2019-09-18 ENCOUNTER — TELEPHONE (OUTPATIENT)
Dept: OBSTETRICS AND GYNECOLOGY | Facility: CLINIC | Age: 84
End: 2019-09-18

## 2019-09-30 ENCOUNTER — TELEPHONE (OUTPATIENT)
Dept: FAMILY MEDICINE CLINIC | Facility: CLINIC | Age: 84
End: 2019-09-30

## 2019-09-30 NOTE — TELEPHONE ENCOUNTER
Patient aware needs to come in and be seen .  Says she has an dr lisy tomorrow in [place] but can come to walk in on Wednesday.

## 2019-09-30 NOTE — TELEPHONE ENCOUNTER
Pt has an insect bite that is rather large and itching. Wanted to know if something could be sent to the pharmacy for her or if she should be seen. Please call at [phone]

## 2019-10-02 ENCOUNTER — OFFICE VISIT (OUTPATIENT)
Dept: FAMILY MEDICINE CLINIC | Facility: CLINIC | Age: 84
End: 2019-10-02

## 2019-10-02 VITALS
OXYGEN SATURATION: 98 % | SYSTOLIC BLOOD PRESSURE: 110 MMHG | TEMPERATURE: 98.4 F | HEIGHT: 60 IN | HEART RATE: 58 BPM | WEIGHT: 124.25 LBS | RESPIRATION RATE: 18 BRPM | DIASTOLIC BLOOD PRESSURE: 58 MMHG | BODY MASS INDEX: 24.39 KG/M2
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
## 2019-10-02 DIAGNOSIS — L08.9 INFECTED INSECT BITE OF RIGHT LOWER EXTREMITY, INITIAL ENCOUNTER: ICD-10-CM

## 2019-10-02 DIAGNOSIS — S40.861A INFECTED INSECT BITE OF RIGHT UPPER EXTREMITY, INITIAL ENCOUNTER: Primary | ICD-10-CM

## 2019-10-02 DIAGNOSIS — S80.861A INFECTED INSECT BITE OF RIGHT LOWER EXTREMITY, INITIAL ENCOUNTER: ICD-10-CM
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
## 2019-10-02 DIAGNOSIS — R63.0 DECREASED APPETITE: ICD-10-CM

## 2019-10-02 DIAGNOSIS — L08.9 INFECTED INSECT BITE OF RIGHT UPPER EXTREMITY, INITIAL ENCOUNTER: Primary | ICD-10-CM

## 2019-10-02 PROCEDURE — 99213 OFFICE O/P EST LOW 20 MIN: CPT | Performed by: NURSE PRACTITIONER

## 2019-10-02 RX ORDER — CYPROHEPTADINE HYDROCHLORIDE 2 MG/5ML
2 SOLUTION ORAL EVERY 12 HOURS PRN
Qty: 480 ML | Refills: 0 | Status: SHIPPED | OUTPATIENT
Start: 2019-10-02 | End: 2020-12-30

## 2019-10-02 RX ORDER — CLINDAMYCIN HYDROCHLORIDE 300 MG/1
300 CAPSULE ORAL 2 TIMES DAILY
Qty: 14 CAPSULE | Refills: 0 | Status: SHIPPED | OUTPATIENT
Start: 2019-10-02 | End: 2019-10-09

## 2019-10-02 RX ORDER — COLCHICINE 0.6 MG/1
CAPSULE ORAL AS NEEDED
Refills: 0 | COMMUNITY
Start: 2019-08-18 | End: 2021-07-19

## 2019-10-02 RX ORDER — TAPENTADOL HYDROCHLORIDE 50 MG/1
TABLET, FILM COATED ORAL
Refills: 0 | COMMUNITY
Start: 2019-09-09 | End: 2021-07-19

## 2019-10-02 RX ORDER — TRIAMCINOLONE ACETONIDE 1 MG/G
CREAM TOPICAL 3 TIMES DAILY PRN
Qty: 80 G | Refills: 0 | Status: SHIPPED | OUTPATIENT
Start: 2019-10-02 | End: 2021-01-28

## 2019-10-02 RX ORDER — CYPROHEPTADINE HYDROCHLORIDE 4 MG/1
4 TABLET ORAL 2 TIMES DAILY
Qty: 60 TABLET | Refills: 1 | Status: CANCELLED | OUTPATIENT
Start: 2019-10-02

## 2019-10-02 NOTE — PATIENT INSTRUCTIONS
Failure to Thrive, Adult  Failure to thrive is a group of symptoms that affect elderly adults. These symptoms include loss of appetite and weight loss. People who have this condition may do fewer and fewer activities over time. They may lose interest in being with friends or they may not want to eat or drink. This condition is not a normal part of aging.  What are the causes?  This condition may be caused by:  · A disease, such dementia, diabetes, cancer, or lung disease.  · A health problem, such as a vitamin deficiency or a heart problem.  · A disorder, such as depression.  · A disability.  · Medicines.  · Mistreatment or neglect.  In some cases, the cause may not be known.  What are the signs or symptoms?  Symptoms of this condition include:  · Loss of more than 5% of your body weight.  · Being more tired than normal after an activity.  · Having trouble getting up after sitting.  · Loss of appetite.  · Not getting out of bed.  · Not wanting to do usual activities.  · Depression.  · Getting infections often.  · Bedsores.  · Taking a long time to recover after an injury or a surgery.  · Weakness.  How is this diagnosed?  This condition may be diagnosed with a physical exam. Your health care provider will ask questions about your health, behavior, and mood, such as:  · Has your activity changed?  · Do you seem sad?  · Are your eating habits different?  Tests may also be done. They may include:  · Blood tests.  · Urine tests.  · Imaging tests, such as X-rays, a CT scan, or MRI.  · Hearing tests.  · Vision tests.  · Tests to check thinking ability (cognitive tests).  · Activity tests to see if you can do tasks such as bathing and dressing and to see if you can move around safely.  You may be referred to a specialist.  How is this treated?  Treatment for this condition depends on the cause. It may involve:  · Treating the cause.  · Talk therapy or medicine to treat depression.  · Improving diet, such as by eating more  often or taking nutritional supplements.  · Changing or stopping a medicine.  · Physical therapy.  It often takes a team of health care providers to find the right treatment.  Follow these instructions at home:  · Take over-the-counter and prescription medicines only as told by your health care provider.  · Eat a healthy, well-balanced diet. Make sure to get enough calories in each meal.  · Be physically active. Include strength training as part of your exercise routine. A physical therapist can help to set up an exercise program that fits you.  · Make sure that you are safe at home.  · Make sure that you have a plan for what to do if you become unable to make decisions for yourself.  Contact a health care provider if:  · You are not able to eat well.  · You are not able to move around.  · You feel very sad or hopeless.  Get help right away if:  · You have thoughts of ending your life.  · You cannot eat or drink.  · You do not get out of bed.  · Staying at home is no longer safe.  · You have a fever.  This information is not intended to replace advice given to you by your health care provider. Make sure you discuss any questions you have with your health care provider.  Document Released: 03/11/2013 Document Revised: 05/25/2017 Document Reviewed: 03/14/2016  [employer] Interactive Patient Education © 2019 [employer]  Insect Bite, Adult  An insect bite can make your skin red, itchy, and swollen. An insect bite is different from an insect sting, which happens when an insect injects poison (venom) into the skin.  Some insects can spread disease to people through a bite. However, most insect bites do not lead to disease and are not serious.  What are the causes?  Insects may bite for a variety of reasons, including:  · Hunger.  · To defend themselves.  Insects that bite include:  · Spiders.  · Mosquitoes.  · Ticks.  · Fleas.  · Ants.  · Flies.  · Bedbugs.  What are the signs or symptoms?  Symptoms of this condition  include:  · Itching or pain in the bite area.  · Redness and swelling in the bite area.  · An open wound (skin ulcer).  In many cases, symptoms last for 2-4 days.  How is this diagnosed?  This condition is usually diagnosed based on symptoms and a physical exam.  How is this treated?  Treatment is usually not needed. Symptoms often go away on their own. When treatment is recommended, it may involve:  · Applying a cream or lotion to the bitten area. This treatment helps with itching.  · Taking an antibiotic medicine. This treatment is needed if the bite area gets infected.  · Getting a tetanus shot.  · Applying ice to the affected area.  · Medicines called antihistamines. This treatment is needed if you develop an allergic reaction to the insect bite.  Follow these instructions at home:  Bite area care  · Do not scratch the bite area.  · Keep the bite area clean and dry. Wash it every day with soap and water as told by your health care provider.  · Check the bite area every day for signs of infection. Check for:  ? More redness, swelling, or pain.  ? Fluid or blood.  ? Warmth.  ? Pus.  Managing pain, itching, and swelling    · You may apply a baking soda paste, cortisone cream, or calamine lotion to the bite area as told by your health care provider.  · If directed, apply ice to the bite area.  ? Put ice in a plastic bag.  ? Place a towel between your skin and the bag.  ? Leave the ice on for 20 minutes, 2-3 times per day.  Medicines  · Apply or take over-the-counter and prescription medicines only as told by your health care provider.  · If you were prescribed an antibiotic medicine, use it as told by your health care provider. Do not stop using the antibiotic even if your condition improves.  General instructions  · Keep all follow-up visits as told by your health care provider. This is important.  How is this prevented?  To help reduce your risk of insect bites:  · When you are outdoors, wear clothing that covers  your arms and legs.  · Use insect repellent. The best insect repellents contain:  ? DEET, picaridin, oil of lemon eucalyptus (OLE), or IR1971.  ? Higher amounts of an active ingredient.  · If your home windows do not have screens, consider installing them.  Contact a health care provider if:  · You have more redness, swelling, or pain in the bite area.  · You have fluid, blood, or pus coming from the bite area.  · The bite area feels warm to the touch.  · You have a fever.  Get help right away if:  · You have joint pain.  · You have a rash.  · You have shortness of breath.  · You feel unusually tired or sleepy.  · You have neck pain.  · You have a headache.  · You have unusual weakness.  · You have chest pain.  · You have nausea, vomiting, or pain in the abdomen.  This information is not intended to replace advice given to you by your health care provider. Make sure you discuss any questions you have with your health care provider.  Document Released: 01/25/2006 Document Revised: 08/16/2017 Document Reviewed: 06/26/2017  [employer] Interactive Patient Education © 2019 [employer]

## 2019-10-02 NOTE — PROGRESS NOTES
"Subjective   Kristy Ramirez is a 85 y.o. female.     [facility] Walk in Clinic Visit    PCP: NADYA Quiroz    CC: \"insect bite; no appetite, losing weight\"    Insect bites started out small, itchy.  Have increased in size and now having some pain in right arm.  Denies removal of ticks.  Not used anything to assist at this point.  Denies fever, body aches, chills.      C/O decreased appetite and slowly losing weight over the last year.  Requesting something to assist.  Has tried Glucerna in the past, but they are expensive.        Insect Bite   This is a new problem. Episode onset: a few days ago while out walking her dog. The problem occurs daily. The problem has been gradually worsening. Pertinent negatives include no abdominal pain, anorexia, arthralgias, change in bowel habit, chest pain, chills, congestion, coughing, diaphoresis, fatigue, fever, headaches, joint swelling, myalgias, nausea, neck pain, numbness, rash, sore throat, swollen glands, urinary symptoms, vertigo, visual change, vomiting or weakness. Nothing aggravates the symptoms. She has tried nothing for the symptoms.   Weight Loss   Chronicity: persistent. The current episode started more than 1 year ago (slowly losing weight, doesn't have an appetite). The problem occurs daily. The problem has been gradually worsening. Pertinent negatives include no abdominal pain, anorexia, arthralgias, change in bowel habit, chest pain, chills, congestion, coughing, diaphoresis, fatigue, fever, headaches, joint swelling, myalgias, nausea, neck pain, numbness, rash, sore throat, swollen glands, urinary symptoms, vertigo, visual change, vomiting or weakness. Exacerbated by: no appetite for anything.        The following portions of the patient's history were reviewed and updated as appropriate: allergies, current medications, past medical history, past social history, past surgical history and problem list.    Review of Systems   Constitutional: Positive for unexpected " "weight loss ( down 6 pounds since June). Negative for chills, diaphoresis, fatigue and fever.   HENT: Negative for congestion, sore throat and swollen glands.    Respiratory: Negative for cough, chest tightness, shortness of breath and wheezing.    Cardiovascular: Negative for chest pain.   Gastrointestinal: Negative for abdominal pain, anorexia, change in bowel habit, nausea and vomiting.   Genitourinary: Negative for difficulty urinating.   Musculoskeletal: Negative for arthralgias, joint swelling, myalgias and neck pain.   Skin: Positive for skin lesions ( insect bites). Negative for rash.   Neurological: Negative for vertigo, weakness, numbness and headache.     /58 (BP Location: Right arm, Patient Position: Sitting, Cuff Size: Adult)   Pulse 58   Temp 98.4 °F (36.9 °C) (Oral)   Resp 18   Ht 152.4 cm (60\")   Wt 56.4 kg (124 lb 4 oz)   SpO2 98%   Breastfeeding? No   BMI 24.27 kg/m²     Objective   Physical Exam   Constitutional: She is oriented to person, place, and time. She appears well-developed and well-nourished. No distress.   Cardiovascular: Normal rate and regular rhythm.   Pulmonary/Chest: Effort normal and breath sounds normal. She has no wheezes. She has no rales.   Musculoskeletal:   Using a cane to help with ambulation   Neurological: She is alert and oriented to person, place, and time.   Skin: There is erythema.        Nursing note and vitals reviewed.    No results found for this or any previous visit (from the past 24 hour(s)).      Assessment/Plan   Kristy was seen today for insect bite.    Diagnoses and all orders for this visit:    Infected insect bite of right upper extremity, initial encounter  -     clindamycin (CLEOCIN) 300 MG capsule; Take 1 capsule by mouth 2 (Two) Times a Day for 7 days.  -     triamcinolone (KENALOG) 0.1 % cream; Apply  topically to the appropriate area as directed 3 (Three) Times a Day As Needed (itching--insect bites).    Infected insect bite of right " lower extremity, initial encounter  -     clindamycin (CLEOCIN) 300 MG capsule; Take 1 capsule by mouth 2 (Two) Times a Day for 7 days.  -     triamcinolone (KENALOG) 0.1 % cream; Apply  topically to the appropriate area as directed 3 (Three) Times a Day As Needed (itching--insect bites).    Decreased appetite  -     cyproheptadine 2 MG/5ML syrup; Take 5 mL by mouth Every 12 (Twelve) Hours As Needed (to stimulate appetite).    Other orders  -     Cancel: cyproheptadine (PERIACTIN) 4 MG tablet; Take 1 tablet by mouth 2 (Two) Times a Day.  -     terconazole (TERAZOL 7) 0.4 % vaginal cream; Insert 1 applicator into the vagina Every Night.    Due to multiple allergies, will give Cleocin BID x 7 days to cover infection at bite sites  Rx for Triamcinalone cream as needed for itching    Encouraged to drink at least one Glucerna per day--coupons given  Rx for low dose Periactin provided--but encouraged to use sparingly due to possible side effects  Schedule f/u with PCP if weight loss persist despite above measures    See PCP or RTC if symptoms persist/worsen  See PCP for routine f/u visit and management of chronic medical conditions

## 2019-11-04 DIAGNOSIS — I10 ESSENTIAL HYPERTENSION: ICD-10-CM

## 2019-11-04 RX ORDER — METOPROLOL SUCCINATE 25 MG/1
25 TABLET, EXTENDED RELEASE ORAL DAILY
Qty: 90 TABLET | Refills: 0 | Status: SHIPPED | OUTPATIENT
Start: 2019-11-04 | End: 2020-01-31

## 2019-11-19 ENCOUNTER — TELEPHONE (OUTPATIENT)
Dept: FAMILY MEDICINE CLINIC | Facility: CLINIC | Age: 84
End: 2019-11-19

## 2019-11-19 NOTE — TELEPHONE ENCOUNTER
Patient called wanting a call back tried calling no answer left message on voicemail [phone]

## 2019-12-03 DIAGNOSIS — I10 ESSENTIAL HYPERTENSION: ICD-10-CM

## 2019-12-03 RX ORDER — CHLORTHALIDONE 25 MG/1
25 TABLET ORAL DAILY
Qty: 90 TABLET | Refills: 0 | Status: SHIPPED | OUTPATIENT
Start: 2019-12-03 | End: 2019-12-09 | Stop reason: SDUPTHER

## 2019-12-09 DIAGNOSIS — I10 ESSENTIAL HYPERTENSION: ICD-10-CM

## 2019-12-09 RX ORDER — LOSARTAN POTASSIUM 50 MG/1
50 TABLET ORAL DAILY
Qty: 90 TABLET | Refills: 2 | Status: SHIPPED | OUTPATIENT
Start: 2019-12-09 | End: 2020-06-26 | Stop reason: SDUPTHER

## 2019-12-09 RX ORDER — CHLORTHALIDONE 25 MG/1
25 TABLET ORAL DAILY
Qty: 90 TABLET | Refills: 2 | Status: SHIPPED | OUTPATIENT
Start: 2019-12-09 | End: 2020-06-26 | Stop reason: SDUPTHER

## 2019-12-09 RX ORDER — HYDROCHLOROTHIAZIDE 12.5 MG/1
12.5 TABLET ORAL DAILY
Qty: 90 TABLET | Refills: 2 | Status: SHIPPED | OUTPATIENT
Start: 2019-12-09 | End: 2020-06-26 | Stop reason: SDUPTHER

## 2020-01-29 ENCOUNTER — APPOINTMENT (OUTPATIENT)
Dept: LAB | Facility: HOSPITAL | Age: 85
End: 2020-01-29

## 2020-01-29 ENCOUNTER — OFFICE VISIT (OUTPATIENT)
Dept: FAMILY MEDICINE CLINIC | Facility: CLINIC | Age: 85
End: 2020-01-29

## 2020-01-29 VITALS
SYSTOLIC BLOOD PRESSURE: 120 MMHG | DIASTOLIC BLOOD PRESSURE: 60 MMHG | RESPIRATION RATE: 18 BRPM | OXYGEN SATURATION: 93 % | BODY MASS INDEX: 23.81 KG/M2 | HEART RATE: 80 BPM | WEIGHT: 121.25 LBS | TEMPERATURE: 98.6 F | HEIGHT: 60 IN

## 2020-01-29 DIAGNOSIS — M25.531 RIGHT WRIST PAIN: ICD-10-CM

## 2020-01-29 DIAGNOSIS — R19.7 ACUTE DIARRHEA: ICD-10-CM

## 2020-01-29 DIAGNOSIS — M79.641 PAIN OF RIGHT HAND: Primary | ICD-10-CM

## 2020-01-29 PROCEDURE — 99213 OFFICE O/P EST LOW 20 MIN: CPT | Performed by: NURSE PRACTITIONER

## 2020-01-29 PROCEDURE — 96372 THER/PROPH/DIAG INJ SC/IM: CPT | Performed by: NURSE PRACTITIONER

## 2020-01-29 PROCEDURE — 84550 ASSAY OF BLOOD/URIC ACID: CPT | Performed by: NURSE PRACTITIONER

## 2020-01-29 PROCEDURE — 85025 COMPLETE CBC W/AUTO DIFF WBC: CPT | Performed by: NURSE PRACTITIONER

## 2020-01-29 PROCEDURE — 80053 COMPREHEN METABOLIC PANEL: CPT | Performed by: NURSE PRACTITIONER

## 2020-01-29 PROCEDURE — 83036 HEMOGLOBIN GLYCOSYLATED A1C: CPT | Performed by: NURSE PRACTITIONER

## 2020-01-29 RX ORDER — TOLTERODINE 4 MG/1
CAPSULE, EXTENDED RELEASE ORAL
COMMUNITY
End: 2020-01-29

## 2020-01-29 RX ORDER — DEXAMETHASONE SODIUM PHOSPHATE 4 MG/ML
4 INJECTION, SOLUTION INTRA-ARTICULAR; INTRALESIONAL; INTRAMUSCULAR; INTRAVENOUS; SOFT TISSUE ONCE
Status: COMPLETED | OUTPATIENT
Start: 2020-01-29 | End: 2020-01-29

## 2020-01-29 RX ADMIN — DEXAMETHASONE SODIUM PHOSPHATE 4 MG: 4 INJECTION, SOLUTION INTRA-ARTICULAR; INTRALESIONAL; INTRAMUSCULAR; INTRAVENOUS; SOFT TISSUE at 15:00

## 2020-01-29 NOTE — PROGRESS NOTES
"Subjective   Kristy Ramirez is a 85 y.o. female.     [facility] Walk in Clinic Visit    PCP: NADYA Quiroz    CC: \"Right hand swollen/painful; diarrhea\"    History of gout, no recent problems.  No injury to hand/wrist.  Allergy to tylenol and NSAIDS.     Hand Pain    The incident occurred 2 days ago. The incident occurred at home. There was no injury mechanism. The pain is present in the right hand and right wrist. The quality of the pain is described as aching. The pain does not radiate. The pain is at a severity of 10/10. The pain has been constant since the incident. Pertinent negatives include no chest pain, muscle weakness, numbness or tingling. The symptoms are aggravated by palpation and movement. She has tried nothing for the symptoms.   Diarrhea    This is a new problem. The current episode started in the past 7 days. The problem occurs 2 to 4 times per day. The problem has been unchanged. The stool consistency is described as watery. Associated symptoms include arthralgias (right hand/wrist). Pertinent negatives include no abdominal pain, bloating, chills, coughing, fever, headaches, increased  flatus, myalgias, sweats, URI, vomiting or weight loss. Nothing aggravates the symptoms. There are no known risk factors. She has tried nothing for the symptoms.        The following portions of the patient's history were reviewed and updated as appropriate: allergies, current medications, past medical history, past social history, past surgical history and problem list.    Review of Systems   Constitutional: Positive for appetite change (decreased). Negative for chills, fever and unexpected weight loss.   HENT: Negative.    Eyes: Negative.    Respiratory: Negative for cough, chest tightness, shortness of breath and wheezing.    Cardiovascular: Negative for chest pain, palpitations and leg swelling.   Gastrointestinal: Positive for diarrhea. Negative for abdominal pain, bloating, blood in stool, flatus, nausea and " "vomiting.   Genitourinary: Negative for difficulty urinating.   Musculoskeletal: Positive for arthralgias (right hand/wrist) and joint swelling (right hand/wrist). Negative for myalgias.   Skin: Negative for rash.   Neurological: Negative for dizziness, tingling, numbness and headache.     /60 (BP Location: Left arm, Patient Position: Sitting, Cuff Size: Adult)   Pulse 80   Temp 98.6 °F (37 °C) (Oral)   Resp 18   Ht 152.4 cm (60\")   Wt 55 kg (121 lb 4 oz)   SpO2 93%   Breastfeeding No   BMI 23.68 kg/m²     Objective   Physical Exam   Constitutional: She is oriented to person, place, and time. She appears well-developed and well-nourished. No distress.   Cardiovascular: Normal rate and regular rhythm.   Pulmonary/Chest: Effort normal and breath sounds normal. She has no wheezes. She has no rales.   Abdominal: Soft. Bowel sounds are increased. There is no tenderness. There is no rebound and no guarding.   Musculoskeletal:        Right wrist: She exhibits decreased range of motion and tenderness.        Right hand: She exhibits decreased range of motion and tenderness.   Neurological: She is alert and oriented to person, place, and time.   Skin: There is erythema ( over right hand/wrist).        Nursing note and vitals reviewed.    No results found for this or any previous visit (from the past 24 hour(s)).  No Images in the past 120 days found..      Assessment/Plan   Diagnoses and all orders for this visit:    Pain of right hand  -     dexamethasone (DECADRON) injection 4 mg  -     CBC Auto Differential  -     Comprehensive metabolic panel  -     Uric acid    Right wrist pain  -     dexamethasone (DECADRON) injection 4 mg  -     CBC Auto Differential  -     Comprehensive metabolic panel  -     Uric acid    Acute diarrhea  -     Gastrointestinal Panel, PCR - Stool, Per Rectum  -     Clostridium Difficile Toxin, PCR - Stool, Per Rectum        Suspect gout, Decadron 4 mg IM x 1 in office  Labs " pending  Stool cultures pending    [place], BRAT diet until diarrhea subsides  Stay well hydrated    See PCP or RTC if symptoms persist/worsen  See PCP for routine f/u visit and management of chronic medical conditions      This document has been electronically signed by NADYA Gusman on January 29, 2020 3:06 PM,.

## 2020-01-30 DIAGNOSIS — D72.829 LEUKOCYTOSIS, UNSPECIFIED TYPE: Primary | ICD-10-CM

## 2020-01-30 LAB
ALBUMIN SERPL-MCNC: 3.9 G/DL (ref 3.5–5.2)
ALBUMIN/GLOB SERPL: 1.1 G/DL
ALP SERPL-CCNC: 92 U/L (ref 39–117)
ALT SERPL W P-5'-P-CCNC: 5 U/L (ref 1–33)
ANION GAP SERPL CALCULATED.3IONS-SCNC: 15 MMOL/L (ref 5–15)
AST SERPL-CCNC: 12 U/L (ref 1–32)
BASOPHILS # BLD AUTO: 0.02 10*3/MM3 (ref 0–0.2)
BASOPHILS NFR BLD AUTO: 0.2 % (ref 0–1.5)
BILIRUB SERPL-MCNC: 0.2 MG/DL (ref 0.2–1.2)
BUN BLD-MCNC: 23 MG/DL (ref 8–23)
BUN/CREAT SERPL: 20 (ref 7–25)
CALCIUM SPEC-SCNC: 9.5 MG/DL (ref 8.6–10.5)
CHLORIDE SERPL-SCNC: 99 MMOL/L (ref 98–107)
CO2 SERPL-SCNC: 22 MMOL/L (ref 22–29)
CREAT BLD-MCNC: 1.15 MG/DL (ref 0.57–1)
DEPRECATED RDW RBC AUTO: 45.5 FL (ref 37–54)
EOSINOPHIL # BLD AUTO: 0.07 10*3/MM3 (ref 0–0.4)
EOSINOPHIL NFR BLD AUTO: 0.6 % (ref 0.3–6.2)
ERYTHROCYTE [DISTWIDTH] IN BLOOD BY AUTOMATED COUNT: 14.3 % (ref 12.3–15.4)
GFR SERPL CREATININE-BSD FRML MDRD: 54 ML/MIN/1.73
GLOBULIN UR ELPH-MCNC: 3.4 GM/DL
GLUCOSE BLD-MCNC: 108 MG/DL (ref 65–99)
HBA1C MFR BLD: 5.6 % (ref 4.8–5.6)
HCT VFR BLD AUTO: 32.5 % (ref 34–46.6)
HGB BLD-MCNC: 10.2 G/DL (ref 12–15.9)
IMM GRANULOCYTES # BLD AUTO: 0.04 10*3/MM3 (ref 0–0.05)
IMM GRANULOCYTES NFR BLD AUTO: 0.4 % (ref 0–0.5)
LYMPHOCYTES # BLD AUTO: 1.78 10*3/MM3 (ref 0.7–3.1)
LYMPHOCYTES NFR BLD AUTO: 15.9 % (ref 19.6–45.3)
MCH RBC QN AUTO: 27.5 PG (ref 26.6–33)
MCHC RBC AUTO-ENTMCNC: 31.4 G/DL (ref 31.5–35.7)
MCV RBC AUTO: 87.6 FL (ref 79–97)
MONOCYTES # BLD AUTO: 0.82 10*3/MM3 (ref 0.1–0.9)
MONOCYTES NFR BLD AUTO: 7.3 % (ref 5–12)
NEUTROPHILS # BLD AUTO: 8.47 10*3/MM3 (ref 1.7–7)
NEUTROPHILS NFR BLD AUTO: 75.6 % (ref 42.7–76)
NRBC BLD AUTO-RTO: 0 /100 WBC (ref 0–0.2)
PLATELET # BLD AUTO: 262 10*3/MM3 (ref 140–450)
PMV BLD AUTO: 11.9 FL (ref 6–12)
POTASSIUM BLD-SCNC: 3.9 MMOL/L (ref 3.5–5.2)
PROT SERPL-MCNC: 7.3 G/DL (ref 6–8.5)
RBC # BLD AUTO: 3.71 10*6/MM3 (ref 3.77–5.28)
SODIUM BLD-SCNC: 136 MMOL/L (ref 136–145)
URATE SERPL-MCNC: 4.8 MG/DL (ref 2.4–5.7)
WBC NRBC COR # BLD: 11.2 10*3/MM3 (ref 3.4–10.8)

## 2020-01-30 RX ORDER — CLINDAMYCIN HYDROCHLORIDE 300 MG/1
300 CAPSULE ORAL 3 TIMES DAILY
Qty: 30 CAPSULE | Refills: 0 | Status: SHIPPED | OUTPATIENT
Start: 2020-01-30 | End: 2020-02-09

## 2020-01-31 ENCOUNTER — TELEPHONE (OUTPATIENT)
Dept: FAMILY MEDICINE CLINIC | Facility: CLINIC | Age: 85
End: 2020-01-31

## 2020-01-31 DIAGNOSIS — M79.641 RIGHT HAND PAIN: Primary | ICD-10-CM

## 2020-01-31 DIAGNOSIS — I10 ESSENTIAL HYPERTENSION: ICD-10-CM

## 2020-01-31 RX ORDER — TRAMADOL HYDROCHLORIDE 50 MG/1
50 TABLET ORAL 2 TIMES DAILY PRN
Qty: 12 TABLET | Refills: 0 | Status: SHIPPED | OUTPATIENT
Start: 2020-01-31 | End: 2020-11-06 | Stop reason: SDUPTHER

## 2020-01-31 RX ORDER — METOPROLOL SUCCINATE 25 MG/1
25 TABLET, EXTENDED RELEASE ORAL DAILY
Qty: 90 TABLET | Refills: 0 | Status: SHIPPED | OUTPATIENT
Start: 2020-01-31 | End: 2020-05-04

## 2020-01-31 NOTE — TELEPHONE ENCOUNTER
Ms Ashley states she is still having pain after starting the abx, per Mateusz she said she could try the tramadol and send it to [facility] please.

## 2020-01-31 NOTE — TELEPHONE ENCOUNTER
Patient states that she is having pain in her hand still after receiving shot. Would like to know if she needs to come back in for another shot or if something can be done to help with the pain.     Please advise.

## 2020-01-31 NOTE — TELEPHONE ENCOUNTER
I called and spoke to her last night and told her that her white count was elevated and that it looked like she had an infection in her hand.  I told her I sent antibiotics to the pharmacy.  Make sure she remembers talking to me?

## 2020-01-31 NOTE — PROGRESS NOTES
Patient requests pain medication.  Unable to take tylenol, NSAIDS.  Rx for Tramadol sent.  She is to finish antibiotics.      Shaan [id number] reviewed.

## 2020-02-03 ENCOUNTER — OFFICE VISIT (OUTPATIENT)
Dept: FAMILY MEDICINE CLINIC | Facility: CLINIC | Age: 85
End: 2020-02-03

## 2020-02-03 ENCOUNTER — APPOINTMENT (OUTPATIENT)
Dept: LAB | Facility: HOSPITAL | Age: 85
End: 2020-02-03

## 2020-02-03 VITALS
HEIGHT: 60 IN | DIASTOLIC BLOOD PRESSURE: 58 MMHG | RESPIRATION RATE: 20 BRPM | WEIGHT: 121.25 LBS | OXYGEN SATURATION: 96 % | SYSTOLIC BLOOD PRESSURE: 118 MMHG | HEART RATE: 58 BPM | BODY MASS INDEX: 23.81 KG/M2 | TEMPERATURE: 98.4 F

## 2020-02-03 DIAGNOSIS — M79.641 RIGHT HAND PAIN: Primary | ICD-10-CM

## 2020-02-03 PROCEDURE — 86431 RHEUMATOID FACTOR QUANT: CPT | Performed by: NURSE PRACTITIONER

## 2020-02-03 PROCEDURE — 0097U HC BIOFIRE FILMARRAY GI PANEL: CPT | Performed by: NURSE PRACTITIONER

## 2020-02-03 PROCEDURE — 86038 ANTINUCLEAR ANTIBODIES: CPT | Performed by: NURSE PRACTITIONER

## 2020-02-03 PROCEDURE — 86140 C-REACTIVE PROTEIN: CPT | Performed by: NURSE PRACTITIONER

## 2020-02-03 PROCEDURE — 85025 COMPLETE CBC W/AUTO DIFF WBC: CPT | Performed by: NURSE PRACTITIONER

## 2020-02-03 PROCEDURE — 87493 C DIFF AMPLIFIED PROBE: CPT | Performed by: NURSE PRACTITIONER

## 2020-02-03 PROCEDURE — 99213 OFFICE O/P EST LOW 20 MIN: CPT | Performed by: NURSE PRACTITIONER

## 2020-02-03 RX ORDER — PREDNISONE 10 MG/1
TABLET ORAL
Qty: 11 TABLET | Refills: 0 | Status: SHIPPED | OUTPATIENT
Start: 2020-02-03 | End: 2020-03-05

## 2020-02-03 NOTE — PROGRESS NOTES
"Subjective   Kristy Ramirez is a 85 y.o. female.     [facility] Walk in Clinic Visit    PCP: NADYA Quiroz    CC: \"check right hand, still hurting and swelling has not gone down\"    See last week by me for red swollen hand/wrist--treated for gout vs cellulitis due to scab on right forearm.  Decadron given with some mild relief and started on Cleocin after found to have elevated WBC.  Swelling/erythema in arm/wrist is gone, but swelling in hand is worse.  No fever.  Taking Tramadol which helps somewhat.  Uric acid was negative last week.  Unsure of history of RA.    Hand Pain    The incident occurred 5 to 7 days ago. The incident occurred at home. There was no injury mechanism. The pain is present in the right hand. The quality of the pain is described as aching. The pain does not radiate. The pain is at a severity of 10/10. The pain has been constant since the incident. Pertinent negatives include no chest pain, muscle weakness, numbness or tingling. The symptoms are aggravated by palpation and movement. Treatments tried: Decadron with mild relief; clindamycin has helped arm/wrist.        The following portions of the patient's history were reviewed and updated as appropriate: allergies, current medications, past medical history, past social history, past surgical history and problem list.    Review of Systems   Constitutional: Negative for chills, fatigue and fever.   Respiratory: Negative.    Cardiovascular: Negative.  Negative for chest pain.   Gastrointestinal: Negative for nausea and vomiting.   Genitourinary: Negative for difficulty urinating.   Musculoskeletal: Positive for arthralgias (right hand).   Skin: Positive for color change (erythema).   Neurological: Negative for tingling, numbness and headache.     /58 (BP Location: Left arm, Patient Position: Sitting, Cuff Size: Adult)   Pulse 58   Temp 98.4 °F (36.9 °C) (Oral)   Resp 20   Ht 152.4 cm (60\")   Wt 55 kg (121 lb 4 oz)   SpO2 96%   Breastfeeding " No   BMI 23.68 kg/m²     Objective   Physical Exam   Constitutional: She is oriented to person, place, and time. She appears well-developed and well-nourished. No distress.   Cardiovascular:   Pulses:       Radial pulses are 2+ on the right side.   Musculoskeletal:        Right hand: She exhibits decreased range of motion and tenderness ( even to light touch).   Neurological: She is alert and oriented to person, place, and time.   Skin: Skin is warm and dry. Capillary refill takes less than 2 seconds. There is erythema.        Nursing note and vitals reviewed.    No results found for this or any previous visit (from the past 24 hour(s)).  Xr Hand 3+ View Right (in Office)    Result Date: 2/3/2020  No acute osseous abnormality. Advanced degenerative arthritic changes in the first carpometacarpal joint. Narrowing radiocarpal joint. Calcification triangular fibrocartilage. 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 [id number] Electronically signed by:  Иван Melton MD  2/3/2020 1:34 PM CST Workstation: [id number]        Assessment/Plan   Kristy was seen today for hand pain.    Diagnoses and all orders for this visit:    Right hand pain  -     XR Hand 3+ View Right (In Office)  -     predniSONE (DELTASONE) 10 MG tablet; 2 tabs po daily x 4 days, then 1 tab po daily x 3 days  -     CBC Auto Differential  -     Rheumatoid factor  -     C-reactive protein  -     JOSE    Xray with significant degenerative changes  Rx for Prednisone x 7 days provided  Sling provided to keep arm elevated during the day, may elevate on pillows at night  Labs as above with f/u in 1 week with PCP for follow up appt  Continue with Tramadol as needed for pain    See PCP or RTC if symptoms persist/worsen  See PCP for routine f/u visit and management of chronic medical conditions      This document has been electronically signed by NADYA Gusman on February 3, 2020 3:22 PM,.

## 2020-02-04 LAB
ADV 40+41 DNA STL QL NAA+NON-PROBE: NOT DETECTED
ASTRO TYP 1-8 RNA STL QL NAA+NON-PROBE: NOT DETECTED
BASOPHILS # BLD AUTO: 0.03 10*3/MM3 (ref 0–0.2)
BASOPHILS NFR BLD AUTO: 0.3 % (ref 0–1.5)
C CAYETANENSIS DNA STL QL NAA+NON-PROBE: NOT DETECTED
C DIFF TOX GENS STL QL NAA+PROBE: NEGATIVE
CAMPY SP DNA.DIARRHEA STL QL NAA+PROBE: NOT DETECTED
CHROMATIN AB SERPL-ACNC: <10 IU/ML (ref 0–14)
CRP SERPL-MCNC: 7.32 MG/DL (ref 0–0.5)
CRYPTOSP STL CULT: NOT DETECTED
DEPRECATED RDW RBC AUTO: 40.6 FL (ref 37–54)
E COLI DNA SPEC QL NAA+PROBE: NOT DETECTED
E HISTOLYT AG STL-ACNC: NOT DETECTED
EAEC PAA PLAS AGGR+AATA ST NAA+NON-PRB: NOT DETECTED
EC STX1 + STX2 GENES STL NAA+PROBE: NOT DETECTED
EOSINOPHIL # BLD AUTO: 0.06 10*3/MM3 (ref 0–0.4)
EOSINOPHIL NFR BLD AUTO: 0.6 % (ref 0.3–6.2)
EPEC EAE GENE STL QL NAA+NON-PROBE: NOT DETECTED
ERYTHROCYTE [DISTWIDTH] IN BLOOD BY AUTOMATED COUNT: 13.5 % (ref 12.3–15.4)
ETEC LTA+ST1A+ST1B TOX ST NAA+NON-PROBE: NOT DETECTED
G LAMBLIA DNA SPEC QL NAA+PROBE: NOT DETECTED
HCT VFR BLD AUTO: 31.1 % (ref 34–46.6)
HGB BLD-MCNC: 10.3 G/DL (ref 12–15.9)
IMM GRANULOCYTES # BLD AUTO: 0.13 10*3/MM3 (ref 0–0.05)
IMM GRANULOCYTES NFR BLD AUTO: 1.2 % (ref 0–0.5)
LYMPHOCYTES # BLD AUTO: 1.7 10*3/MM3 (ref 0.7–3.1)
LYMPHOCYTES NFR BLD AUTO: 15.8 % (ref 19.6–45.3)
MCH RBC QN AUTO: 27.7 PG (ref 26.6–33)
MCHC RBC AUTO-ENTMCNC: 33.1 G/DL (ref 31.5–35.7)
MCV RBC AUTO: 83.6 FL (ref 79–97)
MONOCYTES # BLD AUTO: 0.9 10*3/MM3 (ref 0.1–0.9)
MONOCYTES NFR BLD AUTO: 8.4 % (ref 5–12)
NEUTROPHILS # BLD AUTO: 7.93 10*3/MM3 (ref 1.7–7)
NEUTROPHILS NFR BLD AUTO: 73.7 % (ref 42.7–76)
NOROVIRUS GI+II RNA STL QL NAA+NON-PROBE: NOT DETECTED
NRBC BLD AUTO-RTO: 0 /100 WBC (ref 0–0.2)
P SHIGELLOIDES DNA STL QL NAA+PROBE: NOT DETECTED
PLATELET # BLD AUTO: 312 10*3/MM3 (ref 140–450)
PMV BLD AUTO: 11.9 FL (ref 6–12)
RBC # BLD AUTO: 3.72 10*6/MM3 (ref 3.77–5.28)
RV RNA STL NAA+PROBE: NOT DETECTED
SALMONELLA DNA SPEC QL NAA+PROBE: NOT DETECTED
SAPO I+II+IV+V RNA STL QL NAA+NON-PROBE: NOT DETECTED
SHIGELLA SP+EIEC IPAH STL QL NAA+PROBE: NOT DETECTED
V CHOLERAE DNA SPEC QL NAA+PROBE: NOT DETECTED
VIBRIO DNA SPEC NAA+PROBE: NOT DETECTED
WBC NRBC COR # BLD: 10.75 10*3/MM3 (ref 3.4–10.8)
YERSINIA STL CULT: NOT DETECTED

## 2020-02-05 LAB — ANA SER QL: NEGATIVE

## 2020-02-14 ENCOUNTER — OFFICE VISIT (OUTPATIENT)
Dept: FAMILY MEDICINE CLINIC | Facility: CLINIC | Age: 85
End: 2020-02-14

## 2020-02-14 VITALS
SYSTOLIC BLOOD PRESSURE: 124 MMHG | HEIGHT: 60 IN | TEMPERATURE: 98.2 F | WEIGHT: 123 LBS | RESPIRATION RATE: 20 BRPM | OXYGEN SATURATION: 92 % | HEART RATE: 92 BPM | DIASTOLIC BLOOD PRESSURE: 69 MMHG | BODY MASS INDEX: 24.15 KG/M2

## 2020-02-14 DIAGNOSIS — M79.641 PAIN OF RIGHT HAND: Primary | ICD-10-CM

## 2020-02-14 PROCEDURE — 99214 OFFICE O/P EST MOD 30 MIN: CPT | Performed by: NURSE PRACTITIONER

## 2020-02-14 RX ORDER — METHYLPREDNISOLONE 4 MG/1
TABLET ORAL
Qty: 21 EACH | Refills: 0 | Status: SHIPPED | OUTPATIENT
Start: 2020-02-14 | End: 2020-03-05

## 2020-02-14 RX ORDER — AMOXICILLIN AND CLAVULANATE POTASSIUM 875; 125 MG/1; MG/1
1 TABLET, FILM COATED ORAL 2 TIMES DAILY
Qty: 20 TABLET | Refills: 0 | Status: SHIPPED | OUTPATIENT
Start: 2020-02-14 | End: 2020-03-05

## 2020-02-17 ENCOUNTER — OFFICE VISIT (OUTPATIENT)
Dept: OBSTETRICS AND GYNECOLOGY | Facility: CLINIC | Age: 85
End: 2020-02-17

## 2020-02-17 VITALS
WEIGHT: 121 LBS | DIASTOLIC BLOOD PRESSURE: 60 MMHG | HEIGHT: 60 IN | SYSTOLIC BLOOD PRESSURE: 118 MMHG | BODY MASS INDEX: 23.75 KG/M2

## 2020-02-17 DIAGNOSIS — Z46.89 PESSARY MAINTENANCE: Primary | ICD-10-CM

## 2020-02-17 DIAGNOSIS — N81.6 POP-Q STAGE 2 RECTOCELE: Chronic | ICD-10-CM

## 2020-02-17 DIAGNOSIS — N81.10 POP-Q STAGE 4 CYSTOCELE: Chronic | ICD-10-CM

## 2020-02-17 PROCEDURE — 99212 OFFICE O/P EST SF 10 MIN: CPT | Performed by: NURSE PRACTITIONER

## 2020-02-19 ENCOUNTER — TELEPHONE (OUTPATIENT)
Dept: FAMILY MEDICINE CLINIC | Facility: CLINIC | Age: 85
End: 2020-02-19

## 2020-02-19 NOTE — TELEPHONE ENCOUNTER
Patient called wanting to know if Arelis had gotten her an appointment for her hand Yet.  Arelis is not finished with chart yet.  As soon as she gets chart done she will be set up with someone.  Patient is aware

## 2020-02-27 DIAGNOSIS — M54.50 CHRONIC LOW BACK PAIN: ICD-10-CM

## 2020-02-27 DIAGNOSIS — G89.29 CHRONIC LOW BACK PAIN: ICD-10-CM

## 2020-02-27 DIAGNOSIS — G62.9 NEUROPATHY: ICD-10-CM

## 2020-02-27 RX ORDER — GABAPENTIN 600 MG/1
600 TABLET ORAL NIGHTLY
Qty: 30 TABLET | Refills: 0 | Status: SHIPPED | OUTPATIENT
Start: 2020-02-27 | End: 2020-08-03 | Stop reason: SDUPTHER

## 2020-02-27 RX ORDER — GABAPENTIN 300 MG/1
300 CAPSULE ORAL EVERY MORNING
Qty: 30 CAPSULE | Refills: 0 | Status: SHIPPED | OUTPATIENT
Start: 2020-02-27 | End: 2020-08-03 | Stop reason: SDUPTHER

## 2020-02-28 DIAGNOSIS — S40.861A INFECTED INSECT BITE OF RIGHT UPPER EXTREMITY, INITIAL ENCOUNTER: ICD-10-CM
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
## 2020-02-28 DIAGNOSIS — S80.861A INFECTED INSECT BITE OF RIGHT LOWER EXTREMITY, INITIAL ENCOUNTER: ICD-10-CM

## 2020-02-28 DIAGNOSIS — L08.9 INFECTED INSECT BITE OF RIGHT UPPER EXTREMITY, INITIAL ENCOUNTER: ICD-10-CM
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
## 2020-02-28 DIAGNOSIS — L08.9 INFECTED INSECT BITE OF RIGHT LOWER EXTREMITY, INITIAL ENCOUNTER: ICD-10-CM

## 2020-03-04 DIAGNOSIS — M10.9 GOUT, UNSPECIFIED CAUSE, UNSPECIFIED CHRONICITY, UNSPECIFIED SITE: ICD-10-CM

## 2020-03-04 DIAGNOSIS — I10 ESSENTIAL HYPERTENSION: ICD-10-CM

## 2020-03-04 RX ORDER — ALLOPURINOL 100 MG/1
TABLET ORAL
Qty: 90 TABLET | Refills: 1 | Status: SHIPPED | OUTPATIENT
Start: 2020-03-04 | End: 2020-06-26 | Stop reason: SDUPTHER

## 2020-03-04 RX ORDER — AMLODIPINE BESYLATE 5 MG/1
5 TABLET ORAL DAILY
Qty: 90 TABLET | Refills: 1 | Status: SHIPPED | OUTPATIENT
Start: 2020-03-04 | End: 2020-06-26 | Stop reason: SDUPTHER

## 2020-03-05 ENCOUNTER — OFFICE VISIT (OUTPATIENT)
Dept: FAMILY MEDICINE CLINIC | Facility: CLINIC | Age: 85
End: 2020-03-05

## 2020-03-05 ENCOUNTER — OFFICE VISIT (OUTPATIENT)
Dept: ORTHOPEDIC SURGERY | Facility: CLINIC | Age: 85
End: 2020-03-05

## 2020-03-05 VITALS
OXYGEN SATURATION: 97 % | DIASTOLIC BLOOD PRESSURE: 56 MMHG | HEIGHT: 60 IN | SYSTOLIC BLOOD PRESSURE: 110 MMHG | HEART RATE: 61 BPM | BODY MASS INDEX: 24.2 KG/M2 | TEMPERATURE: 97.8 F | WEIGHT: 123.25 LBS | RESPIRATION RATE: 20 BRPM

## 2020-03-05 VITALS
SYSTOLIC BLOOD PRESSURE: 134 MMHG | TEMPERATURE: 98.4 F | BODY MASS INDEX: 23.95 KG/M2 | RESPIRATION RATE: 18 BRPM | DIASTOLIC BLOOD PRESSURE: 56 MMHG | HEIGHT: 60 IN | OXYGEN SATURATION: 98 % | WEIGHT: 122 LBS | HEART RATE: 61 BPM

## 2020-03-05 DIAGNOSIS — M19.041 PRIMARY OSTEOARTHRITIS OF RIGHT HAND: ICD-10-CM

## 2020-03-05 DIAGNOSIS — J06.9 URI WITH COUGH AND CONGESTION: Primary | ICD-10-CM

## 2020-03-05 DIAGNOSIS — J30.2 SEASONAL ALLERGIES: ICD-10-CM

## 2020-03-05 DIAGNOSIS — M79.641 RIGHT HAND PAIN: Primary | ICD-10-CM

## 2020-03-05 PROCEDURE — 99203 OFFICE O/P NEW LOW 30 MIN: CPT | Performed by: ORTHOPAEDIC SURGERY

## 2020-03-05 PROCEDURE — 99214 OFFICE O/P EST MOD 30 MIN: CPT | Performed by: NURSE PRACTITIONER

## 2020-03-05 PROCEDURE — 20600 DRAIN/INJ JOINT/BURSA W/O US: CPT | Performed by: ORTHOPAEDIC SURGERY

## 2020-03-05 RX ORDER — BUPIVACAINE HYDROCHLORIDE 5 MG/ML
0.5 INJECTION, SOLUTION PERINEURAL
Status: COMPLETED | OUTPATIENT
Start: 2020-03-05 | End: 2020-03-05

## 2020-03-05 RX ORDER — TRIAMCINOLONE ACETONIDE 40 MG/ML
10 INJECTION, SUSPENSION INTRA-ARTICULAR; INTRAMUSCULAR
Status: COMPLETED | OUTPATIENT
Start: 2020-03-05 | End: 2020-03-05

## 2020-03-05 RX ORDER — GUAIFENESIN AND DEXTROMETHORPHAN HYDROBROMIDE 600; 30 MG/1; MG/1
2 TABLET, EXTENDED RELEASE ORAL 2 TIMES DAILY
Qty: 20 TABLET | Refills: 0 | Status: SHIPPED | OUTPATIENT
Start: 2020-03-05 | End: 2020-03-10

## 2020-03-05 RX ORDER — MONTELUKAST SODIUM 10 MG/1
10 TABLET ORAL NIGHTLY
Qty: 30 TABLET | Refills: 2 | Status: SHIPPED | OUTPATIENT
Start: 2020-03-05 | End: 2021-06-16

## 2020-03-05 RX ORDER — LIDOCAINE HYDROCHLORIDE AND EPINEPHRINE 10; 10 MG/ML; UG/ML
0.5 INJECTION, SOLUTION INFILTRATION; PERINEURAL
Status: COMPLETED | OUTPATIENT
Start: 2020-03-05 | End: 2020-03-05

## 2020-03-05 RX ADMIN — TRIAMCINOLONE ACETONIDE 10 MG: 40 INJECTION, SUSPENSION INTRA-ARTICULAR; INTRAMUSCULAR at 09:24

## 2020-03-05 RX ADMIN — LIDOCAINE HYDROCHLORIDE AND EPINEPHRINE 0.5 ML: 10; 10 INJECTION, SOLUTION INFILTRATION; PERINEURAL at 09:24

## 2020-03-05 RX ADMIN — BUPIVACAINE HYDROCHLORIDE 0.5 ML: 5 INJECTION, SOLUTION PERINEURAL at 09:24

## 2020-03-05 NOTE — PROGRESS NOTES
"Subjective   Kristy Ramirez is a 86 y.o. female.     [facility] Visit    PCP: NADYA Quiroz    CC: \"coughing, sneezing, no fever\"    Reports history of seasonal allergies, not currently taking anything to assist with allergies.  Taking Coricidan to help with URI symptoms with some improvement.     URI    This is a new problem. Episode onset: x 4-5 days. The problem has been gradually improving. There has been no fever. Associated symptoms include coughing (clear sputum), joint pain (right hand--seen by ortho today and given joint injection) and sneezing. Pertinent negatives include no abdominal pain, chest pain, congestion, diarrhea, dysuria, ear pain, headaches, joint swelling, nausea, neck pain, plugged ear sensation, rash, rhinorrhea, sinus pain, sore throat, swollen glands, vomiting or wheezing. Treatments tried: Coricidan. The treatment provided mild relief.        The following portions of the patient's history were reviewed and updated as appropriate: allergies, current medications, past medical history, past social history, past surgical history and problem list.    Review of Systems   Constitutional: Negative.    HENT: Positive for sneezing. Negative for congestion, ear discharge, ear pain, nosebleeds, postnasal drip, rhinorrhea, sinus pressure, sore throat, swollen glands and trouble swallowing.    Eyes: Negative.    Respiratory: Positive for cough (clear sputum). Negative for chest tightness, shortness of breath and wheezing.    Cardiovascular: Negative for chest pain, palpitations and leg swelling.   Gastrointestinal: Negative for abdominal pain, diarrhea, nausea and vomiting.   Genitourinary: Negative for difficulty urinating and dysuria.   Musculoskeletal: Positive for joint pain (right hand--seen by ortho today and given joint injection). Negative for neck pain.   Skin: Negative for rash.   Neurological: Negative for dizziness and headache.     /56 (BP Location: Right arm, Patient " "Position: Sitting, Cuff Size: Adult)   Pulse 61   Temp 97.8 °F (36.6 °C) (Oral)   Resp 20   Ht 152.4 cm (60\")   Wt 55.9 kg (123 lb 4 oz)   SpO2 97%   BMI 24.07 kg/m²     Objective   Physical Exam   Constitutional: She is oriented to person, place, and time. She appears well-developed and well-nourished. No distress.   HENT:   Head: Normocephalic and atraumatic.   Right Ear: Tympanic membrane and ear canal normal.   Left Ear: Tympanic membrane and ear canal normal.   Nose: Mucosal edema ( mild) and congestion ( mild) present. Right sinus exhibits no maxillary sinus tenderness and no frontal sinus tenderness. Left sinus exhibits no maxillary sinus tenderness and no frontal sinus tenderness.   Mouth/Throat: Uvula is midline and mucous membranes are normal. Posterior oropharyngeal erythema ( minimal injection with clear PND) present.   Eyes: Conjunctivae are normal. Right eye exhibits no discharge. Left eye exhibits no discharge.   Neck: Neck supple.   Cardiovascular: Normal rate and regular rhythm.   Pulmonary/Chest: Effort normal. She has no wheezes. She has no rales.   +loose, congested cough   Lymphadenopathy:     She has no cervical adenopathy.   Neurological: She is alert and oriented to person, place, and time.   Nursing note and vitals reviewed.    No results found for this or any previous visit (from the past 24 hour(s)).  Xr Hand 3+ View Right (in Office)    Result Date: 2/3/2020  No acute osseous abnormality. Advanced degenerative arthritic changes in the first carpometacarpal joint. Narrowing radiocarpal joint. Calcification triangular fibrocartilage. 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 [id number] Electronically signed by:  Иван Melton MD  2/3/2020 1:34 PM CST Workstation: [id number]        Assessment/Plan   Kristy was seen today for cough.    Diagnoses and all orders for this visit:    URI with cough and congestion  -     guaifenesin-dextromethorphan (MUCINEX DM)  " MG tablet sustained-release 12 hour tablet; Take 2 tablets by mouth 2 (Two) Times a Day for 5 days.    Seasonal allergies  -     montelukast (SINGULAIR) 10 MG tablet; Take 1 tablet by mouth Every Night.    Push fluids  Rest  Tylenol as needed  Rx for Mucinex DM for the next few days  Rx for Singulair for seasonal allergies  Cool mist humidifier/vaporizer as needed    See PCP or RTC if symptoms persist/worsen--RTC for purulent sputum, fever, lethargy  See PCP for routine f/u visit and management of chronic medical conditions      This document has been electronically signed by NADYA Gusman on March 5, 2020 10:17 AM,.

## 2020-03-05 NOTE — PROGRESS NOTES
Kristy Ramirez is a 86 y.o. female   Primary provider:  Arelis Barney APRN       Chief Complaint   Patient presents with   • Right Hand - Pain, Initial Evaluation     X-rays [facility]  HISTORY OF PRESENT ILLNESS:Right hand pain.  No known injury.  Pain scale today 2/10    This is the first office visit for evaluation of pain in the right hand.    Ms. Zhao is 86 years old and right-hand dominant.  She has a 1 month history of pain in the right hand.  There is been no trauma and she denies any previous problems with the hand.  Complains of an aching sensation merrily over the dorsum of the hand fingers.  The pain is worse with use of the hand.  She is noticed some decreased  strength.  There is been no specific relieving factor.  She denies any numbness.  Treatment has included what sounds like an intramuscular steroid injection and oral prednisone.  She has completed her course of prednisone.    Current medications include BuSpar Norvasc Neurontin Lasix metformin metoprolol and Nucynta among others.  She is allergic to Naprosyn and multiple analgesics.  She does not smoke.  Past medical history is remarkable for diabetes hypertension depression and low back pain.  She is forced and unemployed.    NADYA Perry has asked that I see her for evaluation and treatment.  Pain   This is a new problem. The current episode started 1 to 4 weeks ago. The problem occurs constantly (moderate). The problem has been gradually worsening. Associated symptoms include coughing and joint swelling. Associated symptoms comments: Aching,swelling . Exacerbated by: driving. She has tried nothing for the symptoms. The treatment provided no relief.        CONCURRENT MEDICAL HISTORY:    Past Medical History:   Diagnosis Date   • Abnormal CT scan     per patient   • Acute bronchitis    • Acute maxillary sinusitis    • Acute otitis externa    • Acute sinusitis    • Allergic conjunctivitis    • Angular cheilitis    • Ankle edema    •  Backache     improved   • Blood in urine      UTI resolved      • Bradycardia    • Chest discomfort     described as heart racing      • Chronic low back pain     RIGHT leg radiation      • Cold feet     c/o - and lowerlegs      • Contusion     of dorsum of foot   • Cough    • Depressive disorder    • Diabetes mellitus (CMS/HCC)    • Dizziness and giddiness    • Dyspnea    • Dysuria    • Edema    • Edema of foot    • Edema of lower extremity    • Essential hypertension    • Exanthematous disorder    • Fatigue    • Foot pain, left    • Grade II hemorrhoids 3/12/2018   • Hematoma    • Hip pain, right    • History of palpitations    • Hormone replacement therapy (postmenopausal)    • Hypertensive disorder    • Impacted cerumen    • Joint pain    • Knee pain    • Low back pain    • Malaise and fatigue    • Multiple joint pain    • Muscle pain    • Muscle weakness    • Neck pain    • Obesity (BMI 30.0-34.9) 3/12/2018   • Osteoarthritis of knee    • Osteoarthritis of multiple joints    • Otitis externa    • Pain in lower limb    • Peripheral venous insufficiency    • POP-Q stage 2 rectocele 3/12/2018   • POP-Q stage 4 cystocele 3/12/2018   • Sacroiliitis, not elsewhere classified (CMS/HCC)     R LE   • Shoulder pain    • Upper respiratory infection    • Urinary frequency    • Urinary tract infectious disease    • Wheezing        Allergies   Allergen Reactions   • Aleve [Naproxen Sodium] Itching   • Atenolol    • Keflex [Cephalexin] Itching   • Lisinopril Angioedema   • Relafen [Nabumetone] Itching   • Zanaflex [Tizanidine] Itching   • Acetaminophen Itching   • Aspirin Itching and Rash   • Diflucan [Fluconazole] Itching   • Sulfa Antibiotics Rash and Itching         Current Outpatient Medications:   •  ACCU-CHEK FASTCLIX LANCETS misc, 1 each Daily., Disp: 102 each, Rfl: 12  •  allopurinol (ZYLOPRIM) 100 MG tablet, TAKE 1 TABLET BY MOUTH DAILY. TAKE WITH FOOD, Disp: 90 tablet, Rfl: 1  •  amLODIPine (NORVASC) 5 MG tablet,  TAKE 1 TABLET BY MOUTH DAILY, Disp: 90 tablet, Rfl: 1  •  amoxicillin-clavulanate (AUGMENTIN) 875-125 MG per tablet, Take 1 tablet by mouth 2 (Two) Times a Day., Disp: 20 tablet, Rfl: 0  •  aspirin 81 MG tablet, Take 1 tablet by mouth Daily., Disp: 90 tablet, Rfl: 1  •  baclofen (LIORESAL) 10 MG tablet, Take 1 tablet by mouth 3 (Three) Times a Day., Disp: 90 tablet, Rfl: 5  •  busPIRone (BUSPAR) 10 MG tablet, Take 1 tablet by mouth 3 (Three) Times a Day., Disp: 180 tablet, Rfl: 1  •  chlorthalidone (HYGROTON) 25 MG tablet, Take 1 tablet by mouth Daily., Disp: 90 tablet, Rfl: 2  •  colchicine 0.6 MG capsule capsule, TK 1 C PO QD STARTING ON 8/19/19, Disp: , Rfl: 0  •  cyproheptadine 2 MG/5ML syrup, Take 5 mL by mouth Every 12 (Twelve) Hours As Needed (to stimulate appetite)., Disp: 480 mL, Rfl: 0  •  diphenhydrAMINE (BANOPHEN) 25 mg capsule, Banophen 25 mg capsule  TK 1 C PO Q 6 H PRN FOR ITCHING, Disp: , Rfl:   •  docusate sodium (DOK) 100 MG capsule, Take 1 capsule by mouth 2 (Two) Times a Day., Disp: 60 capsule, Rfl: 1  •  estradiol (ESTRACE) 1 MG tablet, Take 1 tablet by mouth Daily., Disp: 90 tablet, Rfl: 1  •  folic acid (FOLVITE) 1 MG tablet, Take 1 mg by mouth Daily., Disp: , Rfl:   •  furosemide (LASIX) 20 MG tablet, Take 1 tablet by mouth Daily., Disp: 90 tablet, Rfl: 1  •  gabapentin (NEURONTIN) 300 MG capsule, Take 1 capsule by mouth Every Morning., Disp: 30 capsule, Rfl: 0  •  gabapentin (NEURONTIN) 600 MG tablet, Take 1 tablet by mouth Every Night., Disp: 30 tablet, Rfl: 0  •  glucose blood test strip, Use as instructed to check b/s 1x a day.  Please give strip compatible with monitor., Disp: 100 each, Rfl: 12  •  hydroCHLOROthiazide (HYDRODIURIL) 12.5 MG tablet, Take 1 tablet by mouth Daily., Disp: 90 tablet, Rfl: 2  •  Lancets Misc. (ACCU-CHEK FASTCLIX LANCET) kit, Accu-Chek FastClix, Disp: , Rfl:   •  losartan (COZAAR) 50 MG tablet, Take 1 tablet by mouth Daily., Disp: 90 tablet, Rfl: 2  •  Menthol,  Topical Analgesic, (BIOFREEZE) 4 % gel, Apply to affected area on left back 2-3 times per day x 1week, Disp: 118 mL, Rfl: 1  •  metFORMIN ER (GLUCOPHAGE-XR) 500 MG 24 hr tablet, Take 1 tablet by mouth Daily With Breakfast., Disp: 90 tablet, Rfl: 3  •  metoprolol succinate XL (TOPROL-XL) 25 MG 24 hr tablet, TAKE 1 TABLET BY MOUTH DAILY, Disp: 90 tablet, Rfl: 0  •  NUCYNTA 50 MG tablet, TK 1 T PO BID PRN, Disp: , Rfl: 0  •  omeprazole (PRILOSEC) 20 MG capsule, Take 1 capsule by mouth 2 (Two) Times a Day., Disp: 60 capsule, Rfl: 3  •  terconazole (TERAZOL 7) 0.4 % vaginal cream, Insert 1 applicator into the vagina Every Night., Disp: 45 g, Rfl: 0  •  tolterodine LA (DETROL LA) 4 MG 24 hr capsule, Take 1 capsule by mouth Daily., Disp: 90 capsule, Rfl: 1  •  tolterodine LA (DETROL LA) 4 MG 24 hr capsule, TAKE 1 CAPSULE BY MOUTH DAILY, Disp: 90 capsule, Rfl: 3  •  traMADol (ULTRAM) 50 MG tablet, Take 1 tablet by mouth 2 (Two) Times a Day As Needed for Moderate Pain  (hand pain)., Disp: 12 tablet, Rfl: 0  •  triamcinolone (KENALOG) 0.1 % cream, Apply  topically to the appropriate area as directed 3 (Three) Times a Day As Needed (itching--insect bites)., Disp: 80 g, Rfl: 0    Past Surgical History:   Procedure Laterality Date   • ANKLE SURGERY     • BACK SURGERY     • COLONOSCOPY  12/14/2009   • EYE SURGERY Bilateral 02/2018    B cataract   • HAMMER TOE REPAIR  08/02/2011    Hammertoe repair, left second toe.   • HYSTERECTOMY     • INCISION AND DRAINAGE ABSCESS  01/21/2015   • INJECTION OF MEDICATION  11/15/2013   • INJECTION OF MEDICATION  05/06/2013    Celestone (betamethasone) (1)      • INJECTION OF MEDICATION  03/17/2016    Kenalog (3)      • NECK SURGERY  01/2018   • UPPER GASTROINTESTINAL ENDOSCOPY  12/14/2009       Family History   Problem Relation Age of Onset   • Diabetes Other    • Heart disease Other    • Stroke Other         Social History     Socioeconomic History   • Marital status:      Spouse name:  "Not on file   • Number of children: Not on file   • Years of education: Not on file   • Highest education level: Not on file   Tobacco Use   • Smoking status: Former Smoker   • Smokeless tobacco: Never Used   Substance and Sexual Activity   • Alcohol use: No   • Drug use: No        Review of Systems   Respiratory: Positive for cough.    Gastrointestinal: Positive for diarrhea.   Endocrine: Positive for cold intolerance and heat intolerance.   Musculoskeletal: Positive for joint swelling.   All other systems reviewed and are negative.    Review of systems is positive as noted above.  She denies any numbness or tingling in the hand.    PHYSICAL EXAMINATION:       Vitals:    03/05/20 0857   BP: 134/56   BP Location: Left arm   Patient Position: Sitting   Cuff Size: Adult   Pulse: 61   Resp: 18   Temp: 98.4 °F (36.9 °C)   TempSrc: Oral   SpO2: 98%   Weight: 55.3 kg (122 lb)   Height: 152.4 cm (60\")   PainSc:   2       Physical Exam  She is a thin elderly lady alert and in apparent mild discomfort at rest.  She says she is covering from a cold and coughs frequently during our encounter.  She responds appropriately to questions and commands.    GAIT:     []  Normal  []  Antalgic    Assistive device: []  None  []  Walker     []  Crutches  []  Cane     []  Wheelchair  []  Stretcher    Ortho Exam is directed to the upper extremities.  She has a prominent abduction contracture of both thumbs with prominence of the thumb carpometacarpal joints.  There is mild diffuse swelling over the dorsum of the right hand centered over the middle finger MCP joint.  He is quite apprehensive about motion of her fingers.  Gentle motion of the index ring and small fingers produce no apparent pain but guards quite a bit with any attempts at passive motion of the middle finger MCP joint.  She has prominent thenar atrophy bilaterally.  Passive motion of the thumb carpometacarpal joint is quite restricted but produces no apparent pain.  Radial " pulses strong.  Sensory exam is intact to soft touch.    RadioGraphs of the hand done previously show quite severe degenerative change of the thumb carpometacarpal joint.  She also has degenerative change in multiple other joints in the hand most prominent in the middle finger MCP joint.    Small Joint Arthrocentesis: R long MCP  Consent given by: patient  Site marked: site marked  Timeout: Immediately prior to procedure a time out was called to verify the correct patient, procedure, equipment, support staff and site/side marked as required   Supporting Documentation  Indications: pain and joint swelling   Procedure Details  Location: long finger - R long MCP  Preparation: Patient was prepped and draped in the usual sterile fashion  Needle size: 25 G  Medications administered: 0.5 mL lidocaine-EPINEPHrine 1 %-1:[id number]; 0.5 mL bupivacaine 0.5 %; 10 mg triamcinolone acetonide 40 MG/ML          No results found.        ASSESSMENT: Osteoarthritis right hand.  I think her current symptoms are primarily related to the degenerative change in the middle finger MCP joint.    The natural history of the disorder and treatment options were reviewed.  She was instructed in symptomatic care.  Potential benefits of injection therapy were discussed.  She wished to proceed with this.    The right hand was prepped.  Skin was infiltrated with 1% Xylocaine with epinephrine.  The middle finger MCP joint was then injected with a mixture of Marcaine Kenalog and Xylocaine with epinephrine.  There were no complications no she reported moderately severe pain with the injection.    She May advance activities as tolerated.  When her symptoms recur she will call for reevaluation.  She was reassured that at this point I see no surgical indications.    Diagnoses and all orders for this visit:    Right hand pain  -     Small Joint Arthrocentesis: R long MCP    Primary osteoarthritis of right hand          PLAN    Return if symptoms worsen or fail  to improve.    Syed Carr MD

## 2020-03-05 NOTE — PATIENT INSTRUCTIONS
Upper Respiratory Infection, Adult  An upper respiratory infection (URI) is a common viral infection of the nose, throat, and upper air passages that lead to the lungs. The most common type of URI is the common cold. URIs usually get better on their own, without medical treatment.  What are the causes?  A URI is caused by a virus. You may catch a virus by:  · Breathing in droplets from an infected person's cough or sneeze.  · Touching something that has been exposed to the virus (contaminated) and then touching your mouth, nose, or eyes.  What increases the risk?  You are more likely to get a URI if:  · You are very young or very old.  · It is faraz or winter.  · You have close contact with others, such as at a , school, or health care facility.  · You smoke.  · You have long-term (chronic) heart or lung disease.  · You have a weakened disease-fighting (immune) system.  · You have nasal allergies or asthma.  · You are experiencing a lot of stress.  · You work in an area that has poor air circulation.  · You have poor nutrition.  What are the signs or symptoms?  A URI usually involves some of the following symptoms:  · Runny or stuffy (congested) nose.  · Sneezing.  · Cough.  · Sore throat.  · Headache.  · Fatigue.  · Fever.  · Loss of appetite.  · Pain in your forehead, behind your eyes, and over your cheekbones (sinus pain).  · Muscle aches.  · Redness or irritation of the eyes.  · Pressure in the ears or face.  How is this diagnosed?  This condition may be diagnosed based on your medical history and symptoms, and a physical exam. Your health care provider may use a cotton swab to take a mucus sample from your nose (nasal swab). This sample can be tested to determine what virus is causing the illness.  How is this treated?  URIs usually get better on their own within 7-10 days. You can take steps at home to relieve your symptoms. Medicines cannot cure URIs, but your health care provider may recommend  certain medicines to help relieve symptoms, such as:  · Over-the-counter cold medicines.  · Cough suppressants. Coughing is a type of defense against infection that helps to clear the respiratory system, so take these medicines only as recommended by your health care provider.  · Fever-reducing medicines.  Follow these instructions at home:  Activity  · Rest as needed.  · If you have a fever, stay home from work or school until your fever is gone or until your health care provider says you are no longer contagious. Your health care provider may have you wear a face mask to prevent your infection from spreading.  Relieving symptoms  · Gargle with a salt-water mixture 3-4 times a day or as needed. To make a salt-water mixture, completely dissolve ½-1 tsp of salt in 1 cup of warm water.  · Use a cool-mist humidifier to add moisture to the air. This can help you breathe more easily.  Eating and drinking    · Drink enough fluid to keep your urine pale yellow.  · Eat soups and other clear broths.  General instructions    · Take over-the-counter and prescription medicines only as told by your health care provider. These include cold medicines, fever reducers, and cough suppressants.  · Do not use any products that contain nicotine or tobacco, such as cigarettes and e-cigarettes. If you need help quitting, ask your health care provider.  · Stay away from secondhand smoke.  · Stay up to date on all immunizations, including the yearly (annual) flu vaccine.  · Keep all follow-up visits as told by your health care provider. This is important.  How to prevent the spread of infection to others    · URIs can be passed from person to person (are contagious). To prevent the infection from spreading:  ? Wash your hands often with soap and water. If soap and water are not available, use hand .  ? Avoid touching your mouth, face, eyes, or nose.  ? Cough or sneeze into a tissue or your sleeve or elbow instead of into your hand  or into the air.  Contact a health care provider if:  · You are getting worse instead of better.  · You have a fever or chills.  · Your mucus is brown or red.  · You have yellow or brown discharge coming from your nose.  · You have pain in your face, especially when you bend forward.  · You have swollen neck glands.  · You have pain while swallowing.  · You have white areas in the back of your throat.  Get help right away if:  · You have shortness of breath that gets worse.  · You have severe or persistent:  ? Headache.  ? Ear pain.  ? Sinus pain.  ? Chest pain.  · You have chronic lung disease along with any of the following:  ? Wheezing.  ? Prolonged cough.  ? Coughing up blood.  ? A change in your usual mucus.  · You have a stiff neck.  · You have changes in your:  ? Vision.  ? Hearing.  ? Thinking.  ? Mood.  Summary  · An upper respiratory infection (URI) is a common infection of the nose, throat, and upper air passages that lead to the lungs.  · A URI is caused by a virus.  · URIs usually get better on their own within 7-10 days.  · Medicines cannot cure URIs, but your health care provider may recommend certain medicines to help relieve symptoms.  This information is not intended to replace advice given to you by your health care provider. Make sure you discuss any questions you have with your health care provider.  Document Released: 06/13/2002 Document Revised: 12/26/2019 Document Reviewed: 08/03/2018  [email or web address] Interactive Patient Education © 2020 [email or web address]    Allergic Rhinitis, Adult  Allergic rhinitis is an allergic reaction that affects the mucous membrane inside the nose. It causes sneezing, a runny or stuffy nose, and the feeling of mucus going down the back of the throat (postnasal drip). Allergic rhinitis can be mild to severe.  There are two types of allergic rhinitis:  · Seasonal. This type is also called hay fever. It happens only during certain seasons.  · Perennial. This type can happen at any  time of the year.  What are the causes?  This condition happens when the body's defense system (immune system) responds to certain harmless substances called allergens as though they were germs.   Seasonal allergic rhinitis is triggered by pollen, which can come from grasses, trees, and weeds. Perennial allergic rhinitis may be caused by:  · House dust mites.  · Pet dander.  · Mold spores.  What are the signs or symptoms?  Symptoms of this condition include:  · Sneezing.  · Runny or stuffy nose (nasal congestion).  · Postnasal drip.  · Itchy nose.  · Tearing of the eyes.  · Trouble sleeping.  · Daytime sleepiness.  How is this diagnosed?  This condition may be diagnosed based on:  · Your medical history.  · A physical exam.  · Tests to check for related conditions, such as:  ? Asthma.  ? Pink eye.  ? Ear infection.  ? Upper respiratory infection.  · Tests to find out which allergens trigger your symptoms. These may include skin or blood tests.  How is this treated?  There is no cure for this condition, but treatment can help control symptoms. Treatment may include:  · Taking medicines that block allergy symptoms, such as antihistamines. Medicine may be given as a shot, nasal spray, or pill.  · Avoiding the allergen.  · Desensitization. This treatment involves getting ongoing shots until your body becomes less sensitive to the allergen. This treatment may be done if other treatments do not help.  · If taking medicine and avoiding the allergen does not work, new, stronger medicines may be prescribed.  Follow these instructions at home:  · Find out what you are allergic to. Common allergens include smoke, dust, and pollen.  · Avoid the things you are allergic to. These are some things you can do to help avoid allergens:  ? Replace carpet with wood, tile, or vinyl romeo. Carpet can trap dander and dust.  ? Do not smoke. Do not allow smoking in your home.  ? Change your heating and air conditioning filter at least  once a month.  ? During allergy season:  § Keep windows closed as much as possible.  § Plan outdoor activities when pollen counts are lowest. This is usually during the evening hours.  § When coming indoors, change clothing and shower before sitting on furniture or bedding.  · Take over-the-counter and prescription medicines only as told by your health care provider.  · Keep all follow-up visits as told by your health care provider. This is important.  Contact a health care provider if:  · You have a fever.  · You develop a persistent cough.  · You make whistling sounds when you breathe (you wheeze).  · Your symptoms interfere with your normal daily activities.  Get help right away if:  · You have shortness of breath.  Summary  · This condition can be managed by taking medicines as directed and avoiding allergens.  · Contact your health care provider if you develop a persistent cough or fever.  · During allergy season, keep windows closed as much as possible.  This information is not intended to replace advice given to you by your health care provider. Make sure you discuss any questions you have with your health care provider.  Document Released: 09/12/2002 Document Revised: 01/25/2018 Document Reviewed: 01/25/2018  [employer] Interactive Patient Education © 2020 [employer]

## 2020-03-09 ENCOUNTER — TELEPHONE (OUTPATIENT)
Dept: FAMILY MEDICINE CLINIC | Facility: CLINIC | Age: 85
End: 2020-03-09

## 2020-03-09 NOTE — TELEPHONE ENCOUNTER
Tried calling patient back she left message requesting call back number listed says not a working number and no answer at other number

## 2020-03-09 NOTE — TELEPHONE ENCOUNTER
Patient would like to know if she can get a prescription for cough medicine sent to [facility].  Patient can be reached at [phone]

## 2020-03-10 RX ORDER — DEXTROMETHORPHAN HYDROBROMIDE AND PROMETHAZINE HYDROCHLORIDE 15; 6.25 MG/5ML; MG/5ML
5 SYRUP ORAL 4 TIMES DAILY PRN
Qty: 118 ML | Refills: 0 | Status: SHIPPED | OUTPATIENT
Start: 2020-03-10 | End: 2021-02-22

## 2020-03-11 DIAGNOSIS — K21.9 GASTROESOPHAGEAL REFLUX DISEASE, ESOPHAGITIS PRESENCE NOT SPECIFIED: ICD-10-CM

## 2020-03-11 RX ORDER — OMEPRAZOLE 20 MG/1
CAPSULE, DELAYED RELEASE ORAL
Qty: 60 CAPSULE | Refills: 3 | Status: SHIPPED | OUTPATIENT
Start: 2020-03-11 | End: 2020-06-26 | Stop reason: SDUPTHER

## 2020-03-19 ENCOUNTER — TELEPHONE (OUTPATIENT)
Dept: FAMILY MEDICINE CLINIC | Facility: CLINIC | Age: 85
End: 2020-03-19

## 2020-04-13 ENCOUNTER — OFFICE VISIT (OUTPATIENT)
Dept: OBSTETRICS AND GYNECOLOGY | Facility: CLINIC | Age: 85
End: 2020-04-13

## 2020-04-13 ENCOUNTER — TELEPHONE (OUTPATIENT)
Dept: FAMILY MEDICINE CLINIC | Facility: CLINIC | Age: 85
End: 2020-04-13

## 2020-04-13 VITALS
BODY MASS INDEX: 24.15 KG/M2 | SYSTOLIC BLOOD PRESSURE: 118 MMHG | WEIGHT: 123 LBS | HEIGHT: 60 IN | DIASTOLIC BLOOD PRESSURE: 70 MMHG

## 2020-04-13 DIAGNOSIS — N81.10 POP-Q STAGE 4 CYSTOCELE: Chronic | ICD-10-CM

## 2020-04-13 DIAGNOSIS — Z46.89 PESSARY MAINTENANCE: Primary | ICD-10-CM

## 2020-04-13 DIAGNOSIS — R19.7 DIARRHEA, UNSPECIFIED TYPE: Primary | ICD-10-CM

## 2020-04-13 PROCEDURE — 99212 OFFICE O/P EST SF 10 MIN: CPT | Performed by: NURSE PRACTITIONER

## 2020-04-13 NOTE — PROGRESS NOTES
"Subjective   Chief Complaint   Patient presents with   • Pessary Check     Kristy Ramirez is a 86 y.o. year old who presents to be seen for follow-up of her pessary.  Currently she is using Foldable ring w/ support - #7 w/o urethral bar.  She reports no problems at this time.    No Additional Complaints Reported    The following portions of the patient's history were reviewed and updated as appropriate:problem list, current medications and allergies    Review of Systems   Constitutional: Negative for chills, fever, unexpected weight gain and unexpected weight loss.   Genitourinary: Negative for decreased urine volume, difficulty urinating, pelvic pain, pelvic pressure, urgency, urinary incontinence, vaginal bleeding, vaginal discharge and vaginal pain.        Objective   /70   Ht 152.4 cm (60\")   Wt 55.8 kg (123 lb)   BMI 24.02 kg/m²     General:  well developed; well nourished  no acute distress   Pelvis: Clinical staff was present for exam  External genitalia:  normal appearance of the external genitalia including [place]th[place]'s and [place]'s glands.  :  urethral meatus normal; urethra hypermobile; prominent caruncle present;  Vaginal:  atrophic mucosal changes are present;  Cervix:  absent.  Uterus:  absent.  Adnexa:  non palpable bilaterally.  Cystocele GRADE 2  Rectocele GRADE 2  Pessary removed and cleaned. After vaginal inspection the pessary was lubricated and placed back into the vaginal vault.     Lab Review   No data reviewed    Imaging   No data reviewed         Diagnoses and all orders for this visit:    Pessary maintenance    POP-Q stage 4 cystocele    Other orders  -     terconazole (Terazol 7) 0.4 % vaginal cream; Insert 1 applicator into the vagina Every Night.        New Medications Ordered This Visit   Medications   • terconazole (Terazol 7) 0.4 % vaginal cream     Sig: Insert 1 applicator into the vagina Every Night.     Dispense:  45 g     Refill:  4     F/U in 8 weeks for routine pessary " maintenance. Requested refills on terconazole should she develop yeast symptoms again. Refills sent.  This document was electronically signed.    Gladys Soni, APRN  April 13, 2020

## 2020-04-17 RX ORDER — LOPERAMIDE HYDROCHLORIDE 2 MG/1
2 TABLET ORAL 4 TIMES DAILY PRN
Qty: 120 TABLET | Refills: 1 | Status: SHIPPED | OUTPATIENT
Start: 2020-04-17 | End: 2020-06-26 | Stop reason: SDUPTHER

## 2020-04-17 NOTE — TELEPHONE ENCOUNTER
Send in some imodium.  But she really needs to be seen early next week if she has been having diarrhea for a month,

## 2020-04-17 NOTE — TELEPHONE ENCOUNTER
Patient aware script sent in also aware that Arelis would like to see her as well, but patient doesn't want to get out unless she has to.  Says if the medication doesn't help she will come in

## 2020-05-03 DIAGNOSIS — I10 ESSENTIAL HYPERTENSION: ICD-10-CM

## 2020-05-04 RX ORDER — METOPROLOL SUCCINATE 25 MG/1
25 TABLET, EXTENDED RELEASE ORAL DAILY
Qty: 90 TABLET | Refills: 0 | Status: SHIPPED | OUTPATIENT
Start: 2020-05-04 | End: 2020-06-26 | Stop reason: SDUPTHER

## 2020-05-07 DIAGNOSIS — E11.9 TYPE 2 DIABETES MELLITUS WITHOUT COMPLICATION, WITHOUT LONG-TERM CURRENT USE OF INSULIN (HCC): ICD-10-CM

## 2020-06-04 DIAGNOSIS — I10 ESSENTIAL HYPERTENSION: ICD-10-CM

## 2020-06-04 RX ORDER — FUROSEMIDE 20 MG/1
20 TABLET ORAL DAILY
Qty: 90 TABLET | Refills: 1 | Status: SHIPPED | OUTPATIENT
Start: 2020-06-04 | End: 2020-06-26 | Stop reason: SDUPTHER

## 2020-06-08 ENCOUNTER — OFFICE VISIT (OUTPATIENT)
Dept: OBSTETRICS AND GYNECOLOGY | Facility: CLINIC | Age: 85
End: 2020-06-08

## 2020-06-08 VITALS
HEIGHT: 60 IN | BODY MASS INDEX: 23.75 KG/M2 | SYSTOLIC BLOOD PRESSURE: 118 MMHG | WEIGHT: 121 LBS | DIASTOLIC BLOOD PRESSURE: 70 MMHG

## 2020-06-08 DIAGNOSIS — N81.10 POP-Q STAGE 4 CYSTOCELE: Chronic | ICD-10-CM

## 2020-06-08 DIAGNOSIS — N81.6 POP-Q STAGE 2 RECTOCELE: Chronic | ICD-10-CM

## 2020-06-08 DIAGNOSIS — Z46.89 PESSARY MAINTENANCE: Primary | ICD-10-CM

## 2020-06-08 PROCEDURE — 99212 OFFICE O/P EST SF 10 MIN: CPT | Performed by: NURSE PRACTITIONER

## 2020-06-08 NOTE — PROGRESS NOTES
"Subjective   Chief Complaint   Patient presents with   • Pessary Check     Kristy Ramirez is a 86 y.o. year old who presents to be seen for follow-up of her pessary.  Currently she is using Foldable ring w/ support - #7 w/o urethral bar.  She reports no problems at this time.    No Additional Complaints Reported    The following portions of the patient's history were reviewed and updated as appropriate:problem list, current medications and allergies    Review of Systems   Constitutional: Negative for chills, fever, unexpected weight gain and unexpected weight loss.   Genitourinary: Negative for decreased urine volume, difficulty urinating, pelvic pain, pelvic pressure, urgency, urinary incontinence, vaginal bleeding, vaginal discharge and vaginal pain.        Objective   /70   Ht 152.4 cm (60\")   Wt 54.9 kg (121 lb)   Breastfeeding No   BMI 23.63 kg/m²     General:  well developed; well nourished  no acute distress   Pelvis: Clinical staff was present for exam  External genitalia:  normal appearance of the external genitalia including [place]tholin's and [place]'s glands.  :  urethral meatus normal; urethra hypermobile; prominent caruncle present;  Vaginal:  atrophic mucosal changes are present;  Cervix:  absent.  Uterus:  absent.  Adnexa:  non palpable bilaterally.  Cystocele GRADE 2  Rectocele GRADE 2  Pessary removed and cleaned. After vaginal inspection the pessary was lubricated and placed back into the vaginal vault.     Lab Review   No data reviewed    Imaging   No data reviewed         Diagnoses and all orders for this visit:    Pessary maintenance    POP-Q stage 2 rectocele    POP-Q stage 4 cystocele        No orders of the defined types were placed in this encounter.    F/U in 8 weeks for routine pessary maintenance.     This document was electronically signed.     Gladys Soni, APRANNETTE  June 8, 2020    "

## 2020-06-23 ENCOUNTER — TELEPHONE (OUTPATIENT)
Dept: FAMILY MEDICINE CLINIC | Facility: CLINIC | Age: 85
End: 2020-06-23

## 2020-06-23 NOTE — TELEPHONE ENCOUNTER
Patient called requesting a call back, attempted to call patient no answer lm on voicemail I was returning her call.

## 2020-06-26 DIAGNOSIS — I10 ESSENTIAL HYPERTENSION: ICD-10-CM

## 2020-06-26 DIAGNOSIS — K21.9 GASTROESOPHAGEAL REFLUX DISEASE, ESOPHAGITIS PRESENCE NOT SPECIFIED: ICD-10-CM

## 2020-06-26 DIAGNOSIS — M10.9 GOUT, UNSPECIFIED CAUSE, UNSPECIFIED CHRONICITY, UNSPECIFIED SITE: ICD-10-CM

## 2020-06-26 DIAGNOSIS — R19.7 DIARRHEA, UNSPECIFIED TYPE: ICD-10-CM

## 2020-06-26 RX ORDER — LOPERAMIDE HYDROCHLORIDE 2 MG/1
2 TABLET ORAL 4 TIMES DAILY PRN
Qty: 360 TABLET | Refills: 1 | Status: SHIPPED | OUTPATIENT
Start: 2020-06-26 | End: 2020-07-02 | Stop reason: SDUPTHER

## 2020-06-26 RX ORDER — FUROSEMIDE 20 MG/1
20 TABLET ORAL DAILY
Qty: 90 TABLET | Refills: 1 | Status: SHIPPED | OUTPATIENT
Start: 2020-06-26 | End: 2020-08-03 | Stop reason: SDUPTHER

## 2020-06-26 RX ORDER — CHLORTHALIDONE 25 MG/1
25 TABLET ORAL DAILY
Qty: 90 TABLET | Refills: 1 | Status: SHIPPED | OUTPATIENT
Start: 2020-06-26 | End: 2020-07-02 | Stop reason: SDUPTHER

## 2020-06-26 RX ORDER — AMLODIPINE BESYLATE 5 MG/1
5 TABLET ORAL DAILY
Qty: 90 TABLET | Refills: 1 | Status: SHIPPED | OUTPATIENT
Start: 2020-06-26 | End: 2020-08-03 | Stop reason: SDUPTHER

## 2020-06-26 RX ORDER — METOPROLOL SUCCINATE 25 MG/1
25 TABLET, EXTENDED RELEASE ORAL DAILY
Qty: 90 TABLET | Refills: 1 | Status: SHIPPED | OUTPATIENT
Start: 2020-06-26 | End: 2020-07-02 | Stop reason: SDUPTHER

## 2020-06-26 RX ORDER — ALLOPURINOL 100 MG/1
100 TABLET ORAL DAILY
Qty: 90 TABLET | Refills: 1 | Status: SHIPPED | OUTPATIENT
Start: 2020-06-26 | End: 2020-07-02 | Stop reason: SDUPTHER

## 2020-06-26 RX ORDER — OMEPRAZOLE 20 MG/1
20 CAPSULE, DELAYED RELEASE ORAL 2 TIMES DAILY
Qty: 180 CAPSULE | Refills: 1 | Status: SHIPPED | OUTPATIENT
Start: 2020-06-26 | End: 2020-08-03 | Stop reason: SDUPTHER

## 2020-06-26 RX ORDER — HYDROCHLOROTHIAZIDE 12.5 MG/1
12.5 TABLET ORAL DAILY
Qty: 90 TABLET | Refills: 1 | Status: SHIPPED | OUTPATIENT
Start: 2020-06-26 | End: 2020-07-02 | Stop reason: SDUPTHER

## 2020-06-26 RX ORDER — LOSARTAN POTASSIUM 50 MG/1
50 TABLET ORAL DAILY
Qty: 90 TABLET | Refills: 1 | Status: SHIPPED | OUTPATIENT
Start: 2020-06-26 | End: 2020-07-02 | Stop reason: SDUPTHER

## 2020-07-02 DIAGNOSIS — R19.7 DIARRHEA, UNSPECIFIED TYPE: ICD-10-CM

## 2020-07-02 DIAGNOSIS — M10.9 GOUT, UNSPECIFIED CAUSE, UNSPECIFIED CHRONICITY, UNSPECIFIED SITE: ICD-10-CM

## 2020-07-02 DIAGNOSIS — I10 ESSENTIAL HYPERTENSION: ICD-10-CM

## 2020-07-02 RX ORDER — ALLOPURINOL 100 MG/1
100 TABLET ORAL DAILY
Qty: 90 TABLET | Refills: 1 | Status: SHIPPED | OUTPATIENT
Start: 2020-07-02 | End: 2020-08-03 | Stop reason: SDUPTHER

## 2020-07-02 RX ORDER — METOPROLOL SUCCINATE 25 MG/1
25 TABLET, EXTENDED RELEASE ORAL DAILY
Qty: 90 TABLET | Refills: 1 | Status: SHIPPED | OUTPATIENT
Start: 2020-07-02 | End: 2020-08-03 | Stop reason: SDUPTHER

## 2020-07-02 RX ORDER — CHLORTHALIDONE 25 MG/1
25 TABLET ORAL DAILY
Qty: 90 TABLET | Refills: 1 | Status: SHIPPED | OUTPATIENT
Start: 2020-07-02 | End: 2020-08-03 | Stop reason: SDUPTHER

## 2020-07-02 RX ORDER — LOSARTAN POTASSIUM 50 MG/1
50 TABLET ORAL DAILY
Qty: 90 TABLET | Refills: 1 | Status: SHIPPED | OUTPATIENT
Start: 2020-07-02 | End: 2020-08-03 | Stop reason: SDUPTHER

## 2020-07-02 RX ORDER — HYDROCHLOROTHIAZIDE 12.5 MG/1
12.5 TABLET ORAL DAILY
Qty: 90 TABLET | Refills: 1 | Status: SHIPPED | OUTPATIENT
Start: 2020-07-02 | End: 2020-08-03 | Stop reason: SDUPTHER

## 2020-07-02 RX ORDER — LOPERAMIDE HYDROCHLORIDE 2 MG/1
2 TABLET ORAL 4 TIMES DAILY PRN
Qty: 360 TABLET | Refills: 1 | Status: SHIPPED | OUTPATIENT
Start: 2020-07-02 | End: 2020-08-03 | Stop reason: SDUPTHER

## 2020-07-27 ENCOUNTER — TELEPHONE (OUTPATIENT)
Dept: FAMILY MEDICINE CLINIC | Facility: CLINIC | Age: 85
End: 2020-07-27

## 2020-07-27 NOTE — TELEPHONE ENCOUNTER
[facility] pharmacy called wanting a refill on patients Gabapentin that was already denied because patient has to be see since it is a controlled substance.  Advised pharamacy of the matter they stated patient had talked to me according to the patient and she could get a refill.  Advised I have not talked to the patient about her gabapentin and she has not been seen in the last 3 months by PCP and needs an appointment.

## 2020-08-03 ENCOUNTER — LAB (OUTPATIENT)
Dept: LAB | Facility: HOSPITAL | Age: 85
End: 2020-08-03

## 2020-08-03 ENCOUNTER — OFFICE VISIT (OUTPATIENT)
Dept: OBSTETRICS AND GYNECOLOGY | Facility: CLINIC | Age: 85
End: 2020-08-03

## 2020-08-03 ENCOUNTER — OFFICE VISIT (OUTPATIENT)
Dept: FAMILY MEDICINE CLINIC | Facility: CLINIC | Age: 85
End: 2020-08-03

## 2020-08-03 VITALS
OXYGEN SATURATION: 94 % | SYSTOLIC BLOOD PRESSURE: 125 MMHG | DIASTOLIC BLOOD PRESSURE: 55 MMHG | HEIGHT: 60 IN | WEIGHT: 119 LBS | TEMPERATURE: 98.2 F | HEART RATE: 58 BPM | RESPIRATION RATE: 18 BRPM | BODY MASS INDEX: 23.36 KG/M2

## 2020-08-03 VITALS
DIASTOLIC BLOOD PRESSURE: 55 MMHG | SYSTOLIC BLOOD PRESSURE: 125 MMHG | WEIGHT: 119 LBS | HEIGHT: 60 IN | BODY MASS INDEX: 23.36 KG/M2

## 2020-08-03 DIAGNOSIS — M10.9 GOUT, UNSPECIFIED CAUSE, UNSPECIFIED CHRONICITY, UNSPECIFIED SITE: ICD-10-CM

## 2020-08-03 DIAGNOSIS — M54.50 CHRONIC LOW BACK PAIN: ICD-10-CM

## 2020-08-03 DIAGNOSIS — K21.9 GASTROESOPHAGEAL REFLUX DISEASE, ESOPHAGITIS PRESENCE NOT SPECIFIED: ICD-10-CM

## 2020-08-03 DIAGNOSIS — Z46.89 PESSARY MAINTENANCE: ICD-10-CM

## 2020-08-03 DIAGNOSIS — R21 RASH: ICD-10-CM

## 2020-08-03 DIAGNOSIS — G89.29 CHRONIC LOW BACK PAIN: ICD-10-CM

## 2020-08-03 DIAGNOSIS — N39.3 STRESS INCONTINENCE: ICD-10-CM

## 2020-08-03 DIAGNOSIS — N81.6 POP-Q STAGE 2 RECTOCELE: Primary | Chronic | ICD-10-CM

## 2020-08-03 DIAGNOSIS — M79.641 HAND PAIN, RIGHT: Primary | ICD-10-CM

## 2020-08-03 DIAGNOSIS — G62.9 NEUROPATHY: ICD-10-CM

## 2020-08-03 DIAGNOSIS — R19.7 DIARRHEA, UNSPECIFIED TYPE: ICD-10-CM

## 2020-08-03 DIAGNOSIS — I10 ESSENTIAL HYPERTENSION: ICD-10-CM

## 2020-08-03 DIAGNOSIS — E11.9 TYPE 2 DIABETES MELLITUS WITHOUT COMPLICATION, WITHOUT LONG-TERM CURRENT USE OF INSULIN (HCC): ICD-10-CM

## 2020-08-03 DIAGNOSIS — N81.10 POP-Q STAGE 4 CYSTOCELE: Chronic | ICD-10-CM

## 2020-08-03 LAB
ALBUMIN SERPL-MCNC: 4.2 G/DL (ref 3.5–5.2)
ALBUMIN/GLOB SERPL: 1.3 G/DL
ALP SERPL-CCNC: 77 U/L (ref 39–117)
ALT SERPL W P-5'-P-CCNC: 6 U/L (ref 1–33)
ANION GAP SERPL CALCULATED.3IONS-SCNC: 15.2 MMOL/L (ref 5–15)
AST SERPL-CCNC: 18 U/L (ref 1–32)
BILIRUB SERPL-MCNC: 0.4 MG/DL (ref 0–1.2)
BUN SERPL-MCNC: 31 MG/DL (ref 8–23)
BUN/CREAT SERPL: 22 (ref 7–25)
CALCIUM SPEC-SCNC: 10.3 MG/DL (ref 8.6–10.5)
CHLORIDE SERPL-SCNC: 100 MMOL/L (ref 98–107)
CO2 SERPL-SCNC: 23.8 MMOL/L (ref 22–29)
CREAT SERPL-MCNC: 1.41 MG/DL (ref 0.57–1)
GFR SERPL CREATININE-BSD FRML MDRD: 43 ML/MIN/1.73
GLOBULIN UR ELPH-MCNC: 3.3 GM/DL
GLUCOSE SERPL-MCNC: 98 MG/DL (ref 65–99)
HBA1C MFR BLD: 6.21 % (ref 4.8–5.6)
POTASSIUM SERPL-SCNC: 3.6 MMOL/L (ref 3.5–5.2)
PROT SERPL-MCNC: 7.5 G/DL (ref 6–8.5)
SODIUM SERPL-SCNC: 139 MMOL/L (ref 136–145)

## 2020-08-03 PROCEDURE — 83036 HEMOGLOBIN GLYCOSYLATED A1C: CPT | Performed by: NURSE PRACTITIONER

## 2020-08-03 PROCEDURE — 80053 COMPREHEN METABOLIC PANEL: CPT | Performed by: NURSE PRACTITIONER

## 2020-08-03 PROCEDURE — 99212 OFFICE O/P EST SF 10 MIN: CPT | Performed by: NURSE PRACTITIONER

## 2020-08-03 PROCEDURE — 99214 OFFICE O/P EST MOD 30 MIN: CPT | Performed by: NURSE PRACTITIONER

## 2020-08-03 RX ORDER — ALLOPURINOL 100 MG/1
100 TABLET ORAL DAILY
Qty: 90 TABLET | Refills: 1 | Status: SHIPPED | OUTPATIENT
Start: 2020-08-03 | End: 2021-09-08 | Stop reason: SDUPTHER

## 2020-08-03 RX ORDER — LANCETS
1 EACH MISCELLANEOUS DAILY
Qty: 100 EACH | Refills: 12 | Status: SHIPPED | OUTPATIENT
Start: 2020-08-03 | End: 2021-07-19

## 2020-08-03 RX ORDER — GABAPENTIN 300 MG/1
300 CAPSULE ORAL EVERY MORNING
Qty: 30 CAPSULE | Refills: 0 | Status: SHIPPED | OUTPATIENT
Start: 2020-08-03 | End: 2020-08-04 | Stop reason: SDUPTHER

## 2020-08-03 RX ORDER — AMLODIPINE BESYLATE 5 MG/1
5 TABLET ORAL DAILY
Qty: 90 TABLET | Refills: 1 | Status: SHIPPED | OUTPATIENT
Start: 2020-08-03 | End: 2021-09-15 | Stop reason: SDUPTHER

## 2020-08-03 RX ORDER — CLOTRIMAZOLE AND BETAMETHASONE DIPROPIONATE 10; .64 MG/G; MG/G
CREAM TOPICAL 2 TIMES DAILY
Qty: 45 G | Refills: 1 | Status: SHIPPED | OUTPATIENT
Start: 2020-08-03 | End: 2020-09-24

## 2020-08-03 RX ORDER — CHLORTHALIDONE 25 MG/1
25 TABLET ORAL DAILY
Qty: 90 TABLET | Refills: 1 | Status: SHIPPED | OUTPATIENT
Start: 2020-08-03 | End: 2020-08-17 | Stop reason: SDUPTHER

## 2020-08-03 RX ORDER — TOLTERODINE 4 MG/1
4 CAPSULE, EXTENDED RELEASE ORAL DAILY
Qty: 90 CAPSULE | Refills: 1 | Status: SHIPPED | OUTPATIENT
Start: 2020-08-03 | End: 2021-01-29

## 2020-08-03 RX ORDER — HYDROCHLOROTHIAZIDE 12.5 MG/1
12.5 TABLET ORAL DAILY
Qty: 90 TABLET | Refills: 1 | Status: SHIPPED | OUTPATIENT
Start: 2020-08-03 | End: 2020-08-17 | Stop reason: SDUPTHER

## 2020-08-03 RX ORDER — CLOTRIMAZOLE AND BETAMETHASONE DIPROPIONATE 10; .64 MG/G; MG/G
CREAM TOPICAL 2 TIMES DAILY
Qty: 45 G | Refills: 1 | Status: SHIPPED | OUTPATIENT
Start: 2020-08-03 | End: 2020-08-03 | Stop reason: SDUPTHER

## 2020-08-03 RX ORDER — LOSARTAN POTASSIUM 50 MG/1
50 TABLET ORAL DAILY
Qty: 90 TABLET | Refills: 1 | Status: SHIPPED | OUTPATIENT
Start: 2020-08-03 | End: 2020-11-10 | Stop reason: HOSPADM

## 2020-08-03 RX ORDER — GABAPENTIN 600 MG/1
600 TABLET ORAL NIGHTLY
Qty: 30 TABLET | Refills: 0 | Status: SHIPPED | OUTPATIENT
Start: 2020-08-03 | End: 2020-08-04 | Stop reason: SDUPTHER

## 2020-08-03 RX ORDER — OMEPRAZOLE 20 MG/1
20 CAPSULE, DELAYED RELEASE ORAL 2 TIMES DAILY
Qty: 180 CAPSULE | Refills: 1 | Status: SHIPPED | OUTPATIENT
Start: 2020-08-03 | End: 2021-02-18 | Stop reason: SDUPTHER

## 2020-08-03 RX ORDER — FUROSEMIDE 20 MG/1
20 TABLET ORAL DAILY
Qty: 90 TABLET | Refills: 1 | Status: SHIPPED | OUTPATIENT
Start: 2020-08-03 | End: 2020-11-08

## 2020-08-03 RX ORDER — METOPROLOL SUCCINATE 25 MG/1
25 TABLET, EXTENDED RELEASE ORAL DAILY
Qty: 90 TABLET | Refills: 1 | Status: SHIPPED | OUTPATIENT
Start: 2020-08-03 | End: 2021-07-08

## 2020-08-03 RX ORDER — LOPERAMIDE HYDROCHLORIDE 2 MG/1
2 TABLET ORAL 4 TIMES DAILY PRN
Qty: 360 TABLET | Refills: 1 | Status: SHIPPED | OUTPATIENT
Start: 2020-08-03 | End: 2020-12-30

## 2020-08-03 NOTE — PROGRESS NOTES
"Subjective   Chief Complaint   Patient presents with   • Pessary Check     Kristy Ramirez is a 86 y.o. year old who presents to be seen for follow-up of her pessary.  Currently she is using Foldable ring w/ support - #7 w/o urethral bar.  She reports no problems at this time.    No Additional Complaints Reported    The following portions of the patient's history were reviewed and updated as appropriate:problem list, current medications and allergies    Review of Systems   Constitutional: Negative for chills, fever, unexpected weight gain and unexpected weight loss.   Genitourinary: Negative for decreased urine volume, difficulty urinating, pelvic pain, pelvic pressure, urgency, urinary incontinence, vaginal bleeding, vaginal discharge and vaginal pain.        Objective   /55   Ht 152.4 cm (60\")   Wt 54 kg (119 lb)   BMI 23.24 kg/m²     General:  well developed; well nourished  no acute distress   Pelvis: Clinical staff was present for exam  External genitalia:  normal appearance of the external genitalia including [place]tholin's and [place]'s glands.  :  urethral meatus normal; urethra hypermobile; prominent caruncle present;  Vaginal:  atrophic mucosal changes are present;  Cervix:  absent.  Uterus:  absent.  Adnexa:  non palpable bilaterally.  Cystocele GRADE 2  Rectocele GRADE 2  Pessary removed and cleaned. After vaginal inspection the pessary was lubricated and placed back into the vaginal vault.     Lab Review   No data reviewed    Imaging   No data reviewed         Diagnoses and all orders for this visit:    POP-Q stage 2 rectocele    POP-Q stage 4 cystocele    Pessary maintenance        No orders of the defined types were placed in this encounter.    F/U in 8 weeks for routine pessary maintenance.     This document was electronically signed.     Gladys Soni, NADYA  August 3, 2020    "

## 2020-08-03 NOTE — PROGRESS NOTES
Subjective   Kristy Ramirez is a 86 y.o. female.     Z 86 is here today for recheck on her diabetes blood pressure.  she also is having pain in her right hand she has had joint injections in the past.  But they did not help.  She also has a trigger finger right hand middle finger has exacerbated over the course of the past month.  Blood sugars reported to run under 150 most the time.  Blood pressure is also well controlled.    Hypertension   This is a chronic problem. The current episode started more than 1 year ago. The problem is unchanged. The problem is controlled. Pertinent negatives include no anxiety, blurred vision, chest pain, headaches, malaise/fatigue, neck pain, orthopnea, palpitations, peripheral edema, PND, shortness of breath or sweats. There are no associated agents to hypertension. Risk factors for coronary artery disease include diabetes mellitus, dyslipidemia, post-menopausal state and sedentary lifestyle. Past treatments include calcium channel blockers, diuretics, angiotensin blockers and beta blockers. Current antihypertension treatment includes calcium channel blockers, diuretics, angiotensin blockers and beta blockers. The current treatment provides significant improvement. There are no compliance problems.  Hypertensive end-organ damage includes CAD/MI. There is no history of angina, kidney disease, CVA, heart failure, left ventricular hypertrophy, PVD or retinopathy.   Diabetes   She presents for her follow-up diabetic visit. She has type 2 diabetes mellitus. Her disease course has been stable. There are no hypoglycemic associated symptoms. Pertinent negatives for hypoglycemia include no headaches or sweats. There are no diabetic associated symptoms. Pertinent negatives for diabetes include no blurred vision and no chest pain. There are no hypoglycemic complications. Symptoms are stable. Diabetic complications include heart disease. Pertinent negatives for diabetic complications include no  CVA, PVD or retinopathy. Risk factors for coronary artery disease include diabetes mellitus, dyslipidemia, hypertension, post-menopausal and sedentary lifestyle. Current diabetic treatment includes oral agent (monotherapy). She is compliant with treatment all of the time. Her weight is stable. She is following a diabetic diet. Meal planning includes avoidance of concentrated sweets. She has not had a previous visit with a dietitian. She rarely participates in exercise. She monitors blood glucose at home 1-2 x per day. Her breakfast blood glucose is taken between 7-8 am. Her breakfast blood glucose range is generally 110-130 mg/dl. Her overall blood glucose range is 130-140 mg/dl. An ACE inhibitor/angiotensin II receptor blocker is being taken. She does not see a podiatrist.Eye exam is current.   Hand Pain    The incident occurred more than 1 week ago. There was no injury mechanism. The pain is present in the right hand. The quality of the pain is described as aching. The pain does not radiate. The pain is at a severity of 7/10. Pertinent negatives include no chest pain. The symptoms are aggravated by movement. She has tried nothing for the symptoms. The treatment provided no relief.        The following portions of the patient's history were reviewed and updated as appropriate: allergies, current medications, past family history, past medical history, past social history, past surgical history and problem list.    Review of Systems   Constitutional: Negative.  Negative for malaise/fatigue.   HENT: Negative.    Eyes: Negative.  Negative for blurred vision.   Respiratory: Negative.  Negative for shortness of breath.    Cardiovascular: Negative.  Negative for chest pain, palpitations, orthopnea and PND.   Gastrointestinal: Negative.    Endocrine: Negative.    Genitourinary: Negative.    Musculoskeletal: Positive for arthralgias (  Pain in her right hand.  Worse at the base of her thumb.  Trigger finger right hand  middle finger.). Negative for neck pain.   Skin: Negative.    Allergic/Immunologic: Negative.    Hematological: Negative.        Objective   Physical Exam   Constitutional: She is oriented to person, place, and time. She appears well-developed and well-nourished. No distress.   HENT:   Head: Normocephalic and atraumatic.   Mouth/Throat: No oropharyngeal exudate.   Eyes: Pupils are equal, round, and reactive to light.   Neck: Normal range of motion. Neck supple. No thyromegaly present.   Cardiovascular: Normal rate, regular rhythm and normal heart sounds. Exam reveals no friction rub.   No murmur heard.  Pulmonary/Chest: Effort normal and breath sounds normal. No respiratory distress. She has no wheezes. She has no rales.   Abdominal: Soft.   Musculoskeletal:        Right hand: She exhibits decreased range of motion and tenderness.   Extremities of her right hand are reviewed and she has degenerative changes noted.  The middle finger on the right hand will stick in a downward position does cause some discomfort with range of motion.   Neurological: She is alert and oriented to person, place, and time.   Skin: Skin is warm and dry.   Psychiatric: She has a normal mood and affect. Thought content normal.   Nursing note and vitals reviewed.        Assessment/Plan   Kristy was seen today for hypertension, diabetes, neck pain, back pain, hand pain and place on back.    Diagnoses and all orders for this visit:    Hand pain, right  -     XR Hand 3+ View Right (In Office)  -     Ambulatory Referral to Orthopedic Surgery    Type 2 diabetes mellitus without complication, without long-term current use of insulin (CMS/[facility])  -     Accu-Chek FastClix Lancets misc; 1 each Daily.  -     glucose blood test strip; Use as instructed to check b/s 1x a day.  Please give strip compatible with monitor. (accucheck)  -     Hemoglobin A1c  -     Comprehensive metabolic panel    Gout, unspecified cause, unspecified chronicity, unspecified  site  -     allopurinol (ZYLOPRIM) 100 MG tablet; Take 1 tablet by mouth Daily. take with food    Essential hypertension  -     amLODIPine (NORVASC) 5 MG tablet; Take 1 tablet by mouth Daily.  -     chlorthalidone (HYGROTON) 25 MG tablet; Take 1 tablet by mouth Daily.  -     furosemide (LASIX) 20 MG tablet; Take 1 tablet by mouth Daily.  -     metoprolol succinate XL (TOPROL-XL) 25 MG 24 hr tablet; Take 1 tablet by mouth Daily.    Neuropathy  -     Discontinue: gabapentin (NEURONTIN) 300 MG capsule; Take 1 capsule by mouth Every Morning.  -     Discontinue: gabapentin (NEURONTIN) 600 MG tablet; Take 1 tablet by mouth Every Night.  -     gabapentin (NEURONTIN) 600 MG tablet; Take 1 tablet by mouth Every Night.  -     gabapentin (NEURONTIN) 300 MG capsule; Take 1 capsule by mouth Every Morning.    Chronic low back pain  -     Discontinue: gabapentin (NEURONTIN) 300 MG capsule; Take 1 capsule by mouth Every Morning.  -     Discontinue: gabapentin (NEURONTIN) 600 MG tablet; Take 1 tablet by mouth Every Night.  -     gabapentin (NEURONTIN) 600 MG tablet; Take 1 tablet by mouth Every Night.  -     gabapentin (NEURONTIN) 300 MG capsule; Take 1 capsule by mouth Every Morning.    Diarrhea, unspecified type  -     loperamide (Imodium A-D) 2 MG tablet; Take 1 tablet by mouth 4 (Four) Times a Day As Needed for Diarrhea.    Gastroesophageal reflux disease, esophagitis presence not specified  -     omeprazole (priLOSEC) 20 MG capsule; Take 1 capsule by mouth 2 (Two) Times a Day.    Stress incontinence  -     tolterodine LA (DETROL LA) 4 MG 24 hr capsule; Take 1 capsule by mouth Daily.    Rash  -     Discontinue: clotrimazole-betamethasone (Lotrisone) 1-0.05 % cream; Apply  topically to the appropriate area as directed 2 (Two) Times a Day.  -     clotrimazole-betamethasone (Lotrisone) 1-0.05 % cream; Apply  topically to the appropriate area as directed 2 (Two) Times a Day.    Other orders  -     hydroCHLOROthiazide (HYDRODIURIL)  12.5 MG tablet; Take 1 tablet by mouth Daily.  -     losartan (COZAAR) 50 MG tablet; Take 1 tablet by mouth Daily.  -     terconazole (Terazol 7) 0.4 % vaginal cream; Insert 1 applicator into the vagina Every Night.

## 2020-08-04 RX ORDER — GABAPENTIN 600 MG/1
600 TABLET ORAL NIGHTLY
Qty: 30 TABLET | Refills: 3 | Status: SHIPPED | OUTPATIENT
Start: 2020-08-04 | End: 2020-10-27 | Stop reason: SDUPTHER

## 2020-08-04 RX ORDER — GABAPENTIN 300 MG/1
300 CAPSULE ORAL EVERY MORNING
Qty: 30 CAPSULE | Refills: 3 | Status: SHIPPED | OUTPATIENT
Start: 2020-08-04 | End: 2020-10-27 | Stop reason: SDUPTHER

## 2020-08-04 NOTE — PROGRESS NOTES
Her creatinine is slightly elevated.  Would see if she is taking an anti-inflammatory if she she needs to stop and just either go with tramadol or Tylenol 3 gabapentin or something in that order.

## 2020-08-05 ENCOUNTER — TELEPHONE (OUTPATIENT)
Dept: FAMILY MEDICINE CLINIC | Facility: CLINIC | Age: 85
End: 2020-08-05

## 2020-08-05 NOTE — TELEPHONE ENCOUNTER
Patient aware she is already taking gabapentin.  Advised that is she needed anything for pain to lets us know.

## 2020-08-05 NOTE — TELEPHONE ENCOUNTER
----- Message from NADYA Welch sent at 8/4/2020  4:14 PM CDT -----  Her creatinine is slightly elevated.  Would see if she is taking an anti-inflammatory if she she needs to stop and just either go with tramadol or Tylenol 3 gabapentin or something in that order.

## 2020-08-06 NOTE — PATIENT INSTRUCTIONS
Calorie Counting for Weight Loss  Calories are units of energy. Your body needs a certain amount of calories from food to keep you going throughout the day. When you eat more calories than your body needs, your body stores the extra calories as fat. When you eat fewer calories than your body needs, your body burns fat to get the energy it needs.  Calorie counting means keeping track of how many calories you eat and drink each day. Calorie counting can be helpful if you need to lose weight. If you make sure to eat fewer calories than your body needs, you should lose weight. Ask your health care provider what a healthy weight is for you.  For calorie counting to work, you will need to eat the right number of calories in a day in order to lose a healthy amount of weight per week. A dietitian can help you determine how many calories you need in a day and will give you suggestions on how to reach your calorie goal.  · A healthy amount of weight to lose per week is usually 1-2 lb (0.5-0.9 kg). This usually means that your daily calorie intake should be reduced by 500-750 calories.  · Eating 1,200 - 1,500 calories per day can help most women lose weight.  · Eating 1,500 - 1,800 calories per day can help most men lose weight.  What is my plan?  My goal is to have __________ calories per day.  If I have this many calories per day, I should lose around __________ pounds per week.  What do I need to know about calorie counting?  In order to meet your daily calorie goal, you will need to:  · Find out how many calories are in each food you would like to eat. Try to do this before you eat.  · Decide how much of the food you plan to eat.  · Write down what you ate and how many calories it had. Doing this is called keeping a food log.  To successfully lose weight, it is important to balance calorie counting with a healthy lifestyle that includes regular activity. Aim for 150 minutes of moderate exercise (such as walking) or 75  minutes of vigorous exercise (such as running) each week.  Where do I find calorie information?    The number of calories in a food can be found on a Nutrition Facts label. If a food does not have a Nutrition Facts label, try to look up the calories online or ask your dietitian for help.  Remember that calories are listed per serving. If you choose to have more than one serving of a food, you will have to multiply the calories per serving by the amount of servings you plan to eat. For example, the label on a package of bread might say that a serving size is 1 slice and that there are 90 calories in a serving. If you eat 1 slice, you will have eaten 90 calories. If you eat 2 slices, you will have eaten 180 calories.  How do I keep a food log?  Immediately after each meal, record the following information in your food log:  · What you ate. Don't forget to include toppings, sauces, and other extras on the food.  · How much you ate. This can be measured in cups, ounces, or number of items.  · How many calories each food and drink had.  · The total number of calories in the meal.  Keep your food log near you, such as in a small notebook in your pocket, or use a mobile sinai or website. Some programs will calculate calories for you and show you how many calories you have left for the day to meet your goal.  What are some calorie counting tips?    · Use your calories on foods and drinks that will fill you up and not leave you hungry:  ? Some examples of foods that fill you up are nuts and nut butters, vegetables, lean proteins, and high-fiber foods like whole grains. High-fiber foods are foods with more than 5 g fiber per serving.  ? Drinks such as sodas, specialty coffee drinks, alcohol, and juices have a lot of calories, yet do not fill you up.  · Eat nutritious foods and avoid empty calories. Empty calories are calories you get from foods or beverages that do not have many vitamins or protein, such as candy, sweets, and  "soda. It is better to have a nutritious high-calorie food (such as an avocado) than a food with few nutrients (such as a bag of chips).  · Know how many calories are in the foods you eat most often. This will help you calculate calorie counts faster.  · Pay attention to calories in drinks. Low-calorie drinks include water and unsweetened drinks.  · Pay attention to nutrition labels for \"low fat\" or \"fat free\" foods. These foods sometimes have the same amount of calories or more calories than the full fat versions. They also often have added sugar, starch, or salt, to make up for flavor that was removed with the fat.  · Find a way of tracking calories that works for you. Get creative. Try different apps or programs if writing down calories does not work for you.  What are some portion control tips?  · Know how many calories are in a serving. This will help you know how many servings of a certain food you can have.  · Use a measuring cup to measure serving sizes. You could also try weighing out portions on a kitchen scale. With time, you will be able to estimate serving sizes for some foods.  · Take some time to put servings of different foods on your favorite plates, bowls, and cups so you know what a serving looks like.  · Try not to eat straight from a bag or box. Doing this can lead to overeating. Put the amount you would like to eat in a cup or on a plate to make sure you are eating the right portion.  · Use smaller plates, glasses, and bowls to prevent overeating.  · Try not to multitask (for example, watch TV or use your computer) while eating. If it is time to eat, sit down at a table and enjoy your food. This will help you to know when you are full. It will also help you to be aware of what you are eating and how much you are eating.  What are tips for following this plan?  Reading food labels  · Check the calorie count compared to the serving size. The serving size may be smaller than what you are used to " "eating.  · Check the source of the calories. Make sure the food you are eating is high in vitamins and protein and low in saturated and trans fats.  Shopping  · Read nutrition labels while you shop. This will help you make healthy decisions before you decide to purchase your food.  · Make a grocery list and stick to it.  Cooking  · Try to cook your favorite foods in a healthier way. For example, try baking instead of frying.  · Use low-fat dairy products.  Meal planning  · Use more fruits and vegetables. Half of your plate should be fruits and vegetables.  · Include lean proteins like poultry and fish.  How do I count calories when eating out?  · Ask for smaller portion sizes.  · Consider sharing an entree and sides instead of getting your own entree.  · If you get your own entree, eat only half. Ask for a box at the beginning of your meal and put the rest of your entree in it so you are not tempted to eat it.  · If calories are listed on the menu, choose the lower calorie options.  · Choose dishes that include vegetables, fruits, whole grains, low-fat dairy products, and lean protein.  · Choose items that are boiled, broiled, grilled, or steamed. Stay away from items that are buttered, battered, fried, or served with cream sauce. Items labeled \"crispy\" are usually fried, unless stated otherwise.  · Choose water, low-fat milk, unsweetened iced tea, or other drinks without added sugar. If you want an alcoholic beverage, choose a lower calorie option such as a glass of wine or light beer.  · Ask for dressings, sauces, and syrups on the side. These are usually high in calories, so you should limit the amount you eat.  · If you want a salad, choose a garden salad and ask for grilled meats. Avoid extra toppings like rico, cheese, or fried items. Ask for the dressing on the side, or ask for olive oil and vinegar or lemon to use as dressing.  · Estimate how many servings of a food you are given. For example, a serving of " cooked rice is ½ cup or about the size of half a baseball. Knowing serving sizes will help you be aware of how much food you are eating at restaurants. The list below tells you how big or small some common portion sizes are based on everyday objects:  ? 1 oz--4 stacked dice.  ? 3 oz--1 deck of cards.  ? 1 tsp--1 die.  ? 1 Tbsp--½ a ping-pong ball.  ? 2 Tbsp--1 ping-pong ball.  ? ½ cup--½ baseball.  ? 1 cup--1 baseball.  Summary  · Calorie counting means keeping track of how many calories you eat and drink each day. If you eat fewer calories than your body needs, you should lose weight.  · A healthy amount of weight to lose per week is usually 1-2 lb (0.5-0.9 kg). This usually means reducing your daily calorie intake by 500-750 calories.  · The number of calories in a food can be found on a Nutrition Facts label. If a food does not have a Nutrition Facts label, try to look up the calories online or ask your dietitian for help.  · Use your calories on foods and drinks that will fill you up, and not on foods and drinks that will leave you hungry.  · Use smaller plates, glasses, and bowls to prevent overeating.  This information is not intended to replace advice given to you by your health care provider. Make sure you discuss any questions you have with your health care provider.  Document Released: 12/18/2006 Document Revised: 09/06/2019 Document Reviewed: 11/17/2017  [employer] Patient Education © 2020 [employer]      Exercising to Lose Weight  Exercise is structured, repetitive physical activity to improve fitness and health. Getting regular exercise is important for everyone. It is especially important if you are overweight. Being overweight increases your risk of heart disease, stroke, diabetes, high blood pressure, and several types of cancer. Reducing your calorie intake and exercising can help you lose weight.  Exercise is usually categorized as moderate or vigorous intensity. To lose weight, most people need  to do a certain amount of moderate-intensity or vigorous-intensity exercise each week.  Moderate-intensity exercise    Moderate-intensity exercise is any activity that gets you moving enough to burn at least three times more energy (calories) than if you were sitting.  Examples of moderate exercise include:  · Walking a mile in 15 minutes.  · Doing light yard work.  · Biking at an easy pace.  Most people should get at least 150 minutes (2 hours and 30 minutes) a week of moderate-intensity exercise to maintain their body weight.  Vigorous-intensity exercise  Vigorous-intensity exercise is any activity that gets you moving enough to burn at least six times more calories than if you were sitting. When you exercise at this intensity, you should be working hard enough that you are not able to carry on a conversation.  Examples of vigorous exercise include:  · Running.  · Playing a team sport, such as football, basketball, and soccer.  · Jumping rope.  Most people should get at least 75 minutes (1 hour and 15 minutes) a week of vigorous-intensity exercise to maintain their body weight.  How can exercise affect me?  When you exercise enough to burn more calories than you eat, you lose weight. Exercise also reduces body fat and builds muscle. The more muscle you have, the more calories you burn. Exercise also:  · Improves mood.  · Reduces stress and tension.  · Improves your overall fitness, flexibility, and endurance.  · Increases bone strength.  The amount of exercise you need to lose weight depends on:  · Your age.  · The type of exercise.  · Any health conditions you have.  · Your overall physical ability.  Talk to your health care provider about how much exercise you need and what types of activities are safe for you.  What actions can I take to lose weight?  Nutrition    · Make changes to your diet as told by your health care provider or diet and nutrition specialist (dietitian). This may include:  ? Eating fewer  calories.  ? Eating more protein.  ? Eating less unhealthy fats.  ? Eating a diet that includes fresh fruits and vegetables, whole grains, low-fat dairy products, and lean protein.  ? Avoiding foods with added fat, salt, and sugar.  · Drink plenty of water while you exercise to prevent dehydration or heat stroke.  Activity  · Choose an activity that you enjoy and set realistic goals. Your health care provider can help you make an exercise plan that works for you.  · Exercise at a moderate or vigorous intensity most days of the week.  ? The intensity of exercise may vary from person to person. You can tell how intense a workout is for you by paying attention to your breathing and heartbeat. Most people will notice their breathing and heartbeat get faster with more intense exercise.  · Do resistance training twice each week, such as:  ? Push-ups.  ? Sit-ups.  ? Lifting weights.  ? Using resistance bands.  · Getting short amounts of exercise can be just as helpful as long structured periods of exercise. If you have trouble finding time to exercise, try to include exercise in your daily routine.  ? Get up, stretch, and walk around every 30 minutes throughout the day.  ? Go for a walk during your lunch break.  ? Park your car farther away from your destination.  ? If you take public transportation, get off one stop early and walk the rest of the way.  ? Make phone calls while standing up and walking around.  ? Take the stairs instead of elevators or escalators.  · Wear comfortable clothes and shoes with good support.  · Do not exercise so much that you hurt yourself, feel dizzy, or get very short of breath.  Where to find more information  · [place] Department of Health and Human Services: www.hhs.gov  · Centers for Disease Control and Prevention (CDC): www.cdc.gov  Contact a health care provider:  · Before starting a new exercise program.  · If you have questions or concerns about your weight.  · If you have a medical  problem that keeps you from exercising.  Get help right away if you have any of the following while exercising:  · Injury.  · Dizziness.  · Difficulty breathing or shortness of breath that does not go away when you stop exercising.  · Chest pain.  · Rapid heartbeat.  Summary  · Being overweight increases your risk of heart disease, stroke, diabetes, high blood pressure, and several types of cancer.  · Losing weight happens when you burn more calories than you eat.  · Reducing the amount of calories you eat in addition to getting regular moderate or vigorous exercise each week helps you lose weight.  This information is not intended to replace advice given to you by your health care provider. Make sure you discuss any questions you have with your health care provider.  Document Released: 01/20/2012 Document Revised: 12/31/2018 Document Reviewed: 12/31/2018  [employer] Patient Education © 2020 [employer]

## 2020-08-17 DIAGNOSIS — I10 ESSENTIAL HYPERTENSION: ICD-10-CM

## 2020-08-17 RX ORDER — CHLORTHALIDONE 25 MG/1
25 TABLET ORAL DAILY
Qty: 90 TABLET | Refills: 1 | Status: SHIPPED | OUTPATIENT
Start: 2020-08-17 | End: 2020-11-08

## 2020-08-20 ENCOUNTER — OFFICE VISIT (OUTPATIENT)
Dept: ORTHOPEDIC SURGERY | Facility: CLINIC | Age: 85
End: 2020-08-20

## 2020-08-20 VITALS — WEIGHT: 119 LBS | OXYGEN SATURATION: 98 % | HEIGHT: 60 IN | BODY MASS INDEX: 23.36 KG/M2

## 2020-08-20 DIAGNOSIS — M79.641 RIGHT HAND PAIN: Primary | ICD-10-CM

## 2020-08-20 DIAGNOSIS — M19.041 PRIMARY OSTEOARTHRITIS OF RIGHT HAND: ICD-10-CM

## 2020-08-20 PROCEDURE — 99212 OFFICE O/P EST SF 10 MIN: CPT | Performed by: ORTHOPAEDIC SURGERY

## 2020-08-20 NOTE — PROGRESS NOTES
"Kristy Ramirez is a 86 y.o. female returns for     Chief Complaint   Patient presents with   • Right Hand - Follow-up, Pain       HISTORY OF PRESENT ILLNESS:follow up right hand.  Pain scale today 6/10    Mrs. Ramirez had little if any benefit from her injection.  She has had fairly good relief by taping the middle finger to the ring finger.     CONCURRENT MEDICAL HISTORY:    The following portions of the patient's history were reviewed and updated as appropriate: allergies, current medications, past family history, past medical history, past social history, past surgical history and problem list.         PHYSICAL EXAMINATION:       Ht 152.4 cm (60\")   Wt 54 kg (119 lb)   LMP  (LMP Unknown)   SpO2 98%   BMI 23.24 kg/m²     Physical Exam he is alert and pleasant.    GAIT:     [x]  Normal  []  Antalgic    Assistive device: [x]  None  []  Walker     []  Crutches  []  Cane     []  Wheelchair  []  Stretcher    Ortho Exam there is mild fullness diffusely about the middle finger MCP joint.  Motion of the joint is smooth but painfully restricted.      Xr Hand 3+ View Right (in Office)    Result Date: 8/3/2020  Narrative: Procedure:  Right hand    Indication:  Pain, cyst right thumb.. Technique:  Three views. Prior exam: Right hand February 3, 2020. Prior relevant exam:  None. FINDINGS: Extensive degenerative osteoarthritic changes first carpometacarpal joint. Joint space narrowing and osteophyte production and chronic-appearing radial subluxation the base of first metacarpal with respect to the greater multangular. Large fragmented osteophyte or accessory ossicle immediately adjacent to the base of the first metacarpal. (This may account for a soft tissue prominence or fullness on physical examination.) No significant change since prior exam. Joint space narrowing second and third metacarpophalangeal joints. This is slightly more pronounced than on prior exam.     Impression: As above. Electronically signed by:  Abraham " Jose FRANCISCO  8/3/2020 11:55 AM CDT Workstation: [id number]            ASSESSMENT: Osteoarthritis right hand.    The natural history of the disorder was again discussed and treatment options reviewed.  She understands surgery may eventually be needed to control her symptoms.  She is not interested in having any surgery.  We will continue her current buddy taping to help control her symptoms.  I will also call in a prescription for Voltaren gel to take as needed.  She said Aspercreme has not been of any benefit to her.    If her symptoms worsen and she wishes further treatment she will call for reevaluation.    Diagnoses and all orders for this visit:    Right hand pain    Primary osteoarthritis of right hand          PLAN    Return if symptoms worsen or fail to improve.    Syed Carr MD

## 2020-09-01 RX ORDER — HYDROCHLOROTHIAZIDE 12.5 MG/1
12.5 TABLET ORAL DAILY
Qty: 90 TABLET | Refills: 2 | Status: SHIPPED | OUTPATIENT
Start: 2020-09-01 | End: 2020-11-08

## 2020-09-14 ENCOUNTER — OFFICE VISIT (OUTPATIENT)
Dept: OBSTETRICS AND GYNECOLOGY | Facility: CLINIC | Age: 85
End: 2020-09-14

## 2020-09-14 VITALS
SYSTOLIC BLOOD PRESSURE: 102 MMHG | HEIGHT: 60 IN | WEIGHT: 120 LBS | DIASTOLIC BLOOD PRESSURE: 62 MMHG | BODY MASS INDEX: 23.56 KG/M2

## 2020-09-14 DIAGNOSIS — N81.6 POP-Q STAGE 2 RECTOCELE: Chronic | ICD-10-CM

## 2020-09-14 DIAGNOSIS — R19.7 DIARRHEA, UNSPECIFIED TYPE: ICD-10-CM

## 2020-09-14 DIAGNOSIS — N81.10 POP-Q STAGE 4 CYSTOCELE: Chronic | ICD-10-CM

## 2020-09-14 DIAGNOSIS — R13.10 PROBLEMS WITH SWALLOWING: Primary | ICD-10-CM

## 2020-09-14 DIAGNOSIS — K62.5 RECTAL BLEEDING: ICD-10-CM

## 2020-09-14 DIAGNOSIS — Z46.89 PESSARY MAINTENANCE: Primary | ICD-10-CM

## 2020-09-14 PROCEDURE — 99213 OFFICE O/P EST LOW 20 MIN: CPT | Performed by: NURSE PRACTITIONER

## 2020-09-14 NOTE — PROGRESS NOTES
"Subjective   Chief Complaint   Patient presents with   • Pessary Check     Kristy Ramirez is a 86 y.o. year old who presents to be seen for follow-up of her pessary.  Currently she is using Foldable ring w/ support - #7 w/o urethral bar.  She reports no problems with her pessary at this time. States that she has been having diarrhea with bleeding for over 2 weeks but will have bouts of constipation in between for about a day. C/O abdominal pain with the diarrhea and the constipation.    No Additional Complaints Reported    The following portions of the patient's history were reviewed and updated as appropriate:problem list, current medications and allergies    Review of Systems   Constitutional: Negative for activity change, appetite change, chills, diaphoresis, fatigue, fever, unexpected weight gain and unexpected weight loss.   Gastrointestinal: Positive for abdominal pain, anal bleeding, blood in stool, constipation, diarrhea and rectal pain. Negative for abdominal distention, nausea and vomiting.   Genitourinary: Negative for decreased urine volume, difficulty urinating, pelvic pain, pelvic pressure, urgency, urinary incontinence, vaginal bleeding, vaginal discharge and vaginal pain.        Objective   /62   Ht 152.4 cm (60\")   Wt 54.4 kg (120 lb)   LMP  (LMP Unknown)   Breastfeeding No   BMI 23.44 kg/m²     General:  well developed; well nourished  no acute distress   Pelvis: Clinical staff was present for exam  External genitalia:  normal appearance of the external genitalia including Bartholin's and [place]'s glands.  :  urethral meatus normal; urethra hypermobile; prominent caruncle present;  Vaginal:  atrophic mucosal changes are present;  Cervix:  absent.  Uterus:  absent.  Adnexa:  non palpable bilaterally.  Cystocele GRADE 2  Rectocele GRADE 2  Pessary removed and cleaned. After vaginal inspection the pessary was lubricated and placed back into the vaginal vault.     Lab Review   No data " reviewed    Imaging   No data reviewed         Diagnoses and all orders for this visit:    Pessary maintenance    POP-Q stage 2 rectocele    POP-Q stage 4 cystocele        No orders of the defined types were placed in this encounter.    F/U in 8 weeks for routine pessary maintenance.     Advised pt to see her PCP asap for evaluation and management of her GI problems. She is going to stop by there today to try to be seen.    This document was electronically signed.     Gladys Soni, NADYA  September 14, 2020

## 2020-09-18 ENCOUNTER — TELEPHONE (OUTPATIENT)
Dept: FAMILY MEDICINE CLINIC | Facility: CLINIC | Age: 85
End: 2020-09-18

## 2020-09-18 ENCOUNTER — OFFICE VISIT (OUTPATIENT)
Dept: FAMILY MEDICINE CLINIC | Facility: CLINIC | Age: 85
End: 2020-09-18

## 2020-09-18 VITALS
DIASTOLIC BLOOD PRESSURE: 57 MMHG | SYSTOLIC BLOOD PRESSURE: 120 MMHG | BODY MASS INDEX: 24.15 KG/M2 | WEIGHT: 123 LBS | HEIGHT: 60 IN | HEART RATE: 59 BPM | OXYGEN SATURATION: 97 % | TEMPERATURE: 98 F | RESPIRATION RATE: 19 BRPM

## 2020-09-18 DIAGNOSIS — R19.7 INTERMITTENT DIARRHEA: ICD-10-CM

## 2020-09-18 DIAGNOSIS — K62.5 RECTAL BLEEDING: ICD-10-CM

## 2020-09-18 DIAGNOSIS — K59.00 CONSTIPATION, UNSPECIFIED CONSTIPATION TYPE: Primary | ICD-10-CM

## 2020-09-18 PROCEDURE — 99214 OFFICE O/P EST MOD 30 MIN: CPT | Performed by: NURSE PRACTITIONER

## 2020-09-18 RX ORDER — POLYETHYLENE GLYCOL 3350 17 G/17G
17 POWDER, FOR SOLUTION ORAL DAILY
Qty: 30 PACKET | Refills: 5 | Status: SHIPPED | OUTPATIENT
Start: 2020-09-18 | End: 2021-07-19

## 2020-09-18 NOTE — TELEPHONE ENCOUNTER
PATIENT STATES THERE IS A MEDICATION THAT HER INSURANCE WILL NOT COVER SHE IS REQUESTING ANOTHER PRESCRIPTION THAT COULD SUBSTITUTE IT   SHE STATED WHEN SHE SPEAKS WITH THE NURSE SHE WILL KNOW WHAT IT IS     NO OTHER INFO GIVEN       GOOD CONTACT NUMBER   [phone] (H)

## 2020-09-21 NOTE — PROGRESS NOTES
Subjective   Kristy Ramirez is a 86 y.o. female.     Kristy Ramirez 86 comes in today with rectal bleeding and constipation alternating with diarrhea that she has had for the past 2 weeks.  The alternating diarrhea and constipation have been going on for quite some time but apparently when she has diarrhea she takes Imodium and then that causes constipation so she is had a problem with being regular for a while.  She does have a history of pretty severe hemorrhoids she states that the bleeding was bright red bleeding after an episode of constipation.  She has an appointment with gastro within the next week.    Rectal Bleeding  This is a recurrent problem. The current episode started 1 to 4 weeks ago. The problem occurs rarely. The problem has been gradually improving. Pertinent negatives include no abdominal pain, anorexia, arthralgias, change in bowel habit, chest pain, chills, congestion, coughing, diaphoresis, fatigue, fever, headaches, joint swelling, myalgias, nausea, neck pain, numbness, rash, sore throat, swollen glands, urinary symptoms, vertigo, visual change, vomiting or weakness. Exacerbated by: Constipation. She has tried nothing for the symptoms. The treatment provided no relief.   Diarrhea   The current episode started 1 to 4 weeks ago. The problem occurs 5 to 10 times per day (It varies, she can have up to 10 watery stools a day and other times she will only have 1 or 2.=). The problem has been waxing and waning. The stool consistency is described as watery. The patient states that diarrhea does not awaken her from sleep. Pertinent negatives include no abdominal pain, arthralgias, bloating, chills, coughing, fever, headaches, increased  flatus, myalgias, sweats, URI, vomiting or weight loss. Nothing aggravates the symptoms. There are no known risk factors. She has tried anti-motility drug for the symptoms. The treatment provided significant relief. There is no history of bowel resection, inflammatory  bowel disease, irritable bowel syndrome, malabsorption, a recent abdominal surgery or short gut syndrome.        The following portions of the patient's history were reviewed and updated as appropriate: allergies, current medications, past family history, past medical history, past social history, past surgical history and problem list.    Review of Systems   Constitutional: Negative.  Negative for chills, diaphoresis, fatigue, fever and unexpected weight loss.   HENT: Negative.  Negative for congestion, sore throat and swollen glands.    Eyes: Negative.    Respiratory: Negative.  Negative for cough.    Cardiovascular: Negative.  Negative for chest pain.   Gastrointestinal: Positive for anal bleeding, constipation, diarrhea and hematochezia. Negative for abdominal pain, anorexia, bloating, change in bowel habit, flatus, nausea and vomiting.   Endocrine: Negative.    Genitourinary: Negative.    Musculoskeletal: Negative.  Negative for arthralgias, joint swelling, myalgias and neck pain.   Skin: Negative.  Negative for rash.   Allergic/Immunologic: Negative.    Neurological: Negative.  Negative for vertigo, weakness and numbness.   Hematological: Negative.    Psychiatric/Behavioral: Negative.        Objective   Physical Exam  Vitals signs and nursing note reviewed.   Constitutional:       General: She is not in acute distress.     Appearance: She is well-developed.   HENT:      Head: Normocephalic and atraumatic.      Right Ear: External ear normal.      Left Ear: External ear normal.      Nose: Nose normal.      Mouth/Throat:      Pharynx: No oropharyngeal exudate.   Eyes:      Pupils: Pupils are equal, round, and reactive to light.   Neck:      Musculoskeletal: Normal range of motion and neck supple.      Thyroid: No thyromegaly.   Cardiovascular:      Rate and Rhythm: Normal rate and regular rhythm.      Heart sounds: Normal heart sounds. No murmur. No friction rub.   Pulmonary:      Effort: Pulmonary effort is  normal. No respiratory distress.      Breath sounds: Normal breath sounds. No wheezing or rales.   Abdominal:      Palpations: Abdomen is soft.   Musculoskeletal: Normal range of motion.   Skin:     General: Skin is warm and dry.   Neurological:      Mental Status: She is alert and oriented to person, place, and time.   Psychiatric:         Thought Content: Thought content normal.           Assessment/Plan   Kristy was seen today for rectal bleeding and diarrhea.    Diagnoses and all orders for this visit:    Constipation, unspecified constipation type  -     polyethylene glycol (MIRALAX) 17 g packet; Take 17 g by mouth Daily.    Intermittent diarrhea    Rectal bleeding    For to get her more regular so that she does not have to take anything else, I recommend MiraLAX.  I also recommend that she keep her appointment with gastro.  She is also refusing a colonoscopy.

## 2020-09-22 ENCOUNTER — OFFICE VISIT (OUTPATIENT)
Dept: GASTROENTEROLOGY | Facility: CLINIC | Age: 85
End: 2020-09-22

## 2020-09-22 VITALS — BODY MASS INDEX: 23.71 KG/M2 | WEIGHT: 120.8 LBS | HEIGHT: 60 IN

## 2020-09-22 DIAGNOSIS — K92.1 BLOOD IN STOOL: ICD-10-CM

## 2020-09-22 DIAGNOSIS — R13.19 ESOPHAGEAL DYSPHAGIA: ICD-10-CM

## 2020-09-22 DIAGNOSIS — R19.8 ABNORMAL BOWEL HABITS: Primary | ICD-10-CM

## 2020-09-22 PROBLEM — M48.02 CERVICAL STENOSIS OF SPINAL CANAL: Status: ACTIVE | Noted: 2018-01-24

## 2020-09-22 PROBLEM — M51.36 DEGENERATION OF LUMBAR INTERVERTEBRAL DISC: Status: ACTIVE | Noted: 2020-09-22

## 2020-09-22 PROCEDURE — 99214 OFFICE O/P EST MOD 30 MIN: CPT | Performed by: NURSE PRACTITIONER

## 2020-09-22 RX ORDER — SODIUM CHLORIDE 0.9 % (FLUSH) 0.9 %
10 SYRINGE (ML) INJECTION AS NEEDED
Status: CANCELLED | OUTPATIENT
Start: 2020-09-22

## 2020-09-22 RX ORDER — SODIUM, POTASSIUM,MAG SULFATES 17.5-3.13G
1 SOLUTION, RECONSTITUTED, ORAL ORAL EVERY 12 HOURS
Qty: 2 BOTTLE | Refills: 0 | Status: ON HOLD | OUTPATIENT
Start: 2020-09-22 | End: 2020-11-08

## 2020-09-22 RX ORDER — SODIUM CHLORIDE 0.9 % (FLUSH) 0.9 %
3 SYRINGE (ML) INJECTION EVERY 12 HOURS SCHEDULED
Status: CANCELLED | OUTPATIENT
Start: 2020-09-22

## 2020-09-22 RX ORDER — DEXTROSE AND SODIUM CHLORIDE 5; .45 G/100ML; G/100ML
30 INJECTION, SOLUTION INTRAVENOUS CONTINUOUS PRN
Status: CANCELLED | OUTPATIENT
Start: 2020-10-12

## 2020-09-22 NOTE — PATIENT INSTRUCTIONS
Dysphagia    Dysphagia is trouble swallowing. This condition occurs when solids and liquids stick in a person's throat on the way down to the stomach, or when food takes longer to get to the stomach than usual.  You may have problems swallowing food, liquids, or both. You may also have pain while trying to swallow. It may take you more time and effort to swallow something.  What are the causes?  This condition may be caused by:  · Muscle problems. They may make it difficult for you to move food and liquids through the esophagus, which is the tube that connects your mouth to your stomach.  · Blockages. You may have ulcers, scar tissue, or inflammation that blocks the normal passage of food and liquids. Causes of these problems include:  ? Acid reflux from your stomach into your esophagus (gastroesophageal reflux).  ? Infections.  ? Radiation treatment for cancer.  ? Medicines taken without enough fluids to wash them down into your stomach.  · Stroke. This can affect the nerves and make it difficult to swallow.  · Nerve problems. These prevent signals from being sent to the muscles of your esophagus to squeeze (contract) and move what you swallow down to your stomach.  · Globus pharyngeus. This is a common problem that involves a feeling like something is stuck in your throat or a sense of trouble with swallowing, even though nothing is wrong with the swallowing passages.  · Certain conditions, such as cerebral palsy or Parkinson's disease.  What are the signs or symptoms?  Common symptoms of this condition include:  · A feeling that solids or liquids are stuck in your throat on the way down to the stomach.  · Pain while swallowing.  · Coughing or gagging while trying to swallow.  Other symptoms include:  · Food moving back from your stomach to your mouth (regurgitation).  · Noises coming from your throat.  · Chest discomfort with swallowing.  · A feeling of fullness when swallowing.  · Drooling, especially when the  throat is blocked.  · Heartburn.  How is this diagnosed?  This condition may be diagnosed by:  · Barium X-ray. In this test, you will swallow a white liquid that sticks to the inside of your esophagus. X-ray images are then taken.  · Endoscopy. In this test, a flexible telescope is inserted down your throat to look at your esophagus and your stomach.  · CT scans and an MRI.  How is this treated?  Treatment for dysphagia depends on the cause of this condition, such as:  · If the dysphagia is caused by acid reflux or infection, medicines may be used. They may include antibiotics and heartburn medicines.  · If the dysphagia is caused by problems with the muscles, swallowing therapy may be used to help you strengthen your swallowing muscles. You may have to do specific exercises to strengthen the muscles or stretch them.  · If the dysphagia is caused by a blockage or mass, procedures to remove the blockage may be done. You may need surgery and a feeding tube.  You may need to make diet changes. Ask your health care provider for specific instructions.  Follow these instructions at home:  Medicines  · Take over-the-counter and prescription medicines only as told by your health care provider.  · If you were prescribed an antibiotic medicine, take it as told by your health care provider. Do not stop taking the antibiotic even if you start to feel better.  Eating and drinking    · Follow any diet changes as told by your health care provider.  · Work with a diet and nutrition specialist (dietitian) to create an eating plan that will help you get the nutrients you need in order to stay healthy.  · Eat soft foods that are easier to swallow.  · Cut your food into small pieces and eat slowly. Take small bites.  · Eat and drink only when you are sitting upright.  · Do not drink alcohol or caffeine. If you need help quitting, ask your health care provider.  General instructions  · Check your weight every day to make sure you are  not losing weight.  · Do not use any products that contain nicotine or tobacco, such as cigarettes, e-cigarettes, and chewing tobacco. If you need help quitting, ask your health care provider.  · Keep all follow-up visits as told by your health care provider. This is important.  Contact a health care provider if you:  · Lose weight because you cannot swallow.  · Cough when you drink liquids.  · Cough up partially digested food.  Get help right away if you:  · Cannot swallow your saliva.  · Have shortness of breath, a fever, or both.  · Have a hoarse voice and also have trouble swallowing.  Summary  · Dysphagia is trouble swallowing. This condition occurs when solids and liquids stick in a person's throat on the way down to the stomach. You may cough or gag while trying to swallow.  · Dysphagia has many possible causes.  · Treatment for dysphagia depends on the cause of the condition.  · Keep all follow-up visits as told by your health care provider. This is important.  This information is not intended to replace advice given to you by your health care provider. Make sure you discuss any questions you have with your health care provider.  Document Released: 12/15/2001 Document Revised: 05/13/2020 Document Reviewed: 05/13/2020  [employer] Patient Education © 2020 [employer]

## 2020-09-22 NOTE — PROGRESS NOTES
Chief Complaint   Patient presents with   • Difficulty Swallowing   • Rectal Bleeding       Subjective    Kristy Ramirez is a 86 y.o. female. she is being seen for consultation today at the request of NADYA Welch   86-year-old female presents to discuss lower abdominal pain chronic constipation abnormal bowel habits were alternates with diarrhea and rectal bleeding that has worsened over the last few weeks.  States when she has diarrhea she will take an Imodium and then it will cause constipation and she will have issues with that for a while.  States she has issues with chronic hemorrhoids and has been seen by GI about 10 to 11 years ago.  Last recent colonoscopy in 2009 unable to review those results.  Reports MiraLAX does help with constipation and she has also tried several different fiber regimen such as Citrucel and Metamucil.  Patient also reports trouble with swallowing solids denies any trouble with liquid better outlined below.     Difficulty Swallowing  This is a recurrent problem. The problem occurs intermittently. The problem has been gradually worsening. Associated symptoms include fatigue and nausea. Pertinent negatives include no abdominal pain, anorexia, arthralgias, change in bowel habit, chest pain, chills, congestion, coughing, diaphoresis, fever, headaches, joint swelling, myalgias, neck pain, numbness, vomiting or weakness.   Rectal Bleeding  Associated symptoms include fatigue and nausea. Pertinent negatives include no abdominal pain, anorexia, arthralgias, change in bowel habit, chest pain, chills, congestion, coughing, diaphoresis, fever, headaches, joint swelling, myalgias, neck pain, numbness, vomiting or weakness.     Plan; schedule patient for EGD due to dysphasia and colonoscopy due to abnormal bowel habits rectal bleeding diarrhea alternating with constipation concern for microscopic colitis.       The following portions of the patient's history were reviewed and updated as  appropriate:   Past Medical History:   Diagnosis Date   • Abnormal CT scan     per patient   • Acute bronchitis    • Acute maxillary sinusitis    • Acute otitis externa    • Acute sinusitis    • Allergic conjunctivitis    • Angular cheilitis    • Ankle edema    • Backache     improved   • Blood in urine      UTI resolved      • Bradycardia    • Chest discomfort     described as heart racing      • Chronic low back pain     RIGHT leg radiation      • Cold feet     c/o - and lowerlegs      • Contusion     of dorsum of foot   • Cough    • Depressive disorder    • Diabetes mellitus (CMS/HCC)    • Dizziness and giddiness    • Dyspnea    • Dysuria    • Edema    • Edema of foot    • Edema of lower extremity    • Essential hypertension    • Exanthematous disorder    • Fatigue    • Foot pain, left    • Grade II hemorrhoids 3/12/2018   • Hematoma    • Hip pain, right    • History of palpitations    • Hormone replacement therapy (postmenopausal)    • Hypertensive disorder    • Impacted cerumen    • Joint pain    • Knee pain    • Low back pain    • Malaise and fatigue    • Multiple joint pain    • Muscle pain    • Muscle weakness    • Neck pain    • Obesity (BMI 30.0-34.9) 3/12/2018   • Osteoarthritis of knee    • Osteoarthritis of multiple joints    • Otitis externa    • Pain in lower limb    • Peripheral venous insufficiency    • POP-Q stage 2 rectocele 3/12/2018   • POP-Q stage 4 cystocele 3/12/2018   • Sacroiliitis, not elsewhere classified (CMS/HCC)     R LE   • Shoulder pain    • Upper respiratory infection    • Urinary frequency    • Urinary tract infectious disease    • Wheezing      Past Surgical History:   Procedure Laterality Date   • ANKLE SURGERY     • BACK SURGERY     • COLONOSCOPY  12/14/2009   • EYE SURGERY Bilateral 02/2018    B cataract   • HAMMER TOE REPAIR  08/02/2011    Hammertoe repair, left second toe.   • HYSTERECTOMY     • INCISION AND DRAINAGE ABSCESS  01/21/2015   • INJECTION OF MEDICATION  11/15/2013    • INJECTION OF MEDICATION  2013    Celestone (betamethasone) (1)      • INJECTION OF MEDICATION  2016    Kenalog (3)      • NECK SURGERY  2018   • UPPER GASTROINTESTINAL ENDOSCOPY  2009     Family History   Problem Relation Age of Onset   • Diabetes Other    • Heart disease Other    • Stroke Other      OB History        9    Para   7    Term                AB   2    Living   7       SAB        TAB        Ectopic        Molar        Multiple        Live Births                  Prior to Admission medications    Medication Sig Start Date End Date Taking? Authorizing Provider   Accu-Chek FastClix Lancets misc 1 each Daily. 8/3/20  Yes Arelis Barney APRN   allopurinol (ZYLOPRIM) 100 MG tablet Take 1 tablet by mouth Daily. take with food 8/3/20  Yes Arelis Barney APRN   amLODIPine (NORVASC) 5 MG tablet Take 1 tablet by mouth Daily. 8/3/20  Yes Arelis Barney APRN   aspirin 81 MG tablet Take 1 tablet by mouth Daily. 18  Yes Arelis Barney APRN   baclofen (LIORESAL) 10 MG tablet Take 1 tablet by mouth 3 (Three) Times a Day. 10/4/18  Yes Arelis Barney APRN   busPIRone (BUSPAR) 10 MG tablet Take 1 tablet by mouth 3 (Three) Times a Day. 3/21/19  Yes Arelis Barney APRN   chlorthalidone (HYGROTON) 25 MG tablet Take 1 tablet by mouth Daily. 20  Yes Arelis Barney APRN   clotrimazole-betamethasone (Lotrisone) 1-0.05 % cream Apply  topically to the appropriate area as directed 2 (Two) Times a Day. 8/3/20  Yes Arelis Barney APRN   colchicine 0.6 MG capsule capsule TK 1 C PO QD STARTING ON 19  Yes Provider, MD Vipin   cyproheptadine 2 MG/5ML syrup Take 5 mL by mouth Every 12 (Twelve) Hours As Needed (to stimulate appetite). 10/2/19  Yes Mateusz Chopra APRN   diclofenac (VOLTAREN) 1 % gel gel Apply 4 g topically to the appropriate area as directed 3 (Three) Times a Day. Small amount to affected area 20  Yes Syed Carr,  MD   diphenhydrAMINE (BANOPHEN) 25 mg capsule Banophen 25 mg capsule   TK 1 C PO Q 6 H PRN FOR ITCHING   Yes Provider, Vipin, MD   docusate sodium (DOK) 100 MG capsule Take 1 capsule by mouth 2 (Two) Times a Day. 4/29/19  Yes Mateusz Chopra APRN   estradiol (ESTRACE) 1 MG tablet Take 1 tablet by mouth Daily. 3/21/19  Yes Arelis Barney APRN   folic acid (FOLVITE) 1 MG tablet Take 1 mg by mouth Daily.   Yes Provider, Historical, MD   furosemide (LASIX) 20 MG tablet Take 1 tablet by mouth Daily. 8/3/20  Yes Arelis Barney APRN   gabapentin (NEURONTIN) 300 MG capsule Take 1 capsule by mouth Every Morning. 8/4/20  Yes Arelis Barney APRN   gabapentin (NEURONTIN) 600 MG tablet Take 1 tablet by mouth Every Night. 8/4/20  Yes Arelis Barney APRN   glucose blood test strip Use as instructed to check b/s 1x a day.  Please give strip compatible with monitor. (accucheck) 8/3/20  Yes Arelis Barney APRN   hydroCHLOROthiazide (HYDRODIURIL) 12.5 MG tablet TAKE 1 TABLET BY MOUTH DAILY 9/1/20  Yes Arelis Barney APRN   loperamide (Imodium A-D) 2 MG tablet Take 1 tablet by mouth 4 (Four) Times a Day As Needed for Diarrhea. 8/3/20  Yes Arelis Barney APRN   losartan (COZAAR) 50 MG tablet Take 1 tablet by mouth Daily. 8/3/20  Yes Arelis Barney APRN   Menthol, Topical Analgesic, (BIOFREEZE) 4 % gel Apply to affected area on left back 2-3 times per day x 1week 4/29/19  Yes Mateusz Chopra APRN   metoprolol succinate XL (TOPROL-XL) 25 MG 24 hr tablet Take 1 tablet by mouth Daily. 8/3/20  Yes Arelis Barney APRN   montelukast (SINGULAIR) 10 MG tablet Take 1 tablet by mouth Every Night. 3/5/20  Yes Mateusz Chopra APRN   NUCYNTA 50 MG tablet TK 1 T PO BID PRN 9/9/19  Yes Provider, MD Vipin   omeprazole (priLOSEC) 20 MG capsule Take 1 capsule by mouth 2 (Two) Times a Day. 8/3/20  Yes Arelis Barney APRN   polyethylene glycol (MIRALAX) 17 g packet Take 17 g by mouth Daily. 9/18/20  Yes  Arelis Barney APRN   promethazine-dextromethorphan (PROMETHAZINE-DM) 6.25-15 MG/5ML syrup Take 5 mL by mouth 4 (Four) Times a Day As Needed for Cough. 3/10/20  Yes Arelis Barney APRN   terconazole (TERAZOL 7) 0.4 % vaginal cream INSERT 1 APPLICATORFUL INTO THE VAGINA EVERY NIGHT AS DIRECTED 9/17/20  Yes Arelis Barney APRN   tolterodine LA (DETROL LA) 4 MG 24 hr capsule Take 1 capsule by mouth Daily. 8/3/20  Yes Arelis Barney APRN   traMADol (ULTRAM) 50 MG tablet Take 1 tablet by mouth 2 (Two) Times a Day As Needed for Moderate Pain  (hand pain). 1/31/20  Yes Mateusz Chopra APRN   triamcinolone (KENALOG) 0.1 % cream Apply  topically to the appropriate area as directed 3 (Three) Times a Day As Needed (itching--insect bites). 10/2/19  Yes Mateusz Chopra APRN   tolterodine LA (DETROL LA) 4 MG 24 hr capsule TAKE 1 CAPSULE BY MOUTH DAILY 7/11/19 9/22/20 Yes Arelis Barney APRN     Allergies   Allergen Reactions   • Aleve [Naproxen Sodium] Itching   • Atenolol    • Keflex [Cephalexin] Itching   • Lisinopril Angioedema   • Relafen [Nabumetone] Itching   • Zanaflex [Tizanidine] Itching   • Acetaminophen Itching   • Aspirin Itching and Rash   • Diflucan [Fluconazole] Itching   • Sulfa Antibiotics Rash and Itching     Social History     Socioeconomic History   • Marital status:      Spouse name: Not on file   • Number of children: Not on file   • Years of education: Not on file   • Highest education level: Not on file   Tobacco Use   • Smoking status: Former Smoker   • Smokeless tobacco: Never Used   Substance and Sexual Activity   • Alcohol use: No   • Drug use: No       Review of Systems  Review of Systems   Constitutional: Positive for fatigue. Negative for activity change, appetite change, chills, diaphoresis, fever and unexpected weight change.   HENT: Negative for congestion.    Respiratory: Negative for cough.    Cardiovascular: Negative for chest pain.   Gastrointestinal: Positive for  "anal bleeding, blood in stool, constipation, diarrhea, hematochezia and nausea. Negative for abdominal distention, abdominal pain, anorexia, change in bowel habit, rectal pain and vomiting.   Musculoskeletal: Negative for arthralgias, joint swelling, myalgias and neck pain.   Neurological: Negative for weakness, numbness and headaches.        Ht 152.4 cm (60\")   Wt 54.8 kg (120 lb 12.8 oz)   LMP  (LMP Unknown)   BMI 23.59 kg/m²     Objective    Physical Exam  Constitutional:       General: She is not in acute distress.     Appearance: Normal appearance. She is well-developed.   HENT:      Head: Normocephalic and atraumatic.   Neck:      Musculoskeletal: Normal range of motion and neck supple.      Thyroid: No thyromegaly.   Cardiovascular:      Rate and Rhythm: Normal rate and regular rhythm.      Heart sounds: Normal heart sounds.   Pulmonary:      Effort: Pulmonary effort is normal.      Breath sounds: Normal breath sounds. No wheezing, rhonchi or rales.   Abdominal:      General: Bowel sounds are normal. There is no distension.      Palpations: Abdomen is soft. Abdomen is not rigid.      Tenderness: There is abdominal tenderness in the right lower quadrant and suprapubic area. There is no guarding.      Hernia: No hernia is present.   Lymphadenopathy:      Cervical: No cervical adenopathy.   Skin:     General: Skin is warm and dry.      Coloration: Skin is not pale.      Findings: No rash.   Neurological:      Mental Status: She is alert and oriented to person, place, and time.   Psychiatric:         Speech: Speech normal.         Behavior: Behavior is cooperative.       Office Visit on 08/03/2020   Component Date Value Ref Range Status   • Hemoglobin A1C 08/03/2020 6.21* 4.80 - 5.60 % Final   • Glucose 08/03/2020 98  65 - 99 mg/dL Final   • BUN 08/03/2020 31* 8 - 23 mg/dL Final   • Creatinine 08/03/2020 1.41* 0.57 - 1.00 mg/dL Final   • Sodium 08/03/2020 139  136 - 145 mmol/L Final   • Potassium 08/03/2020 " 3.6  3.5 - 5.2 mmol/L Final   • Chloride 08/03/2020 100  98 - 107 mmol/L Final   • CO2 08/03/2020 23.8  22.0 - 29.0 mmol/L Final   • Calcium 08/03/2020 10.3  8.6 - 10.5 mg/dL Final   • Total Protein 08/03/2020 7.5  6.0 - 8.5 g/dL Final   • Albumin 08/03/2020 4.20  3.50 - 5.20 g/dL Final   • ALT (SGPT) 08/03/2020 6  1 - 33 U/L Final   • AST (SGOT) 08/03/2020 18  1 - 32 U/L Final   • Alkaline Phosphatase 08/03/2020 77  39 - 117 U/L Final   • Total Bilirubin 08/03/2020 0.4  0.0 - 1.2 mg/dL Final   • eGFR   Amer 08/03/2020 43* >60 mL/min/1.73 Final   • Globulin 08/03/2020 3.3  gm/dL Final   • A/G Ratio 08/03/2020 1.3  g/dL Final   • BUN/Creatinine Ratio 08/03/2020 22.0  7.0 - 25.0 Final   • Anion Gap 08/03/2020 15.2* 5.0 - 15.0 mmol/L Final     Assessment/Plan      1. Abnormal bowel habits    2. Blood in stool    3. Esophageal dysphagia    .       Orders placed during this encounter include:  Orders Placed This Encounter   Procedures   • Follow Anesthesia Guidelines / Standing Orders     Standing Status:   Future   • Obtain Informed Consent     Standing Status:   Future     Order Specific Question:   Informed Consent Given For     Answer:   ESOPHAGOGASTRODUODENOSCOPY and Colonoscopy       ESOPHAGOGASTRODUODENOSCOPY (N/A), COLONOSCOPY (N/A)    Review and/or summary of lab tests, radiology, procedures, medications. Review and summary of old records and obtaining of history. The risks and benefits of my recommendations, as well as other treatment options were discussed with the patient today. Questions were answered.    New Medications Ordered This Visit   Medications   • sodium-potassium-magnesium sulfates (Suprep Bowel Prep Kit) 17.5-3.13-1.6 GM/177ML solution oral solution     Sig: Take 1 bottle by mouth Every 12 (Twelve) Hours.     Dispense:  2 bottle     Refill:  0       Follow-up: Return in about 4 weeks (around 10/20/2020) for Recheck, After test.          This document has been electronically signed by Марина  NADYA Valerio on September 23, 2020 09:31 CDT             Results for orders placed or performed in visit on 08/03/20   Hemoglobin A1c    Specimen: Blood   Result Value Ref Range    Hemoglobin A1C 6.21 (H) 4.80 - 5.60 %   Comprehensive metabolic panel    Specimen: Blood   Result Value Ref Range    Glucose 98 65 - 99 mg/dL    BUN 31 (H) 8 - 23 mg/dL    Creatinine 1.41 (H) 0.57 - 1.00 mg/dL    Sodium 139 136 - 145 mmol/L    Potassium 3.6 3.5 - 5.2 mmol/L    Chloride 100 98 - 107 mmol/L    CO2 23.8 22.0 - 29.0 mmol/L    Calcium 10.3 8.6 - 10.5 mg/dL    Total Protein 7.5 6.0 - 8.5 g/dL    Albumin 4.20 3.50 - 5.20 g/dL    ALT (SGPT) 6 1 - 33 U/L    AST (SGOT) 18 1 - 32 U/L    Alkaline Phosphatase 77 39 - 117 U/L    Total Bilirubin 0.4 0.0 - 1.2 mg/dL    eGFR  African Amer 43 (L) >60 mL/min/1.73    Globulin 3.3 gm/dL    A/G Ratio 1.3 g/dL    BUN/Creatinine Ratio 22.0 7.0 - 25.0    Anion Gap 15.2 (H) 5.0 - 15.0 mmol/L   Results for orders placed or performed in visit on 02/03/20   CBC Auto Differential    Specimen: Blood   Result Value Ref Range    WBC 10.75 3.40 - 10.80 10*3/mm3    RBC 3.72 (L) 3.77 - 5.28 10*6/mm3    Hemoglobin 10.3 (L) 12.0 - 15.9 g/dL    Hematocrit 31.1 (L) 34.0 - 46.6 %    MCV 83.6 79.0 - 97.0 fL    MCH 27.7 26.6 - 33.0 pg    MCHC 33.1 31.5 - 35.7 g/dL    RDW 13.5 12.3 - 15.4 %    RDW-SD 40.6 37.0 - 54.0 fl    MPV 11.9 6.0 - 12.0 fL    Platelets 312 140 - 450 10*3/mm3    Neutrophil % 73.7 42.7 - 76.0 %    Lymphocyte % 15.8 (L) 19.6 - 45.3 %    Monocyte % 8.4 5.0 - 12.0 %    Eosinophil % 0.6 0.3 - 6.2 %    Basophil % 0.3 0.0 - 1.5 %    Immature Grans % 1.2 (H) 0.0 - 0.5 %    Neutrophils, Absolute 7.93 (H) 1.70 - 7.00 10*3/mm3    Lymphocytes, Absolute 1.70 0.70 - 3.10 10*3/mm3    Monocytes, Absolute 0.90 0.10 - 0.90 10*3/mm3    Eosinophils, Absolute 0.06 0.00 - 0.40 10*3/mm3    Basophils, Absolute 0.03 0.00 - 0.20 10*3/mm3    Immature Grans, Absolute 0.13 (H) 0.00 - 0.05 10*3/mm3    nRBC 0.0 0.0 - 0.2  /100 WBC   Rheumatoid factor    Specimen: Blood   Result Value Ref Range    Rheumatoid Factor Quantitative <10.0 0.0 - 14.0 IU/mL   C-reactive protein    Specimen: Blood   Result Value Ref Range    C-Reactive Protein 7.32 (H) 0.00 - 0.50 mg/dL   JOSE    Specimen: Blood   Result Value Ref Range    JOSE Direct Negative Negative   Results for orders placed or performed in visit on 01/29/20   Gastrointestinal Panel, PCR - Stool, Per Rectum    Specimen: Per Rectum; Stool   Result Value Ref Range    Campylobacter Not Detected Not Detected, Invalid    Plesiomonas shigelloides Not Detected Not Detected    Salmonella Not Detected Not Detected    Vibrio Not Detected Not Detected    Vibrio cholerae Not Detected Not Detected    Yersinia enterocolitica Not Detected Not Detected    Enteroaggregative E. coli (EAEC) Not Detected Not Detected    Enteropathogenic E. coli (EPEC) Not Detected Not Detected    Enterotoxigenic E. coli (ETEC) lt/st Not Detected Not Detected    Shiga-like toxin-producing E. coli (STEC) stx1/stx2 Not Detected Not Detected    E. coli O157 Not Detected Not Detected    Shigella/Enteroinvasive E. coli (EIEC) Not Detected Not Detected    Cryptosporidium Not Detected Not Detected, Invalid    Cyclospora cayetanensis Not Detected Not Detected    Entamoeba histolytica Not Detected Not Detected    Giardia lamblia Not Detected Not Detected    Adenovirus F40/41 Not Detected Not Detected    Astrovirus Not Detected Not Detected    Norovirus GI/GII Not Detected Not Detected    Rotavirus A Not Detected Not Detected    Sapovirus (I, II, IV or V) Not Detected Not Detected   CBC Auto Differential    Specimen: Blood   Result Value Ref Range    WBC 11.20 (H) 3.40 - 10.80 10*3/mm3    RBC 3.71 (L) 3.77 - 5.28 10*6/mm3    Hemoglobin 10.2 (L) 12.0 - 15.9 g/dL    Hematocrit 32.5 (L) 34.0 - 46.6 %    MCV 87.6 79.0 - 97.0 fL    MCH 27.5 26.6 - 33.0 pg    MCHC 31.4 (L) 31.5 - 35.7 g/dL    RDW 14.3 12.3 - 15.4 %    RDW-SD 45.5 37.0 - 54.0  fl    MPV 11.9 6.0 - 12.0 fL    Platelets 262 140 - 450 10*3/mm3    Neutrophil % 75.6 42.7 - 76.0 %    Lymphocyte % 15.9 (L) 19.6 - 45.3 %    Monocyte % 7.3 5.0 - 12.0 %    Eosinophil % 0.6 0.3 - 6.2 %    Basophil % 0.2 0.0 - 1.5 %    Immature Grans % 0.4 0.0 - 0.5 %    Neutrophils, Absolute 8.47 (H) 1.70 - 7.00 10*3/mm3    Lymphocytes, Absolute 1.78 0.70 - 3.10 10*3/mm3    Monocytes, Absolute 0.82 0.10 - 0.90 10*3/mm3    Eosinophils, Absolute 0.07 0.00 - 0.40 10*3/mm3    Basophils, Absolute 0.02 0.00 - 0.20 10*3/mm3    Immature Grans, Absolute 0.04 0.00 - 0.05 10*3/mm3    nRBC 0.0 0.0 - 0.2 /100 WBC   Clostridium Difficile Toxin, PCR - Stool, Per Rectum    Specimen: Per Rectum; Stool   Result Value Ref Range    C. Difficile Toxins by PCR Negative Negative   Uric acid    Specimen: Blood   Result Value Ref Range    Uric Acid 4.8 2.4 - 5.7 mg/dL   Comprehensive metabolic panel    Specimen: Blood   Result Value Ref Range    Glucose 108 (H) 65 - 99 mg/dL    BUN 23 8 - 23 mg/dL    Creatinine 1.15 (H) 0.57 - 1.00 mg/dL    Sodium 136 136 - 145 mmol/L    Potassium 3.9 3.5 - 5.2 mmol/L    Chloride 99 98 - 107 mmol/L    CO2 22.0 22.0 - 29.0 mmol/L    Calcium 9.5 8.6 - 10.5 mg/dL    Total Protein 7.3 6.0 - 8.5 g/dL    Albumin 3.90 3.50 - 5.20 g/dL    ALT (SGPT) 5 1 - 33 U/L    AST (SGOT) 12 1 - 32 U/L    Alkaline Phosphatase 92 39 - 117 U/L    Total Bilirubin 0.2 0.2 - 1.2 mg/dL    eGFR  African Amer 54 (L) >60 mL/min/1.73    Globulin 3.4 gm/dL    A/G Ratio 1.1 g/dL    BUN/Creatinine Ratio 20.0 7.0 - 25.0    Anion Gap 15.0 5.0 - 15.0 mmol/L     *Note: Due to a large number of results and/or encounters for the requested time period, some results have not been displayed. A complete set of results can be found in Results Review.

## 2020-09-24 DIAGNOSIS — R21 RASH: ICD-10-CM

## 2020-09-24 RX ORDER — CLOTRIMAZOLE AND BETAMETHASONE DIPROPIONATE 10; .64 MG/G; MG/G
CREAM TOPICAL 2 TIMES DAILY
Qty: 45 G | Refills: 1 | Status: SHIPPED | OUTPATIENT
Start: 2020-09-24 | End: 2020-11-16

## 2020-10-09 ENCOUNTER — LAB (OUTPATIENT)
Dept: LAB | Facility: HOSPITAL | Age: 85
End: 2020-10-09

## 2020-10-09 DIAGNOSIS — Z01.818 PREOP TESTING: Primary | ICD-10-CM

## 2020-10-09 PROCEDURE — U0003 INFECTIOUS AGENT DETECTION BY NUCLEIC ACID (DNA OR RNA); SEVERE ACUTE RESPIRATORY SYNDROME CORONAVIRUS 2 (SARS-COV-2) (CORONAVIRUS DISEASE [COVID-19]), AMPLIFIED PROBE TECHNIQUE, MAKING USE OF HIGH THROUGHPUT TECHNOLOGIES AS DESCRIBED BY CMS-2020-01-R: HCPCS

## 2020-10-09 PROCEDURE — C9803 HOPD COVID-19 SPEC COLLECT: HCPCS

## 2020-10-10 LAB
COVID LABCORP PRIORITY: NORMAL
SARS-COV-2 RNA RESP QL NAA+PROBE: DETECTED

## 2020-10-27 DIAGNOSIS — M54.50 CHRONIC LOW BACK PAIN: ICD-10-CM

## 2020-10-27 DIAGNOSIS — G89.29 CHRONIC LOW BACK PAIN: ICD-10-CM

## 2020-10-27 DIAGNOSIS — G62.9 NEUROPATHY: ICD-10-CM

## 2020-10-27 RX ORDER — GABAPENTIN 600 MG/1
600 TABLET ORAL NIGHTLY
Qty: 30 TABLET | Refills: 3 | Status: SHIPPED | OUTPATIENT
Start: 2020-10-27 | End: 2020-11-24

## 2020-10-27 RX ORDER — GABAPENTIN 300 MG/1
300 CAPSULE ORAL EVERY MORNING
Qty: 30 CAPSULE | Refills: 3 | Status: SHIPPED | OUTPATIENT
Start: 2020-10-27 | End: 2021-03-23 | Stop reason: SDUPTHER

## 2020-10-27 NOTE — TELEPHONE ENCOUNTER
Caller: Kristy Ramirez    Relationship: Self    Best call back number: [phone]     Medication needed:   Requested Prescriptions     Pending Prescriptions Disp Refills   • gabapentin (NEURONTIN) 300 MG capsule 30 capsule 3     Sig: Take 1 capsule by mouth Every Morning.   • gabapentin (NEURONTIN) 600 MG tablet 30 tablet 3     Sig: Take 1 tablet by mouth Every Night.       When do you need the refill by: ASAP    What details did the patient provide when requesting the medication: PATIENT IS ALMOST OUT OF MEDICATION     Does the patient have less than a 3 day supply:  [x] Yes  [] No    What is the patient's preferred pharmacy: [id number] [address] & [facility] - [phone] [facility] [phone] FX

## 2020-10-30 ENCOUNTER — LAB (OUTPATIENT)
Dept: LAB | Facility: HOSPITAL | Age: 85
End: 2020-10-30

## 2020-10-30 DIAGNOSIS — K13.0 CHEILITIS: ICD-10-CM

## 2020-10-30 DIAGNOSIS — Z01.818 PREOP TESTING: Primary | ICD-10-CM

## 2020-10-30 PROCEDURE — U0003 INFECTIOUS AGENT DETECTION BY NUCLEIC ACID (DNA OR RNA); SEVERE ACUTE RESPIRATORY SYNDROME CORONAVIRUS 2 (SARS-COV-2) (CORONAVIRUS DISEASE [COVID-19]), AMPLIFIED PROBE TECHNIQUE, MAKING USE OF HIGH THROUGHPUT TECHNOLOGIES AS DESCRIBED BY CMS-2020-01-R: HCPCS

## 2020-10-30 PROCEDURE — C9803 HOPD COVID-19 SPEC COLLECT: HCPCS

## 2020-10-31 LAB — COVID LABCORP PRIORITY: NORMAL

## 2020-11-01 LAB — SARS-COV-2 RNA RESP QL NAA+PROBE: DETECTED

## 2020-11-02 ENCOUNTER — OFFICE VISIT (OUTPATIENT)
Dept: FAMILY MEDICINE CLINIC | Facility: CLINIC | Age: 85
End: 2020-11-02

## 2020-11-02 VITALS
HEART RATE: 66 BPM | TEMPERATURE: 97.7 F | BODY MASS INDEX: 23.75 KG/M2 | SYSTOLIC BLOOD PRESSURE: 126 MMHG | WEIGHT: 121 LBS | DIASTOLIC BLOOD PRESSURE: 56 MMHG | HEIGHT: 60 IN | OXYGEN SATURATION: 97 % | RESPIRATION RATE: 20 BRPM

## 2020-11-02 DIAGNOSIS — U07.1 COVID-19 VIRUS DETECTED: Primary | ICD-10-CM

## 2020-11-02 DIAGNOSIS — R53.1 WEAKNESS: ICD-10-CM

## 2020-11-02 PROCEDURE — 99212 OFFICE O/P EST SF 10 MIN: CPT | Performed by: NURSE PRACTITIONER

## 2020-11-03 NOTE — PROGRESS NOTES
Subjective   Kristy Ramirez is a 86 y.o. female.     Kristy Ramirez age 86 comes in today for recheck.  She has had problems with generalized weakness.  She was tested for Covid at 2 different locations and that came up negative and she has been tested twice at [facility] and those test came positive both times.  The only symptom that she has so far had has been weakness no cough no fevers reported no respiratory difficulty.  This is been going on over the course of the past month.  She is here for recheck.    Weakness - Generalized  This is a new problem. The current episode started 1 to 4 weeks ago. The problem has been gradually improving. Pertinent negatives include no abdominal pain, anorexia, arthralgias, change in bowel habit, chest pain, chills, congestion, coughing, diaphoresis, fatigue, fever, headaches, joint swelling, myalgias, nausea, neck pain, numbness, rash, sore throat, swollen glands, urinary symptoms, vertigo, visual change, vomiting or weakness. The symptoms are aggravated by exertion. She has tried nothing for the symptoms. The treatment provided no relief.        The following portions of the patient's history were reviewed and updated as appropriate: allergies, current medications, past family history, past medical history, past social history, past surgical history and problem list.    Review of Systems   Constitutional: Negative.  Negative for chills, diaphoresis, fatigue and fever.   HENT: Negative.  Negative for congestion, sore throat and swollen glands.    Eyes: Negative.    Respiratory: Negative.  Negative for cough.    Cardiovascular: Negative.  Negative for chest pain.   Gastrointestinal: Negative.  Negative for abdominal pain, anorexia, change in bowel habit, nausea and vomiting.   Endocrine: Negative.    Genitourinary: Negative.    Musculoskeletal: Negative.  Negative for arthralgias, joint swelling, myalgias and neck pain.   Skin: Negative.  Negative for rash.   Allergic/Immunologic:  Negative.    Neurological: Negative for vertigo, weakness and numbness.   Hematological: Negative.    Psychiatric/Behavioral: Negative.        Objective   Physical Exam  Vitals signs and nursing note reviewed.   Constitutional:       General: She is not in acute distress.     Appearance: She is well-developed.   HENT:      Head: Normocephalic and atraumatic.      Right Ear: External ear normal.      Left Ear: External ear normal.      Nose: Nose normal.      Mouth/Throat:      Pharynx: No oropharyngeal exudate.   Eyes:      Pupils: Pupils are equal, round, and reactive to light.   Neck:      Musculoskeletal: Normal range of motion and neck supple.      Thyroid: No thyromegaly.   Cardiovascular:      Rate and Rhythm: Normal rate and regular rhythm.      Heart sounds: Normal heart sounds. No murmur. No friction rub.   Pulmonary:      Effort: Pulmonary effort is normal. No respiratory distress.      Breath sounds: Normal breath sounds. No wheezing or rales.   Abdominal:      Palpations: Abdomen is soft.   Musculoskeletal: Normal range of motion.   Skin:     General: Skin is warm and dry.   Neurological:      Mental Status: She is alert and oriented to person, place, and time.   Psychiatric:         Thought Content: Thought content normal.           Assessment/Plan   Diagnoses and all orders for this visit:    1. COVID-19 virus detected (Primary)  Comments:  over 2 weeks ago.  no sx.    2. Weakness      She is not symptomatic.  She is not running a fever.  I suggest that she continues to home plenty liquids and rest.  She needs to continue to wear protective equipment such as a mask and or gloves if needed and we will see her back if she has other problems.

## 2020-11-05 ENCOUNTER — TELEPHONE (OUTPATIENT)
Dept: FAMILY MEDICINE CLINIC | Facility: CLINIC | Age: 85
End: 2020-11-05

## 2020-11-05 NOTE — TELEPHONE ENCOUNTER
PATIENT CALLING REQUESTING SOMEONE GIVE HER A CALLBACK TO SET UP AN APPT TO GET ANOTHER COVID TEST DONE.    PATIENT CALLBACK # [phone]

## 2020-11-06 ENCOUNTER — TELEPHONE (OUTPATIENT)
Dept: FAMILY MEDICINE CLINIC | Facility: CLINIC | Age: 85
End: 2020-11-06

## 2020-11-06 DIAGNOSIS — M79.641 RIGHT HAND PAIN: ICD-10-CM

## 2020-11-06 DIAGNOSIS — M79.641 PAIN OF RIGHT HAND: ICD-10-CM

## 2020-11-06 RX ORDER — AMOXICILLIN AND CLAVULANATE POTASSIUM 875; 125 MG/1; MG/1
1 TABLET, FILM COATED ORAL 2 TIMES DAILY
Qty: 20 TABLET | Refills: 0 | Status: SHIPPED | OUTPATIENT
Start: 2020-11-06 | End: 2020-11-08

## 2020-11-06 RX ORDER — TRAMADOL HYDROCHLORIDE 50 MG/1
50 TABLET ORAL 2 TIMES DAILY PRN
Qty: 30 TABLET | Refills: 0 | Status: SHIPPED | OUTPATIENT
Start: 2020-11-06 | End: 2021-01-28

## 2020-11-06 NOTE — TELEPHONE ENCOUNTER
Spoke with patient she is aware medication is sent to [facility] and she wanted to cancel the appointment with Mateusz today.

## 2020-11-08 ENCOUNTER — APPOINTMENT (OUTPATIENT)
Dept: GENERAL RADIOLOGY | Facility: HOSPITAL | Age: 85
End: 2020-11-08

## 2020-11-08 ENCOUNTER — HOSPITAL ENCOUNTER (OUTPATIENT)
Facility: HOSPITAL | Age: 85
Setting detail: OBSERVATION
Discharge: HOME | End: 2020-11-10
Attending: EMERGENCY MEDICINE | Admitting: FAMILY MEDICINE

## 2020-11-08 ENCOUNTER — APPOINTMENT (OUTPATIENT)
Dept: CT IMAGING | Facility: HOSPITAL | Age: 85
End: 2020-11-08

## 2020-11-08 DIAGNOSIS — M54.50 CHRONIC LOW BACK PAIN, UNSPECIFIED BACK PAIN LATERALITY, UNSPECIFIED WHETHER SCIATICA PRESENT: ICD-10-CM

## 2020-11-08 DIAGNOSIS — Z74.09 IMPAIRED FUNCTIONAL MOBILITY, BALANCE, GAIT, AND ENDURANCE: ICD-10-CM

## 2020-11-08 DIAGNOSIS — M54.50 LOW BACK PAIN, UNSPECIFIED BACK PAIN LATERALITY, UNSPECIFIED CHRONICITY, UNSPECIFIED WHETHER SCIATICA PRESENT: ICD-10-CM

## 2020-11-08 DIAGNOSIS — G89.29 CHRONIC LOW BACK PAIN, UNSPECIFIED BACK PAIN LATERALITY, UNSPECIFIED WHETHER SCIATICA PRESENT: ICD-10-CM

## 2020-11-08 DIAGNOSIS — R53.1 WEAKNESS: ICD-10-CM

## 2020-11-08 DIAGNOSIS — N18.9 CHRONIC KIDNEY DISEASE, UNSPECIFIED CKD STAGE: ICD-10-CM

## 2020-11-08 DIAGNOSIS — R19.8 ABNORMAL BOWEL HABITS: ICD-10-CM

## 2020-11-08 DIAGNOSIS — R13.19 ESOPHAGEAL DYSPHAGIA: ICD-10-CM

## 2020-11-08 DIAGNOSIS — M25.551 RIGHT HIP PAIN: ICD-10-CM

## 2020-11-08 DIAGNOSIS — R13.10 DYSPHAGIA, UNSPECIFIED TYPE: ICD-10-CM

## 2020-11-08 DIAGNOSIS — K92.1 BLOOD IN STOOL: ICD-10-CM

## 2020-11-08 DIAGNOSIS — I48.0 PAROXYSMAL ATRIAL FIBRILLATION (HCC): ICD-10-CM

## 2020-11-08 DIAGNOSIS — M31.6 TEMPORAL ARTERITIS (HCC): Primary | ICD-10-CM

## 2020-11-08 DIAGNOSIS — Z78.9 IMPAIRED MOBILITY AND ADLS: ICD-10-CM

## 2020-11-08 DIAGNOSIS — M26.652 ARTHROPATHY OF LEFT TEMPOROMANDIBULAR JOINT: ICD-10-CM

## 2020-11-08 DIAGNOSIS — Z74.09 IMPAIRED MOBILITY AND ADLS: ICD-10-CM

## 2020-11-08 PROBLEM — Z20.822 LAB TEST NEGATIVE FOR COVID-19 VIRUS: Status: ACTIVE | Noted: 2020-11-08

## 2020-11-08 LAB
ALBUMIN SERPL-MCNC: 3.7 G/DL (ref 3.5–5.2)
ALBUMIN/GLOB SERPL: 1 G/DL
ALP SERPL-CCNC: 76 U/L (ref 39–117)
ALT SERPL W P-5'-P-CCNC: 5 U/L (ref 1–33)
ANION GAP SERPL CALCULATED.3IONS-SCNC: 13 MMOL/L (ref 5–15)
AST SERPL-CCNC: 11 U/L (ref 1–32)
BASOPHILS # BLD AUTO: 0.02 10*3/MM3 (ref 0–0.2)
BASOPHILS NFR BLD AUTO: 0.1 % (ref 0–1.5)
BILIRUB SERPL-MCNC: 0.5 MG/DL (ref 0–1.2)
BUN SERPL-MCNC: 37 MG/DL (ref 8–23)
BUN/CREAT SERPL: 22.4 (ref 7–25)
CALCIUM SPEC-SCNC: 9.9 MG/DL (ref 8.6–10.5)
CHLORIDE SERPL-SCNC: 94 MMOL/L (ref 98–107)
CO2 SERPL-SCNC: 25 MMOL/L (ref 22–29)
CREAT SERPL-MCNC: 1.65 MG/DL (ref 0.57–1)
CRP SERPL-MCNC: 12.47 MG/DL (ref 0–0.5)
DEPRECATED RDW RBC AUTO: 48.4 FL (ref 37–54)
EOSINOPHIL # BLD AUTO: 0.01 10*3/MM3 (ref 0–0.4)
EOSINOPHIL NFR BLD AUTO: 0.1 % (ref 0.3–6.2)
ERYTHROCYTE [DISTWIDTH] IN BLOOD BY AUTOMATED COUNT: 15.1 % (ref 12.3–15.4)
ERYTHROCYTE [SEDIMENTATION RATE] IN BLOOD: 107 MM/HR (ref 0–20)
GFR SERPL CREATININE-BSD FRML MDRD: 36 ML/MIN/1.73
GLOBULIN UR ELPH-MCNC: 3.6 GM/DL
GLUCOSE SERPL-MCNC: 130 MG/DL (ref 65–99)
HCT VFR BLD AUTO: 30.2 % (ref 34–46.6)
HGB BLD-MCNC: 9.6 G/DL (ref 12–15.9)
HOLD SPECIMEN: NORMAL
HOLD SPECIMEN: NORMAL
IMM GRANULOCYTES # BLD AUTO: 0.05 10*3/MM3 (ref 0–0.05)
IMM GRANULOCYTES NFR BLD AUTO: 0.4 % (ref 0–0.5)
LYMPHOCYTES # BLD AUTO: 0.76 10*3/MM3 (ref 0.7–3.1)
LYMPHOCYTES NFR BLD AUTO: 5.6 % (ref 19.6–45.3)
MCH RBC QN AUTO: 28.2 PG (ref 26.6–33)
MCHC RBC AUTO-ENTMCNC: 31.8 G/DL (ref 31.5–35.7)
MCV RBC AUTO: 88.6 FL (ref 79–97)
MONOCYTES # BLD AUTO: 0.82 10*3/MM3 (ref 0.1–0.9)
MONOCYTES NFR BLD AUTO: 6 % (ref 5–12)
NEUTROPHILS NFR BLD AUTO: 11.96 10*3/MM3 (ref 1.7–7)
NEUTROPHILS NFR BLD AUTO: 87.8 % (ref 42.7–76)
NRBC BLD AUTO-RTO: 0 /100 WBC (ref 0–0.2)
PLATELET # BLD AUTO: 258 10*3/MM3 (ref 140–450)
PMV BLD AUTO: 10.7 FL (ref 6–12)
POTASSIUM SERPL-SCNC: 3.6 MMOL/L (ref 3.5–5.2)
PROT SERPL-MCNC: 7.3 G/DL (ref 6–8.5)
RBC # BLD AUTO: 3.41 10*6/MM3 (ref 3.77–5.28)
SODIUM SERPL-SCNC: 132 MMOL/L (ref 136–145)
WBC # BLD AUTO: 13.62 10*3/MM3 (ref 3.4–10.8)
WHOLE BLOOD HOLD SPECIMEN: NORMAL
WHOLE BLOOD HOLD SPECIMEN: NORMAL

## 2020-11-08 PROCEDURE — 25010000002 METHYLPREDNISOLONE PER 125 MG: Performed by: STUDENT IN AN ORGANIZED HEALTH CARE EDUCATION/TRAINING PROGRAM

## 2020-11-08 PROCEDURE — 85651 RBC SED RATE NONAUTOMATED: CPT | Performed by: STUDENT IN AN ORGANIZED HEALTH CARE EDUCATION/TRAINING PROGRAM

## 2020-11-08 PROCEDURE — 70486 CT MAXILLOFACIAL W/O DYE: CPT

## 2020-11-08 PROCEDURE — C9803 HOPD COVID-19 SPEC COLLECT: HCPCS

## 2020-11-08 PROCEDURE — 71045 X-RAY EXAM CHEST 1 VIEW: CPT

## 2020-11-08 PROCEDURE — G0378 HOSPITAL OBSERVATION PER HR: HCPCS

## 2020-11-08 PROCEDURE — 96361 HYDRATE IV INFUSION ADD-ON: CPT

## 2020-11-08 PROCEDURE — 85025 COMPLETE CBC W/AUTO DIFF WBC: CPT | Performed by: EMERGENCY MEDICINE

## 2020-11-08 PROCEDURE — 25010000002 ENOXAPARIN PER 10 MG: Performed by: STUDENT IN AN ORGANIZED HEALTH CARE EDUCATION/TRAINING PROGRAM

## 2020-11-08 PROCEDURE — 82962 GLUCOSE BLOOD TEST: CPT

## 2020-11-08 PROCEDURE — 96372 THER/PROPH/DIAG INJ SC/IM: CPT

## 2020-11-08 PROCEDURE — 96374 THER/PROPH/DIAG INJ IV PUSH: CPT

## 2020-11-08 PROCEDURE — 86140 C-REACTIVE PROTEIN: CPT | Performed by: STUDENT IN AN ORGANIZED HEALTH CARE EDUCATION/TRAINING PROGRAM

## 2020-11-08 PROCEDURE — 80053 COMPREHEN METABOLIC PANEL: CPT | Performed by: EMERGENCY MEDICINE

## 2020-11-08 PROCEDURE — 99284 EMERGENCY DEPT VISIT MOD MDM: CPT

## 2020-11-08 PROCEDURE — 70490 CT SOFT TISSUE NECK W/O DYE: CPT

## 2020-11-08 PROCEDURE — 87635 SARS-COV-2 COVID-19 AMP PRB: CPT | Performed by: STUDENT IN AN ORGANIZED HEALTH CARE EDUCATION/TRAINING PROGRAM

## 2020-11-08 PROCEDURE — 96375 TX/PRO/DX INJ NEW DRUG ADDON: CPT

## 2020-11-08 RX ORDER — ALUMINA, MAGNESIA, AND SIMETHICONE 2400; 2400; 240 MG/30ML; MG/30ML; MG/30ML
15 SUSPENSION ORAL EVERY 6 HOURS PRN
Status: DISCONTINUED | OUTPATIENT
Start: 2020-11-08 | End: 2020-11-10 | Stop reason: HOSPADM

## 2020-11-08 RX ORDER — METHYLPREDNISOLONE SODIUM SUCCINATE 125 MG/2ML
60 INJECTION, POWDER, LYOPHILIZED, FOR SOLUTION INTRAMUSCULAR; INTRAVENOUS ONCE
Status: COMPLETED | OUTPATIENT
Start: 2020-11-08 | End: 2020-11-08

## 2020-11-08 RX ORDER — OXYBUTYNIN CHLORIDE 5 MG/1
5 TABLET, EXTENDED RELEASE ORAL DAILY
Status: DISCONTINUED | OUTPATIENT
Start: 2020-11-09 | End: 2020-11-10 | Stop reason: HOSPADM

## 2020-11-08 RX ORDER — SODIUM CHLORIDE 0.9 % (FLUSH) 0.9 %
10 SYRINGE (ML) INJECTION EVERY 12 HOURS SCHEDULED
Status: DISCONTINUED | OUTPATIENT
Start: 2020-11-08 | End: 2020-11-10 | Stop reason: HOSPADM

## 2020-11-08 RX ORDER — BACLOFEN 10 MG/1
10 TABLET ORAL 3 TIMES DAILY
Status: DISCONTINUED | OUTPATIENT
Start: 2020-11-08 | End: 2020-11-10 | Stop reason: HOSPADM

## 2020-11-08 RX ORDER — SODIUM CHLORIDE 0.9 % (FLUSH) 0.9 %
10 SYRINGE (ML) INJECTION AS NEEDED
Status: DISCONTINUED | OUTPATIENT
Start: 2020-11-08 | End: 2020-11-10 | Stop reason: HOSPADM

## 2020-11-08 RX ORDER — AMLODIPINE BESYLATE 5 MG/1
5 TABLET ORAL DAILY
Status: DISCONTINUED | OUTPATIENT
Start: 2020-11-09 | End: 2020-11-10 | Stop reason: HOSPADM

## 2020-11-08 RX ORDER — LANOLIN ALCOHOL/MO/W.PET/CERES
6 CREAM (GRAM) TOPICAL NIGHTLY
Status: DISCONTINUED | OUTPATIENT
Start: 2020-11-08 | End: 2020-11-10 | Stop reason: HOSPADM

## 2020-11-08 RX ORDER — ONDANSETRON 2 MG/ML
4 INJECTION INTRAMUSCULAR; INTRAVENOUS EVERY 6 HOURS PRN
Status: DISCONTINUED | OUTPATIENT
Start: 2020-11-08 | End: 2020-11-10 | Stop reason: HOSPADM

## 2020-11-08 RX ORDER — LOPERAMIDE HYDROCHLORIDE 2 MG/1
2 CAPSULE ORAL 4 TIMES DAILY PRN
Status: DISCONTINUED | OUTPATIENT
Start: 2020-11-08 | End: 2020-11-10 | Stop reason: HOSPADM

## 2020-11-08 RX ORDER — ACETAMINOPHEN 325 MG/1
650 TABLET ORAL ONCE
Status: DISCONTINUED | OUTPATIENT
Start: 2020-11-08 | End: 2020-11-10 | Stop reason: HOSPADM

## 2020-11-08 RX ORDER — LOSARTAN POTASSIUM 50 MG/1
100 TABLET ORAL DAILY
Status: DISCONTINUED | OUTPATIENT
Start: 2020-11-09 | End: 2020-11-10 | Stop reason: HOSPADM

## 2020-11-08 RX ORDER — GABAPENTIN 300 MG/1
300 CAPSULE ORAL EVERY MORNING
Status: DISCONTINUED | OUTPATIENT
Start: 2020-11-09 | End: 2020-11-10 | Stop reason: HOSPADM

## 2020-11-08 RX ORDER — METOPROLOL SUCCINATE 25 MG/1
25 TABLET, EXTENDED RELEASE ORAL DAILY
Status: DISCONTINUED | OUTPATIENT
Start: 2020-11-09 | End: 2020-11-10 | Stop reason: HOSPADM

## 2020-11-08 RX ORDER — SODIUM CHLORIDE 9 MG/ML
100 INJECTION, SOLUTION INTRAVENOUS CONTINUOUS
Status: DISCONTINUED | OUTPATIENT
Start: 2020-11-08 | End: 2020-11-10 | Stop reason: HOSPADM

## 2020-11-08 RX ORDER — GABAPENTIN 300 MG/1
600 CAPSULE ORAL NIGHTLY
Status: DISCONTINUED | OUTPATIENT
Start: 2020-11-08 | End: 2020-11-10 | Stop reason: HOSPADM

## 2020-11-08 RX ORDER — ALLOPURINOL 100 MG/1
100 TABLET ORAL DAILY
Status: DISCONTINUED | OUTPATIENT
Start: 2020-11-09 | End: 2020-11-10 | Stop reason: HOSPADM

## 2020-11-08 RX ORDER — PANTOPRAZOLE SODIUM 40 MG/1
40 TABLET, DELAYED RELEASE ORAL EVERY MORNING
Status: DISCONTINUED | OUTPATIENT
Start: 2020-11-09 | End: 2020-11-10 | Stop reason: HOSPADM

## 2020-11-08 RX ADMIN — MELATONIN TAB 3 MG 6 MG: 3 TAB at 23:21

## 2020-11-08 RX ADMIN — SODIUM CHLORIDE 100 ML/HR: 900 INJECTION, SOLUTION INTRAVENOUS at 15:09

## 2020-11-08 RX ADMIN — SODIUM CHLORIDE 100 ML/HR: 900 INJECTION, SOLUTION INTRAVENOUS at 23:20

## 2020-11-08 RX ADMIN — ENOXAPARIN SODIUM 30 MG: 30 INJECTION SUBCUTANEOUS at 20:54

## 2020-11-08 RX ADMIN — METHYLPREDNISOLONE SODIUM SUCCINATE 60 MG: 125 INJECTION, POWDER, FOR SOLUTION INTRAMUSCULAR; INTRAVENOUS at 19:58

## 2020-11-09 PROBLEM — D64.9 ANEMIA: Status: ACTIVE | Noted: 2020-11-09

## 2020-11-09 LAB
ANION GAP SERPL CALCULATED.3IONS-SCNC: 12 MMOL/L (ref 5–15)
BASOPHILS # BLD AUTO: 0.01 10*3/MM3 (ref 0–0.2)
BASOPHILS NFR BLD AUTO: 0.1 % (ref 0–1.5)
BUN SERPL-MCNC: 34 MG/DL (ref 8–23)
BUN/CREAT SERPL: 27.6 (ref 7–25)
CALCIUM SPEC-SCNC: 9.6 MG/DL (ref 8.6–10.5)
CHLORIDE SERPL-SCNC: 98 MMOL/L (ref 98–107)
CO2 SERPL-SCNC: 25 MMOL/L (ref 22–29)
CREAT SERPL-MCNC: 1.23 MG/DL (ref 0.57–1)
DEPRECATED RDW RBC AUTO: 46.4 FL (ref 37–54)
EOSINOPHIL # BLD AUTO: 0 10*3/MM3 (ref 0–0.4)
EOSINOPHIL NFR BLD AUTO: 0 % (ref 0.3–6.2)
ERYTHROCYTE [DISTWIDTH] IN BLOOD BY AUTOMATED COUNT: 14.7 % (ref 12.3–15.4)
GFR SERPL CREATININE-BSD FRML MDRD: 50 ML/MIN/1.73
GLUCOSE BLDC GLUCOMTR-MCNC: 130 MG/DL (ref 70–130)
GLUCOSE SERPL-MCNC: 143 MG/DL (ref 65–99)
HBA1C MFR BLD: 5.9 % (ref 4.8–5.6)
HCT VFR BLD AUTO: 28.9 % (ref 34–46.6)
HGB BLD-MCNC: 9.5 G/DL (ref 12–15.9)
IMM GRANULOCYTES # BLD AUTO: 0.05 10*3/MM3 (ref 0–0.05)
IMM GRANULOCYTES NFR BLD AUTO: 0.6 % (ref 0–0.5)
LYMPHOCYTES # BLD AUTO: 0.51 10*3/MM3 (ref 0.7–3.1)
LYMPHOCYTES NFR BLD AUTO: 6 % (ref 19.6–45.3)
MCH RBC QN AUTO: 28.6 PG (ref 26.6–33)
MCHC RBC AUTO-ENTMCNC: 32.9 G/DL (ref 31.5–35.7)
MCV RBC AUTO: 87 FL (ref 79–97)
MONOCYTES # BLD AUTO: 0.04 10*3/MM3 (ref 0.1–0.9)
MONOCYTES NFR BLD AUTO: 0.5 % (ref 5–12)
NEUTROPHILS NFR BLD AUTO: 7.89 10*3/MM3 (ref 1.7–7)
NEUTROPHILS NFR BLD AUTO: 92.8 % (ref 42.7–76)
NRBC BLD AUTO-RTO: 0 /100 WBC (ref 0–0.2)
PLATELET # BLD AUTO: 262 10*3/MM3 (ref 140–450)
PMV BLD AUTO: 11.5 FL (ref 6–12)
POTASSIUM SERPL-SCNC: 4.1 MMOL/L (ref 3.5–5.2)
RBC # BLD AUTO: 3.32 10*6/MM3 (ref 3.77–5.28)
SARS-COV-2 N GENE RESP QL NAA+PROBE: NOT DETECTED
SODIUM SERPL-SCNC: 135 MMOL/L (ref 136–145)
WBC # BLD AUTO: 8.5 10*3/MM3 (ref 3.4–10.8)

## 2020-11-09 PROCEDURE — 25010000002 METHYLPREDNISOLONE PER 125 MG: Performed by: STUDENT IN AN ORGANIZED HEALTH CARE EDUCATION/TRAINING PROGRAM

## 2020-11-09 PROCEDURE — G0378 HOSPITAL OBSERVATION PER HR: HCPCS

## 2020-11-09 PROCEDURE — 97166 OT EVAL MOD COMPLEX 45 MIN: CPT

## 2020-11-09 PROCEDURE — 99202 OFFICE O/P NEW SF 15 MIN: CPT | Performed by: SURGERY

## 2020-11-09 PROCEDURE — 25010000002 ENOXAPARIN PER 10 MG: Performed by: STUDENT IN AN ORGANIZED HEALTH CARE EDUCATION/TRAINING PROGRAM

## 2020-11-09 PROCEDURE — 99219 PR INITIAL OBSERVATION CARE/DAY 50 MINUTES: CPT | Performed by: STUDENT IN AN ORGANIZED HEALTH CARE EDUCATION/TRAINING PROGRAM

## 2020-11-09 PROCEDURE — 94799 UNLISTED PULMONARY SVC/PX: CPT

## 2020-11-09 PROCEDURE — 96376 TX/PRO/DX INJ SAME DRUG ADON: CPT

## 2020-11-09 PROCEDURE — 96372 THER/PROPH/DIAG INJ SC/IM: CPT

## 2020-11-09 PROCEDURE — 85025 COMPLETE CBC W/AUTO DIFF WBC: CPT | Performed by: STUDENT IN AN ORGANIZED HEALTH CARE EDUCATION/TRAINING PROGRAM

## 2020-11-09 PROCEDURE — 80048 BASIC METABOLIC PNL TOTAL CA: CPT | Performed by: STUDENT IN AN ORGANIZED HEALTH CARE EDUCATION/TRAINING PROGRAM

## 2020-11-09 PROCEDURE — 92610 EVALUATE SWALLOWING FUNCTION: CPT | Performed by: SPEECH-LANGUAGE PATHOLOGIST

## 2020-11-09 PROCEDURE — 97162 PT EVAL MOD COMPLEX 30 MIN: CPT

## 2020-11-09 PROCEDURE — 25010000002 HEPARIN (PORCINE) PER 1000 UNITS: Performed by: STUDENT IN AN ORGANIZED HEALTH CARE EDUCATION/TRAINING PROGRAM

## 2020-11-09 PROCEDURE — 96361 HYDRATE IV INFUSION ADD-ON: CPT

## 2020-11-09 PROCEDURE — 83036 HEMOGLOBIN GLYCOSYLATED A1C: CPT | Performed by: STUDENT IN AN ORGANIZED HEALTH CARE EDUCATION/TRAINING PROGRAM

## 2020-11-09 RX ORDER — METHYLPREDNISOLONE SODIUM SUCCINATE 125 MG/2ML
60 INJECTION, POWDER, LYOPHILIZED, FOR SOLUTION INTRAMUSCULAR; INTRAVENOUS
Status: DISCONTINUED | OUTPATIENT
Start: 2020-11-09 | End: 2020-11-10 | Stop reason: HOSPADM

## 2020-11-09 RX ORDER — HEPARIN SODIUM 5000 [USP'U]/ML
5000 INJECTION, SOLUTION INTRAVENOUS; SUBCUTANEOUS ONCE
Status: COMPLETED | OUTPATIENT
Start: 2020-11-09 | End: 2020-11-09

## 2020-11-09 RX ADMIN — GABAPENTIN 600 MG: 300 CAPSULE ORAL at 21:24

## 2020-11-09 RX ADMIN — METOPROLOL SUCCINATE 25 MG: 25 TABLET, FILM COATED, EXTENDED RELEASE ORAL at 10:37

## 2020-11-09 RX ADMIN — SODIUM CHLORIDE 100 ML/HR: 900 INJECTION, SOLUTION INTRAVENOUS at 10:41

## 2020-11-09 RX ADMIN — BACLOFEN 10 MG: 10 TABLET ORAL at 21:23

## 2020-11-09 RX ADMIN — GABAPENTIN 300 MG: 300 CAPSULE ORAL at 05:51

## 2020-11-09 RX ADMIN — BACLOFEN 10 MG: 10 TABLET ORAL at 10:37

## 2020-11-09 RX ADMIN — LOSARTAN POTASSIUM 100 MG: 50 TABLET, FILM COATED ORAL at 10:38

## 2020-11-09 RX ADMIN — MELATONIN TAB 3 MG 6 MG: 3 TAB at 21:25

## 2020-11-09 RX ADMIN — METHYLPREDNISOLONE SODIUM SUCCINATE 60 MG: 125 INJECTION, POWDER, FOR SOLUTION INTRAMUSCULAR; INTRAVENOUS at 10:40

## 2020-11-09 RX ADMIN — ENOXAPARIN SODIUM 30 MG: 30 INJECTION SUBCUTANEOUS at 10:40

## 2020-11-09 RX ADMIN — OXYBUTYNIN CHLORIDE 5 MG: 5 TABLET, EXTENDED RELEASE ORAL at 10:38

## 2020-11-09 RX ADMIN — AMLODIPINE BESYLATE 5 MG: 5 TABLET ORAL at 10:38

## 2020-11-09 RX ADMIN — BACLOFEN 10 MG: 10 TABLET ORAL at 17:01

## 2020-11-09 RX ADMIN — HEPARIN SODIUM 5000 UNITS: 5000 INJECTION, SOLUTION INTRAVENOUS; SUBCUTANEOUS at 21:23

## 2020-11-09 RX ADMIN — PANTOPRAZOLE SODIUM 40 MG: 40 TABLET, DELAYED RELEASE ORAL at 05:51

## 2020-11-09 RX ADMIN — ALLOPURINOL 100 MG: 100 TABLET ORAL at 10:38

## 2020-11-09 RX ADMIN — SODIUM CHLORIDE, PRESERVATIVE FREE 10 ML: 5 INJECTION INTRAVENOUS at 21:25

## 2020-11-10 ENCOUNTER — ANESTHESIA (OUTPATIENT)
Dept: PERIOP | Facility: HOSPITAL | Age: 85
End: 2020-11-10

## 2020-11-10 ENCOUNTER — ANESTHESIA EVENT (OUTPATIENT)
Dept: PERIOP | Facility: HOSPITAL | Age: 85
End: 2020-11-10

## 2020-11-10 ENCOUNTER — READMISSION MANAGEMENT (OUTPATIENT)
Dept: CALL CENTER | Facility: HOSPITAL | Age: 85
End: 2020-11-10

## 2020-11-10 ENCOUNTER — TELEPHONE (OUTPATIENT)
Dept: FAMILY MEDICINE CLINIC | Facility: CLINIC | Age: 85
End: 2020-11-10

## 2020-11-10 VITALS
SYSTOLIC BLOOD PRESSURE: 100 MMHG | BODY MASS INDEX: 24.35 KG/M2 | OXYGEN SATURATION: 94 % | WEIGHT: 124 LBS | RESPIRATION RATE: 18 BRPM | DIASTOLIC BLOOD PRESSURE: 54 MMHG | HEART RATE: 105 BPM | TEMPERATURE: 97.9 F | HEIGHT: 60 IN

## 2020-11-10 PROBLEM — I48.0 PAROXYSMAL ATRIAL FIBRILLATION: Status: ACTIVE | Noted: 2020-11-10

## 2020-11-10 LAB
ANION GAP SERPL CALCULATED.3IONS-SCNC: 9 MMOL/L (ref 5–15)
BASOPHILS # BLD AUTO: 0.01 10*3/MM3 (ref 0–0.2)
BASOPHILS NFR BLD AUTO: 0.1 % (ref 0–1.5)
BUN SERPL-MCNC: 25 MG/DL (ref 8–23)
BUN/CREAT SERPL: 27.2 (ref 7–25)
CALCIUM SPEC-SCNC: 9.3 MG/DL (ref 8.6–10.5)
CHLORIDE SERPL-SCNC: 106 MMOL/L (ref 98–107)
CO2 SERPL-SCNC: 23 MMOL/L (ref 22–29)
CREAT SERPL-MCNC: 0.92 MG/DL (ref 0.57–1)
DEPRECATED RDW RBC AUTO: 45.1 FL (ref 37–54)
EOSINOPHIL # BLD AUTO: 0 10*3/MM3 (ref 0–0.4)
EOSINOPHIL NFR BLD AUTO: 0 % (ref 0.3–6.2)
ERYTHROCYTE [DISTWIDTH] IN BLOOD BY AUTOMATED COUNT: 14.5 % (ref 12.3–15.4)
GFR SERPL CREATININE-BSD FRML MDRD: 70 ML/MIN/1.73
GLUCOSE SERPL-MCNC: 121 MG/DL (ref 65–99)
HCT VFR BLD AUTO: 24.6 % (ref 34–46.6)
HGB BLD-MCNC: 8 G/DL (ref 12–15.9)
IMM GRANULOCYTES # BLD AUTO: 0.07 10*3/MM3 (ref 0–0.05)
IMM GRANULOCYTES NFR BLD AUTO: 0.6 % (ref 0–0.5)
LYMPHOCYTES # BLD AUTO: 1.05 10*3/MM3 (ref 0.7–3.1)
LYMPHOCYTES NFR BLD AUTO: 9.7 % (ref 19.6–45.3)
MCH RBC QN AUTO: 28 PG (ref 26.6–33)
MCHC RBC AUTO-ENTMCNC: 32.5 G/DL (ref 31.5–35.7)
MCV RBC AUTO: 86 FL (ref 79–97)
MONOCYTES # BLD AUTO: 0.77 10*3/MM3 (ref 0.1–0.9)
MONOCYTES NFR BLD AUTO: 7.1 % (ref 5–12)
NEUTROPHILS NFR BLD AUTO: 8.92 10*3/MM3 (ref 1.7–7)
NEUTROPHILS NFR BLD AUTO: 82.5 % (ref 42.7–76)
NRBC BLD AUTO-RTO: 0 /100 WBC (ref 0–0.2)
PLATELET # BLD AUTO: 254 10*3/MM3 (ref 140–450)
PMV BLD AUTO: 11.2 FL (ref 6–12)
POTASSIUM SERPL-SCNC: 3.5 MMOL/L (ref 3.5–5.2)
RBC # BLD AUTO: 2.86 10*6/MM3 (ref 3.77–5.28)
SARS-COV-2 N GENE RESP QL NAA+PROBE: DETECTED
SODIUM SERPL-SCNC: 138 MMOL/L (ref 136–145)
WBC # BLD AUTO: 10.82 10*3/MM3 (ref 3.4–10.8)

## 2020-11-10 PROCEDURE — 93005 ELECTROCARDIOGRAM TRACING: CPT | Performed by: STUDENT IN AN ORGANIZED HEALTH CARE EDUCATION/TRAINING PROGRAM

## 2020-11-10 PROCEDURE — 37609 LIGATION/BX TEMPORAL ARTERY: CPT | Performed by: SURGERY

## 2020-11-10 PROCEDURE — G0378 HOSPITAL OBSERVATION PER HR: HCPCS

## 2020-11-10 PROCEDURE — 96376 TX/PRO/DX INJ SAME DRUG ADON: CPT

## 2020-11-10 PROCEDURE — 99204 OFFICE O/P NEW MOD 45 MIN: CPT | Performed by: INTERNAL MEDICINE

## 2020-11-10 PROCEDURE — 99217 PR OBSERVATION CARE DISCHARGE MANAGEMENT: CPT | Performed by: STUDENT IN AN ORGANIZED HEALTH CARE EDUCATION/TRAINING PROGRAM

## 2020-11-10 PROCEDURE — 25010000002 MIDAZOLAM PER 1 MG: Performed by: NURSE ANESTHETIST, CERTIFIED REGISTERED

## 2020-11-10 PROCEDURE — 88305 TISSUE EXAM BY PATHOLOGIST: CPT

## 2020-11-10 PROCEDURE — 80048 BASIC METABOLIC PNL TOTAL CA: CPT | Performed by: STUDENT IN AN ORGANIZED HEALTH CARE EDUCATION/TRAINING PROGRAM

## 2020-11-10 PROCEDURE — 93010 ELECTROCARDIOGRAM REPORT: CPT | Performed by: INTERNAL MEDICINE

## 2020-11-10 PROCEDURE — 25010000002 PROPOFOL 10 MG/ML EMULSION: Performed by: NURSE ANESTHETIST, CERTIFIED REGISTERED

## 2020-11-10 PROCEDURE — 25010000002 FENTANYL CITRATE (PF) 100 MCG/2ML SOLUTION: Performed by: NURSE ANESTHETIST, CERTIFIED REGISTERED

## 2020-11-10 PROCEDURE — 87635 SARS-COV-2 COVID-19 AMP PRB: CPT | Performed by: STUDENT IN AN ORGANIZED HEALTH CARE EDUCATION/TRAINING PROGRAM

## 2020-11-10 PROCEDURE — 25010000002 METHYLPREDNISOLONE PER 125 MG: Performed by: STUDENT IN AN ORGANIZED HEALTH CARE EDUCATION/TRAINING PROGRAM

## 2020-11-10 PROCEDURE — 85025 COMPLETE CBC W/AUTO DIFF WBC: CPT | Performed by: STUDENT IN AN ORGANIZED HEALTH CARE EDUCATION/TRAINING PROGRAM

## 2020-11-10 RX ORDER — ONDANSETRON 2 MG/ML
4 INJECTION INTRAMUSCULAR; INTRAVENOUS ONCE AS NEEDED
Status: DISCONTINUED | OUTPATIENT
Start: 2020-11-10 | End: 2020-11-10 | Stop reason: HOSPADM

## 2020-11-10 RX ORDER — FENTANYL CITRATE 50 UG/ML
INJECTION, SOLUTION INTRAMUSCULAR; INTRAVENOUS AS NEEDED
Status: DISCONTINUED | OUTPATIENT
Start: 2020-11-10 | End: 2020-11-10 | Stop reason: SURG

## 2020-11-10 RX ORDER — MIDAZOLAM HYDROCHLORIDE 1 MG/ML
INJECTION INTRAMUSCULAR; INTRAVENOUS AS NEEDED
Status: DISCONTINUED | OUTPATIENT
Start: 2020-11-10 | End: 2020-11-10 | Stop reason: SURG

## 2020-11-10 RX ORDER — LOSARTAN POTASSIUM 100 MG/1
100 TABLET ORAL DAILY
Qty: 30 TABLET | Refills: 0 | Status: SHIPPED | OUTPATIENT
Start: 2020-11-11 | End: 2021-06-12

## 2020-11-10 RX ORDER — PREDNISONE 20 MG/1
40 TABLET ORAL DAILY
Qty: 60 TABLET | Refills: 0 | Status: SHIPPED | OUTPATIENT
Start: 2020-11-10 | End: 2020-12-10

## 2020-11-10 RX ADMIN — FENTANYL CITRATE 25 MCG: 50 INJECTION, SOLUTION INTRAMUSCULAR; INTRAVENOUS at 09:11

## 2020-11-10 RX ADMIN — MIDAZOLAM HYDROCHLORIDE 0.5 MG: 2 INJECTION, SOLUTION INTRAMUSCULAR; INTRAVENOUS at 09:05

## 2020-11-10 RX ADMIN — SODIUM CHLORIDE 100 ML/HR: 900 INJECTION, SOLUTION INTRAVENOUS at 10:50

## 2020-11-10 RX ADMIN — METHYLPREDNISOLONE SODIUM SUCCINATE 60 MG: 125 INJECTION, POWDER, FOR SOLUTION INTRAMUSCULAR; INTRAVENOUS at 08:47

## 2020-11-10 RX ADMIN — PROPOFOL 50 MCG/KG/MIN: 10 INJECTION, EMULSION INTRAVENOUS at 09:11

## 2020-11-10 RX ADMIN — SODIUM CHLORIDE, PRESERVATIVE FREE 10 ML: 5 INJECTION INTRAVENOUS at 10:51

## 2020-11-10 RX ADMIN — LOSARTAN POTASSIUM 100 MG: 50 TABLET, FILM COATED ORAL at 10:40

## 2020-11-10 RX ADMIN — ALLOPURINOL 100 MG: 100 TABLET ORAL at 10:40

## 2020-11-10 RX ADMIN — BACLOFEN 10 MG: 10 TABLET ORAL at 10:41

## 2020-11-10 RX ADMIN — OXYBUTYNIN CHLORIDE 5 MG: 5 TABLET, EXTENDED RELEASE ORAL at 10:40

## 2020-11-10 NOTE — TELEPHONE ENCOUNTER
HOME HEALTH CALLED   PATIENT IS IN [facility]   THEY WOULD LIKE TO KNOW IF IN ADDITION IF THEY CAN CURRENT HEALTH CONTIOUNES  MONITORING.     PLEASE CALL BACK [phone]

## 2020-11-10 NOTE — ANESTHESIA POSTPROCEDURE EVALUATION
Patient: Kristy Ramirez    Procedure Summary     Date: 11/10/20 Room / Location: [facility] OR 03 / [facility] OR    Anesthesia Start: 0901 Anesthesia Stop: 1008    Procedure: LEFT TEMPORAL ARTERY BIOPSY (Left Head) Diagnosis:       Temporal arteritis (CMS/HCC)      (Temporal arteritis (CMS/HCC) [M31.6])    Surgeon: Anjel Olea MD Provider: Tanja Dillard DO    Anesthesia Type: MAC ASA Status: 3          Anesthesia Type: MAC    Vitals  No vitals data found for the desired time range.          Post Anesthesia Care and Evaluation    Patient location during evaluation: bedside  Level of consciousness: awake and awake and alert  Pain score: 0  Pain management: adequate  Airway patency: patent  Anesthetic complications: No anesthetic complications  PONV Status: none  Cardiovascular status: acceptable and hemodynamically stable  Respiratory status: acceptable and spontaneous ventilation  Hydration status: acceptable

## 2020-11-10 NOTE — ANESTHESIA PREPROCEDURE EVALUATION
Anesthesia Evaluation     Patient summary reviewed and Nursing notes reviewed   no history of anesthetic complications:  NPO Solid Status: > 8 hours  NPO Liquid Status: > 8 hours           Airway   Mallampati: II  TM distance: >3 FB  Neck ROM: full  Small opening  Dental    (+) lower dentures and upper dentures    Pulmonary     breath sounds clear to auscultation  (+) a smoker Former, COPD mild,   (-) asthma, sleep apnea, rhonchi, decreased breath sounds, wheezes  Cardiovascular   Exercise tolerance: poor (<4 METS)    ECG reviewed  Patient on routine beta blocker and Beta blocker given within 24 hours of surgery  Rhythm: regular  Rate: normal    (+) hypertension well controlled 2 medications or greater,   (-) pacemaker, valvular problems/murmurs, past MI, CAD, dysrhythmias, murmur, cardiac stents, CABG, DVT, hyperlipidemia      Neuro/Psych  (-) seizures, TIA, CVA  GI/Hepatic/Renal/Endo    (+)  GERD well controlled,    (-)  obesity, morbid obesity, diabetes    Musculoskeletal     Abdominal    Substance History      OB/GYN negative ob/gyn ROS         Other   arthritis,      ROS/Med Hx Other: Treat as covid +    DM controlled with diet      Phys Exam Other: Not able to open the mouth very wide due to pain along temporal artery                Anesthesia Plan    ASA 3     general   (Discussed general anesthesia (LMA) with patient and she understands possible complications, risks, & agrees.)  intravenous induction     Anesthetic plan, all risks, benefits, and alternatives have been provided, discussed and informed consent has been obtained with: patient.

## 2020-11-11 ENCOUNTER — TRANSITIONAL CARE MANAGEMENT TELEPHONE ENCOUNTER (OUTPATIENT)
Dept: CALL CENTER | Facility: HOSPITAL | Age: 85
End: 2020-11-11

## 2020-11-11 DIAGNOSIS — R21 RASH: ICD-10-CM

## 2020-11-11 LAB
QT INTERVAL: 344 MS
QTC INTERVAL: 430 MS

## 2020-11-11 NOTE — OUTREACH NOTE
Call Center TCM Note      Responses   [facility] [place] patient discharged from?  [place]   Does the patient have one of the following disease processes/diagnoses(primary or secondary)?  COVID-19   COVID-19 underlying condition?  None   TCM attempt successful?  Yes   Call start time  1159   Call end time  1217   Discharge diagnosis  Temporal arteritis    Meds reviewed with patient/caregiver?  Yes   Is the patient having any side effects they believe may be caused by any medication additions or changes?  No   Does the patient have all medications ordered at discharge?  Yes   Is the patient taking all medications as directed (includes completed medication regime)?  Yes   Does the patient have a primary care provider?   Yes   Does the patient have an appointment with their PCP or specialist within 7 days of discharge?  Yes   Comments  Appt 11/17 with Arelis Barney   What is the Home health agency?   [facility]   Has home health visited the patient within 72 hours of discharge?  Call prior to 72 hours   Home health comments  States they are to come today   Did the patient receive a copy of their discharge instructions?  Yes   Did the patient receive a copy of COVID-19 specific instructions?  No   Nursing interventions  Reviewed instructions with patient   What is the patient's perception of their health status since discharge?  Improving   Does the patient have any of the following symptoms?  None   Nursing Interventions  Nurse provided patient education   Pulse Ox monitoring  None   Is the patient/caregiver able to teach back steps to recovery at home?  Set small, achievable goals for return to baseline health, Rest and rebuild strength, gradually increase activity, Make a list of questions for provider's appointment   Is the patient/caregiver able to teach back the hierarchy of who to call/visit for symptoms/problems? PCP, Specialist, Home health nurse, Urgent Care, ED, 911  Yes   TCM call completed?  Yes   Wrap up  additional comments  States she has questions regarding results.  That she has tested negative and positive so unclear which she is.  Will message office to contact denisse Hooker LPN    11/11/2020, 12:19 EST

## 2020-11-11 NOTE — OUTREACH NOTE
Prep Survey      Responses   [facility] facility patient discharged from?  [place]   Is LACE score < 7 ?  No   Eligibility  [facility]   Date of Admission  11/08/20   Date of Discharge  11/10/20   Discharge Disposition  Home-Health Care Sv   Discharge diagnosis  Temporal arteritis    Does the patient have one of the following disease processes/diagnoses(primary or secondary)?  COVID-19   Does the patient have Home health ordered?  Yes   What is the Home health agency?   [facility]   Is there a DME ordered?  No   Prep survey completed?  Yes          Rebecca King RN

## 2020-11-11 NOTE — TELEPHONE ENCOUNTER
Spoke with Cristina Vallejo With [facility].  Patient is being disharged to home with a positive covid test and they will be putting a monitor on her that records her 02 level pulse etc.  And if it goes of the monitoring company will call.  Wanted to make Arelis aware of this.  It will call patient first then YAHAIRA and then Arelis

## 2020-11-12 ENCOUNTER — READMISSION MANAGEMENT (OUTPATIENT)
Dept: CALL CENTER | Facility: HOSPITAL | Age: 85
End: 2020-11-12

## 2020-11-12 LAB
LAB AP CASE REPORT: NORMAL
PATH REPORT.FINAL DX SPEC: NORMAL

## 2020-11-12 NOTE — OUTREACH NOTE
COVID-19 Week 1 Survey      Responses   [facility] patient discharged from?  [place]   Does the patient have one of the following disease processes/diagnoses(primary or secondary)?  COVID-19   COVID-19 underlying condition?  None   Call Number  Call 2   Week 1 Call successful?  No   Discharge diagnosis  Temporal arteritis           Rebecca King RN

## 2020-11-13 ENCOUNTER — READMISSION MANAGEMENT (OUTPATIENT)
Dept: CALL CENTER | Facility: HOSPITAL | Age: 85
End: 2020-11-13

## 2020-11-13 NOTE — OUTREACH NOTE
COVID-19 Week 1 Survey      Responses   [facility] patient discharged from?  [place]   Does the patient have one of the following disease processes/diagnoses(primary or secondary)?  COVID-19   COVID-19 underlying condition?  None   Call Number  Call 3   Week 1 Call successful?  Yes   Call start time  1036   Call end time  1058   Discharge diagnosis  Temporal arteritis    Is patient permission given to speak with other caregiver?  Yes   List who call center can speak with  son    Person spoke with today (if not patient) and relationship  son and pt   Meds reviewed with patient/caregiver?  Yes   Is the patient taking all medications as directed (includes completed medication regime)?  Yes   Does the patient have an appointment with their PCP or specialist within 7 days of discharge?  Yes   Has the patient kept scheduled appointments due by today?  Yes   Comments  telehealth appt 11/12/2020   Comments  left side of face is still painful and unable to chew r/t pain, eating soft foods. Incision still sore, dressing in place. Pt & son have mult questions as to how the pt continues to have positive then neg Covid test results. They are frustrated that she has to keep quarantining even though no symptoms and the tests seem inconsistant. Her PCP did not have answers for her either. I suggested consulting an Inf.disease specialist.    What is the patient's perception of their health status since discharge?  Same   Does the patient have any of the following symptoms?  None   Pulse Ox monitoring  None   Is the patient/caregiver able to teach back steps to recovery at home?  Set small, achievable goals for return to baseline health, Eat a well-balance diet   If the patient is a current smoker, are they able to teach back resources for cessation?  Not a smoker   Is the patient/caregiver able to teach back the hierarchy of who to call/visit for symptoms/problems? PCP, Specialist, Home health nurse, Urgent Care, ED, 911   Yes   COVID-19 call completed?  Yes   Revoked  No further contact(revokes)-requires comment   Graduated/Revoked comments  no symptoms for Covid, multiple + and neg tests          Lela Reed RN

## 2020-11-16 ENCOUNTER — READMISSION MANAGEMENT (OUTPATIENT)
Dept: CALL CENTER | Facility: HOSPITAL | Age: 85
End: 2020-11-16

## 2020-11-16 RX ORDER — CLOTRIMAZOLE AND BETAMETHASONE DIPROPIONATE 10; .64 MG/G; MG/G
CREAM TOPICAL
Qty: 45 G | Refills: 1 | Status: SHIPPED | OUTPATIENT
Start: 2020-11-16 | End: 2020-12-30

## 2020-11-16 NOTE — OUTREACH NOTE
COVID-19 Week 1 Survey      Responses   [facility] patient discharged from?  [place]   Does the patient have one of the following disease processes/diagnoses(primary or secondary)?  COVID-19   COVID-19 underlying condition?  None   Call Number  Call 4   Week 1 Call successful?  Yes   Call start time  1102   Call end time  1107   Discharge diagnosis  Temporal arteritis    Meds reviewed with patient/caregiver?  Yes   Is the patient having any side effects they believe may be caused by any medication additions or changes?  No   Does the patient have all medications ordered at discharge?  Yes   Is the patient taking all medications as directed (includes completed medication regime)?  Yes   Does the patient have a primary care provider?   Yes   Comments regarding PCP  Patient has  a telehealth appt tomorrow with her PCP   Does the patient have an appointment with their PCP or specialist within 7 days of discharge?  Yes   Has the patient kept scheduled appointments due by today?  N/A   What is the Home health agency?   [facility]   Has home health visited the patient within 72 hours of discharge?  Yes   Home health comments  States they are to come today   Psychosocial issues?  No   Comments  Patient reports left side of face is still painful but improving.    Did the patient receive a copy of their discharge instructions?  Yes   Did the patient receive a copy of COVID-19 specific instructions?  Yes   Nursing interventions  Reviewed instructions with patient   What is the patient's perception of their health status since discharge?  Improving   Does the patient have any of the following symptoms?  None   Nursing Interventions  Nurse provided patient education   Pulse Ox monitoring  None   Is the patient/caregiver able to teach back steps to recovery at home?  Set small, achievable goals for return to baseline health, Eat a well-balance diet   If the patient is a current smoker, are they able to teach back resources  for cessation?  Not a smoker   Is the patient/caregiver able to teach back the hierarchy of who to call/visit for symptoms/problems? PCP, Specialist, Home health nurse, Urgent Care, ED, 911  Yes   COVID-19 call completed?  Yes          Marek Salguero RN

## 2020-11-17 ENCOUNTER — OFFICE VISIT (OUTPATIENT)
Dept: GASTROENTEROLOGY | Facility: CLINIC | Age: 85
End: 2020-11-17

## 2020-11-17 ENCOUNTER — OFFICE VISIT (OUTPATIENT)
Dept: FAMILY MEDICINE CLINIC | Facility: CLINIC | Age: 85
End: 2020-11-17

## 2020-11-17 VITALS
WEIGHT: 124 LBS | HEIGHT: 60 IN | HEART RATE: 66 BPM | BODY MASS INDEX: 24.35 KG/M2 | OXYGEN SATURATION: 98 % | SYSTOLIC BLOOD PRESSURE: 120 MMHG | DIASTOLIC BLOOD PRESSURE: 61 MMHG | RESPIRATION RATE: 20 BRPM | TEMPERATURE: 97.8 F

## 2020-11-17 DIAGNOSIS — R13.19 OTHER DYSPHAGIA: Primary | ICD-10-CM

## 2020-11-17 DIAGNOSIS — R68.84 JAW PAIN: Primary | ICD-10-CM

## 2020-11-17 PROCEDURE — 99213 OFFICE O/P EST LOW 20 MIN: CPT | Performed by: NURSE PRACTITIONER

## 2020-11-17 PROCEDURE — 99442 PR PHYS/QHP TELEPHONE EVALUATION 11-20 MIN: CPT | Performed by: INTERNAL MEDICINE

## 2020-11-17 RX ORDER — DEXTROSE AND SODIUM CHLORIDE 5; .45 G/100ML; G/100ML
30 INJECTION, SOLUTION INTRAVENOUS CONTINUOUS PRN
Status: CANCELLED | OUTPATIENT
Start: 2021-01-06

## 2020-11-17 NOTE — PROGRESS NOTES
[facility]      Chief Complaint:   Chief Complaint   Patient presents with   • Hospital F/u     This visit has been rescheduled as a phone visit to comply with patient safety concerns in accordance with CDC recommendations. Total time of discussion was 20 minutes.  You have chosen to receive care through a telephone visit. Do you consent to use a telephone visit for your medical care today? Yes  Subjective     HPI:   Patient with dysphagia.  Patient also with changes in bowel habits but is refusing colonoscopy secondary to many family members having difficulties with colonoscopies.  Patient had been trying to get an upper endoscopy completed but continues to come out positive for Covid.  We will schedule patient for EGD with dilatation.    Past Medical History:   Past Medical History:   Diagnosis Date   • Abnormal CT scan     per patient   • Acute bronchitis    • Acute maxillary sinusitis    • Acute otitis externa    • Acute sinusitis    • Allergic conjunctivitis    • Angular cheilitis    • Ankle edema    • Backache     improved   • Blood in urine      UTI resolved      • Bradycardia    • Chest discomfort     described as heart racing      • Chronic low back pain     RIGHT leg radiation      • Cold feet     c/o - and lowerlegs      • Contusion     of dorsum of foot   • Cough    • COVID-19 virus infection    • Depressive disorder    • Diabetes mellitus (CMS/HCC)    • Dizziness and giddiness    • Dyspnea    • Dysuria    • Edema    • Edema of foot    • Edema of lower extremity    • Essential hypertension    • Exanthematous disorder    • Fatigue    • Foot pain, left    • GERD (gastroesophageal reflux disease)    • Grade II hemorrhoids 3/12/2018   • Hematoma    • Hip pain, right    • History of palpitations    • Hormone replacement therapy (postmenopausal)    • Hypertensive disorder    • Impacted cerumen    • Joint pain    • Knee pain    • Low back pain    • Malaise and fatigue    • Multiple  joint pain    • Muscle pain    • Muscle weakness    • Neck pain    • Obesity (BMI 30.0-34.9) 3/12/2018   • Osteoarthritis of knee    • Osteoarthritis of multiple joints    • Otitis externa    • Pain in lower limb    • Peripheral venous insufficiency    • POP-Q stage 2 rectocele 3/12/2018   • POP-Q stage 4 cystocele 3/12/2018   • Sacroiliitis, not elsewhere classified (CMS/HCC)     R LE   • Shoulder pain    • Upper respiratory infection    • Urinary frequency    • Urinary tract infectious disease    • Wheezing        Past Surgical History:  Past Surgical History:   Procedure Laterality Date   • ANKLE SURGERY     • BACK SURGERY     • COLONOSCOPY  12/14/2009   • EYE SURGERY Bilateral 02/2018    B cataract   • HAMMER TOE REPAIR  08/02/2011    Hammertoe repair, left second toe.   • HYSTERECTOMY     • INCISION AND DRAINAGE ABSCESS  01/21/2015   • INJECTION OF MEDICATION  11/15/2013   • INJECTION OF MEDICATION  05/06/2013    Celestone (betamethasone) (1)      • INJECTION OF MEDICATION  03/17/2016    Kenalog (3)      • NECK SURGERY  01/2018   • TEMPORAL ARTERY BIOPSY Left 11/10/2020    Procedure: LEFT TEMPORAL ARTERY BIOPSY;  Surgeon: Anjel Olea MD;  Location: [facility];  Service: General;  Laterality: Left;   • UPPER GASTROINTESTINAL ENDOSCOPY  12/14/2009       Family History:  Family History   Problem Relation Age of Onset   • Diabetes Other    • Heart disease Other    • Stroke Other    • Coronary artery disease Mother    • Coronary artery disease Father        Social History:   reports that she has quit smoking. She has never used smokeless tobacco. She reports that she does not drink alcohol or use drugs.    Medications:   Prior to Admission medications    Medication Sig Start Date End Date Taking? Authorizing Provider   Accu-Chek FastClix Lancets misc 1 each Daily. 8/3/20  Yes Arelis Barney APRN   allopurinol (ZYLOPRIM) 100 MG tablet Take 1 tablet by mouth Daily. take with food 8/3/20  Yes Arelis Barney  NADYA   amLODIPine (NORVASC) 5 MG tablet Take 1 tablet by mouth Daily. 8/3/20  Yes Arelis Barney APRN   clotrimazole-betamethasone (LOTRISONE) 1-0.05 % cream APPLY TOPICALLY TO THE APPROPRIATE AREA AS DIRECTED 2 TIMES A DAY 11/16/20  Yes Arelis Barney APRN   colchicine 0.6 MG capsule capsule TK 1 C PO QD STARTING ON 8/19/19 8/18/19  Yes ProviderVipin MD   cyproheptadine 2 MG/5ML syrup Take 5 mL by mouth Every 12 (Twelve) Hours As Needed (to stimulate appetite). 10/2/19  Yes Mateusz Chopra APRN   diclofenac (VOLTAREN) 1 % gel gel Apply 4 g topically to the appropriate area as directed 3 (Three) Times a Day. Small amount to affected area 8/20/20  Yes Syed Carr MD   diphenhydrAMINE (BANOPHEN) 25 mg capsule Banophen 25 mg capsule   TK 1 C PO Q 6 H PRN FOR ITCHING   Yes ProviderVipin MD   docusate sodium (DOK) 100 MG capsule Take 1 capsule by mouth 2 (Two) Times a Day. 4/29/19  Yes Mateusz Chopra APRN   estradiol (ESTRACE) 1 MG tablet Take 1 tablet by mouth Daily. 3/21/19  Yes Arelis Barney APRN   folic acid (FOLVITE) 1 MG tablet Take 1 mg by mouth Daily.   Yes ProviderVipin MD   gabapentin (NEURONTIN) 300 MG capsule Take 1 capsule by mouth Every Morning. 10/27/20  Yes Arelis Barney APRN   gabapentin (NEURONTIN) 600 MG tablet Take 1 tablet by mouth Every Night. 10/27/20  Yes Arelis Barney APRN   glucose blood test strip Use as instructed to check b/s 1x a day.  Please give strip compatible with monitor. (accucheck) 8/3/20  Yes Arelis Barney APRN   loperamide (Imodium A-D) 2 MG tablet Take 1 tablet by mouth 4 (Four) Times a Day As Needed for Diarrhea. 8/3/20  Yes Arelis Barney APRN   losartan (COZAAR) 100 MG tablet Take 1 tablet by mouth Daily for 30 days. 11/11/20 12/11/20 Yes Lukas Tyson MD   Menthol, Topical Analgesic, (BIOFREEZE) 4 % gel Apply to affected area on left back 2-3 times per day x 1week 4/29/19  Yes Mateusz Chopra APRN   metoprolol  succinate XL (TOPROL-XL) 25 MG 24 hr tablet Take 1 tablet by mouth Daily. 8/3/20  Yes Arelis Barney APRN   montelukast (SINGULAIR) 10 MG tablet Take 1 tablet by mouth Every Night. 3/5/20  Yes Mateusz Chopra APRN   NUCYNTA 50 MG tablet TK 1 T PO BID PRN 9/9/19  Yes ProviderVipin MD   nystatin (MYCOSTATIN) 636749 UNIT/ML suspension Apply to corners of mouth 3-4 times per day until rash is gone 10/30/20  Yes Arelis Barney APRN   omeprazole (priLOSEC) 20 MG capsule Take 1 capsule by mouth 2 (Two) Times a Day. 8/3/20  Yes Arelis Barney APRN   polyethylene glycol (MIRALAX) 17 g packet Take 17 g by mouth Daily. 9/18/20  Yes Arelis Barney APRN   predniSONE (DELTASONE) 20 MG tablet Take 2 tablets by mouth Daily for 30 days. 11/10/20 12/10/20 Yes Lukas Tyson MD   promethazine-dextromethorphan (PROMETHAZINE-DM) 6.25-15 MG/5ML syrup Take 5 mL by mouth 4 (Four) Times a Day As Needed for Cough. 3/10/20  Yes Arelis Barney APRN   terconazole (TERAZOL 7) 0.4 % vaginal cream INSERT 1 APPLICATORFUL INTO THE VAGINA EVERY NIGHT AS DIRECTED 9/17/20  Yes Arelis Barney APRN   tolterodine LA (DETROL LA) 4 MG 24 hr capsule Take 1 capsule by mouth Daily. 8/3/20  Yes Arelis Barney APRN   traMADol (ULTRAM) 50 MG tablet Take 1 tablet by mouth 2 (Two) Times a Day As Needed for Moderate Pain  (hand pain). 11/6/20  Yes Arelis Barney APRN   triamcinolone (KENALOG) 0.1 % cream Apply  topically to the appropriate area as directed 3 (Three) Times a Day As Needed (itching--insect bites). 10/2/19  Yes Mateusz Chopra APRN       Allergies:  Aleve [naproxen sodium], Atenolol, Keflex [cephalexin], Lisinopril, Relafen [nabumetone], Zanaflex [tizanidine], Acetaminophen, Aspirin, Diflucan [fluconazole], and Sulfa antibiotics    ROS:    Review of Systems   Constitutional: Negative for activity change, appetite change, chills, diaphoresis, fatigue, fever and unexpected weight change.   HENT: Positive for trouble  swallowing. Negative for sore throat.    Respiratory: Negative for shortness of breath.    Gastrointestinal: Negative for abdominal distention, abdominal pain, anal bleeding, blood in stool, constipation, diarrhea, nausea, rectal pain and vomiting.   Endocrine: Negative for polydipsia, polyphagia and polyuria.   Genitourinary: Negative for difficulty urinating.   Musculoskeletal: Negative for arthralgias.   Skin: Negative for pallor.   Allergic/Immunologic: Negative for food allergies.   Neurological: Negative for weakness and light-headedness.   Psychiatric/Behavioral: Negative for behavioral problems.     Objective     not currently breastfeeding.    Physical Exam  Constitutional:       General: She is not in acute distress.     Appearance: She is well-developed. She is not diaphoretic.   HENT:      Head: Normocephalic and atraumatic.   Cardiovascular:      Rate and Rhythm: Normal rate and regular rhythm.      Heart sounds: Normal heart sounds. No murmur. No friction rub. No gallop.    Pulmonary:      Effort: No respiratory distress.      Breath sounds: Normal breath sounds. No wheezing or rales.   Chest:      Chest wall: No tenderness.   Abdominal:      General: Bowel sounds are normal. There is no distension.      Palpations: Abdomen is soft. There is no mass.      Tenderness: There is no abdominal tenderness. There is no guarding or rebound.      Hernia: No hernia is present.   Musculoskeletal: Normal range of motion.   Skin:     General: Skin is warm and dry.      Coloration: Skin is not pale.      Findings: No erythema or rash.   Neurological:      Mental Status: She is alert and oriented to person, place, and time.   Psychiatric:         Behavior: Behavior normal.         Thought Content: Thought content normal.         Judgment: Judgment normal.          Assessment/Plan   Diagnoses and all orders for this visit:    1. Other dysphagia (Primary)  -     Case Request; Standing  -     dextrose 5 % and sodium  chloride 0.45 % infusion  -     Case Request    Other orders  -     Follow Anesthesia Guidelines / Standing Orders; Future  -     Obtain Informed Consent; Future  -     Implement Anesthesia Orders Day of Procedure; Standing  -     Obtain Informed Consent; Standing  -     POC Glucose Once; Standing  -     Insert Peripheral IV; Standing        ESOPHAGOGASTRODUODENOSCOPY (N/A)     Diagnosis Plan   1. Other dysphagia  Case Request    dextrose 5 % and sodium chloride 0.45 % infusion    Case Request       Anticipated Surgical Procedure:  Orders Placed This Encounter   Procedures   • Follow Anesthesia Guidelines / Standing Orders     Standing Status:   Future   • Obtain Informed Consent     Standing Status:   Future     Order Specific Question:   Informed Consent Given For     Answer:   ESOPHAGOGASTRODUODENOSCOPY       The risks, benefits, and alternatives of this procedure have been discussed with the patient or the responsible party- the patient understands and agrees to proceed.

## 2020-11-17 NOTE — PATIENT INSTRUCTIONS
"BMI for Adults  What is BMI?  Body mass index (BMI) is a number that is calculated from a person's weight and height. BMI can help estimate how much of a person's weight is composed of fat. BMI does not measure body fat directly. Rather, it is an alternative to procedures that directly measure body fat, which can be difficult and expensive.  BMI can help identify people who may be at higher risk for certain medical problems.  What are BMI measurements used for?  BMI is used as a screening tool to identify possible weight problems. It helps determine whether a person is obese, overweight, a healthy weight, or underweight.  BMI is useful for:  · Identifying a weight problem that may be related to a medical condition or may increase the risk for medical problems.  · Promoting changes, such as changes in diet and exercise, to help reach a healthy weight. BMI screening can be repeated to see if these changes are working.  How is BMI calculated?  BMI involves measuring your weight in relation to your height. Both height and weight are measured, and the BMI is calculated from those numbers. This can be done either in English ([place] or metric measurements. Note that charts and online BMI calculators are available to help you find your BMI quickly and easily without having to do these calculations yourself.  To calculate your BMI in English ([place] measurements:    1. Measure your weight in pounds (lb).  2. Multiply the number of pounds by 703.  ? For example, for a person who weighs 180 lb, multiply that number by 703, which equals 126,540.  3. Measure your height in inches. Then multiply that number by itself to get a measurement called \"inches squared.\"  ? For example, for a person who is 70 inches tall, the \"inches squared\" measurement is 70 inches x 70 inches, which equals 4,900 inches squared.  4. Divide the total from step 2 (number of lb x 703) by the total from step 3 (inches squared): 126,540 ÷ 4,900 = 25.8. This is " "your BMI.  To calculate your BMI in metric measurements:  1. Measure your weight in kilograms (kg).  2. Measure your height in meters (m). Then multiply that number by itself to get a measurement called \"meters squared.\"  ? For example, for a person who is 1.75 m tall, the \"meters squared\" measurement is 1.75 m x 1.75 m, which is equal to 3.1 meters squared.  3. Divide the number of kilograms (your weight) by the meters squared number. In this example: 70 ÷ 3.1 = 22.6. This is your BMI.  What do the results mean?  BMI charts are used to identify whether you are underweight, normal weight, overweight, or obese. The following guidelines will be used:  · Underweight: BMI less than 18.5.  · Normal weight: BMI between 18.5 and 24.9.  · Overweight: BMI between 25 and 29.9.  · Obese: BMI of 30 or above.  Keep these notes in mind:  · Weight includes both fat and muscle, so someone with a muscular build, such as an athlete, may have a BMI that is higher than 24.9. In cases like these, BMI is not an accurate measure of body fat.  · To determine if excess body fat is the cause of a BMI of 25 or higher, further assessments may need to be done by a health care provider.  · BMI is usually interpreted in the same way for men and women.  Where to find more information  For more information about BMI, including tools to quickly calculate your BMI, go to these websites:  · Centers for Disease Control and Prevention: www.cdc.gov  · American Heart Association: www.heart.org  · National Heart, Lung, and [employer]: www.nhlbi.nih.gov  Summary  · Body mass index (BMI) is a number that is calculated from a person's weight and height.  · BMI may help estimate how much of a person's weight is composed of fat. BMI can help identify those who may be at higher risk for certain medical problems.  · BMI can be measured using English measurements or metric measurements.  · BMI charts are used to identify whether you are underweight, normal " weight, overweight, or obese.  This information is not intended to replace advice given to you by your health care provider. Make sure you discuss any questions you have with your health care provider.  Document Released: 08/29/2005 Document Revised: 09/09/2020 Document Reviewed: 07/17/2020  Elsevier Patient Education © 2020 [employer]

## 2020-11-19 ENCOUNTER — READMISSION MANAGEMENT (OUTPATIENT)
Dept: CALL CENTER | Facility: HOSPITAL | Age: 85
End: 2020-11-19

## 2020-11-19 NOTE — PROGRESS NOTES
Subjective   Kristy Ramirez is a 86 y.o. female.     Kristy Ramirez age 86 comes in today with chief complaint of bilateral jaw pain after she has had procedure done to rule out temporal arteritis.  It was negative.  She has had Covid and is now testing negative.  She is taken no medication for the bilateral jaw pain.    Jaw Pain  This is a recurrent problem. The current episode started in the past 7 days. The problem occurs intermittently. The problem has been waxing and waning. Associated symptoms include arthralgias. Pertinent negatives include no abdominal pain, anorexia, change in bowel habit, chest pain, chills, congestion, coughing, diaphoresis, fatigue, fever, headaches, joint swelling, myalgias, nausea, neck pain, numbness, rash, sore throat, swollen glands, urinary symptoms, vertigo, visual change, vomiting or weakness. The symptoms are aggravated by eating. She has tried nothing for the symptoms. The treatment provided no relief.        The following portions of the patient's history were reviewed and updated as appropriate: allergies, current medications, past family history, past medical history, past social history, past surgical history and problem list.    Review of Systems   Constitutional: Negative.  Negative for chills, diaphoresis, fatigue and fever.   HENT: Negative.  Negative for congestion, sore throat and swollen glands.    Eyes: Negative.    Respiratory: Negative.  Negative for cough.    Cardiovascular: Negative.  Negative for chest pain.   Gastrointestinal: Negative.  Negative for abdominal pain, anorexia, change in bowel habit, nausea and vomiting.   Endocrine: Negative.    Genitourinary: Negative.    Musculoskeletal: Positive for arthralgias. Negative for joint swelling, myalgias and neck pain.   Skin: Negative.  Negative for rash.   Allergic/Immunologic: Negative.    Neurological: Negative.  Negative for vertigo, weakness and numbness.   Hematological: Negative.    Psychiatric/Behavioral:  Negative.        Objective   Physical Exam  Vitals signs and nursing note reviewed.   Constitutional:       General: She is not in acute distress.     Appearance: She is well-developed.   HENT:      Head: Normocephalic and atraumatic.      Right Ear: External ear normal.      Left Ear: External ear normal.      Nose: Nose normal.      Mouth/Throat:      Pharynx: No oropharyngeal exudate.   Eyes:      Pupils: Pupils are equal, round, and reactive to light.   Neck:      Musculoskeletal: Normal range of motion and neck supple.      Thyroid: No thyromegaly.   Cardiovascular:      Rate and Rhythm: Normal rate and regular rhythm.      Heart sounds: Normal heart sounds. No murmur. No friction rub.   Pulmonary:      Effort: Pulmonary effort is normal. No respiratory distress.      Breath sounds: Normal breath sounds. No wheezing or rales.   Abdominal:      Palpations: Abdomen is soft.   Musculoskeletal: Normal range of motion.      Comments: Pops and some clicks with opening and closing mouth.   Skin:     General: Skin is warm and dry.   Neurological:      Mental Status: She is alert and oriented to person, place, and time.   Psychiatric:         Thought Content: Thought content normal.           Assessment/Plan   Diagnoses and all orders for this visit:    1. Jaw pain (Primary)  Comments:  she has tramadol, she may take it for the jaw pain.

## 2020-11-19 NOTE — OUTREACH NOTE
COVID-19 Week 2 Survey      Responses   [facility] patient discharged from?  [place]   Does the patient have one of the following disease processes/diagnoses(primary or secondary)?  COVID-19   COVID-19 underlying condition?  None   Call Number  Call 1   COVID-19 Week 2: Call 1 attempt successful?  Yes   Call start time  1015   Call end time  1019   Discharge diagnosis  Temporal arteritis    Is patient permission given to speak with other caregiver?  Yes   List who call center can speak with  son    Meds reviewed with patient/caregiver?  Yes   Is the patient having any side effects they believe may be caused by any medication additions or changes?  No   Does the patient have all medications ordered at discharge?  Yes   Is the patient taking all medications as directed (includes completed medication regime)?  Yes   Does the patient have a primary care provider?   Yes   Does the patient have an appointment with their PCP or specialist within 7 days of discharge?  Yes   Has the patient kept scheduled appointments due by today?  Yes   Psychosocial issues?  No   Did the patient receive a copy of their discharge instructions?  Yes   Did the patient receive a copy of COVID-19 specific instructions?  Yes   Nursing interventions  Reviewed instructions with patient, Educated on MyChart   What is the patient's perception of their health status since discharge?  Improving   Does the patient have any of the following symptoms?  None   Nursing Interventions  Nurse provided patient education   Pulse Ox monitoring  None   Is the patient/caregiver able to teach back steps to recovery at home?  Set small, achievable goals for return to baseline health, Rest and rebuild strength, gradually increase activity   If the patient is a current smoker, are they able to teach back resources for cessation?  Not a smoker   Is the patient/caregiver able to teach back the hierarchy of who to call/visit for symptoms/problems? PCP,  Specialist, Home health nurse, Urgent Care, ED, 911  Yes   COVID-19 call completed?  Yes   Wrap up additional comments  She has questions about surgical dressing. She was instructed to call surgeon about this.          Kathleen Jonas RN

## 2020-11-22 ENCOUNTER — READMISSION MANAGEMENT (OUTPATIENT)
Dept: CALL CENTER | Facility: HOSPITAL | Age: 85
End: 2020-11-22

## 2020-11-22 NOTE — OUTREACH NOTE
COVID-19 Week 2 Survey      Responses   [facility] patient discharged from?  [place]   Does the patient have one of the following disease processes/diagnoses(primary or secondary)?  COVID-19   COVID-19 underlying condition?  None   Call Number  Call 2   COVID-19 Week 2: Call 1 attempt successful?  Yes   Call start time  1257   Call end time  1300   Discharge diagnosis  Temporal arteritis    Meds reviewed with patient/caregiver?  Yes   Is the patient having any side effects they believe may be caused by any medication additions or changes?  No   Does the patient have all medications ordered at discharge?  Yes   Is the patient taking all medications as directed (includes completed medication regime)?  Yes   Does the patient have a primary care provider?   Yes   Does the patient have an appointment with their PCP or specialist within 7 days of discharge?  Yes   Has the patient kept scheduled appointments due by today?  Yes   What is the Home health agency?   [facility]   Has home health visited the patient within 72 hours of discharge?  Yes   Psychosocial issues?  No   Did the patient receive a copy of their discharge instructions?  Yes   Did the patient receive a copy of COVID-19 specific instructions?  Yes   Nursing interventions  Reviewed instructions with patient   What is the patient's perception of their health status since discharge?  Improving   Does the patient have any of the following symptoms?  None   Nursing Interventions  Nurse provided patient education   Pulse Ox monitoring  None   Is the patient/caregiver able to teach back steps to recovery at home?  Set small, achievable goals for return to baseline health, Rest and rebuild strength, gradually increase activity   If the patient is a current smoker, are they able to teach back resources for cessation?  Not a smoker   Is the patient/caregiver able to teach back the hierarchy of who to call/visit for symptoms/problems? PCP, Specialist, Home health  nurse, Urgent Care, ED, 911  Yes   COVID-19 call completed?  Yes   Wrap up additional comments  doing well, wanting to know if she should take off steri strips, told her tpo leave them until they fall liu or for any questions to callo her surgeon.          Iwona Gomez, RN

## 2020-11-23 ENCOUNTER — OFFICE VISIT (OUTPATIENT)
Dept: OBSTETRICS AND GYNECOLOGY | Facility: CLINIC | Age: 85
End: 2020-11-23

## 2020-11-23 VITALS
DIASTOLIC BLOOD PRESSURE: 68 MMHG | BODY MASS INDEX: 24.35 KG/M2 | HEIGHT: 60 IN | WEIGHT: 124 LBS | SYSTOLIC BLOOD PRESSURE: 104 MMHG

## 2020-11-23 DIAGNOSIS — N81.10 POP-Q STAGE 4 CYSTOCELE: Chronic | ICD-10-CM

## 2020-11-23 DIAGNOSIS — Z46.89 PESSARY MAINTENANCE: Primary | ICD-10-CM

## 2020-11-23 PROCEDURE — 99212 OFFICE O/P EST SF 10 MIN: CPT | Performed by: NURSE PRACTITIONER

## 2020-11-23 RX ORDER — LOPERAMIDE HYDROCHLORIDE 2 MG/1
CAPSULE ORAL
COMMUNITY
Start: 2020-11-17 | End: 2020-11-23 | Stop reason: SDUPTHER

## 2020-11-23 NOTE — PROGRESS NOTES
"Subjective   Chief Complaint   Patient presents with   • Pessary Check     Kristy Ramirez is a 86 y.o. year old who presents to be seen for follow-up of her pessary.  Currently she is using Foldable ring w/ support - #7 w/o urethral bar.  She reports no problems with her pessary at this time. States that she has been having diarrhea with bleeding for over 2 weeks but will have bouts of constipation in between for about a day. C/O abdominal pain with the diarrhea and the constipation.    No Additional Complaints Reported    The following portions of the patient's history were reviewed and updated as appropriate:problem list, current medications and allergies    Review of Systems   Constitutional: Negative for activity change, appetite change, chills, diaphoresis, fatigue, fever, unexpected weight gain and unexpected weight loss.   Gastrointestinal: Negative for abdominal distention, abdominal pain, anal bleeding, blood in stool, constipation, diarrhea, nausea, rectal pain and vomiting.   Genitourinary: Negative for decreased urine volume, difficulty urinating, pelvic pain, pelvic pressure, urgency, urinary incontinence, vaginal bleeding, vaginal discharge and vaginal pain.        Objective   /68   Ht 152.4 cm (60\")   Wt 56.2 kg (124 lb)   LMP  (LMP Unknown)   Breastfeeding No   BMI 24.22 kg/m²     General:  well developed; well nourished  no acute distress   Pelvis: Clinical staff was present for exam  External genitalia:  normal appearance of the external genitalia including Bartholin's and Millis-Clicquot's glands.  :  urethral meatus normal; urethra hypermobile; prominent caruncle present;  Vaginal:  atrophic mucosal changes are present;  Cervix:  absent.  Uterus:  absent.  Adnexa:  non palpable bilaterally.  Cystocele GRADE 2  Rectocele GRADE 2  Pessary removed and cleaned. After vaginal inspection the pessary was lubricated and placed back into the vaginal vault.     Lab Review   No data reviewed    Imaging "   No data reviewed         Diagnoses and all orders for this visit:    Pessary maintenance    POP-Q stage 4 cystocele    Other orders  -     Discontinue: loperamide (IMODIUM) 2 MG capsule        No orders of the defined types were placed in this encounter.    F/U in 8 weeks for routine pessary maintenance.       This document was electronically signed.     Gladys Soni, NADYA  November 23, 2020

## 2020-11-24 DIAGNOSIS — G62.9 NEUROPATHY: ICD-10-CM

## 2020-11-24 DIAGNOSIS — M54.50 CHRONIC LOW BACK PAIN: ICD-10-CM

## 2020-11-24 DIAGNOSIS — G89.29 CHRONIC LOW BACK PAIN: ICD-10-CM

## 2020-11-24 RX ORDER — GABAPENTIN 600 MG/1
TABLET ORAL
Qty: 30 TABLET | Refills: 3 | Status: SHIPPED | OUTPATIENT
Start: 2020-11-24 | End: 2021-07-08 | Stop reason: SINTOL

## 2020-11-29 ENCOUNTER — READMISSION MANAGEMENT (OUTPATIENT)
Dept: CALL CENTER | Facility: HOSPITAL | Age: 85
End: 2020-11-29

## 2020-11-29 NOTE — OUTREACH NOTE
COVID-19 Week 3 Survey      Responses   [facility] patient discharged from?  [place]   Does the patient have one of the following disease processes/diagnoses(primary or secondary)?  COVID-19   COVID-19 underlying condition?  None   Call Number  Call 1   COVID-19 Week 3: Call 1 attempt successful?  Yes   Call start time  0935   Call end time  0940   Discharge diagnosis  Temporal arteritis    Meds reviewed with patient/caregiver?  Yes   Is the patient taking all medications as directed (includes completed medication regime)?  Yes   Does the patient have any of the following symptoms?  None   Pulse Ox monitoring  None   COVID-19 call completed?  Yes   Revoked  No further contact(revokes)-requires comment   Graduated/Revoked comments  No covid symptoms, doing well, has tested negative          Neeru Johnson RN

## 2020-12-06 ENCOUNTER — LAB (OUTPATIENT)
Dept: LAB | Facility: HOSPITAL | Age: 85
End: 2020-12-06

## 2020-12-06 DIAGNOSIS — Z01.818 PREOP TESTING: Primary | ICD-10-CM

## 2020-12-06 LAB — SARS-COV-2 N GENE RESP QL NAA+PROBE: DETECTED

## 2020-12-06 PROCEDURE — 87635 SARS-COV-2 COVID-19 AMP PRB: CPT

## 2020-12-06 PROCEDURE — C9803 HOPD COVID-19 SPEC COLLECT: HCPCS

## 2020-12-10 ENCOUNTER — TELEPHONE (OUTPATIENT)
Dept: FAMILY MEDICINE CLINIC | Facility: CLINIC | Age: 85
End: 2020-12-10

## 2020-12-10 NOTE — TELEPHONE ENCOUNTER
Patient called and is having hot flashes and wants to know if there is anything you can give her for them.  Advised she was already on estradiol

## 2020-12-15 ENCOUNTER — TELEPHONE (OUTPATIENT)
Dept: FAMILY MEDICINE CLINIC | Facility: CLINIC | Age: 85
End: 2020-12-15

## 2020-12-16 ENCOUNTER — TELEPHONE (OUTPATIENT)
Dept: OBSTETRICS AND GYNECOLOGY | Facility: CLINIC | Age: 85
End: 2020-12-16

## 2020-12-21 ENCOUNTER — OFFICE VISIT (OUTPATIENT)
Dept: OBSTETRICS AND GYNECOLOGY | Facility: CLINIC | Age: 85
End: 2020-12-21

## 2020-12-21 VITALS
WEIGHT: 123 LBS | BODY MASS INDEX: 24.15 KG/M2 | SYSTOLIC BLOOD PRESSURE: 118 MMHG | HEIGHT: 60 IN | DIASTOLIC BLOOD PRESSURE: 70 MMHG

## 2020-12-21 DIAGNOSIS — R39.89 BLADDER PAIN: ICD-10-CM

## 2020-12-21 DIAGNOSIS — Z46.89 PESSARY MAINTENANCE: Primary | ICD-10-CM

## 2020-12-21 DIAGNOSIS — N81.10 POP-Q STAGE 4 CYSTOCELE: Chronic | ICD-10-CM

## 2020-12-21 PROCEDURE — 87480 CANDIDA DNA DIR PROBE: CPT | Performed by: NURSE PRACTITIONER

## 2020-12-21 PROCEDURE — 87660 TRICHOMONAS VAGIN DIR PROBE: CPT | Performed by: NURSE PRACTITIONER

## 2020-12-21 PROCEDURE — 87510 GARDNER VAG DNA DIR PROBE: CPT | Performed by: NURSE PRACTITIONER

## 2020-12-21 PROCEDURE — 99212 OFFICE O/P EST SF 10 MIN: CPT | Performed by: NURSE PRACTITIONER

## 2020-12-21 NOTE — PROGRESS NOTES
"Subjective   No chief complaint on file.    Kristy Ramirez is a 86 y.o. year old who presents to be seen for follow-up of her pessary.  Currently she is using Foldable ring w/ support - #7 w/o urethral bar.  She reports no problems with her pessary at this time. Having pelvic pain/pressure and thinks it is from the pessary.    No Additional Complaints Reported    The following portions of the patient's history were reviewed and updated as appropriate:problem list, current medications and allergies    Review of Systems   Constitutional: Negative for activity change, appetite change, chills, diaphoresis, fatigue, fever, unexpected weight gain and unexpected weight loss.   Gastrointestinal: Negative for abdominal distention, abdominal pain, anal bleeding, blood in stool, constipation, diarrhea, nausea, rectal pain and vomiting.   Genitourinary: Positive for pelvic pain. Negative for decreased urine volume, difficulty urinating, pelvic pressure, urgency, urinary incontinence, vaginal bleeding, vaginal discharge and vaginal pain.        Objective   /70   Ht 152.4 cm (60\")   Wt 55.8 kg (123 lb)   LMP  (LMP Unknown)   Breastfeeding No   BMI 24.02 kg/m²     General:  well developed; well nourished  no acute distress   Pelvis: Clinical staff was present for exam  External genitalia:  normal appearance of the external genitalia including Bartholin's and Gleason's glands.  :  urethral meatus normal; urethra hypermobile; prominent caruncle present; bladder is tender to palpation  Vaginal:  atrophic mucosal changes are present;  Cervix:  absent.  Uterus:  absent.  Adnexa:  non palpable bilaterally.  Cystocele GRADE 2  Rectocele GRADE 2  Pessary removed and cleaned. After vaginal inspection the pessary was lubricated and placed back into the vaginal vault.     Lab Review   No data reviewed    Imaging   No data reviewed         Diagnoses and all orders for this visit:    Pessary maintenance    POP-Q stage 4 " cystocele    Bladder pain  -     Urinalysis With Culture If Indicated - Urine, Clean Catch        No orders of the defined types were placed in this encounter.    F/U in 8 weeks for routine pessary maintenance.       This document was electronically signed.     Gladys Soni, NADYA  December 21, 2020

## 2020-12-22 LAB
CANDIDA ALBICANS: POSITIVE
GARDNERELLA VAGINALIS: POSITIVE
T VAGINALIS DNA VAG QL PROBE+SIG AMP: NEGATIVE

## 2020-12-22 RX ORDER — METRONIDAZOLE 500 MG/1
500 TABLET ORAL 2 TIMES DAILY
Qty: 14 TABLET | Refills: 0 | Status: SHIPPED | OUTPATIENT
Start: 2020-12-22 | End: 2020-12-29

## 2020-12-29 ENCOUNTER — TELEPHONE (OUTPATIENT)
Dept: OBSTETRICS AND GYNECOLOGY | Facility: CLINIC | Age: 85
End: 2020-12-29

## 2020-12-29 NOTE — TELEPHONE ENCOUNTER
Patient called and stated she is still having the same problem with lower stomach pain. Notified her that you are on vacation at this time she still asked that I would send you a message to return her call once you return.

## 2020-12-30 ENCOUNTER — OFFICE VISIT (OUTPATIENT)
Dept: FAMILY MEDICINE CLINIC | Facility: CLINIC | Age: 85
End: 2020-12-30

## 2020-12-30 VITALS
BODY MASS INDEX: 24.74 KG/M2 | TEMPERATURE: 97.3 F | WEIGHT: 126 LBS | DIASTOLIC BLOOD PRESSURE: 62 MMHG | OXYGEN SATURATION: 98 % | SYSTOLIC BLOOD PRESSURE: 116 MMHG | HEART RATE: 72 BPM | HEIGHT: 60 IN

## 2020-12-30 DIAGNOSIS — R82.90 ABNORMAL URINALYSIS: ICD-10-CM

## 2020-12-30 DIAGNOSIS — R10.30 LOWER ABDOMINAL PAIN: Primary | ICD-10-CM

## 2020-12-30 LAB
BILIRUB BLD-MCNC: NEGATIVE MG/DL
CLARITY, POC: CLEAR
COLOR UR: ABNORMAL
GLUCOSE UR STRIP-MCNC: NEGATIVE MG/DL
KETONES UR QL: NEGATIVE
LEUKOCYTE EST, POC: ABNORMAL
NITRITE UR-MCNC: NEGATIVE MG/ML
PH UR: 6 [PH] (ref 5–8)
PROT UR STRIP-MCNC: NEGATIVE MG/DL
RBC # UR STRIP: NEGATIVE /UL
SP GR UR: 1.02 (ref 1–1.03)
UROBILINOGEN UR QL: NORMAL

## 2020-12-30 PROCEDURE — 99213 OFFICE O/P EST LOW 20 MIN: CPT | Performed by: NURSE PRACTITIONER

## 2020-12-30 PROCEDURE — 81003 URINALYSIS AUTO W/O SCOPE: CPT | Performed by: NURSE PRACTITIONER

## 2020-12-30 PROCEDURE — 87086 URINE CULTURE/COLONY COUNT: CPT | Performed by: NURSE PRACTITIONER

## 2020-12-30 RX ORDER — CHLORTHALIDONE 25 MG/1
25 TABLET ORAL DAILY
COMMUNITY
End: 2021-02-01 | Stop reason: SDUPTHER

## 2020-12-30 RX ORDER — FUROSEMIDE 20 MG/1
20 TABLET ORAL 2 TIMES DAILY
COMMUNITY
End: 2021-01-27

## 2020-12-30 RX ORDER — PREDNISONE 20 MG/1
20 TABLET ORAL DAILY
COMMUNITY
End: 2021-02-05

## 2020-12-31 LAB — BACTERIA SPEC AEROBE CULT: NORMAL

## 2021-01-03 ENCOUNTER — LAB (OUTPATIENT)
Dept: LAB | Facility: HOSPITAL | Age: 86
End: 2021-01-03

## 2021-01-03 DIAGNOSIS — Z01.818 PREOP TESTING: Primary | ICD-10-CM

## 2021-01-03 PROCEDURE — U0004 COV-19 TEST NON-CDC HGH THRU: HCPCS

## 2021-01-03 PROCEDURE — C9803 HOPD COVID-19 SPEC COLLECT: HCPCS

## 2021-01-04 LAB — SARS-COV-2 ORF1AB RESP QL NAA+PROBE: NOT DETECTED

## 2021-01-05 NOTE — PROGRESS NOTES
Patient has been informed of her results and stressed to her to keep her appointment with PCP on the 7th.

## 2021-01-06 ENCOUNTER — ANESTHESIA (OUTPATIENT)
Dept: GASTROENTEROLOGY | Facility: HOSPITAL | Age: 86
End: 2021-01-06

## 2021-01-06 ENCOUNTER — ANESTHESIA EVENT (OUTPATIENT)
Dept: GASTROENTEROLOGY | Facility: HOSPITAL | Age: 86
End: 2021-01-06

## 2021-01-06 ENCOUNTER — HOSPITAL ENCOUNTER (OUTPATIENT)
Facility: HOSPITAL | Age: 86
Setting detail: HOSPITAL OUTPATIENT SURGERY
Discharge: HOME | End: 2021-01-06
Attending: INTERNAL MEDICINE | Admitting: INTERNAL MEDICINE

## 2021-01-06 VITALS
HEIGHT: 55 IN | SYSTOLIC BLOOD PRESSURE: 123 MMHG | OXYGEN SATURATION: 98 % | RESPIRATION RATE: 18 BRPM | TEMPERATURE: 97.6 F | HEART RATE: 66 BPM | WEIGHT: 124 LBS | BODY MASS INDEX: 28.7 KG/M2 | DIASTOLIC BLOOD PRESSURE: 58 MMHG

## 2021-01-06 DIAGNOSIS — R13.19 OTHER DYSPHAGIA: ICD-10-CM

## 2021-01-06 PROCEDURE — C1726 CATH, BAL DIL, NON-VASCULAR: HCPCS | Performed by: INTERNAL MEDICINE

## 2021-01-06 PROCEDURE — 25010000002 PROPOFOL 10 MG/ML EMULSION: Performed by: NURSE ANESTHETIST, CERTIFIED REGISTERED

## 2021-01-06 PROCEDURE — 43249 ESOPH EGD DILATION <30 MM: CPT | Performed by: INTERNAL MEDICINE

## 2021-01-06 RX ORDER — PROMETHAZINE HYDROCHLORIDE 25 MG/1
25 TABLET ORAL ONCE AS NEEDED
Status: DISCONTINUED | OUTPATIENT
Start: 2021-01-06 | End: 2021-01-06 | Stop reason: HOSPADM

## 2021-01-06 RX ORDER — PROPOFOL 10 MG/ML
VIAL (ML) INTRAVENOUS AS NEEDED
Status: DISCONTINUED | OUTPATIENT
Start: 2021-01-06 | End: 2021-01-06 | Stop reason: SURG

## 2021-01-06 RX ORDER — LOPERAMIDE HYDROCHLORIDE 2 MG/1
2 CAPSULE ORAL 4 TIMES DAILY PRN
COMMUNITY
End: 2021-02-17 | Stop reason: SDUPTHER

## 2021-01-06 RX ORDER — LIDOCAINE HYDROCHLORIDE 20 MG/ML
INJECTION, SOLUTION INTRAVENOUS AS NEEDED
Status: DISCONTINUED | OUTPATIENT
Start: 2021-01-06 | End: 2021-01-06 | Stop reason: SURG

## 2021-01-06 RX ORDER — ONDANSETRON 2 MG/ML
4 INJECTION INTRAMUSCULAR; INTRAVENOUS ONCE AS NEEDED
Status: DISCONTINUED | OUTPATIENT
Start: 2021-01-06 | End: 2021-01-06 | Stop reason: HOSPADM

## 2021-01-06 RX ORDER — MEPERIDINE HYDROCHLORIDE 25 MG/ML
12.5 INJECTION INTRAMUSCULAR; INTRAVENOUS; SUBCUTANEOUS
Status: DISCONTINUED | OUTPATIENT
Start: 2021-01-06 | End: 2021-01-06 | Stop reason: HOSPADM

## 2021-01-06 RX ORDER — DEXTROSE AND SODIUM CHLORIDE 5; .45 G/100ML; G/100ML
30 INJECTION, SOLUTION INTRAVENOUS CONTINUOUS PRN
Status: DISCONTINUED | OUTPATIENT
Start: 2021-01-06 | End: 2021-01-06 | Stop reason: HOSPADM

## 2021-01-06 RX ORDER — PROMETHAZINE HYDROCHLORIDE 25 MG/1
25 SUPPOSITORY RECTAL ONCE AS NEEDED
Status: DISCONTINUED | OUTPATIENT
Start: 2021-01-06 | End: 2021-01-06 | Stop reason: HOSPADM

## 2021-01-06 RX ADMIN — PROPOFOL 60 MG: 10 INJECTION, EMULSION INTRAVENOUS at 12:01

## 2021-01-06 RX ADMIN — LIDOCAINE HYDROCHLORIDE 80 MG: 20 INJECTION, SOLUTION INTRAVENOUS at 12:01

## 2021-01-06 RX ADMIN — DEXTROSE AND SODIUM CHLORIDE 30 ML/HR: 5; 450 INJECTION, SOLUTION INTRAVENOUS at 11:29

## 2021-01-06 RX ADMIN — PROPOFOL 20 MG: 10 INJECTION, EMULSION INTRAVENOUS at 12:05

## 2021-01-06 RX ADMIN — PROPOFOL 20 MG: 10 INJECTION, EMULSION INTRAVENOUS at 12:07

## 2021-01-06 RX ADMIN — PROPOFOL 20 MG: 10 INJECTION, EMULSION INTRAVENOUS at 12:03

## 2021-01-06 NOTE — ANESTHESIA PREPROCEDURE EVALUATION
Anesthesia Evaluation     Patient summary reviewed and Nursing notes reviewed   no history of anesthetic complications:  NPO Solid Status: > 8 hours  NPO Liquid Status: > 8 hours           Airway   Mallampati: II  TM distance: >3 FB  Neck ROM: full  Small opening  Dental    (+) lower dentures and upper dentures    Pulmonary     breath sounds clear to auscultation  (+) a smoker Former, COPD mild,   (-) asthma, sleep apnea, rhonchi, decreased breath sounds, wheezes  Cardiovascular   Exercise tolerance: poor (<4 METS)    ECG reviewed  Patient on routine beta blocker and Beta blocker given within 24 hours of surgery  Rhythm: regular  Rate: normal    (+) hypertension well controlled 2 medications or greater,   (-) pacemaker, valvular problems/murmurs, past MI, CAD, dysrhythmias, murmur, cardiac stents, CABG, DVT, hyperlipidemia    ROS comment: Atrial fibrillation  Abnormal ECG  When compared with ECG of 05-JUN-2019 17:42,  Atrial fibrillation has replaced Sinus rhythm  Confirmed by SOLE ALVARENGA ([id number]) on 11/11/2020 3:15:48 PM     Referred By:             Confirmed By:SOLE ALVARENGA    Specimen Collected: 11/10/20 12:17          Neuro/Psych  (-) seizures, TIA, CVA  GI/Hepatic/Renal/Endo    (+) obesity,  GERD well controlled,    (-) diabetes    Musculoskeletal     (+) neck pain,   Abdominal    Substance History      OB/GYN negative ob/gyn ROS         Other   arthritis,      ROS/Med Hx Other: Treat as covid +    DM controlled with diet                    Anesthesia Plan    ASA 3     MAC     intravenous induction     Anesthetic plan, all risks, benefits, and alternatives have been provided, discussed and informed consent has been obtained with: patient.    Plan discussed with CRNA.

## 2021-01-06 NOTE — H&P
[facility]      Chief Complaint:   No chief complaint on file.    This visit has been rescheduled as a phone visit to comply with patient safety concerns in accordance with CDC recommendations. Total time of discussion was 20 minutes.  You have chosen to receive care through a telephone visit. Do you consent to use a telephone visit for your medical care today? Yes  Subjective     HPI:   Patient with dysphagia.  Patient also with changes in bowel habits but is refusing colonoscopy secondary to many family members having difficulties with colonoscopies.  Patient had been trying to get an upper endoscopy completed but continues to come out positive for Covid.  We will schedule patient for EGD with dilatation.    Past Medical History:   Past Medical History:   Diagnosis Date   • Abnormal CT scan     per patient   • Acute bronchitis    • Acute maxillary sinusitis    • Acute otitis externa    • Acute sinusitis    • Allergic conjunctivitis    • Angular cheilitis    • Ankle edema    • Backache     improved   • Blood in urine      UTI resolved      • Bradycardia    • Chest discomfort     described as heart racing      • Chronic low back pain     RIGHT leg radiation      • Cold feet     c/o - and lowerlegs      • Contusion     of dorsum of foot   • Cough    • COVID-19 virus infection    • Depressive disorder    • Diabetes mellitus (CMS/HCC)    • Dizziness and giddiness    • Dyspnea    • Dysuria    • Edema    • Edema of foot    • Edema of lower extremity    • Essential hypertension    • Exanthematous disorder    • Fatigue    • Foot pain, left    • GERD (gastroesophageal reflux disease)    • Grade II hemorrhoids 3/12/2018   • Hematoma    • Hip pain, right    • History of palpitations    • Hormone replacement therapy (postmenopausal)    • Hypertensive disorder    • Impacted cerumen    • Joint pain    • Knee pain    • Low back pain    • Malaise and fatigue    • Multiple joint pain    • Muscle pain    •  Muscle weakness    • Neck pain    • Obesity (BMI 30.0-34.9) 3/12/2018   • Osteoarthritis of knee    • Osteoarthritis of multiple joints    • Otitis externa    • Pain in lower limb    • Peripheral venous insufficiency    • POP-Q stage 2 rectocele 3/12/2018   • POP-Q stage 4 cystocele 3/12/2018   • Sacroiliitis, not elsewhere classified (CMS/HCC)     R LE   • Shoulder pain    • Upper respiratory infection    • Urinary frequency    • Urinary tract infectious disease    • Wheezing        Past Surgical History:    Past Surgical History:   Procedure Laterality Date   • ANKLE SURGERY     • BACK SURGERY     • COLONOSCOPY  12/14/2009   • EYE SURGERY Bilateral 02/2018    B cataract   • HAMMER TOE REPAIR  08/02/2011    Hammertoe repair, left second toe.   • HYSTERECTOMY     • INCISION AND DRAINAGE ABSCESS  01/21/2015   • INJECTION OF MEDICATION  11/15/2013   • INJECTION OF MEDICATION  05/06/2013    Celestone (betamethasone) (1)      • INJECTION OF MEDICATION  03/17/2016    Kenalog (3)      • NECK SURGERY  01/2018   • TEMPORAL ARTERY BIOPSY Left 11/10/2020    Procedure: LEFT TEMPORAL ARTERY BIOPSY;  Surgeon: Anjel Olea MD;  Location: [facility];  Service: General;  Laterality: Left;   • UPPER GASTROINTESTINAL ENDOSCOPY  12/14/2009       Family History:  Family History   Problem Relation Age of Onset   • Diabetes Other    • Heart disease Other    • Stroke Other    • Coronary artery disease Mother    • Coronary artery disease Father        Social History:   reports that she has quit smoking. She has never used smokeless tobacco. She reports that she does not drink alcohol or use drugs.    Medications:   Prior to Admission medications    Medication Sig Start Date End Date Taking? Authorizing Provider   Accu-Chek FastClix Lancets misc 1 each Daily. 8/3/20  Yes Arelis Barney APRN   allopurinol (ZYLOPRIM) 100 MG tablet Take 1 tablet by mouth Daily. take with food 8/3/20  Yes Arelis Barney APRN   amLODIPine (NORVASC) 5 MG  tablet Take 1 tablet by mouth Daily. 8/3/20  Yes Arelis Barney APRN   clotrimazole-betamethasone (LOTRISONE) 1-0.05 % cream APPLY TOPICALLY TO THE APPROPRIATE AREA AS DIRECTED 2 TIMES A DAY 11/16/20  Yes Arelis Barney APRN   colchicine 0.6 MG capsule capsule TK 1 C PO QD STARTING ON 8/19/19 8/18/19  Yes ProviderVipin MD   cyproheptadine 2 MG/5ML syrup Take 5 mL by mouth Every 12 (Twelve) Hours As Needed (to stimulate appetite). 10/2/19  Yes Mateusz Chopra APRN   diclofenac (VOLTAREN) 1 % gel gel Apply 4 g topically to the appropriate area as directed 3 (Three) Times a Day. Small amount to affected area 8/20/20  Yes Syed Carr MD   diphenhydrAMINE (BANOPHEN) 25 mg capsule Banophen 25 mg capsule   TK 1 C PO Q 6 H PRN FOR ITCHING   Yes ProviderVipin MD   docusate sodium (DOK) 100 MG capsule Take 1 capsule by mouth 2 (Two) Times a Day. 4/29/19  Yes Mateusz Chopra APRN   estradiol (ESTRACE) 1 MG tablet Take 1 tablet by mouth Daily. 3/21/19  Yes Arelis Barney APRN   folic acid (FOLVITE) 1 MG tablet Take 1 mg by mouth Daily.   Yes ProviderVipin MD   gabapentin (NEURONTIN) 300 MG capsule Take 1 capsule by mouth Every Morning. 10/27/20  Yes Arelis Barney APRN   gabapentin (NEURONTIN) 600 MG tablet Take 1 tablet by mouth Every Night. 10/27/20  Yes Arelis Barney APRN   glucose blood test strip Use as instructed to check b/s 1x a day.  Please give strip compatible with monitor. (accucheck) 8/3/20  Yes Arelis Barney APRN   loperamide (Imodium A-D) 2 MG tablet Take 1 tablet by mouth 4 (Four) Times a Day As Needed for Diarrhea. 8/3/20  Yes Arelis Barney APRN   losartan (COZAAR) 100 MG tablet Take 1 tablet by mouth Daily for 30 days. 11/11/20 12/11/20 Yes Lukas Tyson MD   Menthol, Topical Analgesic, (BIOFREEZE) 4 % gel Apply to affected area on left back 2-3 times per day x 1week 4/29/19  Yes Mateusz Chopra APRN   metoprolol succinate XL (TOPROL-XL) 25 MG 24 hr  tablet Take 1 tablet by mouth Daily. 8/3/20  Yes Arelis Barney APRN   montelukast (SINGULAIR) 10 MG tablet Take 1 tablet by mouth Every Night. 3/5/20  Yes Mateusz Chopra APRN   NUCYNTA 50 MG tablet TK 1 T PO BID PRN 9/9/19  Yes Vipin Wright MD   nystatin (MYCOSTATIN) 045835 UNIT/ML suspension Apply to corners of mouth 3-4 times per day until rash is gone 10/30/20  Yes Arelis Barney APRN   omeprazole (priLOSEC) 20 MG capsule Take 1 capsule by mouth 2 (Two) Times a Day. 8/3/20  Yes Arelis Barney APRN   polyethylene glycol (MIRALAX) 17 g packet Take 17 g by mouth Daily. 9/18/20  Yes Arelis Barney APRN   predniSONE (DELTASONE) 20 MG tablet Take 2 tablets by mouth Daily for 30 days. 11/10/20 12/10/20 Yes Lukas Tyson MD   promethazine-dextromethorphan (PROMETHAZINE-DM) 6.25-15 MG/5ML syrup Take 5 mL by mouth 4 (Four) Times a Day As Needed for Cough. 3/10/20  Yes Arelis Barney APRN   terconazole (TERAZOL 7) 0.4 % vaginal cream INSERT 1 APPLICATORFUL INTO THE VAGINA EVERY NIGHT AS DIRECTED 9/17/20  Yes Arelis Barney APRN   tolterodine LA (DETROL LA) 4 MG 24 hr capsule Take 1 capsule by mouth Daily. 8/3/20  Yes Arelis Barney APRN   traMADol (ULTRAM) 50 MG tablet Take 1 tablet by mouth 2 (Two) Times a Day As Needed for Moderate Pain  (hand pain). 11/6/20  Yes Arelis Barney APRN   triamcinolone (KENALOG) 0.1 % cream Apply  topically to the appropriate area as directed 3 (Three) Times a Day As Needed (itching--insect bites). 10/2/19  Yes Mateusz Chopra APRN       Allergies:  Aleve [naproxen sodium], Atenolol, Keflex [cephalexin], Lisinopril, Relafen [nabumetone], Zanaflex [tizanidine], Acetaminophen, Aspirin, Codeine, Diflucan [fluconazole], and Sulfa antibiotics    ROS:    Review of Systems   Constitutional: Negative for activity change, appetite change, chills, diaphoresis, fatigue, fever and unexpected weight change.   HENT: Positive for trouble swallowing. Negative for  "sore throat.    Respiratory: Negative for shortness of breath.    Gastrointestinal: Negative for abdominal distention, abdominal pain, anal bleeding, blood in stool, constipation, diarrhea, nausea, rectal pain and vomiting.   Endocrine: Negative for polydipsia, polyphagia and polyuria.   Genitourinary: Negative for difficulty urinating.   Musculoskeletal: Negative for arthralgias.   Skin: Negative for pallor.   Allergic/Immunologic: Negative for food allergies.   Neurological: Negative for weakness and light-headedness.   Psychiatric/Behavioral: Negative for behavioral problems.     Objective     Blood pressure 142/71, pulse 69, temperature 97.8 °F (36.6 °C), resp. rate 18, height 134.6 cm (53\"), weight 56.2 kg (124 lb), SpO2 100 %, not currently breastfeeding.    Physical Exam  Constitutional:       General: She is not in acute distress.     Appearance: She is well-developed. She is not diaphoretic.   HENT:      Head: Normocephalic and atraumatic.   Cardiovascular:      Rate and Rhythm: Normal rate and regular rhythm.      Heart sounds: Normal heart sounds. No murmur. No friction rub. No gallop.    Pulmonary:      Effort: No respiratory distress.      Breath sounds: Normal breath sounds. No wheezing or rales.   Chest:      Chest wall: No tenderness.   Abdominal:      General: Bowel sounds are normal. There is no distension.      Palpations: Abdomen is soft. There is no mass.      Tenderness: There is no abdominal tenderness. There is no guarding or rebound.      Hernia: No hernia is present.   Musculoskeletal: Normal range of motion.   Skin:     General: Skin is warm and dry.      Coloration: Skin is not pale.      Findings: No erythema or rash.   Neurological:      Mental Status: She is alert and oriented to person, place, and time.   Psychiatric:         Behavior: Behavior normal.         Thought Content: Thought content normal.         Judgment: Judgment normal.          Assessment/Plan   Diagnoses and all " orders for this visit:    1. Other dysphagia  -     dextrose 5 % and sodium chloride 0.45 % infusion    Other orders  -     POC Glucose Once; Standing  -     Implement Anesthesia Orders Day of Procedure; Standing  -     Obtain Informed Consent; Standing  -     Insert Peripheral IV; Standing  -     POC Glucose Once  -     Implement Anesthesia Orders Day of Procedure  -     Obtain Informed Consent  -     Insert Peripheral IV        ESOPHAGOGASTRODUODENOSCOPY (N/A)     Diagnosis Plan   1. Other dysphagia  dextrose 5 % and sodium chloride 0.45 % infusion       Anticipated Surgical Procedure:  Orders Placed This Encounter   Procedures   • Implement Anesthesia Orders Day of Procedure     Standing Status:   Standing     Number of Occurrences:   1   • Obtain Informed Consent     Standing Status:   Standing     Number of Occurrences:   1     Order Specific Question:   Informed Consent Given For     Answer:   egd   • POC Glucose Once     Prior to Procedure on ALL Diabetic Patients     Standing Status:   Standing     Number of Occurrences:   1   • Insert Peripheral IV     Standing Status:   Standing     Number of Occurrences:   1       The risks, benefits, and alternatives of this procedure have been discussed with the patient or the responsible party- the patient understands and agrees to proceed.

## 2021-01-06 NOTE — NURSING NOTE
I reviewed s/s to monitor for with patient & daughter both voicing understanding.  At d/c patient denies any pain, nausea, or s/s to report.  Both agree to call  Endoscopy if any pain, bleeding, persistent elevated tempt, or other s/s of concern.

## 2021-01-07 ENCOUNTER — OFFICE VISIT (OUTPATIENT)
Dept: FAMILY MEDICINE CLINIC | Facility: CLINIC | Age: 86
End: 2021-01-07

## 2021-01-07 VITALS
RESPIRATION RATE: 19 BRPM | WEIGHT: 124 LBS | HEART RATE: 58 BPM | BODY MASS INDEX: 24.35 KG/M2 | SYSTOLIC BLOOD PRESSURE: 120 MMHG | DIASTOLIC BLOOD PRESSURE: 53 MMHG | TEMPERATURE: 96.8 F | OXYGEN SATURATION: 96 % | HEIGHT: 60 IN

## 2021-01-07 DIAGNOSIS — N76.1 CHRONIC VAGINITIS: Primary | ICD-10-CM

## 2021-01-07 DIAGNOSIS — R39.89 BLADDER PAIN: ICD-10-CM

## 2021-01-07 DIAGNOSIS — N89.8 VAGINAL DISCHARGE: Primary | ICD-10-CM

## 2021-01-07 PROCEDURE — 87660 TRICHOMONAS VAGIN DIR PROBE: CPT | Performed by: NURSE PRACTITIONER

## 2021-01-07 PROCEDURE — 87480 CANDIDA DNA DIR PROBE: CPT | Performed by: NURSE PRACTITIONER

## 2021-01-07 PROCEDURE — 87510 GARDNER VAG DNA DIR PROBE: CPT | Performed by: NURSE PRACTITIONER

## 2021-01-07 PROCEDURE — 99213 OFFICE O/P EST LOW 20 MIN: CPT | Performed by: NURSE PRACTITIONER

## 2021-01-07 RX ORDER — METRONIDAZOLE 500 MG/1
500 TABLET ORAL 2 TIMES DAILY
Qty: 14 TABLET | Refills: 1 | Status: SHIPPED | OUTPATIENT
Start: 2021-01-07 | End: 2021-02-05

## 2021-01-08 LAB
CANDIDA ALBICANS: POSITIVE
GARDNERELLA VAGINALIS: NEGATIVE
T VAGINALIS DNA VAG QL PROBE+SIG AMP: NEGATIVE

## 2021-01-11 ENCOUNTER — OFFICE VISIT (OUTPATIENT)
Dept: FAMILY MEDICINE CLINIC | Facility: CLINIC | Age: 86
End: 2021-01-11

## 2021-01-11 VITALS
RESPIRATION RATE: 18 BRPM | SYSTOLIC BLOOD PRESSURE: 122 MMHG | HEART RATE: 63 BPM | HEIGHT: 60 IN | TEMPERATURE: 96.9 F | OXYGEN SATURATION: 96 % | DIASTOLIC BLOOD PRESSURE: 60 MMHG | BODY MASS INDEX: 24.81 KG/M2 | WEIGHT: 126.4 LBS
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
## 2021-01-11 DIAGNOSIS — L08.9 INSECT BITES OF MULTIPLE SITES, INFECTED: Primary | ICD-10-CM

## 2021-01-11 DIAGNOSIS — H65.92 LEFT SEROUS OTITIS MEDIA, UNSPECIFIED CHRONICITY: ICD-10-CM

## 2021-01-11 DIAGNOSIS — T07.XXXA INSECT BITES OF MULTIPLE SITES, INFECTED: Primary | ICD-10-CM

## 2021-01-11 DIAGNOSIS — R42 DIZZINESS: ICD-10-CM

## 2021-01-11 PROCEDURE — 99213 OFFICE O/P EST LOW 20 MIN: CPT | Performed by: NURSE PRACTITIONER

## 2021-01-11 RX ORDER — LORATADINE 10 MG/1
10 TABLET ORAL DAILY
Qty: 30 TABLET | Refills: 0 | Status: SHIPPED | OUTPATIENT
Start: 2021-01-11 | End: 2021-03-08 | Stop reason: SDUPTHER

## 2021-01-11 RX ORDER — DOXYCYCLINE HYCLATE 100 MG/1
100 CAPSULE ORAL 2 TIMES DAILY
Qty: 14 CAPSULE | Refills: 0 | Status: SHIPPED | OUTPATIENT
Start: 2021-01-11 | End: 2021-01-28

## 2021-01-11 NOTE — PROGRESS NOTES
"Chief Complaint  Dizziness    Subjective          Kristy Areli Ramirez presents to [facility] for   [facility] Same Day[facility]Walk [facility]    PCP: NADYA Rodriguez    CC: \"dizzy, checked blood sugar and blood pressure, both normal, still having hot flashes\"    Also reports insect bites x 3 that occurred yesterday.  Red/itchy and using hydrocortisone, but becoming more tender today.  Believes something was brought in by her pet when she let it outside yesterday.      Reports blood sugar was 96 prior to coming into clinic.     Requesting refill of Estradiol that she was previously on for hot flashes.  This will be referred to PCP for further discussion as she has already been speaking to her in regard to this at previous visits.      Had recent EGD and f/u with Gastro tomorrow.     Accompanied by grandaughter    Dizziness  This is a new problem. Episode onset: this am. The problem has been gradually improving (has improved somewhat since this AM). Pertinent negatives include no abdominal pain, anorexia, arthralgias, change in bowel habit, chest pain, chills, congestion, coughing, diaphoresis, fatigue, fever, headaches, joint swelling, myalgias, nausea, neck pain, numbness, rash, sore throat, swollen glands, urinary symptoms, vertigo, visual change, vomiting or weakness. Exacerbated by: movement. She has tried nothing for the symptoms. The treatment provided no relief.   Insect Bite  This is a new problem. The current episode started yesterday. The problem occurs constantly. The problem has been gradually worsening. Pertinent negatives include no abdominal pain, anorexia, arthralgias, change in bowel habit, chest pain, chills, congestion, coughing, diaphoresis, fatigue, fever, headaches, joint swelling, myalgias, nausea, neck pain, numbness, rash, sore throat, swollen glands, urinary symptoms, vertigo, visual change, vomiting or weakness. Nothing aggravates the symptoms. Treatments tried: " "hydrocortisone. The treatment provided mild (helped with itching) relief.       Objective   Vital Signs:   /60 (BP Location: Left arm, Patient Position: Sitting, Cuff Size: Adult)   Pulse 63   Temp 96.9 °F (36.1 °C)   Resp 18   Ht 152.4 cm (60\")   Wt 57.3 kg (126 lb 6.4 oz)   SpO2 96%   BMI 24.69 kg/m²     Physical Exam  Vitals signs and nursing note reviewed.   Constitutional:       General: She is not in acute distress.     Appearance: Normal appearance. She is not ill-appearing.   HENT:      Head: Normocephalic and atraumatic.      Right Ear: Tympanic membrane and ear canal normal.      Left Ear: A middle ear effusion is present. Tympanic membrane is not erythematous.      Nose: Nose normal.      Right Sinus: No maxillary sinus tenderness or frontal sinus tenderness.      Left Sinus: No maxillary sinus tenderness or frontal sinus tenderness.      Mouth/Throat:      Lips: Pink.      Mouth: Mucous membranes are moist.      Pharynx: Oropharynx is clear. Uvula midline. No pharyngeal swelling, posterior oropharyngeal erythema or uvula swelling.   Eyes:      General:         Right eye: No discharge.         Left eye: No discharge.      Conjunctiva/sclera: Conjunctivae normal.   Neck:      Musculoskeletal: Normal range of motion and neck supple. No neck rigidity or muscular tenderness.      Vascular: No carotid bruit.   Cardiovascular:      Rate and Rhythm: Normal rate and regular rhythm.   Pulmonary:      Effort: Pulmonary effort is normal. No respiratory distress.      Breath sounds: Normal breath sounds. No wheezing, rhonchi or rales.   Abdominal:      Palpations: Abdomen is soft.   Musculoskeletal:      Comments: Using W/C   Lymphadenopathy:      Cervical: No cervical adenopathy.   Skin:     General: Skin is warm and dry.      Findings: Erythema present.          Neurological:      General: No focal deficit present.      Mental Status: She is alert and oriented to person, place, and time.      Cranial " Nerves: No cranial nerve deficit.      Motor: Weakness (no changes from normal) present.        Result Review :                 Assessment and Plan    Problem List Items Addressed This Visit        Infectious Diseases    Insect bites of multiple sites, infected - Primary    Relevant Medications    doxycycline (VIBRAMYCIN) 100 MG capsule      Other Visit Diagnoses     Left serous otitis media, unspecified chronicity        Relevant Medications    loratadine (Claritin) 10 MG tablet    Dizziness            Push fluids  Change positions slowly  Rx for Doxycycline for infected insect bites provided.   May continue with Hydrocortisone PRN itching  Rx for Loratadine to help with fluid behind ear drum    Last Hgb at 8.0 in November.  Had recent EGD and f/u tomorrow.  If dizziness persist, may need to repeat CBC, iron profile.    See PCP or RTC if symptoms persist/worsen  See PCP for routine f/u visit and management of chronic medical conditions      This document has been electronically signed by NADYA Gusman on January 11, 2021 14:26 CST,.

## 2021-01-12 ENCOUNTER — OFFICE VISIT (OUTPATIENT)
Dept: FAMILY MEDICINE CLINIC | Facility: CLINIC | Age: 86
End: 2021-01-12

## 2021-01-12 VITALS
TEMPERATURE: 97.5 F | RESPIRATION RATE: 20 BRPM | DIASTOLIC BLOOD PRESSURE: 57 MMHG | HEIGHT: 60 IN | OXYGEN SATURATION: 96 % | SYSTOLIC BLOOD PRESSURE: 111 MMHG | HEART RATE: 64 BPM | BODY MASS INDEX: 24.74 KG/M2 | WEIGHT: 126 LBS

## 2021-01-12 DIAGNOSIS — R61 NIGHT SWEATS: Primary | ICD-10-CM

## 2021-01-12 PROCEDURE — 99214 OFFICE O/P EST MOD 30 MIN: CPT | Performed by: NURSE PRACTITIONER

## 2021-01-12 RX ORDER — PAROXETINE HYDROCHLORIDE 20 MG/1
20 TABLET, FILM COATED ORAL EVERY MORNING
Qty: 30 TABLET | Refills: 3 | Status: SHIPPED | OUTPATIENT
Start: 2021-01-12 | End: 2021-06-16

## 2021-01-18 NOTE — PROGRESS NOTES
Subjective   Kristy Ramirez is a 86 y.o. female.     Kristy Ramirez age 86 comes in today stating that she is sure she has another vaginal infection.  She is having some pelvic pressure and pain.  She wants to have swabs done to verify rule out.    Abdominal Pain  The current episode started in the past 7 days. The onset quality is undetermined. The problem occurs constantly. The problem has been gradually worsening. The pain is located in the suprapubic region. The pain is at a severity of 8/10. The pain is severe. The quality of the pain is cramping. The abdominal pain does not radiate. Pertinent negatives include no anorexia, arthralgias, belching, constipation, diarrhea, dysuria, fever, flatus, frequency, headaches, hematochezia, hematuria, melena, myalgias, nausea, vomiting or weight loss. Nothing aggravates the pain. The pain is relieved by nothing. She has tried nothing for the symptoms. The treatment provided no relief. Prior diagnostic workup includes upper endoscopy.        The following portions of the patient's history were reviewed and updated as appropriate: allergies, current medications, past family history, past medical history, past social history, past surgical history and problem list.    Review of Systems   Constitutional: Negative.  Negative for fever and weight loss.   HENT: Negative.    Eyes: Negative.    Respiratory: Negative.    Cardiovascular: Negative.    Gastrointestinal: Positive for abdominal pain. Negative for anorexia, constipation, diarrhea, flatus, hematochezia, melena, nausea and vomiting.   Genitourinary: Negative.  Negative for dysuria, frequency and hematuria.   Musculoskeletal: Negative.  Negative for arthralgias and myalgias.   Skin: Negative.    Neurological: Negative.  Negative for headaches.   Psychiatric/Behavioral: Negative.        Objective   Physical Exam  Vitals signs and nursing note reviewed.   Constitutional:       General: She is not in acute distress.      Appearance: She is well-developed.   HENT:      Head: Normocephalic and atraumatic.      Right Ear: External ear normal.      Left Ear: External ear normal.      Nose: Nose normal.      Mouth/Throat:      Pharynx: No oropharyngeal exudate.   Eyes:      Pupils: Pupils are equal, round, and reactive to light.   Neck:      Musculoskeletal: Normal range of motion and neck supple.      Thyroid: No thyromegaly.   Cardiovascular:      Rate and Rhythm: Normal rate and regular rhythm.      Heart sounds: Normal heart sounds. No murmur. No friction rub.   Pulmonary:      Effort: Pulmonary effort is normal. No respiratory distress.      Breath sounds: Normal breath sounds. No wheezing or rales.   Abdominal:      Palpations: Abdomen is soft.   Musculoskeletal: Normal range of motion.   Skin:     General: Skin is warm and dry.   Neurological:      Mental Status: She is alert and oriented to person, place, and time.   Psychiatric:         Thought Content: Thought content normal.         Assessment/Plan   Diagnoses and all orders for this visit:    1. Chronic vaginitis (Primary)  -     metroNIDAZOLE (Flagyl) 500 MG tablet; Take 1 tablet by mouth 2 (Two) Times a Day.  Dispense: 14 tablet; Refill: 1  -     Gardnerella vaginalis, Trichomonas vaginalis, Candida albicans, DNA - Swab, Vagina      Wants to be treated and checked for vaginitis today.

## 2021-01-21 ENCOUNTER — OFFICE VISIT (OUTPATIENT)
Dept: GASTROENTEROLOGY | Facility: CLINIC | Age: 86
End: 2021-01-21

## 2021-01-21 VITALS
SYSTOLIC BLOOD PRESSURE: 125 MMHG | BODY MASS INDEX: 24.42 KG/M2 | HEART RATE: 57 BPM | HEIGHT: 60 IN | WEIGHT: 124.4 LBS | DIASTOLIC BLOOD PRESSURE: 61 MMHG

## 2021-01-21 DIAGNOSIS — R13.19 OTHER DYSPHAGIA: Primary | ICD-10-CM

## 2021-01-21 PROCEDURE — 99214 OFFICE O/P EST MOD 30 MIN: CPT | Performed by: INTERNAL MEDICINE

## 2021-01-21 RX ORDER — DEXTROSE AND SODIUM CHLORIDE 5; .45 G/100ML; G/100ML
30 INJECTION, SOLUTION INTRAVENOUS CONTINUOUS PRN
Status: CANCELLED | OUTPATIENT
Start: 2021-01-28

## 2021-01-21 NOTE — PROGRESS NOTES
[facility]      Chief Complaint:   Chief Complaint   Patient presents with   • Difficulty Swallowing       Subjective     HPI:   Patient with dysphagia.  Patient has had marked improvement in swallowing since dilatation but her esophagus is extremely tight dilatation was done with the balloon up to 36.  This caused marked trauma to the esophagus but patient did well following the procedure.  Patient continues to have some difficulty swallowing stating that that has only slightly improved since previous endoscopy.  We will schedule patient for repeat EGD with dilatation but will need to be done extremely slowly.    Plan; we will schedule patient for EGD next Thursday    Past Medical History:   Past Medical History:   Diagnosis Date   • Abnormal CT scan     per patient   • Acute bronchitis    • Acute maxillary sinusitis    • Acute otitis externa    • Acute sinusitis    • Allergic conjunctivitis    • Angular cheilitis    • Ankle edema    • Backache     improved   • Blood in urine      UTI resolved      • Bradycardia    • Chest discomfort     described as heart racing      • Chronic low back pain     RIGHT leg radiation      • Cold feet     c/o - and lowerlegs      • Contusion     of dorsum of foot   • Cough    • COVID-19 virus infection    • Depressive disorder    • Diabetes mellitus (CMS/HCC)    • Dizziness and giddiness    • Dyspnea    • Dysuria    • Edema    • Edema of foot    • Edema of lower extremity    • Essential hypertension    • Exanthematous disorder    • Fatigue    • Foot pain, left    • GERD (gastroesophageal reflux disease)    • Grade II hemorrhoids 3/12/2018   • Hematoma    • Hip pain, right    • History of palpitations    • Hormone replacement therapy (postmenopausal)    • Hypertensive disorder    • Impacted cerumen    • Joint pain    • Knee pain    • Low back pain    • Malaise and fatigue    • Multiple joint pain    • Muscle pain    • Muscle weakness    • Neck pain    • Obesity  (BMI 30.0-34.9) 3/12/2018   • Osteoarthritis of knee    • Osteoarthritis of multiple joints    • Otitis externa    • Pain in lower limb    • Peripheral venous insufficiency    • POP-Q stage 2 rectocele 3/12/2018   • POP-Q stage 4 cystocele 3/12/2018   • Sacroiliitis, not elsewhere classified (CMS/HCC)     R LE   • Shoulder pain    • Upper respiratory infection    • Urinary frequency    • Urinary tract infectious disease    • Wheezing        Past Surgical History:  Past Surgical History:   Procedure Laterality Date   • ANKLE SURGERY     • BACK SURGERY     • COLONOSCOPY  12/14/2009   • ENDOSCOPY N/A 1/6/2021    Procedure: ESOPHAGOGASTRODUODENOSCOPY;  Surgeon: Jack Ellis MD;  Location: [facility] ENDOSCOPY;  Service: Gastroenterology;  Laterality: N/A;   • EYE SURGERY Bilateral 02/2018    B cataract   • HAMMER TOE REPAIR  08/02/2011    Hammertoe repair, left second toe.   • HYSTERECTOMY     • INCISION AND DRAINAGE ABSCESS  01/21/2015   • INJECTION OF MEDICATION  11/15/2013   • INJECTION OF MEDICATION  05/06/2013    Celestone (betamethasone) (1)      • INJECTION OF MEDICATION  03/17/2016    Kenalog (3)      • NECK SURGERY  01/2018   • TEMPORAL ARTERY BIOPSY Left 11/10/2020    Procedure: LEFT TEMPORAL ARTERY BIOPSY;  Surgeon: Anjel Olea MD;  Location: [facility] OR;  Service: General;  Laterality: Left;   • UPPER GASTROINTESTINAL ENDOSCOPY  12/14/2009   • UPPER GASTROINTESTINAL ENDOSCOPY  01/06/2021       Family History:  Family History   Problem Relation Age of Onset   • Diabetes Other    • Heart disease Other    • Stroke Other    • Coronary artery disease Mother    • Coronary artery disease Father        Social History:   reports that she has quit smoking. She has never used smokeless tobacco. She reports that she does not drink alcohol or use drugs.    Medications:   Prior to Admission medications    Medication Sig Start Date End Date Taking? Authorizing Provider   Accu-Chek FastClix Lancets misc 1 each Daily. 8/3/20   Yes Arelis Barney APRN   allopurinol (ZYLOPRIM) 100 MG tablet Take 1 tablet by mouth Daily. take with food 8/3/20  Yes Arelis Barney APRN   amLODIPine (NORVASC) 5 MG tablet Take 1 tablet by mouth Daily. 8/3/20  Yes Arelis Barney APRN   chlorthalidone (HYGROTON) 25 MG tablet Take 25 mg by mouth Daily.   Yes ProviderVipin MD   colchicine 0.6 MG capsule capsule TK 1 C PO QD STARTING ON 8/19/19 8/18/19  Yes Provider, MD Vipin   diclofenac (VOLTAREN) 1 % gel gel Apply 4 g topically to the appropriate area as directed 3 (Three) Times a Day. Small amount to affected area 8/20/20  Yes Syed Carr MD   docusate sodium (DOK) 100 MG capsule Take 1 capsule by mouth 2 (Two) Times a Day. 4/29/19  Yes Mateusz Chopra APRN   folic acid (FOLVITE) 1 MG tablet Take 1 mg by mouth Daily.   Yes ProviderVipin MD   furosemide (LASIX) 20 MG tablet Take 20 mg by mouth 2 (Two) Times a Day.   Yes Provider, MD Vipin   gabapentin (NEURONTIN) 300 MG capsule Take 1 capsule by mouth Every Morning. 10/27/20  Yes Arelis Barney APRN   gabapentin (NEURONTIN) 600 MG tablet TAKE 1 TABLET BY MOUTH EVERY EVENING 11/24/20  Yes Arelis Barney APRN   glucose blood test strip Use as instructed to check b/s 1x a day.  Please give strip compatible with monitor. (accucheck) 8/3/20  Yes Arelis Barney APRN   loperamide (IMODIUM) 2 MG capsule Take 2 mg by mouth 4 (Four) Times a Day As Needed for Diarrhea.   Yes ProviderVipin MD   loratadine (Claritin) 10 MG tablet Take 1 tablet by mouth Daily. 1/11/21  Yes Mateusz Chopra APRN   Menthol, Topical Analgesic, (BIOFREEZE) 4 % gel Apply to affected area on left back 2-3 times per day x 1week 4/29/19  Yes Mateusz Chopra APRN   metoprolol succinate XL (TOPROL-XL) 25 MG 24 hr tablet Take 1 tablet by mouth Daily. 8/3/20  Yes Arelis Barney APRN   metroNIDAZOLE (Flagyl) 500 MG tablet Take 1 tablet by mouth 2 (Two) Times a Day. 1/7/21  Yes Ector  NADYA Guerra   montelukast (SINGULAIR) 10 MG tablet Take 1 tablet by mouth Every Night. 3/5/20  Yes Mateusz Chopra APRN   NUCYNTA 50 MG tablet TK 1 T PO BID PRN 9/9/19  Yes Provider, MD Vipin   omeprazole (priLOSEC) 20 MG capsule Take 1 capsule by mouth 2 (Two) Times a Day. 8/3/20  Yes Arelis Barney APRN   PARoxetine (Paxil) 20 MG tablet Take 1 tablet by mouth Every Morning. 1/12/21  Yes Arelis Barney APRN   polyethylene glycol (MIRALAX) 17 g packet Take 17 g by mouth Daily. 9/18/20  Yes Arelis Barney APRN   predniSONE (DELTASONE) 20 MG tablet Take 20 mg by mouth Daily.   Yes Provider, MD Vipin   promethazine-dextromethorphan (PROMETHAZINE-DM) 6.25-15 MG/5ML syrup Take 5 mL by mouth 4 (Four) Times a Day As Needed for Cough. 3/10/20  Yes Arelis Barney APRN   terconazole (TERAZOL 7) 0.4 % vaginal cream 1 applicator vaginally at bedtime x3 nights. 1/11/21  Yes Gladys Soni APRN   tolterodine LA (DETROL LA) 4 MG 24 hr capsule Take 1 capsule by mouth Daily. 8/3/20  Yes Arelis Barney APRN   losartan (COZAAR) 100 MG tablet Take 1 tablet by mouth Daily for 30 days. 11/11/20 12/11/20  Lukas Tyson MD       Allergies:  Aleve [naproxen sodium], Atenolol, Keflex [cephalexin], Lisinopril, Relafen [nabumetone], Zanaflex [tizanidine], Acetaminophen, Aspirin, Codeine, Diflucan [fluconazole], and Sulfa antibiotics    ROS:    Review of Systems   Constitutional: Negative for activity change, appetite change, chills, diaphoresis, fatigue, fever and unexpected weight change.   HENT: Positive for trouble swallowing. Negative for sore throat.    Respiratory: Negative for shortness of breath.    Gastrointestinal: Negative for abdominal distention, abdominal pain, anal bleeding, blood in stool, constipation, diarrhea, nausea, rectal pain and vomiting.   Endocrine: Negative for polydipsia, polyphagia and polyuria.   Genitourinary: Negative for difficulty urinating.   Musculoskeletal: Negative for  "arthralgias.   Skin: Negative for pallor.   Allergic/Immunologic: Negative for food allergies.   Neurological: Negative for weakness and light-headedness.   Psychiatric/Behavioral: Negative for behavioral problems.     Objective     Blood pressure 125/61, pulse 57, height 152.4 cm (60\"), weight 56.4 kg (124 lb 6.4 oz), not currently breastfeeding.    Physical Exam  Constitutional:       General: She is not in acute distress.     Appearance: She is well-developed. She is not diaphoretic.   HENT:      Head: Normocephalic and atraumatic.   Cardiovascular:      Rate and Rhythm: Normal rate and regular rhythm.      Heart sounds: Normal heart sounds. No murmur. No friction rub. No gallop.    Pulmonary:      Effort: No respiratory distress.      Breath sounds: Normal breath sounds. No wheezing or rales.   Chest:      Chest wall: No tenderness.   Abdominal:      General: Bowel sounds are normal. There is no distension.      Palpations: Abdomen is soft. There is no mass.      Tenderness: There is no abdominal tenderness. There is no guarding or rebound.      Hernia: No hernia is present.   Musculoskeletal: Normal range of motion.   Skin:     General: Skin is warm and dry.      Coloration: Skin is not pale.      Findings: No erythema or rash.   Neurological:      Mental Status: She is alert and oriented to person, place, and time.   Psychiatric:         Behavior: Behavior normal.         Thought Content: Thought content normal.         Judgment: Judgment normal.          Assessment/Plan   Diagnoses and all orders for this visit:    1. Other dysphagia (Primary)  -     Case Request; Standing  -     dextrose 5 % and sodium chloride 0.45 % infusion  -     Case Request    Other orders  -     Follow Anesthesia Guidelines / Standing Orders; Future  -     Obtain Informed Consent; Future  -     Implement Anesthesia Orders Day of Procedure; Standing  -     Obtain Informed Consent; Standing  -     POC Glucose Once; Standing  -     " Insert Peripheral IV; Standing        ESOPHAGOGASTRODUODENOSCOPY (N/A)     Diagnosis Plan   1. Other dysphagia  Case Request    dextrose 5 % and sodium chloride 0.45 % infusion    Case Request       Anticipated Surgical Procedure:  Orders Placed This Encounter   Procedures   • Follow Anesthesia Guidelines / Standing Orders     Standing Status:   Future   • Obtain Informed Consent     Standing Status:   Future     Order Specific Question:   Informed Consent Given For     Answer:   ESOPHAGOGASTRODUODENOSCOPY       The risks, benefits, and alternatives of this procedure have been discussed with the patient or the responsible party- the patient understands and agrees to proceed.

## 2021-01-21 NOTE — PATIENT INSTRUCTIONS
MyPlate from USDA    MyPlate is an outline of a general healthy diet based on the 2010 Dietary Guidelines for Americans, from the U.S. Department of Agriculture (USDA). It sets guidelines for how much food you should eat from each food group based on your age, sex, and level of physical activity.  What are tips for following MyPlate?  To follow MyPlate recommendations:  · Eat a wide variety of fruits and vegetables, grains, and protein foods.  · Serve smaller portions and eat less food throughout the day.  · Limit portion sizes to avoid overeating.  · Enjoy your food.  · Get at least 150 minutes of exercise every week. This is about 30 minutes each day, 5 or more days per week.  It can be difficult to have every meal look like MyPlate. Think about MyPlate as eating guidelines for an entire day, rather than each individual meal.  Fruits and vegetables  · Make half of your plate fruits and vegetables.  · Eat many different colors of fruits and vegetables each day.  · For a 2,000 calorie daily food plan, eat:  ? 2½ cups of vegetables every day.  ? 2 cups of fruit every day.  · 1 cup is equal to:  ? 1 cup raw or cooked vegetables.  ? 1 cup raw fruit.  ? 1 medium-sized orange, apple, or banana.  ? 1 cup 100% fruit or vegetable juice.  ? 2 cups raw leafy greens, such as lettuce, spinach, or kale.  ? ½ cup dried fruit.  Grains  · One fourth of your plate should be grains.  · Make at least half of the grains you eat each day whole grains.  · For a 2,000 calorie daily food plan, eat 6 oz of grains every day.  · 1 oz is equal to:  ? 1 slice bread.  ? 1 cup cereal.  ? ½ cup cooked rice, cereal, or pasta.  Protein  · One fourth of your plate should be protein.  · Eat a wide variety of protein foods, including meat, poultry, fish, eggs, beans, nuts, and tofu.  · For a 2,000 calorie daily food plan, eat 5½ oz of protein every day.  · 1 oz is equal to:  ? 1 oz meat, poultry, or fish.  ? ¼ cup cooked beans.  ? 1 egg.  ? ½ oz nuts  or seeds.  ? 1 Tbsp peanut butter.  Dairy  · Drink fat-free or low-fat (1%) milk.  · Eat or drink dairy as a side to meals.  · For a 2,000 calorie daily food plan, eat or drink 3 cups of dairy every day.  · 1 cup is equal to:  ? 1 cup milk, yogurt, cottage cheese, or soy milk (soy beverage).  ? 2 oz processed cheese.  ? 1½ oz natural cheese.  Fats, oils, salt, and sugars  · Only small amounts of oils are recommended.  · Avoid foods that are high in calories and low in nutritional value (empty calories), like foods high in fat or added sugars.  · Choose foods that are low in salt (sodium). Choose foods that have less than 140 milligrams (mg) of sodium per serving.  · Drink water instead of sugary drinks. Drink enough water each day to keep your urine pale yellow.  Where to find support  · Work with your health care provider or a nutrition specialist (dietitian) to develop a customized eating plan that is right for you.  · Download an sinai (mobile application) to help you track your daily food intake.  Where to find more information  · Go to ChooseMyPlate.gov for more information.  Summary  · MyPlate is a general guideline for healthy eating from the USDA. It is based on the 2010 Dietary Guidelines for Americans.  · In general, fruits and vegetables should take up ½ of your plate, grains should take up ¼ of your plate, and protein should take up ¼ of your plate.  This information is not intended to replace advice given to you by your health care provider. Make sure you discuss any questions you have with your health care provider.  Document Revised: 05/21/2020 Document Reviewed: 03/19/2018  Elsevier Patient Education © 2020 Elsevier Inc.  BMI for Adults  What is BMI?  Body mass index (BMI) is a number that is calculated from a person's weight and height. BMI can help estimate how much of a person's weight is composed of fat. BMI does not measure body fat directly. Rather, it is an alternative to procedures that  "directly measure body fat, which can be difficult and expensive.  BMI can help identify people who may be at higher risk for certain medical problems.  What are BMI measurements used for?  BMI is used as a screening tool to identify possible weight problems. It helps determine whether a person is obese, overweight, a healthy weight, or underweight.  BMI is useful for:  · Identifying a weight problem that may be related to a medical condition or may increase the risk for medical problems.  · Promoting changes, such as changes in diet and exercise, to help reach a healthy weight. BMI screening can be repeated to see if these changes are working.  How is BMI calculated?  BMI involves measuring your weight in relation to your height. Both height and weight are measured, and the BMI is calculated from those numbers. This can be done either in English (U[place] or metric measurements. Note that charts and online BMI calculators are available to help you find your BMI quickly and easily without having to do these calculations yourself.  To calculate your BMI in English (U.S[place] measurements:    1. Measure your weight in pounds (lb).  2. Multiply the number of pounds by 703.  ? For example, for a person who weighs 180 lb, multiply that number by 703, which equals 126,540.  3. Measure your height in inches. Then multiply that number by itself to get a measurement called \"inches squared.\"  ? For example, for a person who is 70 inches tall, the \"inches squared\" measurement is 70 inches x 70 inches, which equals 4,900 inches squared.  4. Divide the total from step 2 (number of lb x 703) by the total from step 3 (inches squared): 126,540 ÷ 4,900 = 25.8. This is your BMI.  To calculate your BMI in metric measurements:  1. Measure your weight in kilograms (kg).  2. Measure your height in meters (m). Then multiply that number by itself to get a measurement called \"meters squared.\"  ? For example, for a person who is 1.75 m tall, the " "\"meters squared\" measurement is 1.75 m x 1.75 m, which is equal to 3.1 meters squared.  3. Divide the number of kilograms (your weight) by the meters squared number. In this example: 70 ÷ 3.1 = 22.6. This is your BMI.  What do the results mean?  BMI charts are used to identify whether you are underweight, normal weight, overweight, or obese. The following guidelines will be used:  · Underweight: BMI less than 18.5.  · Normal weight: BMI between 18.5 and 24.9.  · Overweight: BMI between 25 and 29.9.  · Obese: BMI of 30 or above.  Keep these notes in mind:  · Weight includes both fat and muscle, so someone with a muscular build, such as an athlete, may have a BMI that is higher than 24.9. In cases like these, BMI is not an accurate measure of body fat.  · To determine if excess body fat is the cause of a BMI of 25 or higher, further assessments may need to be done by a health care provider.  · BMI is usually interpreted in the same way for men and women.  Where to find more information  For more information about BMI, including tools to quickly calculate your BMI, go to these websites:  · Centers for Disease Control and Prevention: www.cdc.gov  · American Heart Association: www.heart.org  · National Heart, Lung, and Blood [place]: www.nhlbi.nih.gov  Summary  · Body mass index (BMI) is a number that is calculated from a person's weight and height.  · BMI may help estimate how much of a person's weight is composed of fat. BMI can help identify those who may be at higher risk for certain medical problems.  · BMI can be measured using English measurements or metric measurements.  · BMI charts are used to identify whether you are underweight, normal weight, overweight, or obese.  This information is not intended to replace advice given to you by your health care provider. Make sure you discuss any questions you have with your health care provider.  Document Revised: 09/09/2020 Document Reviewed: 07/17/2020  Marcio " Patient Education © 2020 Elsevier Inc.

## 2021-01-25 ENCOUNTER — LAB (OUTPATIENT)
Dept: LAB | Facility: HOSPITAL | Age: 86
End: 2021-01-25

## 2021-01-25 DIAGNOSIS — Z01.818 PREOP TESTING: Primary | ICD-10-CM

## 2021-01-25 LAB — SARS-COV-2 ORF1AB RESP QL NAA+PROBE: NOT DETECTED

## 2021-01-25 PROCEDURE — U0004 COV-19 TEST NON-CDC HGH THRU: HCPCS

## 2021-01-25 PROCEDURE — C9803 HOPD COVID-19 SPEC COLLECT: HCPCS

## 2021-01-28 ENCOUNTER — ANESTHESIA (OUTPATIENT)
Dept: GASTROENTEROLOGY | Facility: HOSPITAL | Age: 86
End: 2021-01-28

## 2021-01-28 ENCOUNTER — HOSPITAL ENCOUNTER (OUTPATIENT)
Facility: HOSPITAL | Age: 86
Setting detail: HOSPITAL OUTPATIENT SURGERY
Discharge: HOME | End: 2021-01-28
Attending: INTERNAL MEDICINE | Admitting: INTERNAL MEDICINE

## 2021-01-28 ENCOUNTER — ANESTHESIA EVENT (OUTPATIENT)
Dept: GASTROENTEROLOGY | Facility: HOSPITAL | Age: 86
End: 2021-01-28

## 2021-01-28 VITALS
OXYGEN SATURATION: 96 % | HEART RATE: 57 BPM | DIASTOLIC BLOOD PRESSURE: 61 MMHG | SYSTOLIC BLOOD PRESSURE: 110 MMHG | RESPIRATION RATE: 16 BRPM | HEIGHT: 60 IN | TEMPERATURE: 98.5 F | BODY MASS INDEX: 24.54 KG/M2 | WEIGHT: 125 LBS

## 2021-01-28 DIAGNOSIS — R13.19 ESOPHAGEAL DYSPHAGIA: ICD-10-CM

## 2021-01-28 DIAGNOSIS — R19.8 ABNORMAL BOWEL HABITS: ICD-10-CM

## 2021-01-28 DIAGNOSIS — K92.1 BLOOD IN STOOL: ICD-10-CM

## 2021-01-28 DIAGNOSIS — R13.19 OTHER DYSPHAGIA: ICD-10-CM

## 2021-01-28 PROCEDURE — 43248 EGD GUIDE WIRE INSERTION: CPT | Performed by: INTERNAL MEDICINE

## 2021-01-28 PROCEDURE — C1726 CATH, BAL DIL, NON-VASCULAR: HCPCS | Performed by: INTERNAL MEDICINE

## 2021-01-28 PROCEDURE — 25010000002 PROPOFOL 10 MG/ML EMULSION: Performed by: NURSE ANESTHETIST, CERTIFIED REGISTERED

## 2021-01-28 RX ORDER — LIDOCAINE HYDROCHLORIDE 20 MG/ML
INJECTION, SOLUTION EPIDURAL; INFILTRATION; INTRACAUDAL; PERINEURAL AS NEEDED
Status: DISCONTINUED | OUTPATIENT
Start: 2021-01-28 | End: 2021-01-28 | Stop reason: SURG

## 2021-01-28 RX ORDER — FUROSEMIDE 20 MG/1
TABLET ORAL
Qty: 90 TABLET | Refills: 0 | Status: SHIPPED | OUTPATIENT
Start: 2021-01-28 | End: 2021-07-19

## 2021-01-28 RX ORDER — DEXTROSE AND SODIUM CHLORIDE 5; .45 G/100ML; G/100ML
30 INJECTION, SOLUTION INTRAVENOUS CONTINUOUS PRN
Status: DISCONTINUED | OUTPATIENT
Start: 2021-01-28 | End: 2021-01-28 | Stop reason: HOSPADM

## 2021-01-28 RX ORDER — PROPOFOL 10 MG/ML
VIAL (ML) INTRAVENOUS AS NEEDED
Status: DISCONTINUED | OUTPATIENT
Start: 2021-01-28 | End: 2021-01-28 | Stop reason: SURG

## 2021-01-28 RX ADMIN — PROPOFOL 20 MG: 10 INJECTION, EMULSION INTRAVENOUS at 14:23

## 2021-01-28 RX ADMIN — PROPOFOL 10 MG: 10 INJECTION, EMULSION INTRAVENOUS at 14:26

## 2021-01-28 RX ADMIN — DEXTROSE AND SODIUM CHLORIDE 30 ML/HR: 5; 450 INJECTION, SOLUTION INTRAVENOUS at 13:55

## 2021-01-28 RX ADMIN — PROPOFOL 10 MG: 10 INJECTION, EMULSION INTRAVENOUS at 14:31

## 2021-01-28 RX ADMIN — LIDOCAINE HYDROCHLORIDE 60 MG: 20 INJECTION, SOLUTION EPIDURAL; INFILTRATION; INTRACAUDAL; PERINEURAL at 14:22

## 2021-01-28 RX ADMIN — PROPOFOL 60 MG: 10 INJECTION, EMULSION INTRAVENOUS at 14:22

## 2021-01-28 RX ADMIN — PROPOFOL 20 MG: 10 INJECTION, EMULSION INTRAVENOUS at 14:30

## 2021-01-28 RX ADMIN — PROPOFOL 10 MG: 10 INJECTION, EMULSION INTRAVENOUS at 14:27

## 2021-01-28 NOTE — PROGRESS NOTES
Subjective   Kristy Ramirez is a 86 y.o. female.     Kristy Ramirez age 86 comes in today requesting that she be placed back on hormone replacement therapy due to hot flashes night sweats.  She has had a hysterectomy many years ago and she states that symptoms are starting to get out hand.    Night Sweats  This is a recurrent problem. The current episode started more than 1 month ago. The problem occurs constantly. The problem has been gradually worsening. Pertinent negatives include no abdominal pain, anorexia, arthralgias, change in bowel habit, chest pain, chills, congestion, coughing, diaphoresis, fatigue, fever, headaches, joint swelling, myalgias, nausea, neck pain, numbness, rash, sore throat, swollen glands, urinary symptoms, vertigo, visual change, vomiting or weakness. Nothing aggravates the symptoms. She has tried nothing for the symptoms. The treatment provided no relief.        The following portions of the patient's history were reviewed and updated as appropriate: allergies, current medications, past family history, past medical history, past social history, past surgical history and problem list.    Review of Systems   Constitutional: Positive for night sweats. Negative for chills, diaphoresis, fatigue and fever.   HENT: Negative.  Negative for congestion and sore throat.    Eyes: Negative.    Respiratory: Negative.  Negative for cough.    Cardiovascular: Negative.  Negative for chest pain.   Gastrointestinal: Negative.  Negative for abdominal pain, anorexia, change in bowel habit, nausea and vomiting.   Genitourinary: Negative.    Musculoskeletal: Negative.  Negative for arthralgias, joint swelling, myalgias and neck pain.   Skin: Negative.  Negative for rash.   Neurological: Negative.  Negative for vertigo, weakness, numbness and headaches.   Psychiatric/Behavioral: Negative.        Objective   Physical Exam  Vitals signs and nursing note reviewed.   Constitutional:       General: She is not in acute  distress.     Appearance: She is well-developed.   HENT:      Head: Normocephalic and atraumatic.      Right Ear: External ear normal.      Left Ear: External ear normal.      Nose: Nose normal.      Mouth/Throat:      Pharynx: No oropharyngeal exudate.   Eyes:      Pupils: Pupils are equal, round, and reactive to light.   Neck:      Musculoskeletal: Normal range of motion and neck supple.      Thyroid: No thyromegaly.   Cardiovascular:      Rate and Rhythm: Normal rate and regular rhythm.      Heart sounds: Normal heart sounds. No murmur. No friction rub.   Pulmonary:      Effort: Pulmonary effort is normal. No respiratory distress.      Breath sounds: Normal breath sounds. No wheezing or rales.   Abdominal:      Palpations: Abdomen is soft.   Musculoskeletal: Normal range of motion.   Skin:     General: Skin is warm and dry.   Neurological:      Mental Status: She is alert and oriented to person, place, and time.   Psychiatric:         Thought Content: Thought content normal.         Assessment/Plan   Diagnoses and all orders for this visit:    1. Night sweats (Primary)  -     PARoxetine (Paxil) 20 MG tablet; Take 1 tablet by mouth Every Morning.  Dispense: 30 tablet; Refill: 3    Due to her age I do not think that it is in her best interest to go on hormone replacement therapy.  We will try a SSRI and see if she gets some relief from the night sweats.  She is made aware that this is not a quick acting medication may take a while to actually kick in.  She does verbalize understanding.            done

## 2021-01-28 NOTE — ANESTHESIA POSTPROCEDURE EVALUATION
Patient: Kristy Ramirez    Procedure Summary     Date: 01/28/21 Room / Location: [facility] ENDOSCOPY 2 / [facility] ENDOSCOPY    Anesthesia Start: 1420 Anesthesia Stop: 1432    Procedure: ESOPHAGOGASTRODUODENOSCOPY (N/A ) Diagnosis:       Other dysphagia      (Other dysphagia [R13.19])    Surgeon: Jack Ellis MD Provider: Elijah Duvall CRNA    Anesthesia Type: MAC ASA Status: 3          Anesthesia Type: MAC    Vitals  No vitals data found for the desired time range.          Post Anesthesia Care and Evaluation    Patient location during evaluation: bedside  Patient participation: complete - patient participated  Level of consciousness: awake and awake and alert  Pain score: 0  Pain management: satisfactory to patient  Airway patency: patent  Anesthetic complications: No anesthetic complications  PONV Status: none  Cardiovascular status: acceptable and stable  Respiratory status: acceptable, room air and spontaneous ventilation  Hydration status: acceptable    Comments: Hr 58  bp 115/58  Sat 100%  rr 17

## 2021-01-28 NOTE — ANESTHESIA PREPROCEDURE EVALUATION
Anesthesia Evaluation     Patient summary reviewed and Nursing notes reviewed   no history of anesthetic complications:  NPO Solid Status: > 8 hours  NPO Liquid Status: > 8 hours           Airway   Mallampati: II  TM distance: >3 FB  Neck ROM: full  Small opening  Dental    (+) lower dentures and upper dentures    Pulmonary     breath sounds clear to auscultation  (+) a smoker Former, COPD mild, shortness of breath, recent URI,   (-) asthma, sleep apnea, rhonchi, decreased breath sounds, wheezes  Cardiovascular   Exercise tolerance: poor (<4 METS)    ECG reviewed  Patient on routine beta blocker and Beta blocker given within 24 hours of surgery  Rhythm: regular  Rate: normal    (+) hypertension well controlled 2 medications or greater,   (-) pacemaker, valvular problems/murmurs, past MI, CAD, dysrhythmias, murmur, cardiac stents, CABG, DVT, hyperlipidemia    ROS comment: Atrial fibrillation  Abnormal ECG  When compared with ECG of 05-JUN-2019 17:42,  Atrial fibrillation has replaced Sinus rhythm  Confirmed by SOLE ALVARENGA ([id number]) on 11/11/2020 3:15:48 PM     Referred By:             Confirmed By:SOLE ALVARENGA    Specimen Collected: 11/10/20 12:17          Neuro/Psych  (+) dizziness/light headedness, weakness, psychiatric history,     (-) seizures, TIA, CVA  GI/Hepatic/Renal/Endo    (+) obesity,  GERD well controlled,    (-) diabetes    Musculoskeletal     (+) neck pain,   Abdominal    Substance History      OB/GYN negative ob/gyn ROS         Other   arthritis,      ROS/Med Hx Other: Treat as covid +    DM controlled with diet                    Anesthesia Plan    ASA 3     MAC     intravenous induction     Anesthetic plan, all risks, benefits, and alternatives have been provided, discussed and informed consent has been obtained with: patient.    Plan discussed with CRNA.

## 2021-01-29 DIAGNOSIS — N39.3 STRESS INCONTINENCE: ICD-10-CM

## 2021-01-29 RX ORDER — TOLTERODINE 4 MG/1
CAPSULE, EXTENDED RELEASE ORAL
Qty: 90 CAPSULE | Refills: 1 | Status: SHIPPED | OUTPATIENT
Start: 2021-01-29 | End: 2021-02-03 | Stop reason: SDUPTHER

## 2021-02-01 RX ORDER — CHLORTHALIDONE 25 MG/1
25 TABLET ORAL DAILY
Qty: 90 TABLET | Refills: 3 | Status: SHIPPED | OUTPATIENT
Start: 2021-02-01 | End: 2021-07-21

## 2021-02-03 ENCOUNTER — OFFICE VISIT (OUTPATIENT)
Dept: OBSTETRICS AND GYNECOLOGY | Facility: CLINIC | Age: 86
End: 2021-02-03

## 2021-02-03 VITALS
DIASTOLIC BLOOD PRESSURE: 68 MMHG | SYSTOLIC BLOOD PRESSURE: 120 MMHG | WEIGHT: 125 LBS | BODY MASS INDEX: 24.54 KG/M2 | HEIGHT: 60 IN

## 2021-02-03 DIAGNOSIS — N39.3 STRESS INCONTINENCE: ICD-10-CM

## 2021-02-03 DIAGNOSIS — N81.10 POP-Q STAGE 4 CYSTOCELE: Chronic | ICD-10-CM

## 2021-02-03 DIAGNOSIS — N81.6 POP-Q STAGE 2 RECTOCELE: Chronic | ICD-10-CM

## 2021-02-03 DIAGNOSIS — Z46.89 PESSARY MAINTENANCE: Primary | ICD-10-CM

## 2021-02-03 DIAGNOSIS — R39.89 BLADDER PAIN: ICD-10-CM

## 2021-02-03 LAB
BACTERIA UR QL AUTO: ABNORMAL /HPF
BILIRUB UR QL STRIP: NEGATIVE
CLARITY UR: CLEAR
COLOR UR: YELLOW
GLUCOSE UR STRIP-MCNC: NEGATIVE MG/DL
HGB UR QL STRIP.AUTO: NEGATIVE
HYALINE CASTS UR QL AUTO: ABNORMAL /LPF
KETONES UR QL STRIP: NEGATIVE
LEUKOCYTE ESTERASE UR QL STRIP.AUTO: NEGATIVE
NITRITE UR QL STRIP: NEGATIVE
PH UR STRIP.AUTO: 6 [PH] (ref 5–8)
PROT UR QL STRIP: NEGATIVE
RBC # UR: ABNORMAL /HPF
REF LAB TEST METHOD: ABNORMAL
SP GR UR STRIP: 1.02 (ref 1–1.03)
SQUAMOUS #/AREA URNS HPF: ABNORMAL /HPF
UROBILINOGEN UR QL STRIP: NORMAL
WBC UR QL AUTO: ABNORMAL /HPF

## 2021-02-03 PROCEDURE — 87086 URINE CULTURE/COLONY COUNT: CPT | Performed by: NURSE PRACTITIONER

## 2021-02-03 PROCEDURE — 99212 OFFICE O/P EST SF 10 MIN: CPT | Performed by: NURSE PRACTITIONER

## 2021-02-03 PROCEDURE — 81001 URINALYSIS AUTO W/SCOPE: CPT | Performed by: NURSE PRACTITIONER

## 2021-02-03 RX ORDER — TOLTERODINE 4 MG/1
4 CAPSULE, EXTENDED RELEASE ORAL DAILY
Qty: 90 CAPSULE | Refills: 1 | Status: SHIPPED | OUTPATIENT
Start: 2021-02-03 | End: 2021-09-08 | Stop reason: SDUPTHER

## 2021-02-03 NOTE — PROGRESS NOTES
"Subjective   Chief Complaint   Patient presents with   • Pessary Check     Kristy Ramirez is a 86 y.o. year old who presents to be seen for follow-up of her pessary.  Currently she is using Foldable ring w/ support - #7 w/o urethral bar.  She reports no problems with her pessary at this time. Having pelvic pain/pressure and thinks it is from the pessary.    No Additional Complaints Reported    The following portions of the patient's history were reviewed and updated as appropriate:problem list, current medications and allergies    Review of Systems   Constitutional: Negative for activity change, appetite change, chills, diaphoresis, fatigue, fever, unexpected weight gain and unexpected weight loss.   Gastrointestinal: Negative for abdominal distention, abdominal pain, anal bleeding, blood in stool, constipation, diarrhea, nausea, rectal pain and vomiting.   Genitourinary: Positive for pelvic pressure. Negative for decreased urine volume, difficulty urinating, dysuria, pelvic pain, urgency, urinary incontinence, vaginal bleeding, vaginal discharge and vaginal pain.        Objective   /68   Ht 152.4 cm (60\")   Wt 56.7 kg (125 lb)   LMP  (LMP Unknown)   Breastfeeding No   BMI 24.41 kg/m²     General:  well developed; well nourished  no acute distress   Pelvis: Clinical staff was present for exam  External genitalia:  normal appearance of the external genitalia including Bartholin's and [place]'s glands.  :  urethral meatus normal; urethra hypermobile; prominent caruncle present; bladder is tender to palpation  Vaginal:  atrophic mucosal changes are present;  Cervix:  absent.  Uterus:  absent.  Adnexa:  non palpable bilaterally.  Cystocele GRADE 2  Rectocele GRADE 2  Pessary removed and cleaned. After vaginal inspection the pessary was lubricated and placed back into the vaginal vault.     Lab Review   No data reviewed    Imaging   No data reviewed         Diagnoses and all orders for this visit:    Pessary " maintenance    Bladder pain  -     Urinalysis With Microscopic - Urine, Clean Catch  -     Urine Culture - Urine, Urine, Clean Catch  -     Urinalysis without microscopic (no culture) - Urine, Clean Catch  -     Urinalysis, Microscopic Only - Urine, Clean Catch    POP-Q stage 4 cystocele    POP-Q stage 2 rectocele        No orders of the defined types were placed in this encounter.    F/U in 8 weeks for routine pessary maintenance. Pt leaving for [facility] trip to see her kids. Will be gone for 2-3 months. RTC at that time for exam.      This document was electronically signed.     Gladys Soni, NADYA  February 3, 2021

## 2021-02-04 ENCOUNTER — TELEPHONE (OUTPATIENT)
Dept: FAMILY MEDICINE CLINIC | Facility: CLINIC | Age: 86
End: 2021-02-04

## 2021-02-04 LAB — BACTERIA SPEC AEROBE CULT: ABNORMAL

## 2021-02-04 NOTE — TELEPHONE ENCOUNTER
Patient called stating [employer] mail order called her and said they had faxed orders for medication to our office and they had not heard back from us.  Advised patient that we had gotten 2 refill request and they both had been sent back.  Asked if they said what medication they needed refills on and she did not know.  She gave me a number to call for them but it was a fax number [phone]. Called patient back and she gave me another number [phone] it kept asking for a priority code and would not go to anyone to talk to

## 2021-02-05 ENCOUNTER — OFFICE VISIT (OUTPATIENT)
Dept: GASTROENTEROLOGY | Facility: CLINIC | Age: 86
End: 2021-02-05

## 2021-02-05 VITALS
HEIGHT: 58 IN | BODY MASS INDEX: 27.33 KG/M2 | WEIGHT: 130.2 LBS | SYSTOLIC BLOOD PRESSURE: 131 MMHG | HEART RATE: 63 BPM | DIASTOLIC BLOOD PRESSURE: 65 MMHG

## 2021-02-05 DIAGNOSIS — R13.19 OTHER DYSPHAGIA: Primary | ICD-10-CM

## 2021-02-05 PROCEDURE — 99214 OFFICE O/P EST MOD 30 MIN: CPT | Performed by: INTERNAL MEDICINE

## 2021-02-05 RX ORDER — DEXTROSE AND SODIUM CHLORIDE 5; .45 G/100ML; G/100ML
30 INJECTION, SOLUTION INTRAVENOUS CONTINUOUS PRN
Status: CANCELLED | OUTPATIENT
Start: 2021-04-05

## 2021-02-05 NOTE — PATIENT INSTRUCTIONS
MyPlate from USDA    MyPlate is an outline of a general healthy diet based on the 2010 Dietary Guidelines for Americans, from the U.S. Department of Agriculture (USDA). It sets guidelines for how much food you should eat from each food group based on your age, sex, and level of physical activity.  What are tips for following MyPlate?  To follow MyPlate recommendations:  · Eat a wide variety of fruits and vegetables, grains, and protein foods.  · Serve smaller portions and eat less food throughout the day.  · Limit portion sizes to avoid overeating.  · Enjoy your food.  · Get at least 150 minutes of exercise every week. This is about 30 minutes each day, 5 or more days per week.  It can be difficult to have every meal look like MyPlate. Think about MyPlate as eating guidelines for an entire day, rather than each individual meal.  Fruits and vegetables  · Make half of your plate fruits and vegetables.  · Eat many different colors of fruits and vegetables each day.  · For a 2,000 calorie daily food plan, eat:  ? 2½ cups of vegetables every day.  ? 2 cups of fruit every day.  · 1 cup is equal to:  ? 1 cup raw or cooked vegetables.  ? 1 cup raw fruit.  ? 1 medium-sized orange, apple, or banana.  ? 1 cup 100% fruit or vegetable juice.  ? 2 cups raw leafy greens, such as lettuce, spinach, or kale.  ? ½ cup dried fruit.  Grains  · One fourth of your plate should be grains.  · Make at least half of the grains you eat each day whole grains.  · For a 2,000 calorie daily food plan, eat 6 oz of grains every day.  · 1 oz is equal to:  ? 1 slice bread.  ? 1 cup cereal.  ? ½ cup cooked rice, cereal, or pasta.  Protein  · One fourth of your plate should be protein.  · Eat a wide variety of protein foods, including meat, poultry, fish, eggs, beans, nuts, and tofu.  · For a 2,000 calorie daily food plan, eat 5½ oz of protein every day.  · 1 oz is equal to:  ? 1 oz meat, poultry, or fish.  ? ¼ cup cooked beans.  ? 1 egg.  ? ½ oz nuts  or seeds.  ? 1 Tbsp peanut butter.  Dairy  · Drink fat-free or low-fat (1%) milk.  · Eat or drink dairy as a side to meals.  · For a 2,000 calorie daily food plan, eat or drink 3 cups of dairy every day.  · 1 cup is equal to:  ? 1 cup milk, yogurt, cottage cheese, or soy milk (soy beverage).  ? 2 oz processed cheese.  ? 1½ oz natural cheese.  Fats, oils, salt, and sugars  · Only small amounts of oils are recommended.  · Avoid foods that are high in calories and low in nutritional value (empty calories), like foods high in fat or added sugars.  · Choose foods that are low in salt (sodium). Choose foods that have less than 140 milligrams (mg) of sodium per serving.  · Drink water instead of sugary drinks. Drink enough water each day to keep your urine pale yellow.  Where to find support  · Work with your health care provider or a nutrition specialist (dietitian) to develop a customized eating plan that is right for you.  · Download an [employer]inai (mobile application) to help you track your daily food intake.  Where to find more information  · Go to ChooseMyPlate.gov for more information.  Summary  · MyPlate is a general guideline for healthy eating from the USDA. It is based on the 2010 Dietary Guidelines for Americans.  · In general, fruits and vegetables should take up ½ of your plate, grains should take up ¼ of your plate, and protein should take up ¼ of your plate.  This information is not intended to replace advice given to you by your health care provider. Make sure you discuss any questions you have with your health care provider.  Document Revised: 05/21/2020 Document Reviewed: 03/19/2018  Elsevier Patient Education © 2020 Elsevier Inc.

## 2021-02-05 NOTE — PROGRESS NOTES
[facility]      Chief Complaint:   Chief Complaint   Patient presents with   • EGD Performed 01/28/2021     Other Dysphagia       Subjective     HPI:   Patient with dysphagia.  Patient had EGD with dilatation dilatation up to 36 Danish.  Patient states marked improvement in swallowing.  Discussed with patient that we will need to continue dilatation as this is still very small.  Patient is going to [facility] for 2 months.  We will schedule patient for repeat EGD when patient gets back.    Plan; we will schedule patient her EGD with dilatation    Past Medical History:   Past Medical History:   Diagnosis Date   • Abnormal CT scan     per patient   • Acute bronchitis    • Acute maxillary sinusitis    • Acute otitis externa    • Acute sinusitis    • Allergic conjunctivitis    • Angular cheilitis    • Ankle edema    • Backache     improved   • Blood in urine      UTI resolved      • Bradycardia    • Chest discomfort     described as heart racing      • Chronic low back pain     RIGHT leg radiation      • Cold feet     c/o - and lowerlegs      • Contusion     of dorsum of foot   • Cough    • COVID-19 virus infection    • Depressive disorder    • Diabetes mellitus (CMS/HCC)    • Dizziness and giddiness    • Dyspnea    • Dysuria    • Edema    • Edema of foot    • Edema of lower extremity    • Essential hypertension    • Exanthematous disorder    • Fatigue    • Foot pain, left    • GERD (gastroesophageal reflux disease)    • Grade II hemorrhoids 3/12/2018   • Hematoma    • Hip pain, right    • History of palpitations    • Hormone replacement therapy (postmenopausal)    • Hypertensive disorder    • Impacted cerumen    • Joint pain    • Knee pain    • Low back pain    • Malaise and fatigue    • Multiple joint pain    • Muscle pain    • Muscle weakness    • Neck pain    • Obesity (BMI 30.0-34.9) 3/12/2018   • Osteoarthritis of knee    • Osteoarthritis of multiple joints    • Otitis externa    • Pain  in lower limb    • Peripheral venous insufficiency    • POP-Q stage 2 rectocele 3/12/2018   • POP-Q stage 4 cystocele 3/12/2018   • Sacroiliitis, not elsewhere classified (CMS/HCC)     R LE   • Shoulder pain    • Upper respiratory infection    • Urinary frequency    • Urinary tract infectious disease    • Wheezing        Past Surgical History:  Past Surgical History:   Procedure Laterality Date   • ANKLE SURGERY     • BACK SURGERY     • COLONOSCOPY  12/14/2009   • ENDOSCOPY N/A 1/6/2021    Procedure: ESOPHAGOGASTRODUODENOSCOPY;  Surgeon: Jack Ellis MD;  Location: [facility] ENDOSCOPY;  Service: Gastroenterology;  Laterality: N/A;   • ENDOSCOPY N/A 1/28/2021    Procedure: ESOPHAGOGASTRODUODENOSCOPY;  Surgeon: Jack Ellis MD;  Location: [facility] ENDOSCOPY;  Service: Gastroenterology;  Laterality: N/A;  eso dilation with ballon to 30FR   • EYE SURGERY Bilateral 02/2018    B cataract   • HAMMER TOE REPAIR  08/02/2011    Hammertoe repair, left second toe.   • HYSTERECTOMY     • INCISION AND DRAINAGE ABSCESS  01/21/2015   • INJECTION OF MEDICATION  11/15/2013   • INJECTION OF MEDICATION  05/06/2013    Celestone (betamethasone) (1)      • INJECTION OF MEDICATION  03/17/2016    Kenalog (3)      • NECK SURGERY  01/2018   • TEMPORAL ARTERY BIOPSY Left 11/10/2020    Procedure: LEFT TEMPORAL ARTERY BIOPSY;  Surgeon: Anjel Olea MD;  Location: [facility] OR;  Service: General;  Laterality: Left;   • UPPER GASTROINTESTINAL ENDOSCOPY  12/14/2009   • UPPER GASTROINTESTINAL ENDOSCOPY  01/06/2021   • UPPER GASTROINTESTINAL ENDOSCOPY  01/28/2021       Family History:  Family History   Problem Relation Age of Onset   • Diabetes Other    • Heart disease Other    • Stroke Other    • Coronary artery disease Mother    • Coronary artery disease Father        Social History:   reports that she has quit smoking. Her smoking use included cigarettes. She has never used smokeless tobacco. She reports that she does not drink alcohol or use  drugs.    Medications:   Prior to Admission medications    Medication Sig Start Date End Date Taking? Authorizing Provider   Accu-Chek FastClix Lancets misc 1 each Daily. 8/3/20  Yes Arelis Barney APRN   allopurinol (ZYLOPRIM) 100 MG tablet Take 1 tablet by mouth Daily. take with food 8/3/20  Yes Arelis Barney APRN   amLODIPine (NORVASC) 5 MG tablet Take 1 tablet by mouth Daily. 8/3/20  Yes Arelis Barney APRN   chlorthalidone (HYGROTON) 25 MG tablet Take 1 tablet by mouth Daily. 2/1/21  Yes Arelis Barney APRN   colchicine 0.6 MG capsule capsule TK 1 C PO QD STARTING ON 8/19/19 8/18/19  Yes ProviderVipin MD   docusate sodium (DOK) 100 MG capsule Take 1 capsule by mouth 2 (Two) Times a Day.  Patient taking differently: Take 100 mg by mouth 2 (Two) Times a Day As Needed. 4/29/19  Yes Mateusz Chopra APRN   gabapentin (NEURONTIN) 300 MG capsule Take 1 capsule by mouth Every Morning. 10/27/20  Yes Arelis Barney APRN   gabapentin (NEURONTIN) 600 MG tablet TAKE 1 TABLET BY MOUTH EVERY EVENING 11/24/20  Yes Arelis Barney APRN   glucose blood test strip Use as instructed to check b/s 1x a day.  Please give strip compatible with monitor. (accucheck) 8/3/20  Yes Arelis Barney APRN   loperamide (IMODIUM) 2 MG capsule Take 2 mg by mouth 4 (Four) Times a Day As Needed for Diarrhea.   Yes ProviderVipin MD   loratadine (Claritin) 10 MG tablet Take 1 tablet by mouth Daily.  Patient taking differently: Take 10 mg by mouth Daily As Needed. 1/11/21  Yes Mateusz Chopra APRN   metoprolol succinate XL (TOPROL-XL) 25 MG 24 hr tablet Take 1 tablet by mouth Daily. 8/3/20  Yes Arelis Barney APRN   omeprazole (priLOSEC) 20 MG capsule Take 1 capsule by mouth 2 (Two) Times a Day. 8/3/20  Yes Arelis Barney APRN   PARoxetine (Paxil) 20 MG tablet Take 1 tablet by mouth Every Morning. 1/12/21  Yes Arelis Barney APRN   polyethylene glycol (MIRALAX) 17 g packet Take 17 g by mouth  Daily.  Patient taking differently: Take 17 g by mouth Daily As Needed. 9/18/20  Yes Arelis Barney APRN   promethazine-dextromethorphan (PROMETHAZINE-DM) 6.25-15 MG/5ML syrup Take 5 mL by mouth 4 (Four) Times a Day As Needed for Cough. 3/10/20  Yes Arelis Barney APRN   terconazole (TERAZOL 7) 0.4 % vaginal cream 1 applicator vaginally at bedtime x3 nights.  Patient taking differently: 1 applicator vaginally at bedtime x3 nights prn 1/11/21  Yes Gladys Soni APRN   tolterodine LA (DETROL LA) 4 MG 24 hr capsule Take 1 capsule by mouth Daily. 2/3/21  Yes Arelis Barney APRN   diclofenac (VOLTAREN) 1 % gel gel Apply 4 g topically to the appropriate area as directed 3 (Three) Times a Day. Small amount to affected area 8/20/20   Syed Carr MD   folic acid (FOLVITE) 1 MG tablet Take 1 mg by mouth Daily.    Provider, MD Vipin   furosemide (LASIX) 20 MG tablet TAKE 1 TABLET EVERY DAY 1/28/21   Arelis Barney APRN   losartan (COZAAR) 100 MG tablet Take 1 tablet by mouth Daily for 30 days. 11/11/20 1/28/21  Lukas Tyson MD   Menthol, Topical Analgesic, (BIOFREEZE) 4 % gel Apply to affected area on left back 2-3 times per day x 1week 4/29/19   Mateusz Chopra APRN   montelukast (SINGULAIR) 10 MG tablet Take 1 tablet by mouth Every Night. 3/5/20   Mateusz Chopra APRN   NUCYNTA 50 MG tablet TK 1 T PO BID PRN 9/9/19   Provider, MD Vipin   amoxicillin-clavulanate (AUGMENTIN) 400-57 MG per chewable tablet Chew 1 tablet 2 (Two) Times a Day. 2/4/21 2/5/21  Gladys Soni APRN   metroNIDAZOLE (Flagyl) 500 MG tablet Take 1 tablet by mouth 2 (Two) Times a Day. 1/7/21 2/5/21  Arelis Barney APRN   predniSONE (DELTASONE) 20 MG tablet Take 20 mg by mouth Daily.  2/5/21  Provider, Vipin, MD       Allergies:  Aleve [naproxen sodium], Atenolol, Keflex [cephalexin], Lisinopril, Relafen [nabumetone], Zanaflex [tizanidine], Acetaminophen, Aspirin, Codeine, Diflucan [fluconazole], and Sulfa  "antibiotics    ROS:    Review of Systems   Constitutional: Negative for activity change, appetite change, chills, diaphoresis, fatigue, fever and unexpected weight change.   HENT: Positive for trouble swallowing. Negative for sore throat.    Respiratory: Negative for shortness of breath.    Gastrointestinal: Negative for abdominal distention, abdominal pain, anal bleeding, blood in stool, constipation, diarrhea, nausea, rectal pain and vomiting.   Endocrine: Negative for polydipsia, polyphagia and polyuria.   Genitourinary: Negative for difficulty urinating.   Musculoskeletal: Negative for arthralgias.   Skin: Negative for pallor.   Allergic/Immunologic: Negative for food allergies.   Neurological: Negative for weakness and light-headedness.   Psychiatric/Behavioral: Negative for behavioral problems.     Objective     Blood pressure 131/65, pulse 63, height 148 cm (58.25\"), weight 59.1 kg (130 lb 3.2 oz), not currently breastfeeding.    Physical Exam  Constitutional:       General: She is not in acute distress.     Appearance: She is well-developed. She is not diaphoretic.   HENT:      Head: Normocephalic and atraumatic.   Cardiovascular:      Rate and Rhythm: Normal rate and regular rhythm.      Heart sounds: Normal heart sounds. No murmur. No friction rub. No gallop.    Pulmonary:      Effort: No respiratory distress.      Breath sounds: Normal breath sounds. No wheezing or rales.   Chest:      Chest wall: No tenderness.   Abdominal:      General: Bowel sounds are normal. There is no distension.      Palpations: Abdomen is soft. There is no mass.      Tenderness: There is no abdominal tenderness. There is no guarding or rebound.      Hernia: No hernia is present.   Musculoskeletal: Normal range of motion.   Skin:     General: Skin is warm and dry.      Coloration: Skin is not pale.      Findings: No erythema or rash.   Neurological:      Mental Status: She is alert and oriented to person, place, and time. "   Psychiatric:         Behavior: Behavior normal.         Thought Content: Thought content normal.         Judgment: Judgment normal.          Assessment/Plan   Diagnoses and all orders for this visit:    1. Other dysphagia (Primary)  -     Case Request; Standing  -     dextrose 5 % and sodium chloride 0.45 % infusion  -     Case Request    Other orders  -     Follow Anesthesia Guidelines / Standing Orders; Future  -     Obtain Informed Consent; Future  -     Implement Anesthesia Orders Day of Procedure; Standing  -     Obtain Informed Consent; Standing  -     POC Glucose Once; Standing  -     Insert Peripheral IV; Standing        ESOPHAGOGASTRODUODENOSCOPY (N/A)     Diagnosis Plan   1. Other dysphagia  Case Request    dextrose 5 % and sodium chloride 0.45 % infusion    Case Request       Anticipated Surgical Procedure:  Orders Placed This Encounter   Procedures   • Follow Anesthesia Guidelines / Standing Orders     Standing Status:   Future   • Obtain Informed Consent     Standing Status:   Future     Order Specific Question:   Informed Consent Given For     Answer:   ESOPHAGOGASTRODUODENOSCOPY       The risks, benefits, and alternatives of this procedure have been discussed with the patient or the responsible party- the patient understands and agrees to proceed.

## 2021-02-17 ENCOUNTER — TELEPHONE (OUTPATIENT)
Dept: FAMILY MEDICINE CLINIC | Facility: CLINIC | Age: 86
End: 2021-02-17

## 2021-02-17 DIAGNOSIS — K21.9 GASTROESOPHAGEAL REFLUX DISEASE: ICD-10-CM

## 2021-02-17 NOTE — TELEPHONE ENCOUNTER
Spoke with patient wanted an appointment for Friday.  Gave her one for 9 am and advised it may be canceled due to weather.

## 2021-02-18 RX ORDER — LOPERAMIDE HYDROCHLORIDE 2 MG/1
CAPSULE ORAL
Qty: 360 CAPSULE | Refills: 0 | Status: SHIPPED | OUTPATIENT
Start: 2021-02-18 | End: 2021-04-17

## 2021-02-18 RX ORDER — OMEPRAZOLE 20 MG/1
CAPSULE, DELAYED RELEASE ORAL
Qty: 180 CAPSULE | Refills: 1 | Status: SHIPPED | OUTPATIENT
Start: 2021-02-18 | End: 2021-08-24

## 2021-02-19 ENCOUNTER — TELEPHONE (OUTPATIENT)
Dept: FAMILY MEDICINE CLINIC | Facility: CLINIC | Age: 86
End: 2021-02-19

## 2021-02-19 NOTE — TELEPHONE ENCOUNTER
CALLED  TO LET PATIENT KNOW [facility] WAS OUT CAN MOVE TO SAME DAY CLINIC OR R/S  NA/LVM  HUB TO ANSWER

## 2021-02-22 ENCOUNTER — OFFICE VISIT (OUTPATIENT)
Dept: FAMILY MEDICINE CLINIC | Facility: CLINIC | Age: 86
End: 2021-02-22

## 2021-02-22 ENCOUNTER — LAB (OUTPATIENT)
Dept: LAB | Facility: HOSPITAL | Age: 86
End: 2021-02-22

## 2021-02-22 VITALS
OXYGEN SATURATION: 98 % | RESPIRATION RATE: 20 BRPM | WEIGHT: 129 LBS | SYSTOLIC BLOOD PRESSURE: 106 MMHG | DIASTOLIC BLOOD PRESSURE: 60 MMHG | BODY MASS INDEX: 26 KG/M2 | HEART RATE: 89 BPM | HEIGHT: 59 IN | TEMPERATURE: 97.7 F

## 2021-02-22 DIAGNOSIS — M25.551 ACUTE RIGHT HIP PAIN: ICD-10-CM

## 2021-02-22 DIAGNOSIS — R10.31 ABDOMINAL PAIN, RLQ: Primary | ICD-10-CM

## 2021-02-22 LAB
BILIRUB BLD-MCNC: NEGATIVE MG/DL
CLARITY, POC: CLEAR
COLOR UR: NORMAL
GLUCOSE UR STRIP-MCNC: NEGATIVE MG/DL
KETONES UR QL: NEGATIVE
LEUKOCYTE EST, POC: NEGATIVE
NITRITE UR-MCNC: NEGATIVE MG/ML
PH UR: 5.5 [PH] (ref 5–8)
PROT UR STRIP-MCNC: NEGATIVE MG/DL
RBC # UR STRIP: NEGATIVE /UL
SP GR UR: 1.02 (ref 1–1.03)
UROBILINOGEN UR QL: NORMAL

## 2021-02-22 PROCEDURE — 81003 URINALYSIS AUTO W/O SCOPE: CPT | Performed by: NURSE PRACTITIONER

## 2021-02-22 PROCEDURE — 99213 OFFICE O/P EST LOW 20 MIN: CPT | Performed by: NURSE PRACTITIONER

## 2021-02-22 RX ORDER — CLOTRIMAZOLE AND BETAMETHASONE DIPROPIONATE 10; .64 MG/G; MG/G
CREAM TOPICAL
Qty: 45 G | Refills: 2 | Status: SHIPPED | OUTPATIENT
Start: 2021-02-22 | End: 2021-07-19

## 2021-02-22 NOTE — PROGRESS NOTES
"Chief Complaint  No chief complaint on file.    Subjective     {Problem List  Visit Diagnosis   Encounters  Notes  Medications  Labs  Result Review Imaging  Media :23}     Kristy Ramirez presents to [facility]  [facility]    PCP: NADYA Rodriguez    CC: \"stomach still hurting and right hip\"    Has had recurrent lower abdominal pain for a while.  Recently seen by GI and GYN.  Treated for UTI, yeast and BV.  Reports vaginal symptoms have resolved.  Has been alternating between constipation and diarrhea for awhile now, takes Miralax, Colace and Imodium as needed, but reports pain never changes, is always the same.  Does have a pessary in place and is followed by GYN for this.  No fever.  Concerned because she is going to stay with her son in [place] for the next few months, wanted to make sure she didn't have anything acute prior to going.     C/O right hip pain in the last week.  No injury has been noted.      Hip Pain   The incident occurred more than 1 week ago. The incident occurred at home. There was no injury mechanism. The pain is present in the right hip. The pain is at a severity of 6/10. The pain has been constant since onset. Pertinent negatives include no inability to bear weight, loss of motion, loss of sensation, muscle weakness, numbness or tingling. She reports no foreign bodies present. The symptoms are aggravated by palpation and movement. She has tried nothing for the symptoms. The treatment provided no relief.   Abdominal Pain  This is a recurrent problem. The current episode started more than 1 month ago. The onset quality is gradual. The problem occurs daily. The problem has been waxing and waning. The pain is located in the RLQ. The pain is at a severity of 6/10. The quality of the pain is dull. The abdominal pain does not radiate. Associated symptoms include arthralgias (right hip), constipation and diarrhea. Pertinent negatives include no " "anorexia, belching, dysuria, fever, flatus, frequency, headaches, hematochezia, hematuria, melena, myalgias, nausea, vomiting or weight loss. Nothing (same all the time) aggravates the pain. The pain is relieved by nothing. Treatments tried: immodium, miralax, colace. The treatment provided no relief. Prior diagnostic workup includes GI consult.       Review of Systems   Constitutional: Negative.  Negative for fever and unexpected weight loss.   HENT: Negative.    Eyes: Negative.    Respiratory: Negative.    Cardiovascular: Negative.    Gastrointestinal: Positive for abdominal pain, constipation and diarrhea. Negative for anorexia, blood in stool, flatus, hematochezia, melena, nausea and vomiting.   Genitourinary: Negative for dysuria, flank pain, frequency, hematuria and vaginal discharge.        +pessary   Musculoskeletal: Positive for arthralgias (right hip). Negative for myalgias.   Skin: Negative.    Neurological: Negative for tingling and numbness.     Objective   Vital Signs:   /60 (BP Location: Left arm, Patient Position: Sitting, Cuff Size: Adult)   Pulse 89   Temp 97.7 °F (36.5 °C) (Temporal)   Resp 20   Ht 149.9 cm (59\")   Wt 58.5 kg (129 lb)   SpO2 98%   BMI 26.05 kg/m²     Physical Exam  Vitals signs and nursing note reviewed.   Constitutional:       Appearance: Normal appearance. She is not ill-appearing.   HENT:      Head: Normocephalic and atraumatic.   Neck:      Musculoskeletal: Neck supple.   Cardiovascular:      Rate and Rhythm: Normal rate and regular rhythm.   Pulmonary:      Effort: Pulmonary effort is normal. No respiratory distress.      Breath sounds: Normal breath sounds. No wheezing, rhonchi or rales.   Abdominal:      General: Bowel sounds are normal.      Palpations: Abdomen is soft.      Tenderness: There is abdominal tenderness ( rlq). There is no right CVA tenderness, left CVA tenderness, guarding or rebound.   Musculoskeletal:      Right hip: She exhibits decreased " range of motion (slightly decreased), decreased strength and tenderness. She exhibits no swelling.      Comments: Uses cane   Skin:     General: Skin is warm and dry.      Findings: No rash.   Neurological:      General: No focal deficit present.      Mental Status: She is alert and oriented to person, place, and time.        Result Review :              Recent Results (from the past 24 hour(s))   POCT urinalysis dipstick, automated    Collection Time: 02/22/21 12:09 PM    Specimen: Urine   Result Value Ref Range    Color Dark Yellow Yellow, Straw, Dark Yellow, Kylie    Clarity, UA Clear Clear    Specific Gravity  1.025 1.005 - 1.030    pH, Urine 5.5 5.0 - 8.0    Leukocytes Negative Negative    Nitrite, UA Negative Negative    Protein, POC Negative Negative mg/dL    Glucose, UA Negative Negative, 1000 mg/dL (3+) mg/dL    Ketones, UA Negative Negative    Urobilinogen, UA Normal Normal    Bilirubin Negative Negative    Blood, UA Negative Negative          Assessment and Plan    Diagnoses and all orders for this visit:    1. Abdominal pain, RLQ (Primary)  -     POCT urinalysis dipstick, automated  -     Basic metabolic panel  -     CBC No Differential  -     XR Abdomen Flat & Upright (In Office)    2. Acute right hip pain  -     XR Hip With or Without Pelvis 2 - 3 View Right      Labs, Xrays as above.  Will call with results when available.    No changes to medications at this time.   May use heat/ice to hip as needed.  Muscle rubs OTC as needed.     See PCP or RTC if symptoms persist/worsen or ER if any worsening pain.   See PCP for routine f/u visit and management of chronic medical conditions      This document has been electronically signed by NADYA Gusman on February 22, 2021 13:05 CST,.

## 2021-02-23 LAB
ANION GAP SERPL CALCULATED.3IONS-SCNC: 11.3 MMOL/L (ref 5–15)
BUN SERPL-MCNC: 25 MG/DL (ref 8–23)
BUN/CREAT SERPL: 19.4 (ref 7–25)
CALCIUM SPEC-SCNC: 10 MG/DL (ref 8.6–10.5)
CHLORIDE SERPL-SCNC: 102 MMOL/L (ref 98–107)
CO2 SERPL-SCNC: 25.7 MMOL/L (ref 22–29)
CREAT SERPL-MCNC: 1.29 MG/DL (ref 0.57–1)
DEPRECATED RDW RBC AUTO: 44.6 FL (ref 37–54)
ERYTHROCYTE [DISTWIDTH] IN BLOOD BY AUTOMATED COUNT: 14.1 % (ref 12.3–15.4)
GFR SERPL CREATININE-BSD FRML MDRD: 47 ML/MIN/1.73
GLUCOSE SERPL-MCNC: 89 MG/DL (ref 65–99)
HCT VFR BLD AUTO: 32.2 % (ref 34–46.6)
HGB BLD-MCNC: 10.8 G/DL (ref 12–15.9)
MCH RBC QN AUTO: 29.3 PG (ref 26.6–33)
MCHC RBC AUTO-ENTMCNC: 33.5 G/DL (ref 31.5–35.7)
MCV RBC AUTO: 87.5 FL (ref 79–97)
PLATELET # BLD AUTO: 214 10*3/MM3 (ref 140–450)
PMV BLD AUTO: 11.9 FL (ref 6–12)
POTASSIUM SERPL-SCNC: 3.4 MMOL/L (ref 3.5–5.2)
RBC # BLD AUTO: 3.68 10*6/MM3 (ref 3.77–5.28)
SODIUM SERPL-SCNC: 139 MMOL/L (ref 136–145)
WBC # BLD AUTO: 8.07 10*3/MM3 (ref 3.4–10.8)

## 2021-02-23 PROCEDURE — 85027 COMPLETE CBC AUTOMATED: CPT | Performed by: NURSE PRACTITIONER

## 2021-02-23 PROCEDURE — 80048 BASIC METABOLIC PNL TOTAL CA: CPT | Performed by: NURSE PRACTITIONER

## 2021-02-24 ENCOUNTER — TELEPHONE (OUTPATIENT)
Dept: FAMILY MEDICINE CLINIC | Facility: CLINIC | Age: 86
End: 2021-02-24

## 2021-02-26 ENCOUNTER — TELEPHONE (OUTPATIENT)
Dept: FAMILY MEDICINE CLINIC | Facility: CLINIC | Age: 86
End: 2021-02-26

## 2021-02-26 DIAGNOSIS — H65.92 LEFT SEROUS OTITIS MEDIA, UNSPECIFIED CHRONICITY: ICD-10-CM

## 2021-02-26 DIAGNOSIS — M19.041 PRIMARY OSTEOARTHRITIS OF RIGHT HAND: ICD-10-CM

## 2021-02-26 DIAGNOSIS — I10 ESSENTIAL HYPERTENSION: ICD-10-CM

## 2021-02-26 RX ORDER — LORATADINE 10 MG/1
10 TABLET ORAL DAILY
Qty: 30 TABLET | Refills: 3 | Status: CANCELLED | OUTPATIENT
Start: 2021-02-26

## 2021-02-26 RX ORDER — METOPROLOL SUCCINATE 50 MG/1
50 TABLET, EXTENDED RELEASE ORAL DAILY
Qty: 90 TABLET | Refills: 1 | Status: SHIPPED | OUTPATIENT
Start: 2021-02-26 | End: 2021-07-08

## 2021-02-26 RX ORDER — METOPROLOL SUCCINATE 50 MG/1
50 TABLET, EXTENDED RELEASE ORAL DAILY
Qty: 90 TABLET | Refills: 1 | OUTPATIENT
Start: 2021-02-26

## 2021-03-08 DIAGNOSIS — H65.92 LEFT SEROUS OTITIS MEDIA, UNSPECIFIED CHRONICITY: ICD-10-CM

## 2021-03-08 RX ORDER — LORATADINE 10 MG/1
10 TABLET ORAL DAILY
Qty: 30 TABLET | Refills: 2 | Status: SHIPPED | OUTPATIENT
Start: 2021-03-08 | End: 2021-07-19

## 2021-03-23 ENCOUNTER — TELEPHONE (OUTPATIENT)
Dept: FAMILY MEDICINE CLINIC | Facility: CLINIC | Age: 86
End: 2021-03-23

## 2021-03-23 DIAGNOSIS — G89.29 CHRONIC LOW BACK PAIN: ICD-10-CM

## 2021-03-23 DIAGNOSIS — M54.50 CHRONIC LOW BACK PAIN: ICD-10-CM

## 2021-03-23 DIAGNOSIS — G62.9 NEUROPATHY: ICD-10-CM

## 2021-03-23 RX ORDER — GABAPENTIN 300 MG/1
300 CAPSULE ORAL EVERY MORNING
Qty: 30 CAPSULE | Refills: 3 | Status: SHIPPED | OUTPATIENT
Start: 2021-03-23 | End: 2021-05-25 | Stop reason: SDUPTHER

## 2021-04-06 DIAGNOSIS — M10.9 GOUT, UNSPECIFIED CAUSE, UNSPECIFIED CHRONICITY, UNSPECIFIED SITE: ICD-10-CM

## 2021-04-06 DIAGNOSIS — I10 ESSENTIAL HYPERTENSION: ICD-10-CM

## 2021-04-08 RX ORDER — LOSARTAN POTASSIUM 50 MG/1
TABLET ORAL
Qty: 90 TABLET | OUTPATIENT
Start: 2021-04-08

## 2021-04-08 RX ORDER — ALLOPURINOL 100 MG/1
TABLET ORAL
Qty: 90 TABLET | Refills: 1 | OUTPATIENT
Start: 2021-04-08

## 2021-04-08 RX ORDER — AMLODIPINE BESYLATE 5 MG/1
TABLET ORAL
Qty: 90 TABLET | Refills: 1 | OUTPATIENT
Start: 2021-04-08

## 2021-04-09 NOTE — TELEPHONE ENCOUNTER
"Patient was contact about what medications she was out of and getting her appointment moved up. Patient states she wasn't out of any of her medications and she wanted to cancel her appointment. Before the \"PAC\" hung up, she said never mind I will keep my appointment on the 5th but I am out of state right now. Patient's appointment was put back on May 5th for the same time.  "

## 2021-04-15 ENCOUNTER — TELEPHONE (OUTPATIENT)
Dept: FAMILY MEDICINE CLINIC | Facility: CLINIC | Age: 86
End: 2021-04-15

## 2021-04-15 NOTE — TELEPHONE ENCOUNTER
[phone]    She would like to speak with someone in Maggi William's office regarding the COVID-19 vaccination.  She is presently in [place] and her daughter has concerns with her taking the vaccination.

## 2021-04-16 NOTE — TELEPHONE ENCOUNTER
Patient called and states that the corner of her lips are cracking and she would like something sent in.

## 2021-04-17 RX ORDER — LOPERAMIDE HYDROCHLORIDE 2 MG/1
CAPSULE ORAL
Qty: 360 CAPSULE | Refills: 0 | Status: SHIPPED | OUTPATIENT
Start: 2021-04-17 | End: 2021-06-23

## 2021-04-30 ENCOUNTER — TELEPHONE (OUTPATIENT)
Dept: OBSTETRICS AND GYNECOLOGY | Facility: CLINIC | Age: 86
End: 2021-04-30

## 2021-04-30 ENCOUNTER — TELEPHONE (OUTPATIENT)
Dept: FAMILY MEDICINE CLINIC | Facility: CLINIC | Age: 86
End: 2021-04-30

## 2021-04-30 NOTE — TELEPHONE ENCOUNTER
Patient is wanting a speak with you about sending her in some antibiotics   Pt says she is currently out of town

## 2021-05-25 ENCOUNTER — OFFICE VISIT (OUTPATIENT)
Dept: FAMILY MEDICINE CLINIC | Facility: CLINIC | Age: 86
End: 2021-05-25

## 2021-05-25 VITALS
RESPIRATION RATE: 20 BRPM | OXYGEN SATURATION: 99 % | TEMPERATURE: 98.2 F | WEIGHT: 130.8 LBS | HEIGHT: 59 IN | SYSTOLIC BLOOD PRESSURE: 102 MMHG | HEART RATE: 69 BPM | DIASTOLIC BLOOD PRESSURE: 58 MMHG | BODY MASS INDEX: 26.37 KG/M2

## 2021-05-25 DIAGNOSIS — G89.29 CHRONIC LOW BACK PAIN: ICD-10-CM

## 2021-05-25 DIAGNOSIS — G62.9 NEUROPATHY: Primary | ICD-10-CM

## 2021-05-25 DIAGNOSIS — K12.1 STOMATITIS AND MUCOSITIS: ICD-10-CM

## 2021-05-25 DIAGNOSIS — M54.50 CHRONIC LOW BACK PAIN: ICD-10-CM

## 2021-05-25 DIAGNOSIS — K12.30 STOMATITIS AND MUCOSITIS: ICD-10-CM

## 2021-05-25 PROCEDURE — 99214 OFFICE O/P EST MOD 30 MIN: CPT | Performed by: NURSE PRACTITIONER

## 2021-05-25 RX ORDER — ACYCLOVIR 50 MG/G
OINTMENT TOPICAL
Status: CANCELLED | OUTPATIENT
Start: 2021-05-25

## 2021-05-25 RX ORDER — DOCOSANOL 100 MG/G
CREAM TOPICAL
Status: CANCELLED | OUTPATIENT
Start: 2021-05-25

## 2021-05-25 RX ORDER — GABAPENTIN 300 MG/1
300 CAPSULE ORAL NIGHTLY PRN
Qty: 30 CAPSULE | Refills: 3 | Status: SHIPPED | OUTPATIENT
Start: 2021-05-25 | End: 2021-07-08 | Stop reason: SINTOL

## 2021-05-27 ENCOUNTER — OFFICE VISIT (OUTPATIENT)
Dept: GASTROENTEROLOGY | Facility: CLINIC | Age: 86
End: 2021-05-27

## 2021-05-27 VITALS
SYSTOLIC BLOOD PRESSURE: 143 MMHG | BODY MASS INDEX: 25.29 KG/M2 | HEIGHT: 60 IN | HEART RATE: 73 BPM | DIASTOLIC BLOOD PRESSURE: 71 MMHG | WEIGHT: 128.8 LBS

## 2021-05-27 DIAGNOSIS — R13.19 OTHER DYSPHAGIA: Primary | ICD-10-CM

## 2021-05-27 PROCEDURE — 99214 OFFICE O/P EST MOD 30 MIN: CPT | Performed by: INTERNAL MEDICINE

## 2021-05-27 RX ORDER — DEXTROSE AND SODIUM CHLORIDE 5; .45 G/100ML; G/100ML
30 INJECTION, SOLUTION INTRAVENOUS CONTINUOUS PRN
Status: CANCELLED | OUTPATIENT
Start: 2021-06-21

## 2021-05-27 NOTE — PATIENT INSTRUCTIONS
"BMI for Adults  What is BMI?  Body mass index (BMI) is a number that is calculated from a person's weight and height. BMI can help estimate how much of a person's weight is composed of fat. BMI does not measure body fat directly. Rather, it is an alternative to procedures that directly measure body fat, which can be difficult and expensive.  BMI can help identify people who may be at higher risk for certain medical problems.  What are BMI measurements used for?  BMI is used as a screening tool to identify possible weight problems. It helps determine whether a person is obese, overweight, a healthy weight, or underweight.  BMI is useful for:  · Identifying a weight problem that may be related to a medical condition or may increase the risk for medical problems.  · Promoting changes, such as changes in diet and exercise, to help reach a healthy weight. BMI screening can be repeated to see if these changes are working.  How is BMI calculated?  BMI involves measuring your weight in relation to your height. Both height and weight are measured, and the BMI is calculated from those numbers. This can be done either in English (U[place] or metric measurements. Note that charts and online BMI calculators are available to help you find your BMI quickly and easily without having to do these calculations yourself.  To calculate your BMI in English (U.S[place] measurements:    1. Measure your weight in pounds (lb).  2. Multiply the number of pounds by 703.  ? For example, for a person who weighs 180 lb, multiply that number by 703, which equals 126,540.  3. Measure your height in inches. Then multiply that number by itself to get a measurement called \"inches squared.\"  ? For example, for a person who is 70 inches tall, the \"inches squared\" measurement is 70 inches x 70 inches, which equals 4,900 inches squared.  4. Divide the total from step 2 (number of lb x 703) by the total from step 3 (inches squared): 126,540 ÷ 4,900 = 25.8. This is " "your BMI.  To calculate your BMI in metric measurements:  1. Measure your weight in kilograms (kg).  2. Measure your height in meters (m). Then multiply that number by itself to get a measurement called \"meters squared.\"  ? For example, for a person who is 1.75 m tall, the \"meters squared\" measurement is 1.75 m x 1.75 m, which is equal to 3.1 meters squared.  3. Divide the number of kilograms (your weight) by the meters squared number. In this example: 70 ÷ 3.1 = 22.6. This is your BMI.  What do the results mean?  BMI charts are used to identify whether you are underweight, normal weight, overweight, or obese. The following guidelines will be used:  · Underweight: BMI less than 18.5.  · Normal weight: BMI between 18.5 and 24.9.  · Overweight: BMI between 25 and 29.9.  · Obese: BMI of 30 or above.  Keep these notes in mind:  · Weight includes both fat and muscle, so someone with a muscular build, such as an athlete, may have a BMI that is higher than 24.9. In cases like these, BMI is not an accurate measure of body fat.  · To determine if excess body fat is the cause of a BMI of 25 or higher, further assessments may need to be done by a health care provider.  · BMI is usually interpreted in the same way for men and women.  Where to find more information  For more information about BMI, including tools to quickly calculate your BMI, go to these websites:  · Centers for Disease Control and Prevention: www.cdc.gov  · American Heart Association: www.heart.org  · National Heart, Lung, and Blood [place]: www.nhlbi.nih.gov  Summary  · Body mass index (BMI) is a number that is calculated from a person's weight and height.  · BMI may help estimate how much of a person's weight is composed of fat. BMI can help identify those who may be at higher risk for certain medical problems.  · BMI can be measured using English measurements or metric measurements.  · BMI charts are used to identify whether you are underweight, normal " weight, overweight, or obese.  This information is not intended to replace advice given to you by your health care provider. Make sure you discuss any questions you have with your health care provider.  Document Revised: 09/09/2020 Document Reviewed: 07/17/2020  Elsevier Patient Education © 2021 [employer] Inc.

## 2021-05-27 NOTE — PROGRESS NOTES
[facility]      Chief Complaint:   Chief Complaint   Patient presents with   • 3 month f/u   • Difficulty Swallowing       Subjective     HPI:   Patient with dysphagia.  Patient has an esophageal stricture that has been dilated up to 36.  Patient states this markedly improves her dysphagia but is returning at this time.  Patient has had an extremely tight stricture that needs to be balloon dilated and this does cause patient severe discomfort after dilatation.  Patient states this last usually about 1 week.    Plan; we will schedule patient for EGD with dilatation will attempt to get to 36 at this time and will consider further dilatations in the next few months.    Past Medical History:   Past Medical History:   Diagnosis Date   • Abnormal CT scan     per patient   • Acute bronchitis    • Acute maxillary sinusitis    • Acute otitis externa    • Acute sinusitis    • Allergic conjunctivitis    • Angular cheilitis    • Ankle edema    • Backache     improved   • Blood in urine      UTI resolved      • Bradycardia    • Chest discomfort     described as heart racing      • Chronic low back pain     RIGHT leg radiation      • Cold feet     c/o - and lowerlegs      • Contusion     of dorsum of foot   • Cough    • COVID-19 virus infection    • Depressive disorder    • Diabetes mellitus (CMS/HCC)    • Dizziness and giddiness    • Dyspnea    • Dysuria    • Edema    • Edema of foot    • Edema of lower extremity    • Essential hypertension    • Exanthematous disorder    • Fatigue    • Foot pain, left    • GERD (gastroesophageal reflux disease)    • Grade II hemorrhoids 3/12/2018   • Hematoma    • Hip pain, right    • History of palpitations    • Hormone replacement therapy (postmenopausal)    • Hypertensive disorder    • Impacted cerumen    • Joint pain    • Knee pain    • Low back pain    • Malaise and fatigue    • Multiple joint pain    • Muscle pain    • Muscle weakness    • Neck pain    • Obesity  (BMI 30.0-34.9) 3/12/2018   • Osteoarthritis of knee    • Osteoarthritis of multiple joints    • Otitis externa    • Pain in lower limb    • Peripheral venous insufficiency    • POP-Q stage 2 rectocele 3/12/2018   • POP-Q stage 4 cystocele 3/12/2018   • Sacroiliitis, not elsewhere classified (CMS/HCC)     R LE   • Shoulder pain    • Upper respiratory infection    • Urinary frequency    • Urinary tract infectious disease    • Wheezing        Past Surgical History:  Past Surgical History:   Procedure Laterality Date   • ANKLE SURGERY     • BACK SURGERY     • COLONOSCOPY  12/14/2009   • ENDOSCOPY N/A 1/6/2021    Procedure: ESOPHAGOGASTRODUODENOSCOPY;  Surgeon: Jack Ellis MD;  Location: [facility] ENDOSCOPY;  Service: Gastroenterology;  Laterality: N/A;   • ENDOSCOPY N/A 1/28/2021    Procedure: ESOPHAGOGASTRODUODENOSCOPY;  Surgeon: Jack Ellis MD;  Location: [facility] ENDOSCOPY;  Service: Gastroenterology;  Laterality: N/A;  eso dilation with ballon to 30FR   • EYE SURGERY Bilateral 02/2018    B cataract   • HAMMER TOE REPAIR  08/02/2011    Hammertoe repair, left second toe.   • HYSTERECTOMY     • INCISION AND DRAINAGE ABSCESS  01/21/2015   • INJECTION OF MEDICATION  11/15/2013   • INJECTION OF MEDICATION  05/06/2013    Celestone (betamethasone) (1)      • INJECTION OF MEDICATION  03/17/2016    Kenalog (3)      • NECK SURGERY  01/2018   • TEMPORAL ARTERY BIOPSY Left 11/10/2020    Procedure: LEFT TEMPORAL ARTERY BIOPSY;  Surgeon: Anjel Olea MD;  Location: [facility] OR;  Service: General;  Laterality: Left;   • UPPER GASTROINTESTINAL ENDOSCOPY  12/14/2009   • UPPER GASTROINTESTINAL ENDOSCOPY  01/06/2021   • UPPER GASTROINTESTINAL ENDOSCOPY  01/28/2021       Family History:  Family History   Problem Relation Age of Onset   • Diabetes Other    • Heart disease Other    • Stroke Other    • Coronary artery disease Mother    • Coronary artery disease Father        Social History:   reports that she has quit smoking. Her  smoking use included cigarettes. She has never used smokeless tobacco. She reports that she does not drink alcohol and does not use drugs.    Medications:   Prior to Admission medications    Medication Sig Start Date End Date Taking? Authorizing Provider   Accu-Chek FastClix Lancets misc 1 each Daily. 8/3/20  Yes Ora Walden APRN   allopurinol (ZYLOPRIM) 100 MG tablet Take 1 tablet by mouth Daily. take with food 8/3/20  Yes Ora Walden APRN   amLODIPine (NORVASC) 5 MG tablet Take 1 tablet by mouth Daily. 8/3/20  Yes Ora Walden APRN   chlorthalidone (HYGROTON) 25 MG tablet Take 1 tablet by mouth Daily. 2/1/21  Yes Ora Walden APRN   clotrimazole-betamethasone (LOTRISONE) 1-0.05 % cream APPLY TOPICALLY TO THE APPROPRIATE AREA AS DIRECTED 2 TIMES A DAY 2/22/21  Yes Ora Walden APRN   colchicine 0.6 MG capsule capsule TK 1 C PO QD STARTING ON 8/19/19 8/18/19  Yes ProviderVipin MD   diclofenac (VOLTAREN) 1 % gel gel Apply 4 g topically to the appropriate area as directed 3 (Three) Times a Day. Small amount to affected area 8/20/20  Yes Syed Carr MD   Diclofenac Sodium (VOLTAREN) 1 % gel gel Apply 4 g topically to the appropriate area as directed 4 (Four) Times a Day As Needed (joint pain). 2/26/21  Yes Ora Walden APRN   docusate sodium (DOK) 100 MG capsule Take 1 capsule by mouth 2 (Two) Times a Day.  Patient taking differently: Take 100 mg by mouth 2 (Two) Times a Day As Needed. 4/29/19  Yes Mateusz Chopra APRN   folic acid (FOLVITE) 1 MG tablet Take 1 mg by mouth Daily.   Yes ProviderVipin MD   furosemide (LASIX) 20 MG tablet TAKE 1 TABLET EVERY DAY 1/28/21  Yes Ora Walden APRN   gabapentin (NEURONTIN) 300 MG capsule Take 1 capsule by mouth At Night As Needed (leg pain). 5/25/21  Yes Maggi William APRN   gabapentin (NEURONTIN) 600 MG tablet TAKE 1 TABLET BY MOUTH EVERY EVENING 11/24/20  Yes Win,  NADYA Everett   glucose blood test strip Use as instructed to check b/s 1x a day.  Please give strip compatible with monitor. (accucheck) 8/3/20  Yes Ora Walden APRN   loperamide (IMODIUM) 2 MG capsule TAKE 1 CAPSULE FOUR TIMES DAILY AS NEEDED  FOR  DIARRHEA 4/17/21  Yes Maggi William APRN   loratadine (Claritin) 10 MG tablet Take 1 tablet by mouth Daily. 3/8/21  Yes Ora aWlden APRN   Menthol, Topical Analgesic, (BIOFREEZE) 4 % gel Apply to affected area on left back 2-3 times per day x 1week 4/29/19  Yes Mateusz Chopra APRN   metoprolol succinate XL (Toprol XL) 50 MG 24 hr tablet Take 1 tablet by mouth Daily. 2/26/21  Yes Ora Walden APRN   metoprolol succinate XL (TOPROL-XL) 25 MG 24 hr tablet Take 1 tablet by mouth Daily. 8/3/20  Yes Ora Walden APRN   montelukast (SINGULAIR) 10 MG tablet Take 1 tablet by mouth Every Night. 3/5/20  Yes Mateusz Chopra APRN   NUCYNTA 50 MG tablet TK 1 T PO BID PRN 9/9/19  Yes Provider, MD Vipin   nystatin (MYCOSTATIN) 024103 UNIT/ML suspension Swish and swallow 5 mL 4 (Four) Times a Day. 5/25/21  Yes Maggi William APRN   omeprazole (priLOSEC) 20 MG capsule TAKE 1 CAPSULE TWICE DAILY 2/18/21  Yes Ora Walden APRN   PARoxetine (Paxil) 20 MG tablet Take 1 tablet by mouth Every Morning. 1/12/21  Yes Ora Walden APRN   polyethylene glycol (MIRALAX) 17 g packet Take 17 g by mouth Daily.  Patient taking differently: Take 17 g by mouth Daily As Needed. 9/18/20  Yes Ora Walden APRN   tolterodine LA (DETROL LA) 4 MG 24 hr capsule Take 1 capsule by mouth Daily. 2/3/21  Yes Ector-Kenna, Lelsie C, APRN   losartan (COZAAR) 100 MG tablet Take 1 tablet by mouth Daily for 30 days. 11/11/20 1/28/21  Lukas Tyson MD   omeprazole (priLOSEC) 20 MG capsule Take 1 capsule by mouth 2 (Two) Times a Day. 8/3/20   Ora Walden APRN       Allergies:  Aleve [naproxen sodium], Atenolol, Keflex  "[cephalexin], Lisinopril, Relafen [nabumetone], Zanaflex [tizanidine], Acetaminophen, Aspirin, Codeine, Diflucan [fluconazole], and Sulfa antibiotics    ROS:    Review of Systems   Constitutional: Negative for activity change, appetite change, chills, diaphoresis, fatigue, fever and unexpected weight change.   HENT: Positive for trouble swallowing. Negative for sore throat.    Respiratory: Negative for shortness of breath.    Gastrointestinal: Negative for abdominal distention, abdominal pain, anal bleeding, blood in stool, constipation, diarrhea, nausea, rectal pain and vomiting.   Endocrine: Negative for polydipsia, polyphagia and polyuria.   Genitourinary: Negative for difficulty urinating.   Musculoskeletal: Negative for arthralgias.   Skin: Negative for pallor.   Allergic/Immunologic: Negative for food allergies.   Neurological: Negative for weakness and light-headedness.   Psychiatric/Behavioral: Negative for behavioral problems.     Objective     Blood pressure 143/71, pulse 73, height 152.4 cm (60\"), weight 58.4 kg (128 lb 12.8 oz), not currently breastfeeding.    Physical Exam  Constitutional:       General: She is not in acute distress.     Appearance: She is well-developed. She is not diaphoretic.   HENT:      Head: Normocephalic and atraumatic.   Cardiovascular:      Rate and Rhythm: Normal rate and regular rhythm.      Heart sounds: Normal heart sounds. No murmur heard.   No friction rub. No gallop.    Pulmonary:      Effort: No respiratory distress.      Breath sounds: Normal breath sounds. No wheezing or rales.   Chest:      Chest wall: No tenderness.   Abdominal:      General: Bowel sounds are normal. There is no distension.      Palpations: Abdomen is soft. There is no mass.      Tenderness: There is no abdominal tenderness. There is no guarding or rebound.      Hernia: No hernia is present.   Musculoskeletal:         General: Normal range of motion.   Skin:     General: Skin is warm and dry.      " Coloration: Skin is not pale.      Findings: No erythema or rash.   Neurological:      Mental Status: She is alert and oriented to person, place, and time.   Psychiatric:         Behavior: Behavior normal.         Thought Content: Thought content normal.         Judgment: Judgment normal.          Assessment/Plan   Diagnoses and all orders for this visit:    1. Other dysphagia (Primary)  -     Case Request; Standing  -     dextrose 5 % and sodium chloride 0.45 % infusion  -     Case Request    Other orders  -     Follow Anesthesia Guidelines / Standing Orders; Future  -     Obtain Informed Consent; Future  -     Implement Anesthesia Orders Day of Procedure; Standing  -     Obtain Informed Consent; Standing  -     POC Glucose Once; Standing  -     Insert Peripheral IV; Standing        ESOPHAGOGASTRODUODENOSCOPY (N/A)     Diagnosis Plan   1. Other dysphagia  Case Request    dextrose 5 % and sodium chloride 0.45 % infusion    Case Request       Anticipated Surgical Procedure:  Orders Placed This Encounter   Procedures   • Follow Anesthesia Guidelines / Standing Orders     Standing Status:   Future   • Obtain Informed Consent     Standing Status:   Future     Order Specific Question:   Informed Consent Given For     Answer:   ESOPHAGOGASTRODUODENOSCOPY       The risks, benefits, and alternatives of this procedure have been discussed with the patient or the responsible party- the patient understands and agrees to proceed.

## 2021-05-29 NOTE — PATIENT INSTRUCTIONS
Temporomandibular Joint Syndrome  Temporomandibular joint (TMJ) syndrome is a condition that affects the joints between your jaw and your skull. The TMJs are located near your ears and allow your jaw to open and close. These joints and the nearby muscles are involved in all movements of the jaw. People with TMJ syndrome have pain in the area of these joints and muscles. Chewing, biting, or other movements of the jaw can be difficult or painful.  TMJ syndrome can be caused by various things. In many cases, the condition is mild and goes away within a few weeks. For some people, the condition can become a long-term problem.  What are the causes?  Possible causes of TMJ syndrome include:  · Grinding your teeth or clenching your jaw. Some people do this when they are under stress.  · Arthritis.  · Injury to the jaw.  · Head or neck injury.  · Teeth or dentures that are not aligned well.    In some cases, the cause of TMJ syndrome may not be known.  What are the signs or symptoms?  The most common symptom is an aching pain on the side of the head in the area of the TMJ. Other symptoms may include:  · Pain when moving your jaw, such as when chewing or biting.  · Being unable to open your jaw all the way.  · Making a clicking sound when you open your mouth.  · Headache.  · Earache.  · Neck or shoulder pain.    How is this diagnosed?  Diagnosis can usually be made based on your symptoms, your medical history, and a physical exam. Your health care provider may check the range of motion of your jaw. Imaging tests, such as X-rays or an MRI, are sometimes done. You may need to see your dentist to determine if your teeth and jaw are lined up correctly.  How is this treated?  TMJ syndrome often goes away on its own. If treatment is needed, the options may include:  · Eating soft foods and applying ice or heat.  · Medicines to relieve pain or inflammation.  · Medicines to relax the muscles.  · A splint, bite plate, or mouthpiece  to prevent teeth grinding or jaw clenching.  · Relaxation techniques or counseling to help reduce stress.  · Transcutaneous electrical nerve stimulation (TENS). This helps to relieve pain by applying an electrical current through the skin.  · Acupuncture. This is sometimes helpful to relieve pain.  · Jaw surgery. This is rarely needed.    Follow these instructions at home:  · Take medicines only as directed by your health care provider.  · Eat a soft diet if you are having trouble chewing.  · Apply ice to the painful area.  ? Put ice in a plastic bag.  ? Place a towel between your skin and the bag.  ? Leave the ice on for 20 minutes, 2-3 times a day.  · Apply a warm compress to the painful area as directed.  · Massage your jaw area and perform any jaw stretching exercises as recommended by your health care provider.  · If you were given a mouthpiece or bite plate, wear it as directed.  · Avoid foods that require a lot of chewing. Do not chew gum.  · Keep all follow-up visits as directed by your health care provider. This is important.  Contact a health care provider if:  · You are having trouble eating.  · You have new or worsening symptoms.  Get help right away if:  · Your jaw locks open or closed.  This information is not intended to replace advice given to you by your health care provider. Make sure you discuss any questions you have with your health care provider.  Document Released: 09/12/2002 Document Revised: 08/17/2017 Document Reviewed: 07/23/2015  [employer] Interactive Patient Education © 2019 [employer]      Muscle Cramps and Spasms  Muscle cramps and spasms are when muscles tighten by themselves. They usually get better within minutes. Muscle cramps are painful. They are usually stronger and last longer than muscle spasms. Muscle spasms may or may not be painful. They can last a few seconds or much longer.  Follow these instructions at home:  · Drink enough fluid to keep your pee (urine) clear or pale  yellow.  · Massage, stretch, and relax the muscle.  · If directed, apply heat to tight or tense muscles as often as told by your doctor. Use the heat source that your doctor recommends.  ? Place a towel between your skin and the heat source.  ? Leave the heat on for 20-30 minutes.  ? Take off the heat if your skin turns bright red. This is especially important if you are unable to feel pain, heat, or cold. You may have a greater risk of getting burned.  · If directed, put ice on the affected area. This may help if you are sore or have pain after a cramp or spasm.  ? Put ice in a plastic bag.  ? Place a towel between your skin and the bag.  ? Leave the ice on for 20 minutes, 2-3 times a day.  · Take over-the-counter and prescription medicines only as told by your doctor.  · Pay attention to any changes in your symptoms.  Contact a doctor if:  · Your cramps or spasms get worse or happen more often.  · Your cramps or spasms do not get better with time.  This information is not intended to replace advice given to you by your health care provider. Make sure you discuss any questions you have with your health care provider.  Document Released: 11/30/2009 Document Revised: 01/18/2017 Document Reviewed: 09/20/2016  [employer] Patient Education © 2019 [employer]      Peripheral Neuropathy  Peripheral neuropathy is a type of nerve damage. It affects nerves that carry signals between the spinal cord and the arms, legs, and the rest of the body (peripheral nerves). It does not affect nerves in the spinal cord or brain. In peripheral neuropathy, one nerve or a group of nerves may be damaged. Peripheral neuropathy is a broad category that includes many specific nerve disorders, like diabetic neuropathy, hereditary neuropathy, and carpal tunnel syndrome.  What are the causes?  This condition may be caused by:  · Diabetes. This is the most common cause of peripheral neuropathy.  · Nerve injury.  · Pressure or stress on  a nerve that lasts a long time.  · Lack (deficiency) of B vitamins. This can result from alcoholism, poor diet, or a restricted diet.  · Infections.  · Autoimmune diseases, such as rheumatoid arthritis and systemic lupus erythematosus.  · Nerve diseases that are passed from parent to child (inherited).  · Some medicines, such as cancer medicines (chemotherapy).  · Poisonous (toxic) substances, such as lead and mercury.  · Too little blood flowing to the legs.  · Kidney disease.  · Thyroid disease.    In some cases, the cause of this condition is not known.  What are the signs or symptoms?  Symptoms of this condition depend on which of your nerves is damaged. Common symptoms include:  · Loss of feeling (numbness) in the feet, hands, or both.  · Tingling in the feet, hands, or both.  · Burning pain.  · Very sensitive skin.  · Weakness.  · Not being able to move a part of the body (paralysis).  · Muscle twitching.  · Clumsiness or poor coordination.  · Loss of balance.  · Not being able to control your bladder.  · Feeling dizzy.  · Sexual problems.    How is this diagnosed?  Diagnosing and finding the cause of peripheral neuropathy can be difficult. Your health care provider will take your medical history and do a physical exam. A neurological exam will also be done. This involves checking things that are affected by your brain, spinal cord, and nerves (nervous system). For example, your health care provider will check your reflexes, how you move, and what you can feel.  You may have other tests, such as:  · Blood tests.  · Electromyogram (EMG) and nerve conduction tests. These tests check nerve function and how well the nerves are controlling the muscles.  · Imaging tests, such as CT scans or MRI to rule out other causes of your symptoms.  · Removing a small piece of nerve to be examined in a lab (nerve biopsy). This is rare.  · Removing and examining a small amount of the fluid that surrounds the brain and spinal  cord (lumbar puncture). This is rare.    How is this treated?  Treatment for this condition may involve:  · Treating the underlying cause of the neuropathy, such as diabetes, kidney disease, or vitamin deficiencies.  · Stopping medicines that can cause neuropathy, such as chemotherapy.  · Medicine to relieve pain. Medicines may include:  ? Prescription or over-the-counter pain medicine.  ? Antiseizure medicine.  ? Antidepressants.  ? Pain-relieving patches that are applied to painful areas of skin.  · Surgery to relieve pressure on a nerve or to destroy a nerve that is causing pain.  · Physical therapy to help improve movement and balance.  · Devices to help you move around (assistive devices).    Follow these instructions at home:  Medicines  · Take over-the-counter and prescription medicines only as told by your health care provider. Do not take any other medicines without first asking your health care provider.  · Do not drive or use heavy machinery while taking prescription pain medicine.  Lifestyle  · Do not use any products that contain nicotine or tobacco, such as cigarettes and e-cigarettes. Smoking keeps blood from reaching damaged nerves. If you need help quitting, ask your health care provider.  · Avoid or limit alcohol. Too much alcohol can cause a vitamin B deficiency, and vitamin B is needed for healthy nerves.  · Eat a healthy diet. This includes:  ? Eating foods that are high in fiber, such as fresh fruits and vegetables, whole grains, and beans.  ? Limiting foods that are high in fat and processed sugars, such as fried or sweet foods.  General instructions  · If you have diabetes, work closely with your health care provider to keep your blood sugar under control.  · If you have numbness in your feet:  ? Check every day for signs of injury or infection. Watch for redness, warmth, and swelling.  ? Wear padded socks and comfortable shoes. These help protect your feet.  · Develop a good support system.  Living with peripheral neuropathy can be stressful. Consider talking with a mental health specialist or joining a support group.  · Use assistive devices and attend physical therapy as told by your health care provider. This may include using a walker or a cane.  · Keep all follow-up visits as told by your health care provider. This is important.  Contact a health care provider if:  · You have new signs or symptoms of peripheral neuropathy.  · You are struggling emotionally from dealing with peripheral neuropathy.  · Your pain is not well-controlled.  Get help right away if:  · You have an injury or infection that is not healing normally.  · You develop new weakness in an arm or leg.  · You fall frequently.  Summary  · Peripheral neuropathy is when the nerves in the arms, or legs are damaged, resulting in numbness, weakness, or pain.  · There are many causes of peripheral neuropathy, including diabetes, pinched nerves, vitamin deficiencies, autoimmune disease, and hereditary conditions.  · Diagnosing and finding the cause of peripheral neuropathy can be difficult. Your health care provider will take your medical history, do a physical exam, and do tests, including blood tests and nerve function tests.  · Treatment involves treating the underlying cause of the neuropathy and taking medicines to help control pain. Physical therapy and assistive devices may also help.  This information is not intended to replace advice given to you by your health care provider. Make sure you discuss any questions you have with your health care provider.  Document Released: 12/08/2003 Document Revised: 02/26/2018 Document Reviewed: 02/26/2018  [employer] Patient Education © 2019 [employer]     NEGATIVE

## 2021-06-02 ENCOUNTER — OFFICE VISIT (OUTPATIENT)
Dept: OBSTETRICS AND GYNECOLOGY | Facility: CLINIC | Age: 86
End: 2021-06-02

## 2021-06-02 VITALS
DIASTOLIC BLOOD PRESSURE: 60 MMHG | BODY MASS INDEX: 25.13 KG/M2 | SYSTOLIC BLOOD PRESSURE: 110 MMHG | HEIGHT: 60 IN | WEIGHT: 128 LBS

## 2021-06-02 DIAGNOSIS — N81.6 POP-Q STAGE 2 RECTOCELE: Chronic | ICD-10-CM

## 2021-06-02 DIAGNOSIS — Z46.89 PESSARY MAINTENANCE: Primary | ICD-10-CM

## 2021-06-02 DIAGNOSIS — N81.10 POP-Q STAGE 4 CYSTOCELE: Chronic | ICD-10-CM

## 2021-06-02 PROCEDURE — 99212 OFFICE O/P EST SF 10 MIN: CPT | Performed by: NURSE PRACTITIONER

## 2021-06-02 NOTE — PROGRESS NOTES
"Subjective   Chief Complaint   Patient presents with   • Pessary Check     Kristy Ramirez is a 87 y.o. year old who presents to be seen for follow-up of her pessary.  Currently she is using Foldable ring w/ support - #7 w/o urethral bar.  She reports no problems with her pessary at this time.     No Additional Complaints Reported    The following portions of the patient's history were reviewed and updated as appropriate:problem list, current medications and allergies    Review of Systems   Constitutional: Negative for activity change, appetite change, chills, diaphoresis, fatigue, fever, unexpected weight gain and unexpected weight loss.   Gastrointestinal: Negative for abdominal distention, abdominal pain, anal bleeding, blood in stool, constipation, diarrhea, nausea, rectal pain and vomiting.   Genitourinary: Negative for decreased urine volume, difficulty urinating, pelvic pain, pelvic pressure, urgency, urinary incontinence, vaginal bleeding, vaginal discharge and vaginal pain.        Objective   /60   Ht 152.4 cm (60\")   Wt 58.1 kg (128 lb)   LMP  (LMP Unknown)   BMI 25.00 kg/m²     General:  well developed; well nourished  no acute distress   Pelvis: Clinical staff was present for exam  External genitalia:  normal appearance of the external genitalia including Bartholin's and [place]'s glands.  :  urethral meatus normal; urethra hypermobile; prominent caruncle present; bladder is tender to palpation  Vaginal:  atrophic mucosal changes are present;  Cervix:  absent.  Uterus:  absent.  Adnexa:  non palpable bilaterally.  Cystocele GRADE 2  Rectocele GRADE 2  Pessary removed and cleaned. After vaginal inspection the pessary was lubricated and placed back into the vaginal vault. She tolerated this well.     Lab Review   No data reviewed    Imaging   No data reviewed         Diagnoses and all orders for this visit:    Pessary maintenance    POP-Q stage 4 cystocele    POP-Q stage 2 rectocele        No " orders of the defined types were placed in this encounter.    F/U in 8 weeks for routine pessary maintenance.       This document was electronically signed.     NADYA Cadena  June 2, 2021

## 2021-06-11 DIAGNOSIS — E87.6 HYPOKALEMIA: ICD-10-CM

## 2021-06-11 DIAGNOSIS — M10.9 GOUT, UNSPECIFIED CAUSE, UNSPECIFIED CHRONICITY, UNSPECIFIED SITE: ICD-10-CM

## 2021-06-11 DIAGNOSIS — N28.9 DECREASED RENAL FUNCTION: Primary | ICD-10-CM

## 2021-06-11 DIAGNOSIS — I10 ESSENTIAL HYPERTENSION: ICD-10-CM

## 2021-06-11 RX ORDER — LOSARTAN POTASSIUM 100 MG/1
100 TABLET ORAL DAILY
Qty: 30 TABLET | Refills: 0 | Status: CANCELLED | OUTPATIENT
Start: 2021-06-11 | End: 2021-07-11

## 2021-06-11 RX ORDER — AMLODIPINE BESYLATE 5 MG/1
5 TABLET ORAL DAILY
Qty: 90 TABLET | Refills: 1 | Status: CANCELLED | OUTPATIENT
Start: 2021-06-11

## 2021-06-11 RX ORDER — ALLOPURINOL 100 MG/1
100 TABLET ORAL DAILY
Qty: 90 TABLET | Refills: 1 | Status: CANCELLED | OUTPATIENT
Start: 2021-06-11

## 2021-06-12 PROBLEM — K12.1 STOMATITIS AND MUCOSITIS: Status: ACTIVE | Noted: 2021-06-12

## 2021-06-12 PROBLEM — K12.30 STOMATITIS AND MUCOSITIS: Status: ACTIVE | Noted: 2021-06-12

## 2021-06-12 RX ORDER — LOSARTAN POTASSIUM 50 MG/1
50 TABLET ORAL DAILY
COMMUNITY
Start: 2021-06-07 | End: 2021-07-19

## 2021-06-18 ENCOUNTER — LAB (OUTPATIENT)
Dept: LAB | Facility: HOSPITAL | Age: 86
End: 2021-06-18

## 2021-06-18 DIAGNOSIS — Z01.818 PREOP TESTING: Primary | ICD-10-CM

## 2021-06-18 LAB — SARS-COV-2 N GENE RESP QL NAA+PROBE: NOT DETECTED

## 2021-06-18 PROCEDURE — 87635 SARS-COV-2 COVID-19 AMP PRB: CPT

## 2021-06-18 PROCEDURE — C9803 HOPD COVID-19 SPEC COLLECT: HCPCS

## 2021-06-21 ENCOUNTER — ANESTHESIA (OUTPATIENT)
Dept: GASTROENTEROLOGY | Facility: HOSPITAL | Age: 86
End: 2021-06-21

## 2021-06-21 ENCOUNTER — ANESTHESIA EVENT (OUTPATIENT)
Dept: GASTROENTEROLOGY | Facility: HOSPITAL | Age: 86
End: 2021-06-21

## 2021-06-21 ENCOUNTER — HOSPITAL ENCOUNTER (OUTPATIENT)
Facility: HOSPITAL | Age: 86
Setting detail: HOSPITAL OUTPATIENT SURGERY
Discharge: HOME | End: 2021-06-21
Attending: INTERNAL MEDICINE | Admitting: INTERNAL MEDICINE

## 2021-06-21 VITALS
TEMPERATURE: 97.4 F | OXYGEN SATURATION: 98 % | DIASTOLIC BLOOD PRESSURE: 50 MMHG | HEIGHT: 59 IN | BODY MASS INDEX: 25.2 KG/M2 | RESPIRATION RATE: 18 BRPM | SYSTOLIC BLOOD PRESSURE: 99 MMHG | WEIGHT: 125 LBS | HEART RATE: 48 BPM

## 2021-06-21 DIAGNOSIS — R13.19 OTHER DYSPHAGIA: ICD-10-CM

## 2021-06-21 PROCEDURE — 25010000002 PROPOFOL 10 MG/ML EMULSION: Performed by: NURSE ANESTHETIST, CERTIFIED REGISTERED

## 2021-06-21 PROCEDURE — 43248 EGD GUIDE WIRE INSERTION: CPT | Performed by: INTERNAL MEDICINE

## 2021-06-21 RX ORDER — MEPERIDINE HYDROCHLORIDE 25 MG/ML
12.5 INJECTION INTRAMUSCULAR; INTRAVENOUS; SUBCUTANEOUS
Status: DISCONTINUED | OUTPATIENT
Start: 2021-06-21 | End: 2021-06-21 | Stop reason: HOSPADM

## 2021-06-21 RX ORDER — DEXTROSE AND SODIUM CHLORIDE 5; .45 G/100ML; G/100ML
30 INJECTION, SOLUTION INTRAVENOUS CONTINUOUS PRN
Status: DISCONTINUED | OUTPATIENT
Start: 2021-06-21 | End: 2021-06-21 | Stop reason: HOSPADM

## 2021-06-21 RX ORDER — PROMETHAZINE HYDROCHLORIDE 25 MG/1
25 TABLET ORAL ONCE AS NEEDED
Status: DISCONTINUED | OUTPATIENT
Start: 2021-06-21 | End: 2021-06-21 | Stop reason: HOSPADM

## 2021-06-21 RX ORDER — PROPOFOL 10 MG/ML
VIAL (ML) INTRAVENOUS AS NEEDED
Status: DISCONTINUED | OUTPATIENT
Start: 2021-06-21 | End: 2021-06-21 | Stop reason: SURG

## 2021-06-21 RX ORDER — ONDANSETRON 2 MG/ML
4 INJECTION INTRAMUSCULAR; INTRAVENOUS ONCE AS NEEDED
Status: DISCONTINUED | OUTPATIENT
Start: 2021-06-21 | End: 2021-06-21 | Stop reason: HOSPADM

## 2021-06-21 RX ORDER — PROMETHAZINE HYDROCHLORIDE 25 MG/1
25 SUPPOSITORY RECTAL ONCE AS NEEDED
Status: DISCONTINUED | OUTPATIENT
Start: 2021-06-21 | End: 2021-06-21 | Stop reason: HOSPADM

## 2021-06-21 RX ORDER — LIDOCAINE HYDROCHLORIDE 20 MG/ML
INJECTION, SOLUTION INTRAVENOUS AS NEEDED
Status: DISCONTINUED | OUTPATIENT
Start: 2021-06-21 | End: 2021-06-21 | Stop reason: SURG

## 2021-06-21 RX ADMIN — PROPOFOL 20 MG: 10 INJECTION, EMULSION INTRAVENOUS at 11:42

## 2021-06-21 RX ADMIN — PROPOFOL 50 MG: 10 INJECTION, EMULSION INTRAVENOUS at 11:40

## 2021-06-21 RX ADMIN — DEXTROSE AND SODIUM CHLORIDE 30 ML/HR: 5; 450 INJECTION, SOLUTION INTRAVENOUS at 11:01

## 2021-06-21 RX ADMIN — LIDOCAINE HYDROCHLORIDE 60 MG: 20 INJECTION, SOLUTION INTRAVENOUS at 11:40

## 2021-06-21 RX ADMIN — PROPOFOL 20 MG: 10 INJECTION, EMULSION INTRAVENOUS at 11:44

## 2021-06-21 NOTE — ANESTHESIA POSTPROCEDURE EVALUATION
Patient: Kristy Ramirez    Procedure Summary     Date: 06/21/21 Room / Location: [facility] ENDOSCOPY 3 / [facility] ENDOSCOPY    Anesthesia Start: 1133 Anesthesia Stop: 1148    Procedure: ESOPHAGOGASTRODUODENOSCOPY (N/A ) Diagnosis:       Other dysphagia      (Other dysphagia [R13.19])    Surgeons: Jack Ellis MD Provider: Syed Swift CRNA    Anesthesia Type: MAC ASA Status: 3          Anesthesia Type: MAC    Vitals  No vitals data found for the desired time range.          Post Anesthesia Care and Evaluation    Patient location during evaluation: bedside  Patient participation: complete - patient participated  Level of consciousness: sleepy but conscious  Pain score: 0  Pain management: adequate  Airway patency: patent  Anesthetic complications: No anesthetic complications  PONV Status: none  Cardiovascular status: acceptable  Respiratory status: acceptable  Hydration status: acceptable    Comments: BP: 135/58  HR: 45  RR: 16  SpO2: 100%

## 2021-06-23 RX ORDER — LOPERAMIDE HYDROCHLORIDE 2 MG/1
CAPSULE ORAL
Qty: 360 CAPSULE | Refills: 0 | Status: SHIPPED | OUTPATIENT
Start: 2021-06-23 | End: 2021-08-24

## 2021-06-29 ENCOUNTER — TELEPHONE (OUTPATIENT)
Dept: FAMILY MEDICINE CLINIC | Facility: CLINIC | Age: 86
End: 2021-06-29

## 2021-06-30 ENCOUNTER — LAB (OUTPATIENT)
Dept: LAB | Facility: HOSPITAL | Age: 86
End: 2021-06-30

## 2021-06-30 ENCOUNTER — OFFICE VISIT (OUTPATIENT)
Dept: FAMILY MEDICINE CLINIC | Facility: CLINIC | Age: 86
End: 2021-06-30

## 2021-06-30 VITALS
OXYGEN SATURATION: 98 % | SYSTOLIC BLOOD PRESSURE: 122 MMHG | DIASTOLIC BLOOD PRESSURE: 48 MMHG | HEART RATE: 50 BPM | HEIGHT: 59 IN | TEMPERATURE: 97.1 F | BODY MASS INDEX: 26 KG/M2 | WEIGHT: 129 LBS | RESPIRATION RATE: 20 BRPM

## 2021-06-30 DIAGNOSIS — M10.9 GOUT, UNSPECIFIED CAUSE, UNSPECIFIED CHRONICITY, UNSPECIFIED SITE: ICD-10-CM

## 2021-06-30 DIAGNOSIS — G62.9 NEUROPATHY: ICD-10-CM

## 2021-06-30 DIAGNOSIS — R73.03 PREDIABETES: ICD-10-CM

## 2021-06-30 DIAGNOSIS — Z13.29 SCREENING FOR THYROID DISORDER: ICD-10-CM

## 2021-06-30 DIAGNOSIS — I10 ESSENTIAL HYPERTENSION: ICD-10-CM

## 2021-06-30 DIAGNOSIS — G89.29 CHRONIC LOW BACK PAIN, UNSPECIFIED BACK PAIN LATERALITY, UNSPECIFIED WHETHER SCIATICA PRESENT: ICD-10-CM

## 2021-06-30 DIAGNOSIS — E55.9 VITAMIN D DEFICIENCY: ICD-10-CM

## 2021-06-30 DIAGNOSIS — E87.6 HYPOKALEMIA: ICD-10-CM

## 2021-06-30 DIAGNOSIS — N28.9 DECREASED RENAL FUNCTION: ICD-10-CM

## 2021-06-30 DIAGNOSIS — M54.50 CHRONIC LOW BACK PAIN, UNSPECIFIED BACK PAIN LATERALITY, UNSPECIFIED WHETHER SCIATICA PRESENT: ICD-10-CM

## 2021-06-30 DIAGNOSIS — E53.8 B12 DEFICIENCY: ICD-10-CM

## 2021-06-30 DIAGNOSIS — R21 RASH AND NONSPECIFIC SKIN ERUPTION: ICD-10-CM

## 2021-06-30 DIAGNOSIS — Z76.89 ENCOUNTER TO ESTABLISH CARE: Primary | ICD-10-CM

## 2021-06-30 PROCEDURE — 80048 BASIC METABOLIC PNL TOTAL CA: CPT

## 2021-06-30 PROCEDURE — 99214 OFFICE O/P EST MOD 30 MIN: CPT | Performed by: NURSE PRACTITIONER

## 2021-06-30 PROCEDURE — 84550 ASSAY OF BLOOD/URIC ACID: CPT

## 2021-06-30 PROCEDURE — 81003 URINALYSIS AUTO W/O SCOPE: CPT | Performed by: NURSE PRACTITIONER

## 2021-06-30 PROCEDURE — 82306 VITAMIN D 25 HYDROXY: CPT

## 2021-06-30 PROCEDURE — 84443 ASSAY THYROID STIM HORMONE: CPT

## 2021-06-30 PROCEDURE — 83036 HEMOGLOBIN GLYCOSYLATED A1C: CPT

## 2021-06-30 PROCEDURE — 82607 VITAMIN B-12: CPT

## 2021-07-01 LAB
25(OH)D3 SERPL-MCNC: 48.1 NG/ML
ANION GAP SERPL CALCULATED.3IONS-SCNC: 11.7 MMOL/L (ref 5–15)
BILIRUB UR QL STRIP: NEGATIVE
BUN SERPL-MCNC: 28 MG/DL (ref 8–23)
BUN/CREAT SERPL: 20 (ref 7–25)
CALCIUM SPEC-SCNC: 9.9 MG/DL (ref 8.6–10.5)
CHLORIDE SERPL-SCNC: 99 MMOL/L (ref 98–107)
CLARITY UR: CLEAR
CO2 SERPL-SCNC: 27.3 MMOL/L (ref 22–29)
COLOR UR: YELLOW
CREAT SERPL-MCNC: 1.4 MG/DL (ref 0.57–1)
GFR SERPL CREATININE-BSD FRML MDRD: 43 ML/MIN/1.73
GLUCOSE SERPL-MCNC: 83 MG/DL (ref 65–99)
GLUCOSE UR STRIP-MCNC: NEGATIVE MG/DL
HBA1C MFR BLD: 5.5 % (ref 4.8–5.6)
HGB UR QL STRIP.AUTO: NEGATIVE
KETONES UR QL STRIP: NEGATIVE
LEUKOCYTE ESTERASE UR QL STRIP.AUTO: NEGATIVE
NITRITE UR QL STRIP: NEGATIVE
PH UR STRIP.AUTO: 7.5 [PH] (ref 5–8)
POTASSIUM SERPL-SCNC: 3.8 MMOL/L (ref 3.5–5.2)
PROT UR QL STRIP: NEGATIVE
SODIUM SERPL-SCNC: 138 MMOL/L (ref 136–145)
SP GR UR STRIP: 1.01 (ref 1–1.03)
TSH SERPL DL<=0.05 MIU/L-ACNC: 2.45 UIU/ML (ref 0.27–4.2)
URATE SERPL-MCNC: 6.7 MG/DL (ref 2.4–5.7)
UROBILINOGEN UR QL STRIP: NORMAL
VIT B12 BLD-MCNC: 478 PG/ML (ref 211–946)

## 2021-07-08 ENCOUNTER — OFFICE VISIT (OUTPATIENT)
Dept: GASTROENTEROLOGY | Facility: CLINIC | Age: 86
End: 2021-07-08

## 2021-07-08 VITALS
HEART RATE: 78 BPM | HEIGHT: 59 IN | DIASTOLIC BLOOD PRESSURE: 60 MMHG | BODY MASS INDEX: 25.52 KG/M2 | WEIGHT: 126.6 LBS | SYSTOLIC BLOOD PRESSURE: 134 MMHG

## 2021-07-08 DIAGNOSIS — R13.19 OTHER DYSPHAGIA: Primary | ICD-10-CM

## 2021-07-08 PROCEDURE — 99214 OFFICE O/P EST MOD 30 MIN: CPT | Performed by: INTERNAL MEDICINE

## 2021-07-08 RX ORDER — DEXTROSE AND SODIUM CHLORIDE 5; .45 G/100ML; G/100ML
30 INJECTION, SOLUTION INTRAVENOUS CONTINUOUS PRN
Status: CANCELLED | OUTPATIENT
Start: 2021-08-06

## 2021-07-08 NOTE — PATIENT INSTRUCTIONS
Dysphagia    Dysphagia is trouble swallowing. This condition occurs when solids and liquids stick in a person's throat on the way down to the stomach, or when food takes longer to get to the stomach than usual.  You may have problems swallowing food, liquids, or both. You may also have pain while trying to swallow. It may take you more time and effort to swallow something.  What are the causes?  This condition may be caused by:  · Muscle problems. They may make it difficult for you to move food and liquids through the esophagus, which is the tube that connects your mouth to your stomach.  · Blockages. You may have ulcers, scar tissue, or inflammation that blocks the normal passage of food and liquids. Causes of these problems include:  ? Acid reflux from your stomach into your esophagus (gastroesophageal reflux).  ? Infections.  ? Radiation treatment for cancer.  ? Medicines taken without enough fluids to wash them down into your stomach.  · Stroke. This can affect the nerves and make it difficult to swallow.  · Nerve problems. These prevent signals from being sent to the muscles of your esophagus to squeeze (contract) and move what you swallow down to your stomach.  · Globus pharyngeus. This is a common problem that involves a feeling like something is stuck in your throat or a sense of trouble with swallowing, even though nothing is wrong with the swallowing passages.  · Certain conditions, such as cerebral palsy or Parkinson's disease.  What are the signs or symptoms?  Common symptoms of this condition include:  · A feeling that solids or liquids are stuck in your throat on the way down to the stomach.  · Pain while swallowing.  · Coughing or gagging while trying to swallow.  Other symptoms include:  · Food moving back from your stomach to your mouth (regurgitation).  · Noises coming from your throat.  · Chest discomfort with swallowing.  · A feeling of fullness when swallowing.  · Drooling, especially when the  throat is blocked.  · Heartburn.  How is this diagnosed?  This condition may be diagnosed by:  · Barium X-ray. In this test, you will swallow a white liquid that sticks to the inside of your esophagus. X-ray images are then taken.  · Endoscopy. In this test, a flexible telescope is inserted down your throat to look at your esophagus and your stomach.  · CT scans and an MRI.  How is this treated?  Treatment for dysphagia depends on the cause of this condition, such as:  · If the dysphagia is caused by acid reflux or infection, medicines may be used. They may include antibiotics and heartburn medicines.  · If the dysphagia is caused by problems with the muscles, swallowing therapy may be used to help you strengthen your swallowing muscles. You may have to do specific exercises to strengthen the muscles or stretch them.  · If the dysphagia is caused by a blockage or mass, procedures to remove the blockage may be done. You may need surgery and a feeding tube.  You may need to make diet changes. Ask your health care provider for specific instructions.  Follow these instructions at home:  Medicines  · Take over-the-counter and prescription medicines only as told by your health care provider.  · If you were prescribed an antibiotic medicine, take it as told by your health care provider. Do not stop taking the antibiotic even if you start to feel better.  Eating and drinking    · Follow any diet changes as told by your health care provider.  · Work with a diet and nutrition specialist (dietitian) to create an eating plan that will help you get the nutrients you need in order to stay healthy.  · Eat soft foods that are easier to swallow.  · Cut your food into small pieces and eat slowly. Take small bites.  · Eat and drink only when you are sitting upright.  · Do not drink alcohol or caffeine. If you need help quitting, ask your health care provider.  General instructions  · Check your weight every day to make sure you are  not losing weight.  · Do not use any products that contain nicotine or tobacco, such as cigarettes, e-cigarettes, and chewing tobacco. If you need help quitting, ask your health care provider.  · Keep all follow-up visits as told by your health care provider. This is important.  Contact a health care provider if you:  · Lose weight because you cannot swallow.  · Cough when you drink liquids.  · Cough up partially digested food.  Get help right away if you:  · Cannot swallow your saliva.  · Have shortness of breath, a fever, or both.  · Have a hoarse voice and also have trouble swallowing.  Summary  · Dysphagia is trouble swallowing. This condition occurs when solids and liquids stick in a person's throat on the way down to the stomach. You may cough or gag while trying to swallow.  · Dysphagia has many possible causes.  · Treatment for dysphagia depends on the cause of the condition.  · Keep all follow-up visits as told by your health care provider. This is important.  This information is not intended to replace advice given to you by your health care provider. Make sure you discuss any questions you have with your health care provider.  Document Revised: 05/13/2020 Document Reviewed: 05/13/2020  [employer] Patient Education © 2021 [employer]

## 2021-07-08 NOTE — PROGRESS NOTES
[facility]      Chief Complaint:   Chief Complaint   Patient presents with   • EGD Performed 06/21/2021     Other Dysphagia       Subjective     HPI:   Patient with distal esophageal stricture.  Patient has a severe stricture which has been dilated up to 36 [place].  Patient states that she is not had as much improvement with this previous dilatation she did with the first.    Plan; discussed with patient that we will need to repeat EGD with continued dilatation to improve her swallowing  Past Medical History:   Past Medical History:   Diagnosis Date   • Abnormal CT scan     per patient   • Acute bronchitis    • Acute maxillary sinusitis    • Acute otitis externa    • Acute sinusitis    • Allergic conjunctivitis    • Angular cheilitis    • Ankle edema    • Backache     improved   • Blood in urine      UTI resolved      • Bradycardia    • Chest discomfort     described as heart racing      • Chronic low back pain     RIGHT leg radiation      • Cold feet     c/o - and lowerlegs      • Contusion     of dorsum of foot   • Cough    • COVID-19 virus infection    • Depressive disorder    • Diabetes mellitus (CMS/HCC)    • Dizziness and giddiness    • Dyspnea    • Dysuria    • Edema    • Edema of foot    • Edema of lower extremity    • Essential hypertension    • Exanthematous disorder    • Fatigue    • Foot pain, left    • GERD (gastroesophageal reflux disease)    • Grade II hemorrhoids 3/12/2018   • Hematoma    • Hip pain, right    • History of palpitations    • Hormone replacement therapy (postmenopausal)    • Hypertensive disorder    • Impacted cerumen    • Joint pain    • Knee pain    • Low back pain    • Malaise and fatigue    • Multiple joint pain    • Muscle pain    • Muscle weakness    • Neck pain    • Obesity (BMI 30.0-34.9) 3/12/2018   • Osteoarthritis of knee    • Osteoarthritis of multiple joints    • Otitis externa    • Pain in lower limb    • Peripheral venous insufficiency    • POP-Q  stage 2 rectocele 3/12/2018   • POP-Q stage 4 cystocele 3/12/2018   • Sacroiliitis, not elsewhere classified (CMS/HCC)     R LE   • Shoulder pain    • Upper respiratory infection    • Urinary frequency    • Urinary tract infectious disease    • Wheezing        Past Surgical History:  Past Surgical History:   Procedure Laterality Date   • ANKLE SURGERY     • BACK SURGERY     • COLONOSCOPY  12/14/2009   • ENDOSCOPY N/A 1/6/2021    Procedure: ESOPHAGOGASTRODUODENOSCOPY;  Surgeon: Jack Ellis MD;  Location: [facility] ENDOSCOPY;  Service: Gastroenterology;  Laterality: N/A;   • ENDOSCOPY N/A 1/28/2021    Procedure: ESOPHAGOGASTRODUODENOSCOPY;  Surgeon: Jack Ellis MD;  Location: [facility] ENDOSCOPY;  Service: Gastroenterology;  Laterality: N/A;  eso dilation with ballon to 30FR   • ENDOSCOPY N/A 6/21/2021    Procedure: ESOPHAGOGASTRODUODENOSCOPY;  Surgeon: Jack Ellis MD;  Location: [facility] ENDOSCOPY;  Service: Gastroenterology;  Laterality: N/A;   • EYE SURGERY Bilateral 02/2018    B cataract   • HAMMER TOE REPAIR  08/02/2011    Hammertoe repair, left second toe.   • HYSTERECTOMY     • INCISION AND DRAINAGE ABSCESS  01/21/2015   • INJECTION OF MEDICATION  11/15/2013   • INJECTION OF MEDICATION  05/06/2013    Celestone (betamethasone) (1)      • INJECTION OF MEDICATION  03/17/2016    Kenalog (3)      • NECK SURGERY  01/2018   • TEMPORAL ARTERY BIOPSY Left 11/10/2020    Procedure: LEFT TEMPORAL ARTERY BIOPSY;  Surgeon: Anjel Olea MD;  Location: [facility] OR;  Service: General;  Laterality: Left;   • UPPER GASTROINTESTINAL ENDOSCOPY  12/14/2009   • UPPER GASTROINTESTINAL ENDOSCOPY  01/06/2021   • UPPER GASTROINTESTINAL ENDOSCOPY  01/28/2021   • UPPER GASTROINTESTINAL ENDOSCOPY  06/21/2021       Family History:  Family History   Problem Relation Age of Onset   • Diabetes Other    • Heart disease Other    • Stroke Other    • Coronary artery disease Mother    • Coronary artery disease Father        Social History:    reports that she has quit smoking. Her smoking use included cigarettes. She has never used smokeless tobacco. She reports that she does not drink alcohol and does not use drugs.    Medications:   Prior to Admission medications    Medication Sig Start Date End Date Taking? Authorizing Provider   Accu-Chek FastClix Lancets misc 1 each Daily. 8/3/20  Yes Ora Walden APRN   allopurinol (ZYLOPRIM) 100 MG tablet Take 1 tablet by mouth Daily. take with food 8/3/20  Yes Ora Walden APRN   amLODIPine (NORVASC) 5 MG tablet Take 1 tablet by mouth Daily. 8/3/20  Yes Ora Walden APRN   chlorthalidone (HYGROTON) 25 MG tablet Take 1 tablet by mouth Daily. 2/1/21  Yes Ora Walden APRN   clotrimazole-betamethasone (LOTRISONE) 1-0.05 % cream APPLY TOPICALLY TO THE APPROPRIATE AREA AS DIRECTED 2 TIMES A DAY  Patient taking differently: As Needed. 2/22/21  Yes Ora Walden APRN   colchicine 0.6 MG capsule capsule As Needed. 8/18/19  Yes ProviderVipin MD   diclofenac (VOLTAREN) 1 % gel gel Apply 4 g topically to the appropriate area as directed 3 (Three) Times a Day. Small amount to affected area  Patient taking differently: Apply 4 g topically to the appropriate area as directed 3 (Three) Times a Day As Needed. Small amount to affected area 8/20/20  Yes Syed Carr MD   Diclofenac Sodium (VOLTAREN) 1 % gel gel Apply 4 g topically to the appropriate area as directed 4 (Four) Times a Day As Needed (joint pain). 2/26/21  Yes Ora Walden APRN   docusate sodium (DOK) 100 MG capsule Take 1 capsule by mouth 2 (Two) Times a Day.  Patient taking differently: Take 100 mg by mouth 2 (Two) Times a Day As Needed. 4/29/19  Yes Mateusz Chopra APRN   furosemide (LASIX) 20 MG tablet TAKE 1 TABLET EVERY DAY 1/28/21  Yes Ora Walden APRN   glucose blood test strip Use as instructed to check b/s 1x a day.  Please give strip compatible with monitor.  (accucheck) 8/3/20  Yes Ora Walden APRN   loperamide (IMODIUM) 2 MG capsule TAKE 1 CAPSULE 4 TIMES DAILY AS NEEDED FOR DIARRHEA 6/23/21  Yes Maggi William APRN   loratadine (Claritin) 10 MG tablet Take 1 tablet by mouth Daily.  Patient taking differently: Take 10 mg by mouth Daily As Needed. 3/8/21  Yes Ora Walden APRN   losartan (COZAAR) 50 MG tablet Take 50 mg by mouth Daily. 6/7/21  Yes ProviderVipin MD   Menthol, Topical Analgesic, (BIOFREEZE) 4 % gel Apply to affected area on left back 2-3 times per day x 1week 4/29/19  Yes Mateusz Chopra APRN   mupirocin (Bactroban) 2 % ointment Apply  topically to the appropriate area as directed 2 (Two) Times a Day. 6/30/21  Yes Maggi William APRN   nystatin (MYCOSTATIN) 120727 UNIT/ML suspension Swish and swallow 5 mL 4 (Four) Times a Day.  Patient taking differently: Swish and swallow 500,000 Units 4 (Four) Times a Day As Needed. 5/25/21  Yes Maggi William APRN   omeprazole (priLOSEC) 20 MG capsule TAKE 1 CAPSULE TWICE DAILY 2/18/21  Yes Ora Walden APRN   polyethylene glycol (MIRALAX) 17 g packet Take 17 g by mouth Daily.  Patient taking differently: Take 17 g by mouth Daily As Needed. 9/18/20  Yes Ora Walden APRN   tolterodine LA (DETROL LA) 4 MG 24 hr capsule Take 1 capsule by mouth Daily. 2/3/21  Yes Ora Walden APRN   folic acid (FOLVITE) 1 MG tablet Take 1 mg by mouth Daily.    ProviderVipin MD   NUCYNTA 50 MG tablet TK 1 T PO BID PRN 9/9/19   ProviderVipin MD   gabapentin (NEURONTIN) 300 MG capsule Take 1 capsule by mouth At Night As Needed (leg pain). 5/25/21 7/8/21  Maggi William APRN   gabapentin (NEURONTIN) 600 MG tablet TAKE 1 TABLET BY MOUTH EVERY EVENING 11/24/20 7/8/21  Ora Walden APRN   metoprolol succinate XL (Toprol XL) 50 MG 24 hr tablet Take 1 tablet by mouth Daily. 2/26/21 7/8/21  Ora Walden APRN   metoprolol succinate XL  "(TOPROL-XL) 25 MG 24 hr tablet Take 1 tablet by mouth Daily. 8/3/20 7/8/21  Ora Walden APRN   omeprazole (priLOSEC) 20 MG capsule Take 1 capsule by mouth 2 (Two) Times a Day. 8/3/20   Ora Walden APRN       Allergies:  Aleve [naproxen sodium], Atenolol, Gabapentin, Keflex [cephalexin], Lisinopril, Relafen [nabumetone], Zanaflex [tizanidine], Acetaminophen, Aspirin, Codeine, Diflucan [fluconazole], and Sulfa antibiotics    ROS:    Review of Systems   Constitutional: Negative for activity change, appetite change, chills, diaphoresis, fatigue, fever and unexpected weight change.   HENT: Negative for sore throat and trouble swallowing.    Respiratory: Negative for shortness of breath.    Gastrointestinal: Negative for abdominal distention, abdominal pain, anal bleeding, blood in stool, constipation, diarrhea, nausea, rectal pain and vomiting.   Endocrine: Negative for polydipsia, polyphagia and polyuria.   Genitourinary: Negative for difficulty urinating.   Musculoskeletal: Negative for arthralgias.   Skin: Negative for pallor.   Allergic/Immunologic: Negative for food allergies.   Neurological: Negative for weakness and light-headedness.   Psychiatric/Behavioral: Negative for behavioral problems.     Objective     Blood pressure 134/60, pulse 78, height 149.2 cm (58.75\"), weight 57.4 kg (126 lb 9.6 oz), not currently breastfeeding.    Physical Exam  Constitutional:       General: She is not in acute distress.     Appearance: She is well-developed. She is not diaphoretic.   HENT:      Head: Normocephalic and atraumatic.   Cardiovascular:      Rate and Rhythm: Normal rate and regular rhythm.      Heart sounds: Normal heart sounds. No murmur heard.   No friction rub. No gallop.    Pulmonary:      Effort: No respiratory distress.      Breath sounds: Normal breath sounds. No wheezing or rales.   Chest:      Chest wall: No tenderness.   Abdominal:      General: Bowel sounds are normal. There is no " distension.      Palpations: Abdomen is soft. There is no mass.      Tenderness: There is no abdominal tenderness. There is no guarding or rebound.      Hernia: No hernia is present.   Musculoskeletal:         General: Normal range of motion.   Skin:     General: Skin is warm and dry.      Coloration: Skin is not pale.      Findings: No erythema or rash.   Neurological:      Mental Status: She is alert and oriented to person, place, and time.   Psychiatric:         Behavior: Behavior normal.         Thought Content: Thought content normal.         Judgment: Judgment normal.          Assessment/Plan   Diagnoses and all orders for this visit:    1. Other dysphagia (Primary)  -     Case Request; Standing  -     dextrose 5 % and sodium chloride 0.45 % infusion  -     Case Request    Other orders  -     Follow Anesthesia Guidelines / Standing Orders; Future  -     Obtain Informed Consent; Future  -     Implement Anesthesia Orders Day of Procedure; Standing  -     Obtain Informed Consent; Standing  -     POC Glucose Once; Standing  -     Insert Peripheral IV; Standing        ESOPHAGOGASTRODUODENOSCOPY (N/A)     Diagnosis Plan   1. Other dysphagia  Case Request    dextrose 5 % and sodium chloride 0.45 % infusion    Case Request       Anticipated Surgical Procedure:  Orders Placed This Encounter   Procedures   • Follow Anesthesia Guidelines / Standing Orders     Standing Status:   Future   • Obtain Informed Consent     Standing Status:   Future     Order Specific Question:   Informed Consent Given For     Answer:   ESOPHAGOGASTRODUODENOSCOPY       The risks, benefits, and alternatives of this procedure have been discussed with the patient or the responsible party- the patient understands and agrees to proceed.

## 2021-07-13 ENCOUNTER — OFFICE VISIT (OUTPATIENT)
Dept: FAMILY MEDICINE CLINIC | Facility: CLINIC | Age: 86
End: 2021-07-13

## 2021-07-13 VITALS
RESPIRATION RATE: 20 BRPM | DIASTOLIC BLOOD PRESSURE: 50 MMHG | HEART RATE: 56 BPM | OXYGEN SATURATION: 99 % | WEIGHT: 125 LBS | BODY MASS INDEX: 25.2 KG/M2 | SYSTOLIC BLOOD PRESSURE: 138 MMHG | HEIGHT: 59 IN | TEMPERATURE: 98.2 F

## 2021-07-13 DIAGNOSIS — M79.10 MYALGIA: ICD-10-CM

## 2021-07-13 DIAGNOSIS — I10 ESSENTIAL HYPERTENSION: ICD-10-CM

## 2021-07-13 DIAGNOSIS — G47.00 INSOMNIA, UNSPECIFIED TYPE: Primary | ICD-10-CM

## 2021-07-13 DIAGNOSIS — I48.0 PAROXYSMAL ATRIAL FIBRILLATION (HCC): ICD-10-CM

## 2021-07-13 PROBLEM — T07.XXXA INSECT BITES OF MULTIPLE SITES, INFECTED: Status: RESOLVED | Noted: 2021-01-11 | Resolved: 2021-07-13

## 2021-07-13 PROBLEM — L08.9 INSECT BITES OF MULTIPLE SITES, INFECTED: Status: RESOLVED | Noted: 2021-01-11 | Resolved: 2021-07-13

## 2021-07-13 PROBLEM — J02.9 SORE THROAT: Status: RESOLVED | Noted: 2017-06-16 | Resolved: 2021-07-13

## 2021-07-13 PROBLEM — H61.21 IMPACTED CERUMEN OF RIGHT EAR: Status: RESOLVED | Noted: 2017-06-22 | Resolved: 2021-07-13

## 2021-07-13 PROBLEM — E66.9 OBESITY (BMI 30.0-34.9): Chronic | Status: RESOLVED | Noted: 2018-03-12 | Resolved: 2021-07-13

## 2021-07-13 PROBLEM — L98.9 SKIN LESION: Status: RESOLVED | Noted: 2017-04-13 | Resolved: 2021-07-13

## 2021-07-13 PROBLEM — IMO0002 HORMONE DEFICIENCY: Status: RESOLVED | Noted: 2017-04-13 | Resolved: 2021-07-13

## 2021-07-13 PROBLEM — R63.4 WEIGHT LOSS: Status: RESOLVED | Noted: 2019-06-13 | Resolved: 2021-07-13

## 2021-07-13 PROBLEM — Z79.890 ON HORMONE REPLACEMENT THERAPY: Status: RESOLVED | Noted: 2017-04-13 | Resolved: 2021-07-13

## 2021-07-13 PROBLEM — N76.0 ACUTE VAGINITIS: Status: RESOLVED | Noted: 2019-06-13 | Resolved: 2021-07-13
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
## 2021-07-13 PROBLEM — S40.862A INSECT BITE OF LEFT ARM: Status: RESOLVED | Noted: 2019-06-13 | Resolved: 2021-07-13

## 2021-07-13 PROBLEM — M31.6 TEMPORAL ARTERITIS (HCC): Status: RESOLVED | Noted: 2020-11-08 | Resolved: 2021-07-13

## 2021-07-13 PROBLEM — E34.9 HORMONE DEFICIENCY: Status: RESOLVED | Noted: 2017-04-13 | Resolved: 2021-07-13

## 2021-07-13 PROBLEM — H60.501 ACUTE OTITIS EXTERNA OF RIGHT EAR: Status: RESOLVED | Noted: 2017-06-30 | Resolved: 2021-07-13

## 2021-07-13 PROBLEM — H65.93 BILATERAL SEROUS OTITIS MEDIA: Status: RESOLVED | Noted: 2018-06-08 | Resolved: 2021-07-13
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
## 2021-07-13 PROCEDURE — 99214 OFFICE O/P EST MOD 30 MIN: CPT | Performed by: NURSE PRACTITIONER

## 2021-07-13 RX ORDER — METOPROLOL SUCCINATE 50 MG/1
50 TABLET, EXTENDED RELEASE ORAL DAILY
COMMUNITY
End: 2021-07-13 | Stop reason: DRUGHIGH

## 2021-07-13 RX ORDER — HYDROXYZINE HYDROCHLORIDE 10 MG/1
TABLET, FILM COATED ORAL
Qty: 60 TABLET | Refills: 1 | Status: SHIPPED | OUTPATIENT
Start: 2021-07-13 | End: 2021-08-24

## 2021-07-13 RX ORDER — METOPROLOL SUCCINATE 25 MG/1
25 TABLET, EXTENDED RELEASE ORAL DAILY
Qty: 30 TABLET | Refills: 3 | Status: SHIPPED | OUTPATIENT
Start: 2021-07-13 | End: 2021-12-06 | Stop reason: SDUPTHER

## 2021-07-13 NOTE — PROGRESS NOTES
"Chief Complaint  Insomnia and Fibromyalgia    Subjective    History of Present Illness {CC  Problem List  Visit  Diagnosis   Encounters  Notes  Medications  Labs  Result Review Imaging  Media :23}     Kristy Raimrez presents to [facility] for   C/o \"can't sleep\" and fibromyalgia pain. Has tried benadryl but it made her itch. Pt reports most medications make her itch. Has been prescribed gabapentin in the past but can no longer take d/t causing itching. Also reports that she restarted the metoprolol that was stopped at her visit on 6/21/21 d/t BP 99/50 and HR 48. Review of previous medical records indicates metoprolol was started Nov 2020 by Dr. Olea but 25mg - dosing was changed on 2/26/21 to 50mg without documentation of the reason for change /60 and HR 89 at time of dose change. Pt could not give reason to why med was change. It is also noted that pt has allergy listed to atenolol but no reaction noted - pt could not give clarity to being allergic to atenolol nor the reaction she has - does report taking metoprolol without SE.     Insomnia  This is a new problem. Associated symptoms include myalgias. Pertinent negatives include no fatigue, fever, headaches, urinary symptoms or vertigo. Treatments tried: benadryl. The treatment provided no relief.   Fibromyalgia  This is a chronic problem. The current episode started more than 1 year ago. The problem occurs daily. The problem has been unchanged. Associated symptoms include myalgias. Pertinent negatives include no fatigue, fever, headaches, urinary symptoms or vertigo.        Objective     Physical Exam  Vitals and nursing note reviewed.   Constitutional:       General: She is not in acute distress.     Appearance: Normal appearance. She is normal weight. She is not ill-appearing.   HENT:      Head: Normocephalic and atraumatic.      Mouth/Throat:      Mouth: Mucous membranes are moist.      Pharynx: Oropharynx " "is clear. No posterior oropharyngeal erythema.      Comments: No obvious abnormalities seen of mouth  Eyes:      Conjunctiva/sclera: Conjunctivae normal.      Pupils: Pupils are equal, round, and reactive to light.   Neck:      Vascular: No carotid bruit.   Cardiovascular:      Rate and Rhythm: Regular rhythm. Bradycardia present.      Heart sounds: Normal heart sounds.   Pulmonary:      Effort: Pulmonary effort is normal.      Breath sounds: Normal breath sounds.   Musculoskeletal:         General: Tenderness (back) present. Normal range of motion.      Cervical back: Normal range of motion and neck supple.   Skin:     General: Skin is warm and dry.   Neurological:      General: No focal deficit present.      Mental Status: She is alert and oriented to person, place, and time.   Psychiatric:         Mood and Affect: Mood normal.         Behavior: Behavior normal.        Result Review  Data Reviewed:{ Labs  Result Review  Imaging  Med Tab  Media :23}   The following data was reviewed by (Optional):[id number]}  Common labs    Common Labsle 11/10/20 11/10/20 2/23/21 2/23/21 6/30/21 6/30/21 6/30/21    0743 0743 1032 1032 1122 1122 1122   Glucose  121 (A)  89 83     BUN  25 (A)  25 (A) 28 (A)     Creatinine  0.92  1.29 (A) 1.40 (A)     eGFR  Am  70  47 (A) 43 (A)     Sodium  138  139 138     Potassium  3.5  3.4 (A) 3.8     Chloride  106  102 99     Calcium  9.3  10.0 9.9     WBC 10.82 (A)  8.07       Hemoglobin 8.0 (A)  10.8 (A)       Hematocrit 24.6 (A)  32.2 (A)       Platelets 254  214       Hemoglobin A1C       5.50   Uric Acid      6.7 (A)    (A) Abnormal value               Vital Signs:   /50 (BP Location: Left arm, Patient Position: Sitting, Cuff Size: Adult)   Pulse 56   Temp 98.2 °F (36.8 °C) (Temporal)   Resp 20   Ht 149.9 cm (59\")   Wt 56.7 kg (125 lb)   SpO2 99%   BMI 25.25 kg/m²          Assessment and Plan {CC Problem List  Visit Diagnosis  ROS  Review (Popup)  Health Maintenance  " "Quality  BestPractice  Medications  SmartSets  SnapShot Encounters  Media :23}   Problem List Items Addressed This Visit        Cardiac and Vasculature    Essential hypertension    Overview     Patient on Losartan, lasix, HCTZ, norvasc and chlorthalidone. Will increase her Losartan and discontinue diuretics.  - vitals per floor protocol  - increase losartan to 100 mg daily, keep norvasc 5 mg daily           Relevant Medications    metoprolol succinate XL (Toprol XL) 25 MG 24 hr tablet    Paroxysmal atrial fibrillation (CMS/HCC)    Overview     Patient had episode of atrial fibrillation EKG was obtained and revealed A. fib at HR 94   Asymptomatic  -will monitor           Relevant Medications    metoprolol succinate XL (Toprol XL) 25 MG 24 hr tablet       Musculoskeletal and Injuries    Muscle spasm       Sleep    Insomnia - Primary    Relevant Medications    hydrOXYzine (ATARAX) 10 MG tablet         Diagnosis Plan   1. Insomnia, unspecified type  hydrOXYzine (ATARAX) 10 MG tablet   2. Myalgia     3. Essential hypertension  metoprolol succinate XL (Toprol XL) 25 MG 24 hr tablet   4. Paroxysmal atrial fibrillation (CMS/HCC)  metoprolol succinate XL (Toprol XL) 25 MG 24 hr tablet     - Insomnia - pt requesting trazodone that was suggested by a family member - advised against trazodone at this time - will trial on hydroxyzine 10mg 1-2 tabs at HS PRN Insomnia.  - Myalgia - pt unable to tolerate a lot of medications d/t varying SE   - HTN and paroxysmal A-fib - pt stated she had to restart the metoprolol d/t \"having one palpitation after the other\" - will start back at 25mg d/t low BP and HR previously noted. Will need to contact pharmacy for a current medication list as pt is poor historian regarding medications and currently has 5 BP medications listed as active medications.   - RTC PRN or as scheduled    Follow Up {Instructions Charge Capture  Follow-up Communications :23}   Return if symptoms worsen or fail to " improve, for Next scheduled follow up.  Patient was given instructions and counseling regarding her condition or for health maintenance advice. Please see specific information pulled into the AVS if appropriate            .  This document has been electronically signed by NADYA Duke on July 13, 2021 21:32 CDT

## 2021-07-13 NOTE — PATIENT INSTRUCTIONS
Insomnia  Insomnia is a sleep disorder that makes it difficult to fall asleep or stay asleep. Insomnia can cause fatigue, low energy, difficulty concentrating, mood swings, and poor performance at work or school.  There are three different ways to classify insomnia:  · Difficulty falling asleep.  · Difficulty staying asleep.  · Waking up too early in the morning.  Any type of insomnia can be long-term (chronic) or short-term (acute). Both are common. Short-term insomnia usually lasts for three months or less. Chronic insomnia occurs at least three times a week for longer than three months.  What are the causes?  Insomnia may be caused by another condition, situation, or substance, such as:  · Anxiety.  · Certain medicines.  · Gastroesophageal reflux disease (GERD) or other gastrointestinal conditions.  · Asthma or other breathing conditions.  · Restless legs syndrome, sleep apnea, or other sleep disorders.  · Chronic pain.  · Menopause.  · Stroke.  · Abuse of alcohol, tobacco, or illegal drugs.  · Mental health conditions, such as depression.  · Caffeine.  · Neurological disorders, such as Alzheimer's disease.  · An overactive thyroid (hyperthyroidism).  Sometimes, the cause of insomnia may not be known.  What increases the risk?  Risk factors for insomnia include:  · Gender. Women are affected more often than men.  · Age. Insomnia is more common as you get older.  · Stress.  · Lack of exercise.  · Irregular work schedule or working night shifts.  · Traveling between different time zones.  · Certain medical and mental health conditions.  What are the signs or symptoms?  If you have insomnia, the main symptom is having trouble falling asleep or having trouble staying asleep. This may lead to other symptoms, such as:  · Feeling fatigued or having low energy.  · Feeling nervous about going to sleep.  · Not feeling rested in the morning.  · Having trouble concentrating.  · Feeling irritable, anxious, or depressed.  How  is this diagnosed?  This condition may be diagnosed based on:  · Your symptoms and medical history. Your health care provider may ask about:  ? Your sleep habits.  ? Any medical conditions you have.  ? Your mental health.  · A physical exam.  How is this treated?  Treatment for insomnia depends on the cause. Treatment may focus on treating an underlying condition that is causing insomnia. Treatment may also include:  · Medicines to help you sleep.  · Counseling or therapy.  · Lifestyle adjustments to help you sleep better.  Follow these instructions at home:  Eating and drinking    · Limit or avoid alcohol, caffeinated beverages, and cigarettes, especially close to bedtime. These can disrupt your sleep.  · Do not eat a large meal or eat spicy foods right before bedtime. This can lead to digestive discomfort that can make it hard for you to sleep.  Sleep habits    · Keep a sleep diary to help you and your health care provider figure out what could be causing your insomnia. Write down:  ? When you sleep.  ? When you wake up during the night.  ? How well you sleep.  ? How rested you feel the next day.  ? Any side effects of medicines you are taking.  ? What you eat and drink.  · Make your bedroom a dark, comfortable place where it is easy to fall asleep.  ? Put up shades or blackout curtains to block light from outside.  ? Use a white noise machine to block noise.  ? Keep the temperature cool.  · Limit screen use before bedtime. This includes:  ? Watching TV.  ? Using your smartphone, tablet, or computer.  · Stick to a routine that includes going to bed and waking up at the same times every day and night. This can help you fall asleep faster. Consider making a quiet activity, such as reading, part of your nighttime routine.  · Try to avoid taking naps during the day so that you sleep better at night.  · Get out of bed if you are still awake after 15 minutes of trying to sleep. Keep the lights down, but try reading or  doing a quiet activity. When you feel sleepy, go back to bed.  General instructions  · Take over-the-counter and prescription medicines only as told by your health care provider.  · Exercise regularly, as told by your health care provider. Avoid exercise starting several hours before bedtime.  · Use relaxation techniques to manage stress. Ask your health care provider to suggest some techniques that may work well for you. These may include:  ? Breathing exercises.  ? Routines to release muscle tension.  ? Visualizing peaceful scenes.  · Make sure that you drive carefully. Avoid driving if you feel very sleepy.  · Keep all follow-up visits as told by your health care provider. This is important.  Contact a health care provider if:  · You are tired throughout the day.  · You have trouble in your daily routine due to sleepiness.  · You continue to have sleep problems, or your sleep problems get worse.  Get help right away if:  · You have serious thoughts about hurting yourself or someone else.  If you ever feel like you may hurt yourself or others, or have thoughts about taking your own life, get help right away. You can go to your nearest emergency department or call:  · Your local emergency services (911 in the [place]  · A suicide crisis helpline, such as the National Suicide Prevention Lifeline at 1-621.915.6132. This is open 24 hours a day.  Summary  · Insomnia is a sleep disorder that makes it difficult to fall asleep or stay asleep.  · Insomnia can be long-term (chronic) or short-term (acute).  · Treatment for insomnia depends on the cause. Treatment may focus on treating an underlying condition that is causing insomnia.  · Keep a sleep diary to help you and your health care provider figure out what could be causing your insomnia.  This information is not intended to replace advice given to you by your health care provider. Make sure you discuss any questions you have with your health care provider.  Document  Revised: 11/30/2018 Document Reviewed: 09/27/2018  [employer] Patient Education © 2021 [employer]

## 2021-07-18 PROBLEM — R73.03 PREDIABETES: Status: ACTIVE | Noted: 2018-01-23

## 2021-07-19 RX ORDER — LOSARTAN POTASSIUM 50 MG/1
50 TABLET ORAL DAILY
COMMUNITY
End: 2021-09-08 | Stop reason: SDUPTHER

## 2021-07-21 ENCOUNTER — OFFICE VISIT (OUTPATIENT)
Dept: FAMILY MEDICINE CLINIC | Facility: CLINIC | Age: 86
End: 2021-07-21

## 2021-07-21 VITALS
DIASTOLIC BLOOD PRESSURE: 50 MMHG | BODY MASS INDEX: 25.44 KG/M2 | RESPIRATION RATE: 20 BRPM | HEART RATE: 66 BPM | WEIGHT: 126.2 LBS | HEIGHT: 59 IN | TEMPERATURE: 98 F | SYSTOLIC BLOOD PRESSURE: 102 MMHG | OXYGEN SATURATION: 99 %

## 2021-07-21 DIAGNOSIS — Z00.00 MEDICARE ANNUAL WELLNESS VISIT, SUBSEQUENT: Primary | ICD-10-CM

## 2021-07-21 PROCEDURE — 1160F RVW MEDS BY RX/DR IN RCRD: CPT | Performed by: NURSE PRACTITIONER

## 2021-07-21 PROCEDURE — 1125F AMNT PAIN NOTED PAIN PRSNT: CPT | Performed by: NURSE PRACTITIONER

## 2021-07-21 PROCEDURE — 1170F FXNL STATUS ASSESSED: CPT | Performed by: NURSE PRACTITIONER

## 2021-07-21 PROCEDURE — 96160 PT-FOCUSED HLTH RISK ASSMT: CPT | Performed by: NURSE PRACTITIONER

## 2021-07-21 PROCEDURE — G0439 PPPS, SUBSEQ VISIT: HCPCS | Performed by: NURSE PRACTITIONER

## 2021-07-21 NOTE — PATIENT INSTRUCTIONS
Medicare Wellness  Personal Prevention Plan of Service     Date of Office Visit:  2021  Encounter Provider:  NADYA Duke  Place of Service:  [facility]  Patient Name: Kristy Ramirez  :  1934    As part of the Medicare Wellness portion of your visit today, we are providing you with this personalized preventive plan of services (PPPS). This plan is based upon recommendations of the [place] Preventive Services Task Force (USPSTF) and the Advisory Committee on Immunization Practices (ACIP).    This lists the preventive care services that should be considered, and provides dates of when you are due. Items listed as completed are up-to-date and do not require any further intervention.    Health Maintenance   Topic Date Due   • DXA SCAN  Never done   • Pneumococcal Vaccine 65+ (1 of 2 - PPSV23) Never done   • ZOSTER VACCINE (2 of 2) 07/10/2017   • ANNUAL WELLNESS VISIT  2019   • URINE MICROALBUMIN  2019   • DIABETIC EYE EXAM  2021   • GASTROSCOPY (EGD)  2021   • INFLUENZA VACCINE  10/01/2021   • HEMOGLOBIN A1C  2021   • TDAP/TD VACCINES (2 - Td or Tdap) 05/15/2027   • COVID-19 Vaccine  Completed       No orders of the defined types were placed in this encounter.      Return in about 1 year (around 2022) for Medicare Wellness.

## 2021-07-31 NOTE — PROGRESS NOTES
The ABCs of the Annual Wellness Visit  Subsequent Medicare Wellness Visit    Chief Complaint   Patient presents with   • Annual Exam     medicare wellness       Subjective   History of Present Illness:  Kristy Ramirez is a 87 y.o. female who presents for a Subsequent Medicare Wellness Visit.    HEALTH RISK ASSESSMENT    Recent Hospitalizations:  No hospitalization(s) within the last year.    Current Medical Providers:  Patient Care Team:  Maggi William APRN as PCP - General (Family Medicine)    Smoking Status:  Social History     Tobacco Use   Smoking Status Former Smoker   • Types: Cigarettes   Smokeless Tobacco Never Used   Tobacco Comment    07/08/2021 - Patient states she has not utilized Cigarettes in 40 plus years.       Alcohol Consumption:  Social History     Substance and Sexual Activity   Alcohol Use No       Depression Screen:   PHQ-2/PHQ-9 Depression Screening 12/30/2020   Little interest or pleasure in doing things 0   Feeling down, depressed, or hopeless 0   Total Score 0       Fall Risk Screen:  RENE Fall Risk Assessment was completed, and patient is at MODERATE risk for falls. Assessment completed on:7/21/2021    Health Habits and Functional and Cognitive Screening:  Functional & Cognitive Status 7/21/2021   Do you have difficulty preparing food and eating? No   Do you have difficulty bathing yourself, getting dressed or grooming yourself? No   Do you have difficulty using the toilet? No   Do you have difficulty moving around from place to place? Yes   Do you have trouble with steps or getting out of a bed or a chair? Yes   Current Diet Well Balanced Diet   Dental Exam Up to date   Eye Exam Up to date   Exercise (times per week) 3 times per week   Current Exercises Include Walking   Do you need help using the phone?  No   Are you deaf or do you have serious difficulty hearing?  No   Do you need help with transportation? Yes   Do you need help shopping? Yes   Do you need help preparing  meals?  Yes   Do you need help with housework?  Yes   Do you need help with laundry? Yes   Do you need help taking your medications? No   Do you need help managing money? No   Do you ever drive or ride in a car without wearing a seat belt? No   Have you felt unusual stress, anger or loneliness in the last month? Yes   Who do you live with? Alone   If you need help, do you have trouble finding someone available to you? No   Do you have difficulty concentrating, remembering or making decisions? Yes         Does the patient have evidence of cognitive impairment? Yes    Asprin use counseling:Start ASA 81 mg daily     Age-appropriate Screening Schedule:  Refer to the list below for future screening recommendations based on patient's age, sex and/or medical conditions. Orders for these recommended tests are listed in the plan section. The patient has been provided with a written plan.    Health Maintenance   Topic Date Due   • URINE MICROALBUMIN  12/20/2019   • DXA SCAN  07/21/2022 (Originally 1934)   • ZOSTER VACCINE (2 of 2) 07/21/2022 (Originally 7/10/2017)   • DIABETIC EYE EXAM  08/19/2021   • INFLUENZA VACCINE  10/01/2021   • HEMOGLOBIN A1C  12/30/2021   • TDAP/TD VACCINES (2 - Td or Tdap) 05/15/2027          The following portions of the patient's history were reviewed and updated as appropriate: allergies, current medications, past family history, past medical history, past social history, past surgical history and problem list.    Outpatient Medications Prior to Visit   Medication Sig Dispense Refill   • allopurinol (ZYLOPRIM) 100 MG tablet Take 1 tablet by mouth Daily. take with food 90 tablet 1   • amLODIPine (NORVASC) 5 MG tablet Take 1 tablet by mouth Daily. 90 tablet 1   • Diclofenac Sodium (VOLTAREN) 1 % gel gel Apply 4 g topically to the appropriate area as directed 4 (Four) Times a Day As Needed (joint pain). 150 g 3   • hydrOXYzine (ATARAX) 10 MG tablet Take 1-2 tabs at HS PRN insomnia 60 tablet 1    • loperamide (IMODIUM) 2 MG capsule TAKE 1 CAPSULE 4 TIMES DAILY AS NEEDED FOR DIARRHEA 360 capsule 0   • losartan (COZAAR) 50 MG tablet Take 50 mg by mouth Daily.     • metoprolol succinate XL (Toprol XL) 25 MG 24 hr tablet Take 1 tablet by mouth Daily. 30 tablet 3   • mupirocin (Bactroban) 2 % ointment Apply  topically to the appropriate area as directed 2 (Two) Times a Day. 30 g 1   • omeprazole (priLOSEC) 20 MG capsule TAKE 1 CAPSULE TWICE DAILY 180 capsule 1   • tolterodine LA (DETROL LA) 4 MG 24 hr capsule Take 1 capsule by mouth Daily. 90 capsule 1   • chlorthalidone (HYGROTON) 25 MG tablet Take 1 tablet by mouth Daily. 90 tablet 3     No facility-administered medications prior to visit.       Patient Active Problem List   Diagnosis   • Weakness   • Chronic low back pain   • Choking   • Diarrhea   • Pain of right hip joint   • Magnesium disorder   • Gout   • Gastroesophageal reflux disease   • Essential hypertension   • Stress incontinence   • Low back pain   • Angioedema   • Allergic reaction   • Neck pain   • Prediabetes   • POP-Q stage 4 cystocele   • POP-Q stage 2 rectocele   • Grade II hemorrhoids   • Eustachian tube dysfunction, bilateral   • Dependent edema   • Neuropathy   • Pessary maintenance   • Muscle spasm   • Primary osteoarthritis of right hand   • Cervical stenosis of spinal canal   • Degeneration of lumbar intervertebral disc   • Abnormal bowel habits   • Blood in stool   • Esophageal dysphagia   • Anemia   • Paroxysmal atrial fibrillation (CMS/HCC)   • Other dysphagia   • Stomatitis and mucositis   • Insomnia   • Peripheral venous insufficiency   • Bradycardia       Advanced Care Planning:  ACP discussion was held with the patient during this visit. Patient has an advance directive (not in EMR), copy requested. Patient does not have an advance directive, information provided.    Review of Systems    Compared to one year ago, the patient feels her physical health is worse.  Compared to one  "year ago, the patient feels her mental health is the same.    Reviewed chart for potential of high risk medication in the elderly: yes  Reviewed chart for potential of harmful drug interactions in the elderly:yes    Objective         Vitals:    07/21/21 1005   BP: 102/50   BP Location: Left arm   Patient Position: Sitting   Cuff Size: Adult   Pulse: 66   Resp: 20   Temp: 98 °F (36.7 °C)   TempSrc: Temporal   SpO2: 99%   Weight: 57.2 kg (126 lb 3.2 oz)   Height: 149.9 cm (59\")   PainSc:   8   PainLoc: Back       Body mass index is 25.49 kg/m².  Discussed the patient's BMI with her. The BMI is in the acceptable range.    Physical Exam  Vitals and nursing note reviewed.   Constitutional:       General: She is not in acute distress.     Appearance: Normal appearance. She is normal weight. She is not ill-appearing.   HENT:      Head: Normocephalic and atraumatic.      Mouth/Throat:      Comments: No obvious abnormalities seen of mouth  Eyes:      Conjunctiva/sclera: Conjunctivae normal.      Pupils: Pupils are equal, round, and reactive to light.   Neck:      Vascular: No carotid bruit.   Cardiovascular:      Rate and Rhythm: Normal rate and regular rhythm.      Heart sounds: Normal heart sounds.   Pulmonary:      Effort: Pulmonary effort is normal.      Breath sounds: Normal breath sounds.   Musculoskeletal:         General: Tenderness (back) present. Normal range of motion.      Cervical back: Normal range of motion and neck supple.   Skin:     General: Skin is warm and dry.   Neurological:      General: No focal deficit present.      Mental Status: She is alert and oriented to person, place, and time.   Psychiatric:         Mood and Affect: Mood normal.         Behavior: Behavior normal.         Lab Results   Component Value Date    HGBA1C 5.50 06/30/2021        Assessment/Plan   Medicare Risks and Personalized Health Plan  CMS Preventative Services Quick Reference  Advance Directive Discussion  Cardiovascular " risk  Chronic Pain   Dementia/Memory   Depression/Dysphoria  Diabetic Lab Screening   Fall Risk  Glaucoma Risk  Hearing Problem  Immunizations Discussed/Encouraged (specific immunizations; discussed pneumococcal vaccine and zoster vaccine - pt states she has already had pneumonia vaccine but cannot recall when. Declined shingles.  )  Inadequate Social Support, Isolation, Loneliness, Lack of Transportation, Financial Difficulties, or Caregiver Stress   Inactivity/Sedentary  Osteoporosis Risk  Polypharmacy  Urinary Incontinence    The above risks/problems have been discussed with the patient.  Pertinent information has been shared with the patient in the After Visit Summary.  Follow up plans and orders are seen below in the Assessment/Plan Section.    Diagnoses and all orders for this visit:    1. Medicare annual wellness visit, subsequent (Primary)      Follow Up:  Return in about 1 year (around 7/21/2022), or if symptoms worsen or fail to improve, for Medicare Wellness.     An After Visit Summary and PPPS were given to the patient.         This document has been electronically signed by NADYA Duke on July 31, 2021 18:39 CDT

## 2021-08-02 ENCOUNTER — OFFICE VISIT (OUTPATIENT)
Dept: OBSTETRICS AND GYNECOLOGY | Facility: CLINIC | Age: 86
End: 2021-08-02

## 2021-08-02 VITALS
HEIGHT: 59 IN | DIASTOLIC BLOOD PRESSURE: 60 MMHG | SYSTOLIC BLOOD PRESSURE: 110 MMHG | BODY MASS INDEX: 25.4 KG/M2 | WEIGHT: 126 LBS

## 2021-08-02 DIAGNOSIS — Z46.89 PESSARY MAINTENANCE: Primary | ICD-10-CM

## 2021-08-02 DIAGNOSIS — N81.10 POP-Q STAGE 4 CYSTOCELE: ICD-10-CM

## 2021-08-02 DIAGNOSIS — N89.8 VAGINAL DISCHARGE: ICD-10-CM

## 2021-08-02 DIAGNOSIS — N81.6 POP-Q STAGE 2 RECTOCELE: ICD-10-CM

## 2021-08-02 PROCEDURE — 87480 CANDIDA DNA DIR PROBE: CPT | Performed by: NURSE PRACTITIONER

## 2021-08-02 PROCEDURE — 99212 OFFICE O/P EST SF 10 MIN: CPT | Performed by: NURSE PRACTITIONER

## 2021-08-02 PROCEDURE — 87510 GARDNER VAG DNA DIR PROBE: CPT | Performed by: NURSE PRACTITIONER

## 2021-08-02 PROCEDURE — 87660 TRICHOMONAS VAGIN DIR PROBE: CPT | Performed by: NURSE PRACTITIONER

## 2021-08-02 NOTE — PROGRESS NOTES
"Subjective   Chief Complaint   Patient presents with   • Pessary Check     Kristy Ramirez is a 87 y.o. year old who presents to be seen for follow-up of her pessary.  Currently she is using Foldable ring w/ support - #7 w/o urethral bar.  She reports no problems with her pessary at this time.     No Additional Complaints Reported    The following portions of the patient's history were reviewed and updated as appropriate:problem list, current medications and allergies    Review of Systems   Constitutional: Negative for activity change, appetite change, chills, diaphoresis, fatigue, fever, unexpected weight gain and unexpected weight loss.   Gastrointestinal: Negative for abdominal distention, abdominal pain, anal bleeding, blood in stool, constipation, diarrhea, nausea, rectal pain and vomiting.   Genitourinary: Negative for decreased urine volume, difficulty urinating, pelvic pain, pelvic pressure, urgency, urinary incontinence, vaginal bleeding, vaginal discharge and vaginal pain.        Objective   /60   Ht 149.9 cm (59\")   Wt 57.2 kg (126 lb)   LMP  (LMP Unknown)   Breastfeeding No   BMI 25.45 kg/m²     General:  well developed; well nourished  no acute distress   Pelvis: Clinical staff was present for exam  External genitalia:  normal appearance of the external genitalia including Bartholin's and [place]'s glands.  :  urethral meatus normal; urethra hypermobile; prominent caruncle present; bladder is tender to palpation  Vaginal:  atrophic mucosal changes are present;  Cervix:  absent.  Uterus:  absent.  Adnexa:  non palpable bilaterally.  Cystocele GRADE 2  Rectocele GRADE 2  Pessary removed and cleaned. After vaginal inspection the pessary was lubricated and placed back into the vaginal vault. She tolerated this well.     Lab Review   No data reviewed    Imaging   No data reviewed         Diagnoses and all orders for this visit:    Pessary maintenance    POP-Q stage 4 cystocele    POP-Q " stage 2 rectocele    Vaginal discharge  -     Gardnerella vaginalis, Trichomonas vaginalis, Candida albicans, DNA - Swab, Vagina        No orders of the defined types were placed in this encounter.    F/U in 8 weeks for routine pessary maintenance.       This document was electronically signed.     Gladys Soni, NADYA  August 2, 2021

## 2021-08-03 ENCOUNTER — LAB (OUTPATIENT)
Dept: LAB | Facility: HOSPITAL | Age: 86
End: 2021-08-03

## 2021-08-03 ENCOUNTER — TELEPHONE (OUTPATIENT)
Dept: OBSTETRICS AND GYNECOLOGY | Facility: CLINIC | Age: 86
End: 2021-08-03

## 2021-08-03 DIAGNOSIS — Z01.818 PREOP TESTING: Primary | ICD-10-CM

## 2021-08-03 LAB
CANDIDA ALBICANS: POSITIVE
GARDNERELLA VAGINALIS: POSITIVE
SARS-COV-2 N GENE RESP QL NAA+PROBE: NOT DETECTED
T VAGINALIS DNA VAG QL PROBE+SIG AMP: NEGATIVE

## 2021-08-03 PROCEDURE — C9803 HOPD COVID-19 SPEC COLLECT: HCPCS

## 2021-08-03 PROCEDURE — 87635 SARS-COV-2 COVID-19 AMP PRB: CPT

## 2021-08-03 RX ORDER — METRONIDAZOLE 500 MG/1
500 TABLET ORAL 2 TIMES DAILY
Qty: 14 TABLET | Refills: 0 | Status: SHIPPED | OUTPATIENT
Start: 2021-08-03 | End: 2021-08-10

## 2021-08-03 NOTE — TELEPHONE ENCOUNTER
PT WENT TO THE PHARMACY YESTERDAY AND NO MEDICINE HAD BEEN CALLED IN.  WOULD YOU PLEASE CALL THIS PT?  I TOLD HER THAT HER LAB IS STILL PENDING THAT YOU MAY BE WAITING ON A RESULT.

## 2021-08-04 ENCOUNTER — TELEPHONE (OUTPATIENT)
Dept: GASTROENTEROLOGY | Facility: CLINIC | Age: 86
End: 2021-08-04

## 2021-08-04 ENCOUNTER — OFFICE VISIT (OUTPATIENT)
Dept: FAMILY MEDICINE CLINIC | Facility: CLINIC | Age: 86
End: 2021-08-04

## 2021-08-04 VITALS
DIASTOLIC BLOOD PRESSURE: 50 MMHG | WEIGHT: 111 LBS | HEART RATE: 67 BPM | TEMPERATURE: 98 F | OXYGEN SATURATION: 100 % | RESPIRATION RATE: 20 BRPM | HEIGHT: 59 IN | BODY MASS INDEX: 22.38 KG/M2 | SYSTOLIC BLOOD PRESSURE: 122 MMHG

## 2021-08-04 DIAGNOSIS — G47.00 INSOMNIA, UNSPECIFIED TYPE: Primary | ICD-10-CM

## 2021-08-04 DIAGNOSIS — N28.9 DECREASED RENAL FUNCTION: ICD-10-CM

## 2021-08-04 PROCEDURE — 99214 OFFICE O/P EST MOD 30 MIN: CPT | Performed by: NURSE PRACTITIONER

## 2021-08-04 RX ORDER — ZOLPIDEM TARTRATE 5 MG/1
5 TABLET ORAL NIGHTLY PRN
Qty: 30 TABLET | Refills: 0 | Status: SHIPPED | OUTPATIENT
Start: 2021-08-04 | End: 2021-10-04 | Stop reason: SDUPTHER

## 2021-08-04 NOTE — PROGRESS NOTES
Chief Complaint  Insomnia    Subjective    History of Present Illness {CC  Problem List  Visit  Diagnosis   Encounters  Notes  Medications  Labs  Result Review Imaging  Media :23}     Kristy Ramirez presents to [facility] for   Insomnia. Reports this is worsening and needs something to help her as she states she is only getting approx 3 hrs sleep per night. Has tried hydroxyzine without relief. Reports most medications cause her to itch.    Insomnia  This is a recurrent problem. The current episode started more than 1 month ago. The problem occurs daily. The problem has been gradually worsening. Associated symptoms include arthralgias. Pertinent negatives include no urinary symptoms. Nothing aggravates the symptoms. Treatments tried: hydroxyzine. The treatment provided no relief.        Objective     Physical Exam  Vitals and nursing note reviewed.   Constitutional:       General: She is not in acute distress.     Appearance: Normal appearance. She is normal weight. She is not ill-appearing.   HENT:      Head: Normocephalic and atraumatic.      Mouth/Throat:      Mouth: Mucous membranes are moist.      Pharynx: Oropharynx is clear. No posterior oropharyngeal erythema.      Comments: No obvious abnormalities seen of mouth  Eyes:      Conjunctiva/sclera: Conjunctivae normal.      Pupils: Pupils are equal, round, and reactive to light.   Cardiovascular:      Rate and Rhythm: Normal rate and regular rhythm.      Heart sounds: Normal heart sounds.   Pulmonary:      Effort: Pulmonary effort is normal.      Breath sounds: Normal breath sounds.   Musculoskeletal:         General: Tenderness (back) present. Normal range of motion.      Cervical back: Normal range of motion and neck supple.   Skin:     General: Skin is warm and dry.   Neurological:      General: No focal deficit present.      Mental Status: She is alert.   Psychiatric:         Mood and Affect: Mood normal.          "Behavior: Behavior normal.        Result Review  Data Reviewed:{ Labs  Result Review  Imaging  Med Tab  Media :23}   The following data was reviewed by (Optional):[id number]}  Common labs    Common Labsle 11/10/20 11/10/20 2/23/21 2/23/21 6/30/21 6/30/21 6/30/21    0743 0743 1032 1032 1122 1122 1122   Glucose  121 (A)  89 83     BUN  25 (A)  25 (A) 28 (A)     Creatinine  0.92  1.29 (A) 1.40 (A)     eGFR  Am  70  47 (A) 43 (A)     Sodium  138  139 138     Potassium  3.5  3.4 (A) 3.8     Chloride  106  102 99     Calcium  9.3  10.0 9.9     WBC 10.82 (A)  8.07       Hemoglobin 8.0 (A)  10.8 (A)       Hematocrit 24.6 (A)  32.2 (A)       Platelets 254  214       Hemoglobin A1C       5.50   Uric Acid      6.7 (A)    (A) Abnormal value          No Images in the past 120 days found..       Vital Signs:   /50 (BP Location: Right arm, Patient Position: Sitting, Cuff Size: Adult)   Pulse 67   Temp 98 °F (36.7 °C) (Temporal)   Resp 20   Ht 149.9 cm (59\")   Wt 50.3 kg (111 lb)   SpO2 100%   BMI 22.42 kg/m²          Assessment and Plan {CC Problem List  Visit Diagnosis  ROS  Review (Popup)  Health Maintenance  Quality  BestPractice  Medications  SmartSets  SnapShot Encounters  Media :23}   Problem List Items Addressed This Visit        Sleep    Insomnia - Primary    Relevant Medications    zolpidem (Ambien) 5 MG tablet      Other Visit Diagnoses     Decreased renal function        Relevant Orders    Ambulatory Referral to Nephrology (Completed)         Diagnosis Plan   1. Insomnia, unspecified type  zolpidem (Ambien) 5 MG tablet   2. Decreased renal function  Ambulatory Referral to Nephrology     - Indepth conversation regarding treatment options for insomnia - discussed caution with use r/t fall d/t sedating effects. Will give low dose ambien but stressed that this will only be short term. Pt v/u.  - Decreased renal fxn - discussed that her pruritis may be r/t her kidneys not filtering toxins - " Nephrology referral submitted - discussed importance for pt to go to this appt.   - RTC as scheduled or PRN    Follow Up {Instructions Charge Capture  Follow-up Communications :23}   Return if symptoms worsen or fail to improve, for Next scheduled follow up.  Patient was given instructions and counseling regarding her condition or for health maintenance advice. Please see specific information pulled into the AVS if appropriate            .  This document has been electronically signed by NADYA Duke on August 31, 2021 00:27 CDT

## 2021-08-04 NOTE — TELEPHONE ENCOUNTER
----- Message from Keyona Rodriguez sent at 8/4/2021 10:29 AM CDT -----  Patient daughter Lindsey would like you to call her is wanting to know why her mother throat keep closing up and why so many procedures. Lindsey number is [phone].

## 2021-08-04 NOTE — TELEPHONE ENCOUNTER
Note - 09/22/2020 Gastroenterology Verbal Release Authorization reflects daughter, Lindsey Ulloa, as individual with whom office staff may speak.

## 2021-08-04 NOTE — TELEPHONE ENCOUNTER
08/04/2021, 1345 - Patient's daughter, Lindsey, telephone call returned per this staff member [phone].  Zero answer.  Voice message submitted with date, time, office contact information, and notification this staff member returning her call as requested.

## 2021-08-06 ENCOUNTER — HOSPITAL ENCOUNTER (OUTPATIENT)
Facility: HOSPITAL | Age: 86
Setting detail: HOSPITAL OUTPATIENT SURGERY
Discharge: HOME | End: 2021-08-06
Attending: INTERNAL MEDICINE | Admitting: INTERNAL MEDICINE

## 2021-08-06 ENCOUNTER — ANESTHESIA EVENT (OUTPATIENT)
Dept: GASTROENTEROLOGY | Facility: HOSPITAL | Age: 86
End: 2021-08-06

## 2021-08-06 ENCOUNTER — ANESTHESIA (OUTPATIENT)
Dept: GASTROENTEROLOGY | Facility: HOSPITAL | Age: 86
End: 2021-08-06

## 2021-08-06 VITALS
HEIGHT: 60 IN | OXYGEN SATURATION: 99 % | DIASTOLIC BLOOD PRESSURE: 59 MMHG | WEIGHT: 124.6 LBS | BODY MASS INDEX: 24.46 KG/M2 | RESPIRATION RATE: 18 BRPM | HEART RATE: 61 BPM | TEMPERATURE: 96.5 F | SYSTOLIC BLOOD PRESSURE: 134 MMHG

## 2021-08-06 DIAGNOSIS — R13.19 OTHER DYSPHAGIA: ICD-10-CM

## 2021-08-06 DIAGNOSIS — R13.19 ESOPHAGEAL DYSPHAGIA: Primary | ICD-10-CM

## 2021-08-06 PROCEDURE — C1769 GUIDE WIRE: HCPCS | Performed by: INTERNAL MEDICINE

## 2021-08-06 PROCEDURE — 43248 EGD GUIDE WIRE INSERTION: CPT | Performed by: INTERNAL MEDICINE

## 2021-08-06 PROCEDURE — 25010000002 PROPOFOL 10 MG/ML EMULSION: Performed by: NURSE ANESTHETIST, CERTIFIED REGISTERED

## 2021-08-06 RX ORDER — PROPOFOL 10 MG/ML
VIAL (ML) INTRAVENOUS AS NEEDED
Status: DISCONTINUED | OUTPATIENT
Start: 2021-08-06 | End: 2021-08-06 | Stop reason: SURG

## 2021-08-06 RX ORDER — LIDOCAINE HYDROCHLORIDE 20 MG/ML
INJECTION, SOLUTION EPIDURAL; INFILTRATION; INTRACAUDAL; PERINEURAL AS NEEDED
Status: DISCONTINUED | OUTPATIENT
Start: 2021-08-06 | End: 2021-08-06 | Stop reason: SURG

## 2021-08-06 RX ORDER — DEXTROSE AND SODIUM CHLORIDE 5; .45 G/100ML; G/100ML
30 INJECTION, SOLUTION INTRAVENOUS CONTINUOUS PRN
Status: CANCELLED | OUTPATIENT
Start: 2021-08-06

## 2021-08-06 RX ORDER — DEXTROSE AND SODIUM CHLORIDE 5; .45 G/100ML; G/100ML
30 INJECTION, SOLUTION INTRAVENOUS CONTINUOUS PRN
Status: DISCONTINUED | OUTPATIENT
Start: 2021-08-06 | End: 2021-08-06 | Stop reason: HOSPADM

## 2021-08-06 RX ADMIN — LIDOCAINE HYDROCHLORIDE 60 MG: 20 INJECTION, SOLUTION EPIDURAL; INFILTRATION; INTRACAUDAL; PERINEURAL at 10:52

## 2021-08-06 RX ADMIN — DEXTROSE AND SODIUM CHLORIDE 30 ML/HR: 5; 450 INJECTION, SOLUTION INTRAVENOUS at 10:48

## 2021-08-06 RX ADMIN — PROPOFOL 30 MG: 10 INJECTION, EMULSION INTRAVENOUS at 10:56

## 2021-08-06 RX ADMIN — PROPOFOL 70 MG: 10 INJECTION, EMULSION INTRAVENOUS at 10:52

## 2021-08-06 NOTE — ANESTHESIA POSTPROCEDURE EVALUATION
Patient: Kristy Ramirez    Procedure Summary     Date: 08/06/21 Room / Location: [facility] END[facility] 3 / [facility]    Anesthesia Start: 1050 Anesthesia Stop: 1101    Procedure: ESOPHAGOGASTRODUODENOSCOPY (N/A ) Diagnosis:       Other dysphagia      (Other dysphagia [R13.19])    Surgeons: Jack Ellis MD Provider: Elijah Duvall CRNA    Anesthesia Type: MAC ASA Status: 3          Anesthesia Type: MAC    Vitals  No vitals data found for the desired time range.          Post Anesthesia Care and Evaluation    Patient location during evaluation: bedside  Patient participation: complete - patient participated  Level of consciousness: awake and awake and alert  Pain score: 0  Pain management: adequate  Airway patency: patent  Anesthetic complications: No anesthetic complications  PONV Status: none  Cardiovascular status: acceptable and stable  Respiratory status: acceptable, room air and spontaneous ventilation  Hydration status: acceptable    Comments: BP:  165/72  HR:  65  SAT:  98  RR:  16  TEMP: 97.5

## 2021-08-09 ENCOUNTER — OFFICE VISIT (OUTPATIENT)
Dept: OBSTETRICS AND GYNECOLOGY | Facility: CLINIC | Age: 86
End: 2021-08-09

## 2021-08-09 VITALS
SYSTOLIC BLOOD PRESSURE: 110 MMHG | WEIGHT: 124 LBS | BODY MASS INDEX: 24.35 KG/M2 | DIASTOLIC BLOOD PRESSURE: 62 MMHG | HEIGHT: 60 IN

## 2021-08-09 DIAGNOSIS — N81.6 POP-Q STAGE 2 RECTOCELE: ICD-10-CM

## 2021-08-09 DIAGNOSIS — Z46.89 PESSARY MAINTENANCE: Primary | ICD-10-CM

## 2021-08-09 DIAGNOSIS — N81.10 POP-Q STAGE 4 CYSTOCELE: ICD-10-CM

## 2021-08-09 PROCEDURE — 99212 OFFICE O/P EST SF 10 MIN: CPT | Performed by: NURSE PRACTITIONER

## 2021-08-09 NOTE — PROGRESS NOTES
"Subjective   Chief Complaint   Patient presents with   • Pessary Check     Kristy Ramirez is a 87 y.o. year old who presents to be seen for follow-up of her pessary.  Currently she is using Foldable ring w/ support - #7 w/o urethral bar.  She reports pessary started to fall out yesterday.    No Additional Complaints Reported    The following portions of the patient's history were reviewed and updated as appropriate:current medications and allergies    Social History    Tobacco Use      Smoking status: Former Smoker        Types: Cigarettes      Smokeless tobacco: Never Used      Tobacco comment: 07/08/2021 - Patient states she has not utilized Cigarettes in 40 plus years.    Review of Systems   Constitutional: Negative.    Genitourinary: Positive for pelvic pressure. Negative for decreased urine volume, difficulty urinating, frequency, genital sores, pelvic pain, urgency and urinary incontinence.        Objective   /62   Ht 152.4 cm (60\")   Wt 56.2 kg (124 lb)   LMP  (LMP Unknown)   BMI 24.22 kg/m²         General:  well developed; well nourished  no acute distress   Pelvis: Clinical staff was present for exam  External genitalia:  normal appearance of the external genitalia including Bartholin's and [place]'s glands.  :  urethral meatus normal; urethra hypermobile; prominent caruncle present;  Pessary removed and clean then replaced into the vagina without difficulty. Pt tolerated well.       Lab Review   No data reviewed    Imaging   No data reviewed         No orders of the defined types were placed in this encounter.        Diagnoses and all orders for this visit:    Pessary maintenance    POP-Q stage 4 cystocele    POP-Q stage 2 rectocele        Follow up PRN or at next scheduled visit.     This note was electronically signed.    Gladys Soni, APRN  August 9, 2021  "

## 2021-08-16 ENCOUNTER — TELEPHONE (OUTPATIENT)
Dept: FAMILY MEDICINE CLINIC | Facility: CLINIC | Age: 86
End: 2021-08-16

## 2021-08-16 NOTE — TELEPHONE ENCOUNTER
Pt called regarding her referral to Nepherology.  I didn't see that anything has been scheduled.  I told her that I would check into it and call her by the end of the day

## 2021-08-16 NOTE — TELEPHONE ENCOUNTER
I called the pt back to let her know that I spoke with the office of Dr Duval regarding her Nepherology referral.  I let her know that they told me that their referral person has been out but will be in the office on Wednesday and will be reaching out to her Wednesday or Thursday about he appointment

## 2021-08-18 ENCOUNTER — TELEPHONE (OUTPATIENT)
Dept: FAMILY MEDICINE CLINIC | Facility: CLINIC | Age: 86
End: 2021-08-18

## 2021-08-18 DIAGNOSIS — I48.0 PAROXYSMAL ATRIAL FIBRILLATION (HCC): ICD-10-CM

## 2021-08-18 DIAGNOSIS — R00.2 PALPITATIONS: Primary | ICD-10-CM

## 2021-08-18 NOTE — TELEPHONE ENCOUNTER
Pt called stating that she had stopped the blood pressure medication NADYA Perez asked her to but then started having palpitaions.  When that happened, she started the medication again.  Now she is having dizzy spells and was wanting to know what she needed to do.  NADYA Perez has been notified

## 2021-08-18 NOTE — TELEPHONE ENCOUNTER
I called the pt back and advised her that if the palpatations and dizziness continues and/or gets worse to go to the ER for evaluation and that NADYA Carrasco is putting in a Cardiology referral for her

## 2021-08-19 ENCOUNTER — OFFICE VISIT (OUTPATIENT)
Dept: GASTROENTEROLOGY | Facility: CLINIC | Age: 86
End: 2021-08-19

## 2021-08-19 VITALS
SYSTOLIC BLOOD PRESSURE: 154 MMHG | BODY MASS INDEX: 24.94 KG/M2 | HEART RATE: 69 BPM | WEIGHT: 127 LBS | HEIGHT: 60 IN | DIASTOLIC BLOOD PRESSURE: 65 MMHG

## 2021-08-19 DIAGNOSIS — K22.2 ESOPHAGEAL STRICTURE: Primary | ICD-10-CM

## 2021-08-19 PROCEDURE — 99214 OFFICE O/P EST MOD 30 MIN: CPT | Performed by: INTERNAL MEDICINE

## 2021-08-19 RX ORDER — DEXTROSE AND SODIUM CHLORIDE 5; .45 G/100ML; G/100ML
30 INJECTION, SOLUTION INTRAVENOUS CONTINUOUS PRN
Status: CANCELLED | OUTPATIENT
Start: 2021-08-19

## 2021-08-19 NOTE — PROGRESS NOTES
[facility]      Chief Complaint:   Chief Complaint   Patient presents with   • Difficulty Swallowing     follow up after endo       Subjective     HPI:   Patient with dysphagia.  Patient states that her dysphagia has improved with dilatation up to 39 Faroese.  Patient still is eating scrambled eggs and toast.  Patient states she is doing really well swallowing.  Patient will need repeat dilatation.  We will try to dilate up to at least 42 Faroese.    Plan; patient undergo endoscopy will have patient follow-up following    Past Medical History:   Past Medical History:   Diagnosis Date   • Abnormal CT scan     per patient   • Acute bronchitis    • Acute sinusitis    • Allergic conjunctivitis    • Angular cheilitis    • Ankle edema    • Backache     improved   • Blood in urine      UTI resolved      • Bradycardia    • Chest discomfort     described as heart racing      • Chronic low back pain     RIGHT leg radiation      • Cold feet     c/o - and lowerlegs      • Contusion     of dorsum of foot   • Cough    • COVID-19 virus infection    • Depressive disorder    • Diabetes mellitus (CMS/HCC)    • Dizziness and giddiness    • Dyspnea    • Dysuria    • Edema of lower extremity    • Essential hypertension    • Exanthematous disorder    • Fatigue    • Foot pain, left    • Grade II hemorrhoids 3/12/2018   • Hematoma    • Hip pain, right    • History of palpitations    • Hormone replacement therapy (postmenopausal)    • Impacted cerumen    • Joint pain    • Knee pain    • Low back pain    • Malaise and fatigue    • Multiple joint pain    • Muscle pain    • Muscle weakness    • Neck pain    • Obesity (BMI 30.0-34.9) 3/12/2018   • Osteoarthritis of knee    • Osteoarthritis of multiple joints    • Otitis externa    • Pain in lower limb    • Peripheral venous insufficiency    • POP-Q stage 2 rectocele 3/12/2018   • POP-Q stage 4 cystocele 3/12/2018   • Sacroiliitis, not elsewhere classified (CMS/HCC)     R  LE   • Shoulder pain    • Temporal arteritis (CMS/HCC) 11/8/2020    Recent COVID infection with symptoms concerning for uncomplicated GCA without visual loss/changes.  -ESR on admission 107 - IV steroids 60 mg daily; transition to oral when dysphagia resolved for at minimum 2-4 weeks, then taper by 10 mg weekly - ESR and CRP q48 hrs -consulted Dr Olea, appreciate recommendations- pt underwent Temporal artery biopsy on 11/10/20        • Upper respiratory infection    • Urinary frequency    • Urinary tract infectious disease    • Wheezing        Past Surgical History:  Past Surgical History:   Procedure Laterality Date   • ANKLE SURGERY     • BACK SURGERY     • COLONOSCOPY  12/14/2009   • ENDOSCOPY N/A 1/6/2021    Procedure: ESOPHAGOGASTRODUODENOSCOPY;  Surgeon: Jack Ellis MD;  Location: [facility] ENDOSCOPY;  Service: Gastroenterology;  Laterality: N/A;   • ENDOSCOPY N/A 1/28/2021    Procedure: ESOPHAGOGASTRODUODENOSCOPY;  Surgeon: Jack Ellis MD;  Location: [facility] ENDOSCOPY;  Service: Gastroenterology;  Laterality: N/A;  eso dilation with ballon to 30FR   • ENDOSCOPY N/A 6/21/2021    Procedure: ESOPHAGOGASTRODUODENOSCOPY;  Surgeon: Jack Ellis MD;  Location: [facility] ENDOSCOPY;  Service: Gastroenterology;  Laterality: N/A;   • ENDOSCOPY N/A 8/6/2021    Procedure: ESOPHAGOGASTRODUODENOSCOPY;  Surgeon: Jack Ellis MD;  Location: [facility] ENDOSCOPY;  Service: Gastroenterology;  Laterality: N/A;   • EYE SURGERY Bilateral 02/2018    B cataract   • HAMMER TOE REPAIR  08/02/2011    Hammertoe repair, left second toe.   • HYSTERECTOMY     • INCISION AND DRAINAGE ABSCESS  01/21/2015   • INJECTION OF MEDICATION  11/15/2013   • INJECTION OF MEDICATION  05/06/2013    Celestone (betamethasone) (1)      • INJECTION OF MEDICATION  03/17/2016    Kenalog (3)      • NECK SURGERY  01/2018   • TEMPORAL ARTERY BIOPSY Left 11/10/2020    Procedure: LEFT TEMPORAL ARTERY BIOPSY;  Surgeon: Anjel Olea MD;  Location: [facility] OR;   Service: General;  Laterality: Left;   • UPPER GASTROINTESTINAL ENDOSCOPY  12/14/2009   • UPPER GASTROINTESTINAL ENDOSCOPY  01/06/2021   • UPPER GASTROINTESTINAL ENDOSCOPY  01/28/2021   • UPPER GASTROINTESTINAL ENDOSCOPY  06/21/2021       Family History:  Family History   Problem Relation Age of Onset   • Diabetes Other    • Heart disease Other    • Stroke Other    • Coronary artery disease Mother    • Coronary artery disease Father        Social History:   reports that she has quit smoking. Her smoking use included cigarettes. She has never used smokeless tobacco. She reports that she does not drink alcohol and does not use drugs.    Medications:   Prior to Admission medications    Medication Sig Start Date End Date Taking? Authorizing Provider   allopurinol (ZYLOPRIM) 100 MG tablet Take 1 tablet by mouth Daily. take with food 8/3/20  Yes Ora Walden APRN   amLODIPine (NORVASC) 5 MG tablet Take 1 tablet by mouth Daily. 8/3/20  Yes Ora Walden APRN   Diclofenac Sodium (VOLTAREN) 1 % gel gel Apply 4 g topically to the appropriate area as directed 4 (Four) Times a Day As Needed (joint pain). 2/26/21  Yes Ora Walden APRN   hydrOXYzine (ATARAX) 10 MG tablet Take 1-2 tabs at HS PRN insomnia 7/13/21  Yes Maggi William APRN   loperamide (IMODIUM) 2 MG capsule TAKE 1 CAPSULE 4 TIMES DAILY AS NEEDED FOR DIARRHEA 6/23/21  Yes Maggi William APRN   losartan (COZAAR) 50 MG tablet Take 50 mg by mouth Daily.   Yes Provider, MD Vipin   metoprolol succinate XL (Toprol XL) 25 MG 24 hr tablet Take 1 tablet by mouth Daily. 7/13/21  Yes Maggi William APRN   mupirocin (Bactroban) 2 % ointment Apply  topically to the appropriate area as directed 2 (Two) Times a Day. 6/30/21  Yes Maggi William APRN   omeprazole (priLOSEC) 20 MG capsule TAKE 1 CAPSULE TWICE DAILY 2/18/21  Yes Ora Walden APRN   tolterodine LA (DETROL LA) 4 MG 24 hr capsule Take 1 capsule by mouth Daily.  "2/3/21  Yes Ora Walden APRN   zolpidem (Ambien) 5 MG tablet Take 1 tablet by mouth At Night As Needed for Sleep. 8/4/21  Yes Maggi William APRN   omeprazole (priLOSEC) 20 MG capsule Take 1 capsule by mouth 2 (Two) Times a Day. 8/3/20   Ora Walden APRN       Allergies:  Aleve [naproxen sodium], Atenolol, Gabapentin, Keflex [cephalexin], Lisinopril, Relafen [nabumetone], Zanaflex [tizanidine], Acetaminophen, Aspirin, Codeine, Diflucan [fluconazole], and Sulfa antibiotics    ROS:    Review of Systems   Constitutional: Negative for activity change, appetite change, chills, diaphoresis, fatigue, fever and unexpected weight change.   HENT: Positive for trouble swallowing. Negative for sore throat.    Respiratory: Negative for shortness of breath.    Gastrointestinal: Negative for abdominal distention, abdominal pain, anal bleeding, blood in stool, constipation, diarrhea, nausea, rectal pain and vomiting.   Endocrine: Negative for polydipsia, polyphagia and polyuria.   Genitourinary: Negative for difficulty urinating.   Musculoskeletal: Negative for arthralgias.   Skin: Negative for pallor.   Allergic/Immunologic: Negative for food allergies.   Neurological: Negative for weakness and light-headedness.   Psychiatric/Behavioral: Negative for behavioral problems.     Objective     Blood pressure 154/65, pulse 69, height 152.4 cm (60\"), weight 57.6 kg (127 lb), not currently breastfeeding.    Physical Exam  Constitutional:       General: She is not in acute distress.     Appearance: She is well-developed. She is not diaphoretic.   HENT:      Head: Normocephalic and atraumatic.   Cardiovascular:      Rate and Rhythm: Normal rate and regular rhythm.      Heart sounds: Normal heart sounds. No murmur heard.   No friction rub. No gallop.    Pulmonary:      Effort: No respiratory distress.      Breath sounds: Normal breath sounds. No wheezing or rales.   Chest:      Chest wall: No tenderness. "   Abdominal:      General: Bowel sounds are normal. There is no distension.      Palpations: Abdomen is soft. There is no mass.      Tenderness: There is no abdominal tenderness. There is no guarding or rebound.      Hernia: No hernia is present.   Musculoskeletal:         General: Normal range of motion.   Skin:     General: Skin is warm and dry.      Coloration: Skin is not pale.      Findings: No erythema or rash.   Neurological:      Mental Status: She is alert and oriented to person, place, and time.   Psychiatric:         Behavior: Behavior normal.         Thought Content: Thought content normal.         Judgment: Judgment normal.          Assessment/Plan   Diagnoses and all orders for this visit:    1. Esophageal stricture (Primary)  -     Case Request; Standing  -     dextrose 5 % and sodium chloride 0.45 % infusion  -     Case Request    Other orders  -     Follow Anesthesia Guidelines / Standing Orders; Future  -     Obtain Informed Consent; Future  -     Implement Anesthesia Orders Day of Procedure; Standing  -     Obtain Informed Consent; Standing  -     POC Glucose Once; Standing  -     Insert Peripheral IV; Standing        ESOPHAGOGASTRODUODENOSCOPY (N/A)     Diagnosis Plan   1. Esophageal stricture  Case Request    dextrose 5 % and sodium chloride 0.45 % infusion    Case Request       Anticipated Surgical Procedure:  Orders Placed This Encounter   Procedures   • Follow Anesthesia Guidelines / Standing Orders     Standing Status:   Future   • Obtain Informed Consent     Standing Status:   Future     Order Specific Question:   Informed Consent Given For     Answer:   ESOPHAGOGASTRODUODENOSCOPY       The risks, benefits, and alternatives of this procedure have been discussed with the patient or the responsible party- the patient understands and agrees to proceed.

## 2021-08-19 NOTE — PATIENT INSTRUCTIONS
MyPlate from USDA    MyPlate is an outline of a general healthy diet based on the 2010 Dietary Guidelines for Americans, from the U.S. Department of Agriculture (USDA). It sets guidelines for how much food you should eat from each food group based on your age, sex, and level of physical activity.  What are tips for following MyPlate?  To follow MyPlate recommendations:  · Eat a wide variety of fruits and vegetables, grains, and protein foods.  · Serve smaller portions and eat less food throughout the day.  · Limit portion sizes to avoid overeating.  · Enjoy your food.  · Get at least 150 minutes of exercise every week. This is about 30 minutes each day, 5 or more days per week.  It can be difficult to have every meal look like MyPlate. Think about MyPlate as eating guidelines for an entire day, rather than each individual meal.  Fruits and vegetables  · Make half of your plate fruits and vegetables.  · Eat many different colors of fruits and vegetables each day.  · For a 2,000 calorie daily food plan, eat:  ? 2½ cups of vegetables every day.  ? 2 cups of fruit every day.  · 1 cup is equal to:  ? 1 cup raw or cooked vegetables.  ? 1 cup raw fruit.  ? 1 medium-sized orange, apple, or banana.  ? 1 cup 100% fruit or vegetable juice.  ? 2 cups raw leafy greens, such as lettuce, spinach, or kale.  ? ½ cup dried fruit.  Grains  · One fourth of your plate should be grains.  · Make at least half of the grains you eat each day whole grains.  · For a 2,000 calorie daily food plan, eat 6 oz of grains every day.  · 1 oz is equal to:  ? 1 slice bread.  ? 1 cup cereal.  ? ½ cup cooked rice, cereal, or pasta.  Protein  · One fourth of your plate should be protein.  · Eat a wide variety of protein foods, including meat, poultry, fish, eggs, beans, nuts, and tofu.  · For a 2,000 calorie daily food plan, eat 5½ oz of protein every day.  · 1 oz is equal to:  ? 1 oz meat, poultry, or fish.  ? ¼ cup cooked beans.  ? 1 egg.  ? ½ oz nuts  or seeds.  ? 1 Tbsp peanut butter.  Dairy  · Drink fat-free or low-fat (1%) milk.  · Eat or drink dairy as a side to meals.  · For a 2,000 calorie daily food plan, eat or drink 3 cups of dairy every day.  · 1 cup is equal to:  ? 1 cup milk, yogurt, cottage cheese, or soy milk (soy beverage).  ? 2 oz processed cheese.  ? 1½ oz natural cheese.  Fats, oils, salt, and sugars  · Only small amounts of oils are recommended.  · Avoid foods that are high in calories and low in nutritional value (empty calories), like foods high in fat or added sugars.  · Choose foods that are low in salt (sodium). Choose foods that have less than 140 milligrams (mg) of sodium per serving.  · Drink water instead of sugary drinks. Drink enough water each day to keep your urine pale yellow.  Where to find support  · Work with your health care provider or a nutrition specialist (dietitian) to develop a customized eating plan that is right for you.  · Download an sinai (mobile application) to help you track your daily food intake.  Where to find more information  · Go to ChooseMyPlate.gov for more information.  Summary  · MyPlate is a general guideline for healthy eating from the USDA. It is based on the 2010 Dietary Guidelines for Americans.  · In general, fruits and vegetables should take up ½ of your plate, grains should take up ¼ of your plate, and protein should take up ¼ of your plate.  This information is not intended to replace advice given to you by your health care provider. Make sure you discuss any questions you have with your health care provider.  Document Revised: 05/21/2020 Document Reviewed: 03/19/2018  Elsevier Patient Education © 2021 [employer]  BMI for Adults  What is BMI?  Body mass index (BMI) is a number that is calculated from a person's weight and height. BMI can help estimate how much of a person's weight is composed of fat. BMI does not measure body fat directly. Rather, it is an alternative to procedures that  "directly measure body fat, which can be difficult and expensive.  BMI can help identify people who may be at higher risk for certain medical problems.  What are BMI measurements used for?  BMI is used as a screening tool to identify possible weight problems. It helps determine whether a person is obese, overweight, a healthy weight, or underweight.  BMI is useful for:  · Identifying a weight problem that may be related to a medical condition or may increase the risk for medical problems.  · Promoting changes, such as changes in diet and exercise, to help reach a healthy weight. BMI screening can be repeated to see if these changes are working.  How is BMI calculated?  BMI involves measuring your weight in relation to your height. Both height and weight are measured, and the BMI is calculated from those numbers. This can be done either in English ([place] or metric measurements. Note that charts and online BMI calculators are available to help you find your BMI quickly and easily without having to do these calculations yourself.  To calculate your BMI in English ([place] measurements:    1. Measure your weight in pounds (lb).  2. Multiply the number of pounds by 703.  ? For example, for a person who weighs 180 lb, multiply that number by 703, which equals 126,540.  3. Measure your height in inches. Then multiply that number by itself to get a measurement called \"inches squared.\"  ? For example, for a person who is 70 inches tall, the \"inches squared\" measurement is 70 inches x 70 inches, which equals 4,900 inches squared.  4. Divide the total from step 2 (number of lb x 703) by the total from step 3 (inches squared): 126,540 ÷ 4,900 = 25.8. This is your BMI.  To calculate your BMI in metric measurements:  1. Measure your weight in kilograms (kg).  2. Measure your height in meters (m). Then multiply that number by itself to get a measurement called \"meters squared.\"  ? For example, for a person who is 1.75 m tall, the " "\"meters squared\" measurement is 1.75 m x 1.75 m, which is equal to 3.1 meters squared.  3. Divide the number of kilograms (your weight) by the meters squared number. In this example: 70 ÷ 3.1 = 22.6. This is your BMI.  What do the results mean?  BMI charts are used to identify whether you are underweight, normal weight, overweight, or obese. The following guidelines will be used:  · Underweight: BMI less than 18.5.  · Normal weight: BMI between 18.5 and 24.9.  · Overweight: BMI between 25 and 29.9.  · Obese: BMI of 30 or above.  Keep these notes in mind:  · Weight includes both fat and muscle, so someone with a muscular build, such as an athlete, may have a BMI that is higher than 24.9. In cases like these, BMI is not an accurate measure of body fat.  · To determine if excess body fat is the cause of a BMI of 25 or higher, further assessments may need to be done by a health care provider.  · BMI is usually interpreted in the same way for men and women.  Where to find more information  For more information about BMI, including tools to quickly calculate your BMI, go to these websites:  · Centers for Disease Control and Prevention: www.cdc.gov  · American Heart Association: www.heart.org  · National Heart, Lung, and Blood [place]: www.nhlbi.nih.gov  Summary  · Body mass index (BMI) is a number that is calculated from a person's weight and height.  · BMI may help estimate how much of a person's weight is composed of fat. BMI can help identify those who may be at higher risk for certain medical problems.  · BMI can be measured using English measurements or metric measurements.  · BMI charts are used to identify whether you are underweight, normal weight, overweight, or obese.  This information is not intended to replace advice given to you by your health care provider. Make sure you discuss any questions you have with your health care provider.  Document Revised: 09/09/2020 Document Reviewed: 07/17/2020  Marcio " Patient Education © 2021 [employer] Inc.

## 2021-08-19 NOTE — H&P (VIEW-ONLY)
[facility]      Chief Complaint:   Chief Complaint   Patient presents with   • Difficulty Swallowing     follow up after endo       Subjective     HPI:   Patient with dysphagia.  Patient states that her dysphagia has improved with dilatation up to 39 Pakistani.  Patient still is eating scrambled eggs and toast.  Patient states she is doing really well swallowing.  Patient will need repeat dilatation.  We will try to dilate up to at least 42 Pakistani.    Plan; patient undergo endoscopy will have patient follow-up following    Past Medical History:   Past Medical History:   Diagnosis Date   • Abnormal CT scan     per patient   • Acute bronchitis    • Acute sinusitis    • Allergic conjunctivitis    • Angular cheilitis    • Ankle edema    • Backache     improved   • Blood in urine      UTI resolved      • Bradycardia    • Chest discomfort     described as heart racing      • Chronic low back pain     RIGHT leg radiation      • Cold feet     c/o - and lowerlegs      • Contusion     of dorsum of foot   • Cough    • COVID-19 virus infection    • Depressive disorder    • Diabetes mellitus (CMS/HCC)    • Dizziness and giddiness    • Dyspnea    • Dysuria    • Edema of lower extremity    • Essential hypertension    • Exanthematous disorder    • Fatigue    • Foot pain, left    • Grade II hemorrhoids 3/12/2018   • Hematoma    • Hip pain, right    • History of palpitations    • Hormone replacement therapy (postmenopausal)    • Impacted cerumen    • Joint pain    • Knee pain    • Low back pain    • Malaise and fatigue    • Multiple joint pain    • Muscle pain    • Muscle weakness    • Neck pain    • Obesity (BMI 30.0-34.9) 3/12/2018   • Osteoarthritis of knee    • Osteoarthritis of multiple joints    • Otitis externa    • Pain in lower limb    • Peripheral venous insufficiency    • POP-Q stage 2 rectocele 3/12/2018   • POP-Q stage 4 cystocele 3/12/2018   • Sacroiliitis, not elsewhere classified (CMS/HCC)     R  LE   • Shoulder pain    • Temporal arteritis (CMS/HCC) 11/8/2020    Recent COVID infection with symptoms concerning for uncomplicated GCA without visual loss/changes.  -ESR on admission 107 - IV steroids 60 mg daily; transition to oral when dysphagia resolved for at minimum 2-4 weeks, then taper by 10 mg weekly - ESR and CRP q48 hrs -consulted Dr Olea, appreciate recommendations- pt underwent Temporal artery biopsy on 11/10/20        • Upper respiratory infection    • Urinary frequency    • Urinary tract infectious disease    • Wheezing        Past Surgical History:  Past Surgical History:   Procedure Laterality Date   • ANKLE SURGERY     • BACK SURGERY     • COLONOSCOPY  12/14/2009   • ENDOSCOPY N/A 1/6/2021    Procedure: ESOPHAGOGASTRODUODENOSCOPY;  Surgeon: Jack Ellis MD;  Location: [facility] ENDOSCOPY;  Service: Gastroenterology;  Laterality: N/A;   • ENDOSCOPY N/A 1/28/2021    Procedure: ESOPHAGOGASTRODUODENOSCOPY;  Surgeon: Jack Ellis MD;  Location: [facility] ENDOSCOPY;  Service: Gastroenterology;  Laterality: N/A;  eso dilation with ballon to 30FR   • ENDOSCOPY N/A 6/21/2021    Procedure: ESOPHAGOGASTRODUODENOSCOPY;  Surgeon: Jack Ellis MD;  Location: [facility] ENDOSCOPY;  Service: Gastroenterology;  Laterality: N/A;   • ENDOSCOPY N/A 8/6/2021    Procedure: ESOPHAGOGASTRODUODENOSCOPY;  Surgeon: Jack Ellis MD;  Location: [facility] ENDOSCOPY;  Service: Gastroenterology;  Laterality: N/A;   • EYE SURGERY Bilateral 02/2018    B cataract   • HAMMER TOE REPAIR  08/02/2011    Hammertoe repair, left second toe.   • HYSTERECTOMY     • INCISION AND DRAINAGE ABSCESS  01/21/2015   • INJECTION OF MEDICATION  11/15/2013   • INJECTION OF MEDICATION  05/06/2013    Celestone (betamethasone) (1)      • INJECTION OF MEDICATION  03/17/2016    Kenalog (3)      • NECK SURGERY  01/2018   • TEMPORAL ARTERY BIOPSY Left 11/10/2020    Procedure: LEFT TEMPORAL ARTERY BIOPSY;  Surgeon: Anjel Olea MD;  Location: [facility] OR;   Service: General;  Laterality: Left;   • UPPER GASTROINTESTINAL ENDOSCOPY  12/14/2009   • UPPER GASTROINTESTINAL ENDOSCOPY  01/06/2021   • UPPER GASTROINTESTINAL ENDOSCOPY  01/28/2021   • UPPER GASTROINTESTINAL ENDOSCOPY  06/21/2021       Family History:  Family History   Problem Relation Age of Onset   • Diabetes Other    • Heart disease Other    • Stroke Other    • Coronary artery disease Mother    • Coronary artery disease Father        Social History:   reports that she has quit smoking. Her smoking use included cigarettes. She has never used smokeless tobacco. She reports that she does not drink alcohol and does not use drugs.    Medications:   Prior to Admission medications    Medication Sig Start Date End Date Taking? Authorizing Provider   allopurinol (ZYLOPRIM) 100 MG tablet Take 1 tablet by mouth Daily. take with food 8/3/20  Yes Ora Walden APRN   amLODIPine (NORVASC) 5 MG tablet Take 1 tablet by mouth Daily. 8/3/20  Yes Ora Walden APRN   Diclofenac Sodium (VOLTAREN) 1 % gel gel Apply 4 g topically to the appropriate area as directed 4 (Four) Times a Day As Needed (joint pain). 2/26/21  Yes Ora Walden APRN   hydrOXYzine (ATARAX) 10 MG tablet Take 1-2 tabs at HS PRN insomnia 7/13/21  Yes Maggi William APRN   loperamide (IMODIUM) 2 MG capsule TAKE 1 CAPSULE 4 TIMES DAILY AS NEEDED FOR DIARRHEA 6/23/21  Yes Maggi William APRN   losartan (COZAAR) 50 MG tablet Take 50 mg by mouth Daily.   Yes Provider, MD Vipin   metoprolol succinate XL (Toprol XL) 25 MG 24 hr tablet Take 1 tablet by mouth Daily. 7/13/21  Yes Maggi William APRN   mupirocin (Bactroban) 2 % ointment Apply  topically to the appropriate area as directed 2 (Two) Times a Day. 6/30/21  Yes Maggi William APRN   omeprazole (priLOSEC) 20 MG capsule TAKE 1 CAPSULE TWICE DAILY 2/18/21  Yes Ora Walden APRN   tolterodine LA (DETROL LA) 4 MG 24 hr capsule Take 1 capsule by mouth Daily.  "2/3/21  Yes Ora Walden APRN   zolpidem (Ambien) 5 MG tablet Take 1 tablet by mouth At Night As Needed for Sleep. 8/4/21  Yes Maggi William APRN   omeprazole (priLOSEC) 20 MG capsule Take 1 capsule by mouth 2 (Two) Times a Day. 8/3/20   Ora Walden APRN       Allergies:  Aleve [naproxen sodium], Atenolol, Gabapentin, Keflex [cephalexin], Lisinopril, Relafen [nabumetone], Zanaflex [tizanidine], Acetaminophen, Aspirin, Codeine, Diflucan [fluconazole], and Sulfa antibiotics    ROS:    Review of Systems   Constitutional: Negative for activity change, appetite change, chills, diaphoresis, fatigue, fever and unexpected weight change.   HENT: Positive for trouble swallowing. Negative for sore throat.    Respiratory: Negative for shortness of breath.    Gastrointestinal: Negative for abdominal distention, abdominal pain, anal bleeding, blood in stool, constipation, diarrhea, nausea, rectal pain and vomiting.   Endocrine: Negative for polydipsia, polyphagia and polyuria.   Genitourinary: Negative for difficulty urinating.   Musculoskeletal: Negative for arthralgias.   Skin: Negative for pallor.   Allergic/Immunologic: Negative for food allergies.   Neurological: Negative for weakness and light-headedness.   Psychiatric/Behavioral: Negative for behavioral problems.     Objective     Blood pressure 154/65, pulse 69, height 152.4 cm (60\"), weight 57.6 kg (127 lb), not currently breastfeeding.    Physical Exam  Constitutional:       General: She is not in acute distress.     Appearance: She is well-developed. She is not diaphoretic.   HENT:      Head: Normocephalic and atraumatic.   Cardiovascular:      Rate and Rhythm: Normal rate and regular rhythm.      Heart sounds: Normal heart sounds. No murmur heard.   No friction rub. No gallop.    Pulmonary:      Effort: No respiratory distress.      Breath sounds: Normal breath sounds. No wheezing or rales.   Chest:      Chest wall: No tenderness. "   Abdominal:      General: Bowel sounds are normal. There is no distension.      Palpations: Abdomen is soft. There is no mass.      Tenderness: There is no abdominal tenderness. There is no guarding or rebound.      Hernia: No hernia is present.   Musculoskeletal:         General: Normal range of motion.   Skin:     General: Skin is warm and dry.      Coloration: Skin is not pale.      Findings: No erythema or rash.   Neurological:      Mental Status: She is alert and oriented to person, place, and time.   Psychiatric:         Behavior: Behavior normal.         Thought Content: Thought content normal.         Judgment: Judgment normal.          Assessment/Plan   Diagnoses and all orders for this visit:    1. Esophageal stricture (Primary)  -     Case Request; Standing  -     dextrose 5 % and sodium chloride 0.45 % infusion  -     Case Request    Other orders  -     Follow Anesthesia Guidelines / Standing Orders; Future  -     Obtain Informed Consent; Future  -     Implement Anesthesia Orders Day of Procedure; Standing  -     Obtain Informed Consent; Standing  -     POC Glucose Once; Standing  -     Insert Peripheral IV; Standing        ESOPHAGOGASTRODUODENOSCOPY (N/A)     Diagnosis Plan   1. Esophageal stricture  Case Request    dextrose 5 % and sodium chloride 0.45 % infusion    Case Request       Anticipated Surgical Procedure:  Orders Placed This Encounter   Procedures   • Follow Anesthesia Guidelines / Standing Orders     Standing Status:   Future   • Obtain Informed Consent     Standing Status:   Future     Order Specific Question:   Informed Consent Given For     Answer:   ESOPHAGOGASTRODUODENOSCOPY       The risks, benefits, and alternatives of this procedure have been discussed with the patient or the responsible party- the patient understands and agrees to proceed.

## 2021-08-23 ENCOUNTER — LAB (OUTPATIENT)
Dept: LAB | Facility: HOSPITAL | Age: 86
End: 2021-08-23

## 2021-08-23 DIAGNOSIS — Z01.818 PREOP TESTING: Primary | ICD-10-CM

## 2021-08-23 LAB — SARS-COV-2 N GENE RESP QL NAA+PROBE: NOT DETECTED

## 2021-08-23 PROCEDURE — 87635 SARS-COV-2 COVID-19 AMP PRB: CPT

## 2021-08-23 PROCEDURE — C9803 HOPD COVID-19 SPEC COLLECT: HCPCS

## 2021-08-24 RX ORDER — OMEPRAZOLE 20 MG/1
20 CAPSULE, DELAYED RELEASE ORAL 2 TIMES DAILY
COMMUNITY
End: 2021-09-16 | Stop reason: SDUPTHER

## 2021-08-24 RX ORDER — LOPERAMIDE HYDROCHLORIDE 2 MG/1
2 CAPSULE ORAL 4 TIMES DAILY PRN
COMMUNITY
End: 2022-02-14

## 2021-08-24 RX ORDER — HYDROXYZINE HYDROCHLORIDE 10 MG/1
10 TABLET, FILM COATED ORAL NIGHTLY PRN
COMMUNITY
End: 2022-02-14

## 2021-08-25 ENCOUNTER — ANESTHESIA (OUTPATIENT)
Dept: GASTROENTEROLOGY | Facility: HOSPITAL | Age: 86
End: 2021-08-25

## 2021-08-25 ENCOUNTER — HOSPITAL ENCOUNTER (OUTPATIENT)
Facility: HOSPITAL | Age: 86
Setting detail: HOSPITAL OUTPATIENT SURGERY
Discharge: HOME | End: 2021-08-25
Attending: INTERNAL MEDICINE | Admitting: INTERNAL MEDICINE

## 2021-08-25 ENCOUNTER — ANESTHESIA EVENT (OUTPATIENT)
Dept: GASTROENTEROLOGY | Facility: HOSPITAL | Age: 86
End: 2021-08-25

## 2021-08-25 VITALS
TEMPERATURE: 97.3 F | WEIGHT: 125 LBS | DIASTOLIC BLOOD PRESSURE: 65 MMHG | RESPIRATION RATE: 18 BRPM | BODY MASS INDEX: 24.54 KG/M2 | HEIGHT: 60 IN | OXYGEN SATURATION: 97 % | HEART RATE: 58 BPM | SYSTOLIC BLOOD PRESSURE: 147 MMHG

## 2021-08-25 PROCEDURE — 43248 EGD GUIDE WIRE INSERTION: CPT | Performed by: INTERNAL MEDICINE

## 2021-08-25 PROCEDURE — C1769 GUIDE WIRE: HCPCS | Performed by: INTERNAL MEDICINE

## 2021-08-25 PROCEDURE — 25010000002 PROPOFOL 10 MG/ML EMULSION: Performed by: NURSE ANESTHETIST, CERTIFIED REGISTERED

## 2021-08-25 RX ORDER — DEXTROSE AND SODIUM CHLORIDE 5; .45 G/100ML; G/100ML
20 INJECTION, SOLUTION INTRAVENOUS CONTINUOUS
Status: DISCONTINUED | OUTPATIENT
Start: 2021-08-25 | End: 2021-08-25 | Stop reason: HOSPADM

## 2021-08-25 RX ORDER — PROPOFOL 10 MG/ML
VIAL (ML) INTRAVENOUS AS NEEDED
Status: DISCONTINUED | OUTPATIENT
Start: 2021-08-25 | End: 2021-08-25 | Stop reason: SURG

## 2021-08-25 RX ORDER — LIDOCAINE HYDROCHLORIDE 20 MG/ML
INJECTION, SOLUTION EPIDURAL; INFILTRATION; INTRACAUDAL; PERINEURAL AS NEEDED
Status: DISCONTINUED | OUTPATIENT
Start: 2021-08-25 | End: 2021-08-25 | Stop reason: SURG

## 2021-08-25 RX ORDER — ONDANSETRON 2 MG/ML
4 INJECTION INTRAMUSCULAR; INTRAVENOUS ONCE AS NEEDED
Status: DISCONTINUED | OUTPATIENT
Start: 2021-08-25 | End: 2021-08-25 | Stop reason: HOSPADM

## 2021-08-25 RX ADMIN — PROPOFOL 10 MG: 10 INJECTION, EMULSION INTRAVENOUS at 10:52

## 2021-08-25 RX ADMIN — LIDOCAINE HYDROCHLORIDE 50 MG: 20 INJECTION, SOLUTION EPIDURAL; INFILTRATION; INTRACAUDAL; PERINEURAL at 10:49

## 2021-08-25 RX ADMIN — PROPOFOL 50 MG: 10 INJECTION, EMULSION INTRAVENOUS at 10:49

## 2021-08-25 RX ADMIN — DEXTROSE AND SODIUM CHLORIDE 20 ML/HR: 5; 450 INJECTION, SOLUTION INTRAVENOUS at 10:30

## 2021-08-25 NOTE — ANESTHESIA POSTPROCEDURE EVALUATION
Patient: Kristy Ramirez    Procedure Summary     Date: 08/25/21 Room / Location: [facility] ENDOSCOPY 4 / [facility] ENDOSCOPY    Anesthesia Start: 1044 Anesthesia Stop: 1055    Procedure: ESOPHAGOGASTRODUODENOSCOPY (N/A ) Diagnosis:       Esophageal dysphagia      (Esophageal dysphagia [R13.10])    Surgeons: Jack Ellis MD Provider: Krista Metzger CRNA    Anesthesia Type: MAC ASA Status: 3          Anesthesia Type: MAC    Vitals  No vitals data found for the desired time range.          Post Anesthesia Care and Evaluation    Patient location during evaluation: bedside  Patient participation: complete - patient participated  Level of consciousness: sleepy but conscious  Pain score: 0  Pain management: adequate  Airway patency: patent  Anesthetic complications: No anesthetic complications  PONV Status: none  Cardiovascular status: acceptable  Respiratory status: acceptable and room air  Hydration status: acceptable

## 2021-08-25 NOTE — ANESTHESIA PREPROCEDURE EVALUATION
Anesthesia Evaluation     Patient summary reviewed and Nursing notes reviewed   no history of anesthetic complications:  NPO Solid Status: > 8 hours  NPO Liquid Status: > 8 hours           Airway   Mallampati: II  TM distance: >3 FB  Neck ROM: full  Small opening  Dental    (+) lower dentures and upper dentures    Pulmonary     breath sounds clear to auscultation  (+) a smoker Former, COPD mild, shortness of breath, recent URI,   (-) asthma, sleep apnea, rhonchi, decreased breath sounds, wheezes  Cardiovascular   Exercise tolerance: poor (<4 METS)    ECG reviewed  Patient on routine beta blocker and Beta blocker given within 24 hours of surgery  Rhythm: regular  Rate: normal    (+) hypertension well controlled 2 medications or greater,   (-) pacemaker, valvular problems/murmurs, past MI, CAD, dysrhythmias, murmur, cardiac stents, DVT, hyperlipidemia    ROS comment: Atrial fibrillation  Abnormal ECG  When compared with ECG of 05-JUN-2019 17:42,  Atrial fibrillation has replaced Sinus rhythm  Confirmed by SOLE ALVARENGA ([id number]) on 11/11/2020 3:15:48 PM     Referred By:             Confirmed By:SOLE ALVARENGA    Specimen Collected: 11/10/20 12:17          Neuro/Psych  (+) dizziness/light headedness, weakness, psychiatric history,     (-) seizures, TIA, CVA  GI/Hepatic/Renal/Endo    (+) obesity,  GERD well controlled,    (-) diabetes    Musculoskeletal     (+) neck pain,   Abdominal    Substance History      OB/GYN negative ob/gyn ROS         Other   arthritis,                      Anesthesia Plan    ASA 3     MAC     intravenous induction     Anesthetic plan, all risks, benefits, and alternatives have been provided, discussed and informed consent has been obtained with: patient.

## 2021-08-25 NOTE — ANESTHESIA PREPROCEDURE EVALUATION
Anesthesia Evaluation     Patient summary reviewed and Nursing notes reviewed   NPO Solid Status: > 8 hours  NPO Liquid Status: > 8 hours           Airway   Mallampati: III  TM distance: >3 FB  Neck ROM: full  Possible difficult intubation and Small opening  Dental    (+) upper dentures and lower dentures    Pulmonary - normal exam   (+) shortness of breath, recent URI resolved,   Cardiovascular - normal exam    (+) hypertension, dysrhythmias Atrial Fib,       Neuro/Psych  (+) dizziness/light headedness, weakness, psychiatric history Anxiety and Depression,     GI/Hepatic/Renal/Endo    (+)  GERD poorly controlled,      Musculoskeletal     (+) neck pain,   Abdominal    Substance History - negative use     OB/GYN negative ob/gyn ROS         Other   arthritis,            Anesthesia Evaluation     Patient summary reviewed and Nursing notes reviewed   no history of anesthetic complications:  NPO Solid Status: > 8 hours  NPO Liquid Status: > 8 hours           Airway   Mallampati: II  TM distance: >3 FB  Neck ROM: full  Small opening  Dental    (+) lower dentures and upper dentures    Pulmonary     breath sounds clear to auscultation  (+) a smoker Former, COPD mild, shortness of breath, recent URI,   (-) asthma, sleep apnea, rhonchi, decreased breath sounds, wheezes  Cardiovascular   Exercise tolerance: poor (<4 METS)    ECG reviewed  Patient on routine beta blocker and Beta blocker given within 24 hours of surgery  Rhythm: regular  Rate: normal    (+) hypertension well controlled 2 medications or greater,   (-) pacemaker, valvular problems/murmurs, past MI, CAD, dysrhythmias, murmur, cardiac stents, CABG, DVT, hyperlipidemia    ROS comment: Atrial fibrillation  Abnormal ECG  When compared with ECG of 05-JUN-2019 17:42,  Atrial fibrillation has replaced Sinus rhythm  Confirmed by SOLE ALVARENGA ([id number]) on 11/11/2020 3:15:48 PM     Referred By:             Confirmed By:SOLE ALVARENGA    Specimen Collected: 11/10/20 12:17           Neuro/Psych  (+) dizziness/light headedness, weakness, psychiatric history,     (-) seizures, TIA, CVA  GI/Hepatic/Renal/Endo    (+) obesity,  GERD well controlled,    (-) diabetes    Musculoskeletal     (+) neck pain,   Abdominal    Substance History      OB/GYN negative ob/gyn ROS         Other   arthritis,      ROS/Med Hx Other: Treat as covid +    DM controlled with diet                    Anesthesia Plan    ASA 3     MAC     intravenous induction     Anesthetic plan, all risks, benefits, and alternatives have been provided, discussed and informed consent has been obtained with: patient.    Plan discussed with CRNA.     Anesthesia Evaluation     Patient summary reviewed and Nursing notes reviewed   no history of anesthetic complications:  NPO Solid Status: > 8 hours  NPO Liquid Status: > 8 hours           Airway   Mallampati: II  TM distance: >3 FB  Neck ROM: full  Small opening  Dental    (+) lower dentures and upper dentures    Pulmonary     breath sounds clear to auscultation  (+) a smoker Former, COPD mild, shortness of breath, recent URI,   (-) asthma, sleep apnea, rhonchi, decreased breath sounds, wheezes  Cardiovascular   Exercise tolerance: poor (<4 METS)    ECG reviewed  Patient on routine beta blocker and Beta blocker given within 24 hours of surgery  Rhythm: regular  Rate: normal    (+) hypertension well controlled 2 medications or greater,   (-) pacemaker, valvular problems/murmurs, past MI, CAD, dysrhythmias, murmur, cardiac stents, CABG, DVT, hyperlipidemia    ROS comment: Atrial fibrillation  Abnormal ECG  When compared with ECG of 05-JUN-2019 17:42,  Atrial fibrillation has replaced Sinus rhythm  Confirmed by SOLE ALVARENGA ([id number]) on 11/11/2020 3:15:48 PM     Referred By:             Confirmed By:SOLE ALVARENGA    Specimen Collected: 11/10/20 12:17          Neuro/Psych  (+) dizziness/light headedness, weakness, psychiatric history,     (-) seizures, TIA, CVA  GI/Hepatic/Renal/Endo    (+) obesity,  GERD  well controlled,    (-) diabetes    Musculoskeletal     (+) neck pain,   Abdominal    Substance History      OB/GYN negative ob/gyn ROS         Other   arthritis,      ROS/Med Hx Other: Treat as covid +    DM controlled with diet                    Anesthesia Plan    ASA 3     MAC     intravenous induction     Anesthetic plan, all risks, benefits, and alternatives have been provided, discussed and informed consent has been obtained with: patient.    Plan discussed with CRNA.     Anesthesia Evaluation     Patient summary reviewed and Nursing notes reviewed   no history of anesthetic complications:  NPO Solid Status: > 8 hours  NPO Liquid Status: > 8 hours           Airway   Mallampati: II  TM distance: >3 FB  Neck ROM: full  Small opening  Dental    (+) lower dentures and upper dentures    Pulmonary     breath sounds clear to auscultation  (+) a smoker Former, COPD mild, shortness of breath, recent URI,   (-) asthma, sleep apnea, rhonchi, decreased breath sounds, wheezes  Cardiovascular   Exercise tolerance: poor (<4 METS)    ECG reviewed  Patient on routine beta blocker and Beta blocker given within 24 hours of surgery  Rhythm: regular  Rate: normal    (+) hypertension well controlled 2 medications or greater,   (-) pacemaker, valvular problems/murmurs, past MI, CAD, dysrhythmias, murmur, cardiac stents, CABG, DVT, hyperlipidemia    ROS comment: Atrial fibrillation  Abnormal ECG  When compared with ECG of 05-JUN-2019 17:42,  Atrial fibrillation has replaced Sinus rhythm  Confirmed by SOLE ALVARENGA ([id number]) on 11/11/2020 3:15:48 PM     Referred By:             Confirmed By:SOLE ALVARENGA    Specimen Collected: 11/10/20 12:17          Neuro/Psych  (+) dizziness/light headedness, weakness, psychiatric history,     (-) seizures, TIA, CVA  GI/Hepatic/Renal/Endo    (+) obesity,  GERD well controlled,    (-) diabetes    Musculoskeletal     (+) neck pain,   Abdominal    Substance History      OB/GYN negative ob/gyn ROS          Other   arthritis,      ROS/Med Hx Other: Treat as covid +    DM controlled with diet                    Anesthesia Plan    ASA 3     MAC     intravenous induction     Anesthetic plan, all risks, benefits, and alternatives have been provided, discussed and informed consent has been obtained with: patient.    Plan discussed with CRNA.     Anesthesia Evaluation     Patient summary reviewed and Nursing notes reviewed   no history of anesthetic complications:  NPO Solid Status: > 8 hours  NPO Liquid Status: > 8 hours           Airway   Mallampati: II  TM distance: >3 FB  Neck ROM: full  Small opening  Dental    (+) lower dentures and upper dentures    Pulmonary     breath sounds clear to auscultation  (+) a smoker Former, COPD mild, shortness of breath, recent URI,   (-) asthma, sleep apnea, rhonchi, decreased breath sounds, wheezes  Cardiovascular   Exercise tolerance: poor (<4 METS)    ECG reviewed  Patient on routine beta blocker and Beta blocker given within 24 hours of surgery  Rhythm: regular  Rate: normal    (+) hypertension well controlled 2 medications or greater,   (-) pacemaker, valvular problems/murmurs, past MI, CAD, dysrhythmias, murmur, cardiac stents, CABG, DVT, hyperlipidemia    ROS comment: Atrial fibrillation  Abnormal ECG  When compared with ECG of 05-JUN-2019 17:42,  Atrial fibrillation has replaced Sinus rhythm  Confirmed by SOLE ALVARENGA ([id number]) on 11/11/2020 3:15:48 PM     Referred By:             Confirmed By:SOLE ALVARENGA    Specimen Collected: 11/10/20 12:17          Neuro/Psych  (+) dizziness/light headedness, weakness, psychiatric history,     (-) seizures, TIA, CVA  GI/Hepatic/Renal/Endo    (+) obesity,  GERD well controlled,    (-) diabetes    Musculoskeletal     (+) neck pain,   Abdominal    Substance History      OB/GYN negative ob/gyn ROS         Other   arthritis,      ROS/Med Hx Other: Treat as covid +    DM controlled with diet                    Anesthesia Plan    ASA 3     MAC      intravenous induction     Anesthetic plan, all risks, benefits, and alternatives have been provided, discussed and informed consent has been obtained with: patient.    Plan discussed with CRNA.     Anesthesia Evaluation     Patient summary reviewed and Nursing notes reviewed   no history of anesthetic complications:  NPO Solid Status: > 8 hours  NPO Liquid Status: > 8 hours           Airway   Mallampati: II  TM distance: >3 FB  Neck ROM: full  Small opening  Dental    (+) lower dentures and upper dentures    Pulmonary     breath sounds clear to auscultation  (+) a smoker Former, COPD mild, shortness of breath, recent URI,   (-) asthma, sleep apnea, rhonchi, decreased breath sounds, wheezes  Cardiovascular   Exercise tolerance: poor (<4 METS)    ECG reviewed  Patient on routine beta blocker and Beta blocker given within 24 hours of surgery  Rhythm: regular  Rate: normal    (+) hypertension well controlled 2 medications or greater,   (-) pacemaker, valvular problems/murmurs, past MI, CAD, dysrhythmias, murmur, cardiac stents, CABG, DVT, hyperlipidemia    ROS comment: Atrial fibrillation  Abnormal ECG  When compared with ECG of 05-JUN-2019 17:42,  Atrial fibrillation has replaced Sinus rhythm  Confirmed by SOLE ALVARENGA ([id number]) on 11/11/2020 3:15:48 PM     Referred By:             Confirmed By:SOLE ALVARENGA    Specimen Collected: 11/10/20 12:17          Neuro/Psych  (+) dizziness/light headedness, weakness, psychiatric history,     (-) seizures, TIA, CVA  GI/Hepatic/Renal/Endo    (+) obesity,  GERD well controlled,    (-) diabetes    Musculoskeletal     (+) neck pain,   Abdominal    Substance History      OB/GYN negative ob/gyn ROS         Other   arthritis,      ROS/Med Hx Other: Treat as covid +    DM controlled with diet                    Anesthesia Plan    ASA 3     MAC     intravenous induction     Anesthetic plan, all risks, benefits, and alternatives have been provided, discussed and informed consent has been  obtained with: patient.    Plan discussed with CRNA.             Anesthesia Plan    ASA 3     MAC     intravenous induction     Anesthetic plan, all risks, benefits, and alternatives have been provided, discussed and informed consent has been obtained with: patient.

## 2021-08-27 ENCOUNTER — OFFICE VISIT (OUTPATIENT)
Dept: FAMILY MEDICINE CLINIC | Facility: CLINIC | Age: 86
End: 2021-08-27

## 2021-08-27 VITALS
HEIGHT: 60 IN | HEART RATE: 69 BPM | RESPIRATION RATE: 20 BRPM | SYSTOLIC BLOOD PRESSURE: 140 MMHG | DIASTOLIC BLOOD PRESSURE: 60 MMHG | BODY MASS INDEX: 24.39 KG/M2 | OXYGEN SATURATION: 98 % | TEMPERATURE: 98.2 F | WEIGHT: 124.25 LBS

## 2021-08-27 DIAGNOSIS — Z91.038 ALLERGY TO INSECT BITES: Primary | ICD-10-CM

## 2021-08-27 PROCEDURE — 99213 OFFICE O/P EST LOW 20 MIN: CPT | Performed by: NURSE PRACTITIONER

## 2021-08-27 PROCEDURE — 96372 THER/PROPH/DIAG INJ SC/IM: CPT | Performed by: NURSE PRACTITIONER

## 2021-08-27 RX ORDER — DEXAMETHASONE SODIUM PHOSPHATE 4 MG/ML
4 INJECTION, SOLUTION INTRA-ARTICULAR; INTRALESIONAL; INTRAMUSCULAR; INTRAVENOUS; SOFT TISSUE ONCE
Status: COMPLETED | OUTPATIENT
Start: 2021-08-27 | End: 2021-08-27

## 2021-08-27 RX ADMIN — DEXAMETHASONE SODIUM PHOSPHATE 4 MG: 4 INJECTION, SOLUTION INTRA-ARTICULAR; INTRALESIONAL; INTRAMUSCULAR; INTRAVENOUS; SOFT TISSUE at 16:58

## 2021-08-27 NOTE — PROGRESS NOTES
"Chief Complaint  bites on right arm/elbow x yesterday and left side back/shou    Subjective          Kristy Ramirez presents to [facility]    [facility] Day[facility]    PCP: NADYA Patricio    CC: \"bites\"      Hx of significant localized reactions to insect bites in the past.  Walking dog last evening, multiple bites to right arm, left upper back.  More swollen, itchy, red today. Hydrocortisone cream not helping.       Insect Bite  This is a new problem. Episode onset: walking dog yesterday evening, 3 mosquito bites  The problem occurs constantly. The problem has been gradually worsening. Pertinent negatives include no abdominal pain, anorexia, arthralgias, change in bowel habit, chest pain, chills, congestion, coughing, diaphoresis, fatigue, fever, headaches, joint swelling, myalgias, nausea, neck pain, numbness, rash, sore throat, swollen glands, urinary symptoms, vertigo, visual change, vomiting or weakness. Associated symptoms comments: +itching/swelling at bites  . Nothing aggravates the symptoms. Treatments tried: hydrocortisone cream. The treatment provided mild (only helps for a few minutes) relief.       Review of Systems   Constitutional: Negative for appetite change, chills, diaphoresis, fatigue and fever.   HENT: Negative.  Negative for congestion and sore throat.    Respiratory: Negative.  Negative for cough.    Cardiovascular: Negative.  Negative for chest pain.   Gastrointestinal: Negative.  Negative for abdominal pain, anorexia, change in bowel habit, nausea and vomiting.   Genitourinary: Negative.    Musculoskeletal: Positive for back pain (chronic, low). Negative for arthralgias, joint swelling, myalgias and neck pain.   Skin: Negative for rash.        +insect bites   Neurological: Negative for dizziness, vertigo, weakness, numbness and headaches.        Objective   Vital Signs:   /60 (BP Location: Left arm, Patient Position: Sitting, Cuff Size: Adult)   " "Pulse 69   Temp 98.2 °F (36.8 °C) (Temporal)   Resp 20   Ht 152.4 cm (60\")   Wt 56.4 kg (124 lb 4 oz)   SpO2 98%   BMI 24.27 kg/m²       Physical Exam  Vitals and nursing note reviewed.   Constitutional:       General: She is not in acute distress.     Appearance: Normal appearance. She is not ill-appearing.   HENT:      Head: Normocephalic and atraumatic.      Mouth/Throat:      Mouth: Mucous membranes are moist.      Pharynx: Oropharynx is clear. No pharyngeal swelling, oropharyngeal exudate, posterior oropharyngeal erythema or uvula swelling.   Cardiovascular:      Rate and Rhythm: Normal rate and regular rhythm.   Pulmonary:      Effort: Pulmonary effort is normal. No respiratory distress.      Breath sounds: Normal breath sounds. No wheezing, rhonchi or rales.   Musculoskeletal:      Cervical back: Neck supple.      Comments: Using walker     Skin:     General: Skin is warm and dry.      Findings: Erythema and lesion present.          Neurological:      General: No focal deficit present.      Mental Status: She is alert and oriented to person, place, and time.   Psychiatric:         Mood and Affect: Mood normal.         Thought Content: Thought content normal.          Result Review :                 Assessment and Plan    Diagnoses and all orders for this visit:    1. 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          "

## 2021-08-27 NOTE — PATIENT INSTRUCTIONS
Insect Bite, Adult  An insect bite can make your skin red, itchy, and swollen. An insect bite is different from an insect sting, which happens when an insect injects poison (venom) into the skin.  Some insects can spread disease to people through a bite. However, most insect bites do not lead to disease and are not serious.  What are the causes?  Insects may bite for a variety of reasons, including:  · Hunger.  · To defend themselves.  Insects that bite include:  · Spiders.  · Mosquitoes.  · Ticks.  · Fleas.  · Ants.  · Flies.  · Kissing bugs.  · Chiggers.  What are the signs or symptoms?  Symptoms of this condition include:  · Itching or pain in the bite area.  · Redness and swelling in the bite area.  · An open wound (skin ulcer).  In many cases, symptoms last for 2-4 days.  In rare cases, a person may have a severe allergic reaction (anaphylactic reaction) to a bite. Symptoms of an anaphylactic reaction may include:  · Feeling warm in the face (flushed). This may include redness.  · Itchy, red, swollen areas of skin (hives).  · Swelling of the eyes, lips, face, mouth, tongue, or throat.  · Difficulty breathing, speaking, or swallowing.  · Noisy breathing (wheezing).  · Dizziness or light-headedness.  · Fainting.  · Pain or cramping in the abdomen.  · Vomiting.  · Diarrhea.  How is this diagnosed?  This condition is usually diagnosed based on symptoms and a physical exam.  How is this treated?  Treatment is usually not needed. Symptoms often go away on their own. When treatment is recommended, it may involve:  · Applying a cream or lotion to the bite area. This treatment helps with itching.  · Taking an antibiotic medicine. This treatment is needed if the bite area gets infected.  · Getting a tetanus shot, if you are not up to date on this vaccine.  · Applying ice to the affected area.  · Allergy medicines called antihistamines. This treatment may be needed if you develop itching or an allergic reaction to the  "insect bite.  · Giving yourself an epinephrine injection if you have an anaphylactic reaction to a bite. To give the injection, you will use what is commonly called an auto-injector \"pen\" (pre-filled automatic epinephrine injection device). Your health care provider will teach you how to use an auto-injector pen.  Follow these instructions at home:  Bite area care    · Do not scratch the bite area.  · Keep the bite area clean and dry. Wash it every day with soap and water as told by your health care provider.  · Check the bite area every day for signs of infection. Check for:  ? Redness, swelling, or pain.  ? Fluid or blood.  ? Warmth.  ? Pus or a bad smell.  Managing pain, itching, and swelling    · You may apply cortisone cream, calamine lotion, or a paste made of baking soda and water to the bite area as told by your health care provider.  · If directed, put ice on the bite area.  ? Put ice in a plastic bag.  ? Place a towel between your skin and the bag.  ? Leave the ice on for 20 minutes, 2-3 times a day.  General instructions  · Apply or take over-the-counter and prescription medicines only as told by your health care provider.  · If you were prescribed an antibiotic medicine, take or apply it as told by your health care provider. Do not stop using the antibiotic even if your condition improves.  · Keep all follow-up visits as told by your health care provider. This is important.  How is this prevented?  To help reduce your risk of insect bites:  · When you are outdoors, wear clothing that covers your arms and legs. This is especially important in the early morning and evening.  · Use insect repellent. The best insect repellents contain DEET, picaridin, oil of lemon eucalyptus (OLE), or VL3557.  · Consider spraying your clothing with a pesticide called permethrin. Permethrin helps prevent insect bites. It works for several weeks and for up to 5-6 clothing washes. Do not apply permethrin directly to the " skin.  · If your home windows do not have screens, consider installing them.  · If you will be sleeping in an area where there are mosquitoes, consider covering your sleeping area with a mosquito net.  Contact a health care provider if:  · You have redness, swelling, or pain in the bite area.  · You have fluid or blood coming from the bite area.  · The bite area feels warm to the touch.  · You have pus or a bad smell coming from the bite area.  · You have a fever.  Get help right away if:  · You have joint pain.  · You have a rash.  · You feel unusually tired or sleepy.  · You have neck pain.  · You have a headache.  · You have unusual weakness.  · You develop symptoms of an anaphylactic reaction. These may include:  ? Flushed skin.  ? Hives.  ? Swelling of the eyes, lips, face, mouth, tongue, or throat.  ? Difficulty breathing, speaking, or swallowing.  ? Wheezing.  ? Dizziness or light-headedness.  ? Fainting.  ? Pain or cramping in the abdomen.  ? Vomiting.  ? Diarrhea.  These symptoms may represent a serious problem that is an emergency. Do not wait to see if the symptoms will go away. Do the following right away:  · Use the auto-injector pen as you have been instructed.  · Get medical help. Call your local emergency services (911 in the [place] Do not drive yourself to the hospital.  Summary  · An insect bite can make your skin red, itchy, and swollen.  · Treatment is usually not needed. Symptoms often go away on their own. When treatment is recommended, it may involve taking medicine, applying medicine to the area, or applying ice.  · Apply or take over-the-counter and prescription medicines only as told by your health care provider.  · Use insect repellent to help prevent insect bites.  · Contact a health care provider if you have any signs of infection in the bite area.  This information is not intended to replace advice given to you by your health care provider. Make sure you discuss any questions you have  with your health care provider.  Document Revised: 06/28/2019 Document Reviewed: 06/28/2019  Elsevier Patient Education © 2021 [employer]

## 2021-08-27 NOTE — NURSING NOTE
8/25/20201 1005   Pt questioned why her procedure is scheduled with Dr Mirza instead of Dr Ignacio.  Reference to notes with most recent on 8/20/2021 by Dr Ignacio.  Patient voiced request for Dr Ignacio  To perform procedure instead of Dr Mirza.  Dr Ignacio & Dr Mirza aware of pt request & will proceed accordingly.

## 2021-08-30 ENCOUNTER — OFFICE VISIT (OUTPATIENT)
Dept: CARDIOLOGY | Facility: CLINIC | Age: 86
End: 2021-08-30

## 2021-08-30 VITALS
BODY MASS INDEX: 23.36 KG/M2 | HEIGHT: 60 IN | WEIGHT: 119 LBS | DIASTOLIC BLOOD PRESSURE: 58 MMHG | OXYGEN SATURATION: 98 % | SYSTOLIC BLOOD PRESSURE: 124 MMHG | TEMPERATURE: 96.9 F | HEART RATE: 74 BPM

## 2021-08-30 DIAGNOSIS — R00.2 PALPITATIONS: Primary | ICD-10-CM

## 2021-08-30 PROCEDURE — 99203 OFFICE O/P NEW LOW 30 MIN: CPT | Performed by: INTERNAL MEDICINE

## 2021-08-30 PROCEDURE — 93000 ELECTROCARDIOGRAM COMPLETE: CPT | Performed by: INTERNAL MEDICINE

## 2021-08-30 NOTE — PROGRESS NOTES
[facility] Cardiology  OFFICE CONSULT NOTE    Patient Name: Kristy Ramirez  Age/Sex: 87 y.o. female  : 1934  MRN: [id number]      Referring Provider: Maggi William, APRN  [address] [place] [address]        Chief Complaint: Palpitations    History of Present Illness:  Kristy Ramirez is a 87 y.o. female he has a history of temporal arteritis.  She was in the hospital last year and had temporal arteritis.  During the hospitalization she had a brief episode of atrial fibrillation.  She was on beta-blockers and no other therapy was done.  She is somewhat confused that she has in her notes from her care providers that she is on metoprolol XL but she does not have that presently.  She has no chest pain.    Last Echo:    Last Cath:      Past Medical History:  Past Medical History:   Diagnosis Date   • Abnormal CT scan     per patient   • Acute bronchitis    • Acute sinusitis    • Allergic conjunctivitis    • Angular cheilitis    • Ankle edema    • Backache     improved   • Blood in urine      UTI resolved      • Bradycardia    • Chest discomfort     described as heart racing      • Chronic low back pain     RIGHT leg radiation      • Cold feet     c/o - and lowerlegs      • Contusion     of dorsum of foot   • Cough    • COVID-19 virus infection    • Depressive disorder    • Dizziness and giddiness    • Dyspnea    • Dysuria    • Edema of lower extremity    • Essential hypertension    • Exanthematous disorder    • Fatigue    • Foot pain, left    • Grade II hemorrhoids 3/12/2018   • Hematoma    • Hip pain, right    • History of palpitations    • Hormone replacement therapy (postmenopausal)    • Impacted cerumen    • Joint pain    • Knee pain    • Low back pain    • Malaise and fatigue    • Multiple joint pain    • Muscle pain    • Muscle weakness    • Neck pain    • Obesity (BMI 30.0-34.9) 3/12/2018   • Osteoarthritis of knee    • Osteoarthritis of multiple joints    • Otitis  externa    • Pain in lower limb    • Peripheral venous insufficiency    • POP-Q stage 2 rectocele 3/12/2018   • POP-Q stage 4 cystocele 3/12/2018   • Sacroiliitis, not elsewhere classified (CMS/HCC)     R LE   • Shoulder pain    • Temporal arteritis (CMS/[facility]) 11/8/2020    Recent COVID infection with symptoms concerning for uncomplicated GCA without visual loss/changes.  -ESR on admission 107 - IV steroids 60 mg daily; transition to oral when dysphagia resolved for at minimum 2-4 weeks, then taper by 10 mg weekly - ESR and CRP q48 hrs -consulted Dr Olea, appreciate recommendations- pt underwent Temporal artery biopsy on 11/10/20        • Upper respiratory infection    • Urinary frequency    • Urinary tract infectious disease    • Wheezing        PAST SURGERY   Past Surgical History:   Procedure Laterality Date   • ANKLE SURGERY     • BACK SURGERY     • COLONOSCOPY  12/14/2009   • ENDOSCOPY N/A 1/6/2021    Procedure: ESOPHAGOGASTRODUODENOSCOPY;  Surgeon: Jack Ellis MD;  Location: [facility] ENDOSCOPY;  Service: Gastroenterology;  Laterality: N/A;   • ENDOSCOPY N/A 1/28/2021    Procedure: ESOPHAGOGASTRODUODENOSCOPY;  Surgeon: Jack Ellis MD;  Location: [facility] ENDOSCOPY;  Service: Gastroenterology;  Laterality: N/A;  eso dilation with ballon to 30FR   • ENDOSCOPY N/A 6/21/2021    Procedure: ESOPHAGOGASTRODUODENOSCOPY;  Surgeon: Jack Ellis MD;  Location: [facility] ENDOSCOPY;  Service: Gastroenterology;  Laterality: N/A;   • ENDOSCOPY N/A 8/6/2021    Procedure: ESOPHAGOGASTRODUODENOSCOPY;  Surgeon: Jack Ellis MD;  Location: [facility] ENDOSCOPY;  Service: Gastroenterology;  Laterality: N/A;   • EYE SURGERY Bilateral 02/2018    B cataract   • HAMMER TOE REPAIR  08/02/2011    Hammertoe repair, left second toe.   • HYSTERECTOMY     • INCISION AND DRAINAGE ABSCESS  01/21/2015   • INJECTION OF MEDICATION  11/15/2013   • INJECTION OF MEDICATION  05/06/2013    Celestone (betamethasone) (1)      • INJECTION OF  MEDICATION  03/17/2016    Kenalog (3)      • NECK SURGERY  01/2018   • TEMPORAL ARTERY BIOPSY Left 11/10/2020    Procedure: LEFT TEMPORAL ARTERY BIOPSY;  Surgeon: Anjel Olea MD;  Location: [facility];  Service: General;  Laterality: Left;   • UPPER GASTROINTESTINAL ENDOSCOPY  12/14/2009   • UPPER GASTROINTESTINAL ENDOSCOPY  01/06/2021   • UPPER GASTROINTESTINAL ENDOSCOPY  01/28/2021   • UPPER GASTROINTESTINAL ENDOSCOPY  06/21/2021       Home Medications:      Current Outpatient Medications:   •  allopurinol (ZYLOPRIM) 100 MG tablet, Take 1 tablet by mouth Daily. take with food, Disp: 90 tablet, Rfl: 1  •  amLODIPine (NORVASC) 5 MG tablet, Take 1 tablet by mouth Daily., Disp: 90 tablet, Rfl: 1  •  Diclofenac Sodium (VOLTAREN) 1 % gel gel, Apply 4 g topically to the appropriate area as directed 4 (Four) Times a Day As Needed (joint pain)., Disp: 150 g, Rfl: 3  •  hydrOXYzine (ATARAX) 10 MG tablet, Take 10 mg by mouth At Night As Needed for Itching., Disp: , Rfl:   •  loperamide (IMODIUM) 2 MG capsule, Take 2 mg by mouth 4 (Four) Times a Day As Needed for Diarrhea., Disp: , Rfl:   •  losartan (COZAAR) 50 MG tablet, Take 50 mg by mouth Daily., Disp: , Rfl:   •  metoprolol succinate XL (Toprol XL) 25 MG 24 hr tablet, Take 1 tablet by mouth Daily., Disp: 30 tablet, Rfl: 3  •  mupirocin (BACTROBAN) 2 % ointment, Apply 1 application topically to the appropriate area as directed 2 (Two) Times a Day As Needed., Disp: , Rfl:   •  omeprazole (priLOSEC) 20 MG capsule, Take 20 mg by mouth 2 (Two) Times a Day., Disp: , Rfl:   •  tolterodine LA (DETROL LA) 4 MG 24 hr capsule, Take 1 capsule by mouth Daily., Disp: 90 capsule, Rfl: 1  •  zolpidem (Ambien) 5 MG tablet, Take 1 tablet by mouth At Night As Needed for Sleep., Disp: 30 tablet, Rfl: 0    Allergies:  Allergies   Allergen Reactions   • Aleve [Naproxen Sodium] Itching   • Atenolol    • Gabapentin Itching   • Keflex [Cephalexin] Itching   • Lisinopril Angioedema   • Relafen  [Nabumetone] Itching   • Zanaflex [Tizanidine] Itching   • Acetaminophen Itching   • Aspirin Itching and Rash   • Codeine Rash   • Diflucan [Fluconazole] Itching   • Sulfa Antibiotics Rash and Itching        Social History:  Social History     Socioeconomic History   • Marital status:      Spouse name: Not on file   • Number of children: Not on file   • Years of education: Not on file   • Highest education level: Not on file   Tobacco Use   • Smoking status: Former Smoker     Types: Cigarettes     Quit date:      Years since quittin.6   • Smokeless tobacco: Never Used   Vaping Use   • Vaping Use: Never used   Substance and Sexual Activity   • Alcohol use: No   • Drug use: No   • Sexual activity: Defer     Birth control/protection: Surgical     Comment: Hysterectomy        Family History:  Family History   Problem Relation Age of Onset   • Diabetes Other    • Heart disease Other    • Stroke Other    • Coronary artery disease Mother    • Coronary artery disease Father          Review of Systems:   Constitution: No chills, no rigors, no unexplained weight loss or weight gain    Eyes:  No diplopia, no blurred vision, no loss of vision, conjunctiva is pink and sclera is anicteric    ENT:  No tinnitus, no otorrhea, no epistaxis, no sore throat   Respiratory: No cough, no hemoptysis    Cardiovascular:  As mentioned in HPI    Gastrointestinal: No nausea, no vomiting, no hematemesis, no diarrhea or constipation, no melena    Genitourinary: No frequency of dysuria no hematuria    Integument: positive for  No pruritis and  no skin rash    Hematologic / Lymphatic: No excessive bleeding, easy bruising, fatigue, lymphadenopathy and petechiae    Musculoskeletal: No joint pain, joint stiffness, joint swelling, muscle pain, muscle weakness and neck pain    Neurological: No dizziness, headaches, light headedness, seizures and vertigo or stroke    Behavioral/Psych: No depression, or bipolar disorder    Endocrine: no  h/o diabetes, hyperlipidemia or thyroid problems        Vitals:    08/30/21 0925   BP: 124/58   Pulse: 74   Temp: 96.9 °F (36.1 °C)   SpO2: 98%       Body mass index is 23.24 kg/m².          Physical Exam:   General Appearance:    Alert, oriented, cooperative, in no acute distress     Head:    Normocephalic, atraumatic, without obvious abnormality     Eyes:            Lids and lashes normal, conjunctivae and sclerae normal, no   icterus, no pallor     Ears:    Ears appear intact with no abnormalities noted     Throat:   Mucous membranes pink and moist     Neck:   Supple, trachea midline, no carotid bruit, no JVD     Lungs:     Air enty equal,  Clear to auscultation, respirations regular, Unlabored. No wheezes, rales, rhonchi    Heart:    Regular rhythm and normal rate, normal S1 and S2, no            murmur, no gallop,      Abdomen:     Normal bowel sounds, no masses, liver and spleen nonpalpable, soft, non-tender, non-distended, no guarding, no rebound tenderness       Extremities:   Moves all extremities well, no edema, no cyanosis, no              Redness, no clubbing     Pulses:   Pulses palpable and equal bilaterally       Neurologic:   Alert and oriented to person, place, and time. No focal neurological deficits       Lab Review:     Lab on 08/23/2021   Component Date Value Ref Range Status   • COVID19 08/23/2021 Not Detected  Not Detected - Ref. Range Final   Lab on 08/03/2021   Component Date Value Ref Range Status   • COVID19 08/03/2021 Not Detected  Not Detected - Ref. Range Final   Office Visit on 08/02/2021   Component Date Value Ref Range Status   • CANDIDA ALBICANS 08/02/2021 Positive* Negative Final   • GARDNERELLA VAGINALIS 08/02/2021 Positive* Negative Final   • TRICHOMONAS VAGINALIS 08/02/2021 Negative  Negative Final   Lab on 06/30/2021   Component Date Value Ref Range Status   • Glucose 06/30/2021 83  65 - 99 mg/dL Final   • BUN 06/30/2021 28* 8 - 23 mg/dL Final   • Creatinine 06/30/2021 1.40*  0.57 - 1.00 mg/dL Final   • Sodium 06/30/2021 138  136 - 145 mmol/L Final   • Potassium 06/30/2021 3.8  3.5 - 5.2 mmol/L Final   • Chloride 06/30/2021 99  98 - 107 mmol/L Final   • CO2 06/30/2021 27.3  22.0 - 29.0 mmol/L Final   • Calcium 06/30/2021 9.9  8.6 - 10.5 mg/dL Final   • eGFR   Amer 06/30/2021 43* >60 mL/min/1.73 Final   • BUN/Creatinine Ratio 06/30/2021 20.0  7.0 - 25.0 Final   • Anion Gap 06/30/2021 11.7  5.0 - 15.0 mmol/L Final   • Uric Acid 06/30/2021 6.7* 2.4 - 5.7 mg/dL Final   • Hemoglobin A1C 06/30/2021 5.50  4.80 - 5.60 % Final   • TSH 06/30/2021 2.450  0.270 - 4.200 uIU/mL Final   • Vitamin B-12 06/30/2021 478  211 - 946 pg/mL Final   • 25 Hydroxy, Vitamin D 06/30/2021 48.1  ng/ml Final   Lab on 06/18/2021   Component Date Value Ref Range Status   • COVID19 06/18/2021 Not Detected  Not Detected - Ref. Range Final   ]    Assessment/Plan     1. Palpitations  She is having palpitations.  She did have a episode paroxysmal atrial fibrillation when she had a bout of temporal arteritis.  No anticoagulation was done then that was approximately 8 months ago.  We will go ahead and do a 14-day MCT to see if she has any atrial fibrillation with these palpitations.  Would also like to check a echo.  She does not have metoprolol with her medicine and I think she may be getting this confused with chlorthalidone.  I told her to restart the metoprolol.  - ECG 12 Lead  - Mobile Cardiac Outpatient Telemetry; Future  - Adult Transthoracic Echo Complete W/ Cont if Necessary Per Protocol      Patient's Body mass index is 23.24 kg/m². indicating that she is within normal range (BMI 18.5-24.9). No BMI management plan needed..      Kristy Ulloa Ashley  reports that she quit smoking about 41 years ago. Her smoking use included cigarettes. She has never used smokeless tobacco..         Return in about 6 months (around 2/28/2022).

## 2021-09-01 LAB
QT INTERVAL: 310 MS
QTC INTERVAL: 344 MS

## 2021-09-07 ENCOUNTER — OFFICE VISIT (OUTPATIENT)
Dept: GASTROENTEROLOGY | Facility: CLINIC | Age: 86
End: 2021-09-07

## 2021-09-07 ENCOUNTER — TELEPHONE (OUTPATIENT)
Dept: FAMILY MEDICINE CLINIC | Facility: CLINIC | Age: 86
End: 2021-09-07

## 2021-09-07 VITALS
HEART RATE: 77 BPM | BODY MASS INDEX: 24.7 KG/M2 | WEIGHT: 125.8 LBS | DIASTOLIC BLOOD PRESSURE: 64 MMHG | SYSTOLIC BLOOD PRESSURE: 135 MMHG | HEIGHT: 60 IN

## 2021-09-07 DIAGNOSIS — R13.19 OTHER DYSPHAGIA: Primary | ICD-10-CM

## 2021-09-07 PROCEDURE — 99214 OFFICE O/P EST MOD 30 MIN: CPT | Performed by: INTERNAL MEDICINE

## 2021-09-07 RX ORDER — DEXTROSE AND SODIUM CHLORIDE 5; .45 G/100ML; G/100ML
30 INJECTION, SOLUTION INTRAVENOUS CONTINUOUS PRN
Status: CANCELLED | OUTPATIENT
Start: 2021-10-05

## 2021-09-07 NOTE — PROGRESS NOTES
[facility]      Chief Complaint:   Chief Complaint   Patient presents with   • EGD Performed 08/25/2021     Esophageal Stricture       Subjective     HPI:   Patient with dysphagia.  Patient had EGD with dilatation up to 42 Japanese.  Patient states swallowing is markedly improved at this time.  Patient wishes to continue with dilatation but would like to take a little time off stating that she is tired.  We will set patient up for EGD with dilatation in 1 month.    Plan; have patient follow-up after EGD with dilatation    Past Medical History:   Past Medical History:   Diagnosis Date   • Abnormal CT scan     per patient   • Acute bronchitis    • Acute sinusitis    • Allergic conjunctivitis    • Angular cheilitis    • Ankle edema    • Backache     improved   • Blood in urine      UTI resolved      • Bradycardia    • Chest discomfort     described as heart racing      • Chronic low back pain     RIGHT leg radiation      • Cold feet     c/o - and lowerlegs      • Contusion     of dorsum of foot   • Cough    • COVID-19 virus infection    • Depressive disorder    • Dizziness and giddiness    • Dyspnea    • Dysuria    • Edema of lower extremity    • Essential hypertension    • Exanthematous disorder    • Fatigue    • Foot pain, left    • Grade II hemorrhoids 3/12/2018   • Hematoma    • Hip pain, right    • History of palpitations    • Hormone replacement therapy (postmenopausal)    • Impacted cerumen    • Joint pain    • Knee pain    • Low back pain    • Malaise and fatigue    • Multiple joint pain    • Muscle pain    • Muscle weakness    • Neck pain    • Obesity (BMI 30.0-34.9) 3/12/2018   • Osteoarthritis of knee    • Osteoarthritis of multiple joints    • Otitis externa    • Pain in lower limb    • Peripheral venous insufficiency    • POP-Q stage 2 rectocele 3/12/2018   • POP-Q stage 4 cystocele 3/12/2018   • Sacroiliitis, not elsewhere classified (CMS/HCC)     R LE   • Shoulder pain    •  Temporal arteritis (CMS/HCC) 11/8/2020    Recent COVID infection with symptoms concerning for uncomplicated GCA without visual loss/changes.  -ESR on admission 107 - IV steroids 60 mg daily; transition to oral when dysphagia resolved for at minimum 2-4 weeks, then taper by 10 mg weekly - ESR and CRP q48 hrs -consulted Dr Olea, appreciate recommendations- pt underwent Temporal artery biopsy on 11/10/20        • Upper respiratory infection    • Urinary frequency    • Urinary tract infectious disease    • Wheezing        Past Surgical History:  Past Surgical History:   Procedure Laterality Date   • ANKLE SURGERY     • BACK SURGERY     • COLONOSCOPY  12/14/2009   • ENDOSCOPY N/A 1/6/2021    Procedure: ESOPHAGOGASTRODUODENOSCOPY;  Surgeon: Jack Ellis MD;  Location: [facility] ENDOSCOPY;  Service: Gastroenterology;  Laterality: N/A;   • ENDOSCOPY N/A 1/28/2021    Procedure: ESOPHAGOGASTRODUODENOSCOPY;  Surgeon: Jack Ellis MD;  Location: [facility] ENDOSCOPY;  Service: Gastroenterology;  Laterality: N/A;  eso dilation with ballon to 30FR   • ENDOSCOPY N/A 6/21/2021    Procedure: ESOPHAGOGASTRODUODENOSCOPY;  Surgeon: Jack Ellis MD;  Location: [facility] ENDOSCOPY;  Service: Gastroenterology;  Laterality: N/A;   • ENDOSCOPY N/A 8/6/2021    Procedure: ESOPHAGOGASTRODUODENOSCOPY;  Surgeon: Jack Ellis MD;  Location: [facility] ENDOSCOPY;  Service: Gastroenterology;  Laterality: N/A;   • ENDOSCOPY N/A 8/25/2021    Procedure: ESOPHAGOGASTRODUODENOSCOPY;  Surgeon: Jack Ellis MD;  Location: [facility] ENDOSCOPY;  Service: Gastroenterology;  Laterality: N/A;   • EYE SURGERY Bilateral 02/2018    B cataract   • HAMMER TOE REPAIR  08/02/2011    Hammertoe repair, left second toe.   • HYSTERECTOMY     • INCISION AND DRAINAGE ABSCESS  01/21/2015   • INJECTION OF MEDICATION  11/15/2013   • INJECTION OF MEDICATION  05/06/2013    Celestone (betamethasone) (1)      • INJECTION OF MEDICATION  03/17/2016    Kenalog (3)      • NECK  SURGERY  01/2018   • TEMPORAL ARTERY BIOPSY Left 11/10/2020    Procedure: LEFT TEMPORAL ARTERY BIOPSY;  Surgeon: Anjel Olea MD;  Location: [facility];  Service: General;  Laterality: Left;   • UPPER GASTROINTESTINAL ENDOSCOPY  12/14/2009   • UPPER GASTROINTESTINAL ENDOSCOPY  01/06/2021   • UPPER GASTROINTESTINAL ENDOSCOPY  01/28/2021   • UPPER GASTROINTESTINAL ENDOSCOPY  06/21/2021   • UPPER GASTROINTESTINAL ENDOSCOPY  08/25/2021       Family History:  Family History   Problem Relation Age of Onset   • Diabetes Other    • Heart disease Other    • Stroke Other    • Coronary artery disease Mother    • Coronary artery disease Father        Social History:   reports that she has quit smoking. Her smoking use included cigarettes. She has never used smokeless tobacco. She reports that she does not drink alcohol and does not use drugs.    Medications:   Prior to Admission medications    Medication Sig Start Date End Date Taking? Authorizing Provider   allopurinol (ZYLOPRIM) 100 MG tablet Take 1 tablet by mouth Daily. take with food 8/3/20  Yes Ora Walden APRN   amLODIPine (NORVASC) 5 MG tablet Take 1 tablet by mouth Daily. 8/3/20  Yes Ora Walden APRN   Diclofenac Sodium (VOLTAREN) 1 % gel gel Apply 4 g topically to the appropriate area as directed 4 (Four) Times a Day As Needed (joint pain). 2/26/21  Yes Ora Walden APRN   hydrOXYzine (ATARAX) 10 MG tablet Take 10 mg by mouth At Night As Needed for Itching.   Yes Provider, MD Vipin   loperamide (IMODIUM) 2 MG capsule Take 2 mg by mouth 4 (Four) Times a Day As Needed for Diarrhea.   Yes Provider, MD Vipin   losartan (COZAAR) 50 MG tablet Take 50 mg by mouth Daily.   Yes Provider, MD Vipin   metoprolol succinate XL (Toprol XL) 25 MG 24 hr tablet Take 1 tablet by mouth Daily. 7/13/21  Yes Maggi William APRN   mupirocin (BACTROBAN) 2 % ointment Apply 1 application topically to the appropriate area as directed 2 (Two)  "Times a Day As Needed.   Yes Provider, MD Vipin   omeprazole (priLOSEC) 20 MG capsule Take 20 mg by mouth 2 (Two) Times a Day.   Yes Provider, MD Vipin   tolterodine LA (DETROL LA) 4 MG 24 hr capsule Take 1 capsule by mouth Daily. 2/3/21  Yes Ora Walden APRN   zolpidem (Ambien) 5 MG tablet Take 1 tablet by mouth At Night As Needed for Sleep. 8/4/21  Yes Maggi William APRN   omeprazole (priLOSEC) 20 MG capsule Take 1 capsule by mouth 2 (Two) Times a Day. 8/3/20   Ora Walden APRN       Allergies:  Aleve [naproxen sodium], Atenolol, Gabapentin, Keflex [cephalexin], Lisinopril, Relafen [nabumetone], Zanaflex [tizanidine], Acetaminophen, Aspirin, Codeine, Diflucan [fluconazole], and Sulfa antibiotics    ROS:    Review of Systems   Constitutional: Negative for activity change, appetite change, chills, diaphoresis, fatigue, fever and unexpected weight change.   HENT: Negative for sore throat and trouble swallowing.    Respiratory: Negative for shortness of breath.    Gastrointestinal: Negative for abdominal distention, abdominal pain, anal bleeding, blood in stool, constipation, diarrhea, nausea, rectal pain and vomiting.   Endocrine: Negative for polydipsia, polyphagia and polyuria.   Genitourinary: Negative for difficulty urinating.   Musculoskeletal: Negative for arthralgias.   Skin: Negative for pallor.   Allergic/Immunologic: Negative for food allergies.   Neurological: Negative for weakness and light-headedness.   Psychiatric/Behavioral: Negative for behavioral problems.     Objective     Blood pressure 135/64, pulse 77, height 152.4 cm (60\"), weight 57.1 kg (125 lb 12.8 oz), not currently breastfeeding.    Physical Exam  Constitutional:       General: She is not in acute distress.     Appearance: She is well-developed. She is not diaphoretic.   HENT:      Head: Normocephalic and atraumatic.   Cardiovascular:      Rate and Rhythm: Normal rate and regular rhythm.      Heart " sounds: Normal heart sounds. No murmur heard.   No friction rub. No gallop.    Pulmonary:      Effort: No respiratory distress.      Breath sounds: Normal breath sounds. No wheezing or rales.   Chest:      Chest wall: No tenderness.   Abdominal:      General: Bowel sounds are normal. There is no distension.      Palpations: Abdomen is soft. There is no mass.      Tenderness: There is no abdominal tenderness. There is no guarding or rebound.      Hernia: No hernia is present.   Musculoskeletal:         General: Normal range of motion.   Skin:     General: Skin is warm and dry.      Coloration: Skin is not pale.      Findings: No erythema or rash.   Neurological:      Mental Status: She is alert and oriented to person, place, and time.   Psychiatric:         Behavior: Behavior normal.         Thought Content: Thought content normal.         Judgment: Judgment normal.          Assessment/Plan   Diagnoses and all orders for this visit:    1. Other dysphagia (Primary)  -     Case Request; Standing  -     dextrose 5 % and sodium chloride 0.45 % infusion  -     Case Request    Other orders  -     Follow Anesthesia Guidelines / Standing Orders; Future  -     Obtain Informed Consent; Future  -     Implement Anesthesia Orders Day of Procedure; Standing  -     Obtain Informed Consent; Standing  -     POC Glucose Once; Standing  -     Insert Peripheral IV; Standing        ESOPHAGOGASTRODUODENOSCOPY (N/A)     Diagnosis Plan   1. Other dysphagia  Case Request    dextrose 5 % and sodium chloride 0.45 % infusion    Case Request       Anticipated Surgical Procedure:  Orders Placed This Encounter   Procedures   • Follow Anesthesia Guidelines / Standing Orders     Standing Status:   Future   • Obtain Informed Consent     Standing Status:   Future     Order Specific Question:   Informed Consent Given For     Answer:   ESOPHAGOGASTRODUODENOSCOPY       The risks, benefits, and alternatives of this procedure have been discussed with the  patient or the responsible party- the patient understands and agrees to proceed.

## 2021-09-07 NOTE — PATIENT INSTRUCTIONS
Esophageal Stricture    Esophageal stricture is a narrowing (stricture) of the esophagus. The esophagus is the part of the body that moves food and liquid from your mouth to your stomach. The esophagus can become narrow because of disease or damage to the area. This condition can make swallowing difficult, painful, or even impossible. It also makes choking more likely.  What are the causes?  The most common cause of this condition is gastroesophageal reflux disease (GERD). Normally, food travels down the esophagus and stays in the stomach to be digested. In GERD, food and stomach acid move back up into the esophagus. Over time, this causes scar tissue and leads to narrowing.  Other causes of esophageal stricture include:  · Scarring from swallowing (ingesting) a harmful substance.  · Damage from medical instruments used in the esophagus.  · Radiation therapy.  · Cancer.  · Inflammation of the esophagus.  What increases the risk?  You are more likely to develop an esophageal stricture if you have GERD or esophageal cancer.  What are the signs or symptoms?  Symptoms of this condition include:  · Difficulty swallowing.  · Pain when swallowing.  · Burning pain or discomfort in the throat or chest (heartburn).  · Vomiting or spitting up (regurgitating) food or liquids.  · Unexplained weight loss.  How is this diagnosed?  This condition may be diagnosed based on:  · Your symptoms and a physical exam.  · Tests, such as:  ? Upper endoscopy. Your health care provider will insert a flexible tube with a tiny camera on it (endoscope) into your esophagus to check for a stricture. A tissue sample (biopsy) may also be taken to be examined under a microscope.  ? Esophageal pH monitoring. This test involves using a tube to collect acid in the esophagus to determine how much stomach acid is entering the esophagus.  ? Barium swallow test. For this test, you will drink a chalky liquid (barium solution) that coats the lining of the  esophagus. Then you will have an X-ray taken. The barium solution helps to show if there is a stricture.  How is this treated?  Treatment for esophageal stricture depends on what is causing your condition and how severe your condition is. Treatment options include:  · Esophageal dilation. In this procedure, a health care provider inserts an endoscope or a tool called a dilator into the esophagus to gently stretch it and make the opening wider.  · Stents. In some cases, a health care provider may place a small device (stent) in the esophagus to keep it open.  · Acid-blocking medicines. Taking these can help you manage GERD symptoms after an esophageal stricture. Controlling your GERD symptoms or being free of them can prevent the stricture from returning.  Follow these instructions at home:  Eating and drinking         · Follow instructions from your health care provider about any diet changes.  · Cut your food into small pieces, chew well, and eat slowly  · Try to eat soft food that is easier to swallow.  · Eat and drink only when you are sitting upright.  · Do not drink alcohol. If you need help quitting, ask your health care provider.  · Do not eat during the 3 hours before bedtime.  · Do not overeat at meals.  · Do not eat foods that can make reflux worse. These include:  ? Fatty foods, such as red meat and processed foods.  ? Spicy foods.  ? Soda.  ? Tomato products.  ? Chocolate.  General instructions  · Take over-the-counter and prescription medicines only as told by your health care provider.  · Do not use any products that contain nicotine or tobacco, such as cigarettes and e-cigarettes. If you need help quitting, ask your health care provider.  · Lose weight if you are overweight.  · Wear loose, comfortable clothing.  · When lying in bed, raise (elevate) your head with pillows. This will help to prevent your stomach contents from backing up into your esophagus while you sleep.  · Keep all follow-up visits  as told by your health care provider. This is important.  Contact a health care provider if:  · You have problems eating or swallowing.  · You regurgitate food and liquid.  · Your symptoms do not improve with treatment.  Get help right away if:  · You can no longer keep down any food, drinks, or your saliva.  Summary  · Esophageal stricture is a narrowing of the part of the body that moves food and liquid from your mouth to your stomach (esophagus).  · The esophagus can become narrow because of disease or damage to the area. This can make swallowing difficult, painful, or even impossible.  · Treatment for esophageal stricture depends on what is causing your condition and how severe your condition is. In some cases, procedures may be done to make the opening of the esophagus wider or to place a stent in the esophagus to keep it open.  · Do not drink alcohol, overeat at meals, or eat foods that can make reflux worse.  This information is not intended to replace advice given to you by your health care provider. Make sure you discuss any questions you have with your health care provider.  Document Revised: 03/15/2019 Document Reviewed: 08/24/2018  [facility] Patient Education © 2021 [employer]

## 2021-09-08 DIAGNOSIS — N39.3 STRESS INCONTINENCE: ICD-10-CM

## 2021-09-08 DIAGNOSIS — K12.1 STOMATITIS AND MUCOSITIS: ICD-10-CM

## 2021-09-08 DIAGNOSIS — I10 ESSENTIAL HYPERTENSION: Primary | ICD-10-CM

## 2021-09-08 DIAGNOSIS — R21 RASH AND NONSPECIFIC SKIN ERUPTION: ICD-10-CM

## 2021-09-08 DIAGNOSIS — M10.9 GOUT, UNSPECIFIED CAUSE, UNSPECIFIED CHRONICITY, UNSPECIFIED SITE: ICD-10-CM

## 2021-09-08 DIAGNOSIS — K12.30 STOMATITIS AND MUCOSITIS: ICD-10-CM

## 2021-09-08 DIAGNOSIS — M25.50 ARTHRALGIA, UNSPECIFIED JOINT: ICD-10-CM

## 2021-09-08 PROBLEM — N18.31 STAGE 3A CHRONIC KIDNEY DISEASE (HCC): Status: ACTIVE | Noted: 2021-09-02

## 2021-09-08 RX ORDER — TOLTERODINE 4 MG/1
4 CAPSULE, EXTENDED RELEASE ORAL DAILY
Qty: 90 CAPSULE | Refills: 1 | Status: SHIPPED | OUTPATIENT
Start: 2021-09-08 | End: 2021-12-06 | Stop reason: SDUPTHER

## 2021-09-08 RX ORDER — ALLOPURINOL 100 MG/1
100 TABLET ORAL DAILY
Qty: 90 TABLET | Refills: 1 | Status: SHIPPED | OUTPATIENT
Start: 2021-09-08 | End: 2021-12-06 | Stop reason: SDUPTHER

## 2021-09-08 RX ORDER — LOSARTAN POTASSIUM 50 MG/1
50 TABLET ORAL DAILY
Qty: 90 TABLET | Refills: 1 | Status: SHIPPED | OUTPATIENT
Start: 2021-09-08 | End: 2021-12-06 | Stop reason: SDUPTHER

## 2021-09-09 ENCOUNTER — TELEPHONE (OUTPATIENT)
Dept: CARDIOLOGY | Facility: CLINIC | Age: 86
End: 2021-09-09

## 2021-09-09 NOTE — TELEPHONE ENCOUNTER
I called the pt back and she wanted to know if she needed an appointment to bring her monitor in.  I ler her know that she didn't

## 2021-09-13 ENCOUNTER — TELEPHONE (OUTPATIENT)
Dept: FAMILY MEDICINE CLINIC | Facility: CLINIC | Age: 86
End: 2021-09-13

## 2021-09-14 ENCOUNTER — TELEPHONE (OUTPATIENT)
Dept: FAMILY MEDICINE CLINIC | Facility: CLINIC | Age: 86
End: 2021-09-14

## 2021-09-15 ENCOUNTER — TELEPHONE (OUTPATIENT)
Dept: FAMILY MEDICINE CLINIC | Facility: CLINIC | Age: 86
End: 2021-09-15

## 2021-09-15 DIAGNOSIS — I10 ESSENTIAL HYPERTENSION: ICD-10-CM

## 2021-09-15 RX ORDER — AMLODIPINE BESYLATE 5 MG/1
5 TABLET ORAL DAILY
Qty: 90 TABLET | Refills: 1 | Status: SHIPPED | OUTPATIENT
Start: 2021-09-15 | End: 2021-12-06 | Stop reason: SDUPTHER

## 2021-09-15 NOTE — TELEPHONE ENCOUNTER
Pt was given a 90 days supply in June 2021 from [facility] that Gladys ordered. She has only been out of this medication a couple days.

## 2021-09-16 RX ORDER — OMEPRAZOLE 20 MG/1
20 CAPSULE, DELAYED RELEASE ORAL 2 TIMES DAILY
Qty: 180 CAPSULE | Refills: 1 | Status: SHIPPED | OUTPATIENT
Start: 2021-09-16 | End: 2022-08-22 | Stop reason: SDUPTHER

## 2021-09-17 ENCOUNTER — TELEPHONE (OUTPATIENT)
Dept: CARDIOLOGY | Facility: CLINIC | Age: 86
End: 2021-09-17

## 2021-09-17 NOTE — TELEPHONE ENCOUNTER
Per Dr. Banda I attempted to contact PT to give her holter results. They are as follows;   No sig monitor abnormalities. Just rare extra beats

## 2021-09-20 ENCOUNTER — TELEPHONE (OUTPATIENT)
Dept: CARDIOLOGY | Facility: CLINIC | Age: 86
End: 2021-09-20

## 2021-09-20 NOTE — TELEPHONE ENCOUNTER
Contacted PT per Dr. Banda about holter results. Per Dr. Banda holter showed relativelyNormal with just rare extra heartbeats.  No change in her medicines.  She voiced her understanding

## 2021-09-20 NOTE — TELEPHONE ENCOUNTER
----- Message from Ludwin Banda MD sent at 9/20/2021 11:43 AM CDT -----  Regarding: Monitor  Yes this is relativelyNormal with just rare extra heartbeats.  No change in her medicines.  ----- Message -----  From: Epley, Kendall, MA  Sent: 9/20/2021  11:13 AM CDT  To: MD Dr. Solo Perez do you have these yet? If so Sharlene and I can call patient to give results.   ----- Message -----  From: Mitch Ruelas  Sent: 9/20/2021  10:24 AM CDT  To: [facility] Clinical Pool    Dr. Banda     She is calling for her holter results.     Please call her at [phone].      Thanks    Mitch

## 2021-09-28 ENCOUNTER — TELEPHONE (OUTPATIENT)
Dept: FAMILY MEDICINE CLINIC | Facility: CLINIC | Age: 86
End: 2021-09-28

## 2021-09-29 LAB
BH CV ECHO MEAS - ACS: 1.7 CM
BH CV ECHO MEAS - AI DEC SLOPE: 248.6 CM/SEC^2
BH CV ECHO MEAS - AI MAX PG: 74.6 MMHG
BH CV ECHO MEAS - AI MAX VEL: 432 CM/SEC
BH CV ECHO MEAS - AI P1/2T: 509 MSEC
BH CV ECHO MEAS - AO MAX PG (FULL): 7.7 MMHG
BH CV ECHO MEAS - AO MAX PG: 14 MMHG
BH CV ECHO MEAS - AO MEAN PG (FULL): 3 MMHG
BH CV ECHO MEAS - AO MEAN PG: 6 MMHG
BH CV ECHO MEAS - AO ROOT AREA (BSA CORRECTED): 1.8
BH CV ECHO MEAS - AO ROOT AREA: 6.2 CM^2
BH CV ECHO MEAS - AO ROOT DIAM: 2.8 CM
BH CV ECHO MEAS - AO V2 MAX: 187 CM/SEC
BH CV ECHO MEAS - AO V2 MEAN: 114 CM/SEC
BH CV ECHO MEAS - AO V2 VTI: 39.4 CM
BH CV ECHO MEAS - ASC AORTA: 3.2 CM
BH CV ECHO MEAS - AVA(I,A): 2.3 CM^2
BH CV ECHO MEAS - AVA(I,D): 2.3 CM^2
BH CV ECHO MEAS - AVA(V,A): 2.1 CM^2
BH CV ECHO MEAS - AVA(V,D): 2.1 CM^2
BH CV ECHO MEAS - BSA(HAYCOCK): 1.6 M^2
BH CV ECHO MEAS - BSA: 1.5 M^2
BH CV ECHO MEAS - BZI_BMI: 24.4 KILOGRAMS/M^2
BH CV ECHO MEAS - BZI_METRIC_HEIGHT: 152.4 CM
BH CV ECHO MEAS - BZI_METRIC_WEIGHT: 56.7 KG
BH CV ECHO MEAS - EDV(CUBED): 43.6 ML
BH CV ECHO MEAS - EDV(MOD-SP2): 52.3 ML
BH CV ECHO MEAS - EDV(MOD-SP4): 61.8 ML
BH CV ECHO MEAS - EDV(TEICH): 51.6 ML
BH CV ECHO MEAS - EF(CUBED): 62.9 %
BH CV ECHO MEAS - EF(MOD-SP2): 56.6 %
BH CV ECHO MEAS - EF(MOD-SP4): 57.3 %
BH CV ECHO MEAS - EF(TEICH): 55.4 %
BH CV ECHO MEAS - EPSS: 0.4 CM
BH CV ECHO MEAS - ESV(CUBED): 16.2 ML
BH CV ECHO MEAS - ESV(MOD-SP2): 22.7 ML
BH CV ECHO MEAS - ESV(MOD-SP4): 26.4 ML
BH CV ECHO MEAS - ESV(TEICH): 23 ML
BH CV ECHO MEAS - FS: 28.1 %
BH CV ECHO MEAS - IVS/LVPW: 0.96
BH CV ECHO MEAS - IVSD: 1 CM
BH CV ECHO MEAS - LA DIMENSION: 3.9 CM
BH CV ECHO MEAS - LA/AO: 1.4
BH CV ECHO MEAS - LV DIASTOLIC VOL/BSA (35-75): 40.4 ML/M^2
BH CV ECHO MEAS - LV MASS(C)D: 112 GRAMS
BH CV ECHO MEAS - LV MASS(C)DI: 73.3 GRAMS/M^2
BH CV ECHO MEAS - LV MAX PG: 6.3 MMHG
BH CV ECHO MEAS - LV MEAN PG: 3 MMHG
BH CV ECHO MEAS - LV SYSTOLIC VOL/BSA (12-30): 17.3 ML/M^2
BH CV ECHO MEAS - LV V1 MAX: 125 CM/SEC
BH CV ECHO MEAS - LV V1 MEAN: 73.6 CM/SEC
BH CV ECHO MEAS - LV V1 VTI: 28.7 CM
BH CV ECHO MEAS - LVIDD: 3.5 CM
BH CV ECHO MEAS - LVIDS: 2.5 CM
BH CV ECHO MEAS - LVLD AP2: 8 CM
BH CV ECHO MEAS - LVLD AP4: 7.7 CM
BH CV ECHO MEAS - LVLS AP2: 6.5 CM
BH CV ECHO MEAS - LVLS AP4: 6.3 CM
BH CV ECHO MEAS - LVOT AREA (M): 3.1 CM^2
BH CV ECHO MEAS - LVOT AREA: 3.1 CM^2
BH CV ECHO MEAS - LVOT DIAM: 2 CM
BH CV ECHO MEAS - LVPWD: 1.1 CM
BH CV ECHO MEAS - MR MAX PG: 64.6 MMHG
BH CV ECHO MEAS - MR MAX VEL: 402 CM/SEC
BH CV ECHO MEAS - MV A MAX VEL: 85.7 CM/SEC
BH CV ECHO MEAS - MV DEC SLOPE: 158 CM/SEC^2
BH CV ECHO MEAS - MV E MAX VEL: 69.4 CM/SEC
BH CV ECHO MEAS - MV E/A: 0.81
BH CV ECHO MEAS - MV MAX PG: 4.5 MMHG
BH CV ECHO MEAS - MV MEAN PG: 1 MMHG
BH CV ECHO MEAS - MV P1/2T MAX VEL: 82.5 CM/SEC
BH CV ECHO MEAS - MV P1/2T: 152.9 MSEC
BH CV ECHO MEAS - MV V2 MAX: 106 CM/SEC
BH CV ECHO MEAS - MV V2 MEAN: 47.5 CM/SEC
BH CV ECHO MEAS - MV V2 VTI: 31.2 CM
BH CV ECHO MEAS - MVA P1/2T LCG: 2.7 CM^2
BH CV ECHO MEAS - MVA(P1/2T): 1.4 CM^2
BH CV ECHO MEAS - MVA(VTI): 2.9 CM^2
BH CV ECHO MEAS - PA MAX PG (FULL): 1.2 MMHG
BH CV ECHO MEAS - PA MAX PG: 3.6 MMHG
BH CV ECHO MEAS - PA MEAN PG (FULL): 1 MMHG
BH CV ECHO MEAS - PA MEAN PG: 2 MMHG
BH CV ECHO MEAS - PA V2 MAX: 94.7 CM/SEC
BH CV ECHO MEAS - PA V2 MEAN: 68.5 CM/SEC
BH CV ECHO MEAS - PA V2 VTI: 22.6 CM
BH CV ECHO MEAS - RAP SYSTOLE: 3 MMHG
BH CV ECHO MEAS - RV MAX PG: 2.4 MMHG
BH CV ECHO MEAS - RV MEAN PG: 1 MMHG
BH CV ECHO MEAS - RV V1 MAX: 77.7 CM/SEC
BH CV ECHO MEAS - RV V1 MEAN: 54.8 CM/SEC
BH CV ECHO MEAS - RV V1 VTI: 18.8 CM
BH CV ECHO MEAS - RVDD: 3.2 CM
BH CV ECHO MEAS - RVSP: 33 MMHG
BH CV ECHO MEAS - SI(AO): 158.7 ML/M^2
BH CV ECHO MEAS - SI(CUBED): 17.9 ML/M^2
BH CV ECHO MEAS - SI(LVOT): 59 ML/M^2
BH CV ECHO MEAS - SI(MOD-SP2): 19.4 ML/M^2
BH CV ECHO MEAS - SI(MOD-SP4): 23.2 ML/M^2
BH CV ECHO MEAS - SI(TEICH): 18.7 ML/M^2
BH CV ECHO MEAS - SV(AO): 242.6 ML
BH CV ECHO MEAS - SV(CUBED): 27.4 ML
BH CV ECHO MEAS - SV(LVOT): 90.2 ML
BH CV ECHO MEAS - SV(MOD-SP2): 29.6 ML
BH CV ECHO MEAS - SV(MOD-SP4): 35.4 ML
BH CV ECHO MEAS - SV(TEICH): 28.6 ML
BH CV ECHO MEAS - TR MAX VEL: 274 CM/SEC
BH CV VAS BP RIGHT ARM: NORMAL MMHG
MAXIMAL PREDICTED HEART RATE: 133 BPM
STRESS TARGET HR: 113 BPM

## 2021-09-29 NOTE — TELEPHONE ENCOUNTER
Spoke with patient's daughter, she is wanting a referral for Kristy to see a neuropathy doctor. Said she had a already called once about this but I did see it until looking back and it was attached to a med refill. Now the daughter would like a call from you to talk to you about it.

## 2021-09-30 ENCOUNTER — TELEPHONE (OUTPATIENT)
Dept: CARDIOLOGY | Facility: CLINIC | Age: 86
End: 2021-09-30

## 2021-09-30 NOTE — TELEPHONE ENCOUNTER
Spoke with patient's daughter and informed her that we are waiting on an appointment from 's office. I did let her know that they will probably contact her instead of our office.

## 2021-09-30 NOTE — TELEPHONE ENCOUNTER
Contacted PT per Dr. Banda about results. Results are as follows; She does have some leakage of the AV so she needs to stay on her BP meds.  PT voiced that she would like to talk to Dr. Banda about some questions she has and concerns.        ----- Message from Ludwin Banda MD sent at 9/29/2021  3:01 PM CDT -----  Please call pt  She does have some leakage of the AV so she needs to stay on her BP meds.

## 2021-10-04 ENCOUNTER — OFFICE VISIT (OUTPATIENT)
Dept: OBSTETRICS AND GYNECOLOGY | Facility: CLINIC | Age: 86
End: 2021-10-04

## 2021-10-04 ENCOUNTER — LAB (OUTPATIENT)
Dept: LAB | Facility: HOSPITAL | Age: 86
End: 2021-10-04

## 2021-10-04 VITALS
WEIGHT: 125 LBS | SYSTOLIC BLOOD PRESSURE: 120 MMHG | HEIGHT: 60 IN | DIASTOLIC BLOOD PRESSURE: 70 MMHG | BODY MASS INDEX: 24.54 KG/M2

## 2021-10-04 DIAGNOSIS — N81.10 POP-Q STAGE 4 CYSTOCELE: Chronic | ICD-10-CM

## 2021-10-04 DIAGNOSIS — Z01.818 PREOP TESTING: Primary | ICD-10-CM

## 2021-10-04 DIAGNOSIS — Z46.89 PESSARY MAINTENANCE: Primary | ICD-10-CM

## 2021-10-04 DIAGNOSIS — N81.6 POP-Q STAGE 2 RECTOCELE: Chronic | ICD-10-CM

## 2021-10-04 DIAGNOSIS — G47.00 INSOMNIA, UNSPECIFIED TYPE: ICD-10-CM

## 2021-10-04 LAB — SARS-COV-2 N GENE RESP QL NAA+PROBE: NOT DETECTED

## 2021-10-04 PROCEDURE — 99212 OFFICE O/P EST SF 10 MIN: CPT | Performed by: NURSE PRACTITIONER

## 2021-10-04 PROCEDURE — 87635 SARS-COV-2 COVID-19 AMP PRB: CPT

## 2021-10-04 PROCEDURE — C9803 HOPD COVID-19 SPEC COLLECT: HCPCS

## 2021-10-04 RX ORDER — ZOLPIDEM TARTRATE 5 MG/1
5 TABLET ORAL NIGHTLY PRN
Qty: 30 TABLET | Refills: 0 | Status: SHIPPED | OUTPATIENT
Start: 2021-10-04 | End: 2021-10-21 | Stop reason: SDUPTHER

## 2021-10-04 NOTE — PROGRESS NOTES
"Subjective   Chief Complaint   Patient presents with   • Pessary Check     Kristy Ramirez is a 87 y.o. year old who presents to be seen for follow-up of her pessary.  Currently she is using Foldable ring w/ support - #7 w/o urethral bar.  She reports pessary started to fall out yesterday.    No Additional Complaints Reported    The following portions of the patient's history were reviewed and updated as appropriate:current medications and allergies    Social History    Tobacco Use      Smoking status: Former Smoker        Types: Cigarettes      Smokeless tobacco: Never Used    Review of Systems   Constitutional: Negative.  Negative for activity change, appetite change, diaphoresis, fatigue, unexpected weight gain and unexpected weight loss.   Respiratory: Negative for chest tightness and shortness of breath.    Cardiovascular: Negative for chest pain and palpitations.   Gastrointestinal: Negative for abdominal distention, abdominal pain, constipation and diarrhea.   Genitourinary: Negative for amenorrhea, breast discharge, breast lump, breast pain, decreased libido, decreased urine volume, difficulty urinating, dyspareunia, dysuria, frequency, genital sores, menstrual problem, pelvic pain, pelvic pressure, urgency, urinary incontinence, vaginal bleeding, vaginal discharge and vaginal pain.   Musculoskeletal: Negative for myalgias.   Skin: Negative for color change, dry skin and skin lesions.   Neurological: Negative for light-headedness and headache.   Psychiatric/Behavioral: Negative for agitation, dysphoric mood, sleep disturbance, depressed mood and stress. The patient is not nervous/anxious.         Objective   /70   Ht 152.4 cm (60\")   Wt 56.7 kg (125 lb)   LMP  (LMP Unknown)   Breastfeeding No   BMI 24.41 kg/m²         General:  well developed; well nourished  no acute distress   Pelvis: Clinical staff was present for exam  External genitalia:  normal appearance of the external genitalia " including Bartholin's and [place]'s glands.  :  urethral meatus normal; urethra hypermobile; prominent caruncle present;  Pessary removed and clean then replaced into the vagina without difficulty. Pt tolerated well.       Lab Review   No data reviewed    Imaging   No data reviewed         No orders of the defined types were placed in this encounter.      Follow up PRN or at next scheduled visit.     This note was electronically signed.    Gladys Soni, NADYA  October 4, 2021

## 2021-10-04 NOTE — TELEPHONE ENCOUNTER
Incoming Refill Request      Medication requested (name and dose):[facility]     Pharmacy where request should be sent: [facility]    Additional details provided by patient:      Best call back number: [phone]    Does the patient have less than a 3 day supply:  [x] Yes  [] No    Rossy Fontaine  10/04/21, 13:42 CDT

## 2021-10-05 ENCOUNTER — ANESTHESIA EVENT (OUTPATIENT)
Dept: GASTROENTEROLOGY | Facility: HOSPITAL | Age: 86
End: 2021-10-05

## 2021-10-05 ENCOUNTER — HOSPITAL ENCOUNTER (OUTPATIENT)
Facility: HOSPITAL | Age: 86
Setting detail: HOSPITAL OUTPATIENT SURGERY
Discharge: HOME | End: 2021-10-05
Attending: INTERNAL MEDICINE | Admitting: INTERNAL MEDICINE

## 2021-10-05 ENCOUNTER — ANESTHESIA (OUTPATIENT)
Dept: GASTROENTEROLOGY | Facility: HOSPITAL | Age: 86
End: 2021-10-05

## 2021-10-05 VITALS
HEIGHT: 60 IN | OXYGEN SATURATION: 100 % | WEIGHT: 128 LBS | DIASTOLIC BLOOD PRESSURE: 71 MMHG | RESPIRATION RATE: 18 BRPM | BODY MASS INDEX: 25.13 KG/M2 | SYSTOLIC BLOOD PRESSURE: 151 MMHG | TEMPERATURE: 97.3 F | HEART RATE: 58 BPM

## 2021-10-05 DIAGNOSIS — R13.19 OTHER DYSPHAGIA: ICD-10-CM

## 2021-10-05 PROCEDURE — 25010000002 PROPOFOL 10 MG/ML EMULSION: Performed by: NURSE ANESTHETIST, CERTIFIED REGISTERED

## 2021-10-05 PROCEDURE — 43248 EGD GUIDE WIRE INSERTION: CPT | Performed by: INTERNAL MEDICINE

## 2021-10-05 PROCEDURE — C1769 GUIDE WIRE: HCPCS | Performed by: INTERNAL MEDICINE

## 2021-10-05 RX ORDER — ONDANSETRON 2 MG/ML
4 INJECTION INTRAMUSCULAR; INTRAVENOUS ONCE AS NEEDED
Status: DISCONTINUED | OUTPATIENT
Start: 2021-10-05 | End: 2021-10-05 | Stop reason: HOSPADM

## 2021-10-05 RX ORDER — DEXTROSE AND SODIUM CHLORIDE 5; .45 G/100ML; G/100ML
30 INJECTION, SOLUTION INTRAVENOUS CONTINUOUS PRN
Status: DISCONTINUED | OUTPATIENT
Start: 2021-10-05 | End: 2021-10-05 | Stop reason: HOSPADM

## 2021-10-05 RX ORDER — PROPOFOL 10 MG/ML
VIAL (ML) INTRAVENOUS AS NEEDED
Status: DISCONTINUED | OUTPATIENT
Start: 2021-10-05 | End: 2021-10-05 | Stop reason: SURG

## 2021-10-05 RX ORDER — LIDOCAINE HYDROCHLORIDE 20 MG/ML
INJECTION, SOLUTION EPIDURAL; INFILTRATION; INTRACAUDAL; PERINEURAL AS NEEDED
Status: DISCONTINUED | OUTPATIENT
Start: 2021-10-05 | End: 2021-10-05 | Stop reason: SURG

## 2021-10-05 RX ADMIN — DEXTROSE AND SODIUM CHLORIDE 30 ML/HR: 5; 450 INJECTION, SOLUTION INTRAVENOUS at 09:24

## 2021-10-05 RX ADMIN — LIDOCAINE HYDROCHLORIDE 50 MG: 20 INJECTION, SOLUTION EPIDURAL; INFILTRATION; INTRACAUDAL; PERINEURAL at 09:55

## 2021-10-05 RX ADMIN — PROPOFOL 50 MG: 10 INJECTION, EMULSION INTRAVENOUS at 09:55

## 2021-10-05 RX ADMIN — PROPOFOL 20 MG: 10 INJECTION, EMULSION INTRAVENOUS at 09:58

## 2021-10-05 NOTE — ANESTHESIA POSTPROCEDURE EVALUATION
Patient: Kristy Ramirez    Procedure Summary     Date: 10/05/21 Room / Location: [facility] ENDOSCOPY 1 / [facility] ENDOSCOPY    Anesthesia Start: 0938 Anesthesia Stop: 1001    Procedure: ESOPHAGOGASTRODUODENOSCOPY (N/A ) Diagnosis:       Other dysphagia      (Other dysphagia [R13.19])    Surgeons: Jack Ellis MD Provider: Krista Metzger CRNA    Anesthesia Type: MAC ASA Status: 3          Anesthesia Type: MAC    Vitals  No vitals data found for the desired time range.          Post Anesthesia Care and Evaluation    Patient location during evaluation: bedside  Patient participation: complete - patient participated  Level of consciousness: sleepy but conscious  Pain score: 0  Pain management: adequate  Airway patency: patent  Anesthetic complications: No anesthetic complications  PONV Status: none  Cardiovascular status: acceptable and hemodynamically stable  Respiratory status: acceptable and room air  Hydration status: acceptable    Comments: ---------------------------               10/05/21                      [facility]         ---------------------------   BP:          149/67        Pulse:         57           Resp:          18           Temp:   97.1 °F (36.2 °C)   SpO2:         100%         ---------------------------

## 2021-10-14 ENCOUNTER — OFFICE VISIT (OUTPATIENT)
Dept: GASTROENTEROLOGY | Facility: CLINIC | Age: 86
End: 2021-10-14

## 2021-10-14 VITALS
HEART RATE: 71 BPM | HEIGHT: 60 IN | BODY MASS INDEX: 25.68 KG/M2 | SYSTOLIC BLOOD PRESSURE: 169 MMHG | WEIGHT: 130.8 LBS | DIASTOLIC BLOOD PRESSURE: 73 MMHG

## 2021-10-14 DIAGNOSIS — R13.19 OTHER DYSPHAGIA: Primary | ICD-10-CM

## 2021-10-14 PROCEDURE — 99214 OFFICE O/P EST MOD 30 MIN: CPT | Performed by: INTERNAL MEDICINE

## 2021-10-14 RX ORDER — MAGNESIUM OXIDE 400 MG/1
400 TABLET ORAL
COMMUNITY
Start: 2021-10-14 | End: 2022-05-05 | Stop reason: SDUPTHER

## 2021-10-14 NOTE — PROGRESS NOTES
[facility]      Chief Complaint:   Chief Complaint   Patient presents with   • EGD Performed 10/05/2021     Other Dysphagia       Subjective     HPI:   Patient with dysphagia.  Patient had marked improvement in swallowing since dilatation.  Patient's esophagus was dilated up to 45.  Patient states she did have some vomiting of blood following the procedure.  At this point will not dilate any further than this patient is able to eat whatever she wants.  Discussed with patient that if swallowing becomes more difficult we will have to repeat procedure with repeat dilatation probably not causing as much bleeding because of the severe stricture she had.    Plan; we will have patient follow-up in 3 months to see if continuing improvement in dysphagia.  Past Medical History:   Past Medical History:   Diagnosis Date   • Abnormal CT scan     per patient   • Acute bronchitis    • Acute sinusitis    • Allergic conjunctivitis    • Angular cheilitis    • Ankle edema    • Backache     improved   • Blood in urine      UTI resolved      • Bradycardia    • Chest discomfort     described as heart racing      • Chronic low back pain     RIGHT leg radiation      • Cold feet     c/o - and lowerlegs      • Contusion     of dorsum of foot   • Cough    • COVID-19 virus infection    • Depressive disorder    • Dizziness and giddiness    • Dyspnea    • Dysuria    • Edema of lower extremity    • Essential hypertension    • Exanthematous disorder    • Fatigue    • Foot pain, left    • Grade II hemorrhoids 3/12/2018   • Hematoma    • Hip pain, right    • History of palpitations    • Hormone replacement therapy (postmenopausal)    • Impacted cerumen    • Joint pain    • Knee pain    • Low back pain    • Malaise and fatigue    • Multiple joint pain    • Muscle pain    • Muscle weakness    • Neck pain    • Obesity (BMI 30.0-34.9) 3/12/2018   • Osteoarthritis of knee    • Osteoarthritis of multiple joints    • Otitis externa     • Pain in lower limb    • Peripheral venous insufficiency    • POP-Q stage 2 rectocele 3/12/2018   • POP-Q stage 4 cystocele 3/12/2018   • Sacroiliitis, not elsewhere classified (HCC)     R LE   • Shoulder pain    • Temporal arteritis ([facility]) 11/8/2020    Recent COVID infection with symptoms concerning for uncomplicated GCA without visual loss/changes.  -ESR on admission 107 - IV steroids 60 mg daily; transition to oral when dysphagia resolved for at minimum 2-4 weeks, then taper by 10 mg weekly - ESR and CRP q48 hrs -consulted Dr Olea, appreciate recommendations- pt underwent Temporal artery biopsy on 11/10/20        • Upper respiratory infection    • Urinary frequency    • Urinary tract infectious disease    • Wheezing        Past Surgical History:    Past Surgical History:   Procedure Laterality Date   • ANKLE SURGERY     • BACK SURGERY     • COLONOSCOPY  12/14/2009   • ENDOSCOPY N/A 1/6/2021    Procedure: ESOPHAGOGASTRODUODENOSCOPY;  Surgeon: Jack Ellis MD;  Location: [facility] ENDOSCOPY;  Service: Gastroenterology;  Laterality: N/A;   • ENDOSCOPY N/A 1/28/2021    Procedure: ESOPHAGOGASTRODUODENOSCOPY;  Surgeon: Jack Ellis MD;  Location: [facility] ENDOSCOPY;  Service: Gastroenterology;  Laterality: N/A;  eso dilation with ballon to 30FR   • ENDOSCOPY N/A 6/21/2021    Procedure: ESOPHAGOGASTRODUODENOSCOPY;  Surgeon: Jack Ellis MD;  Location: [facility] ENDOSCOPY;  Service: Gastroenterology;  Laterality: N/A;   • ENDOSCOPY N/A 8/6/2021    Procedure: ESOPHAGOGASTRODUODENOSCOPY;  Surgeon: Jack Ellis MD;  Location: [facility] ENDOSCOPY;  Service: Gastroenterology;  Laterality: N/A;   • ENDOSCOPY N/A 8/25/2021    Procedure: ESOPHAGOGASTRODUODENOSCOPY;  Surgeon: Jack Ellis MD;  Location: [facility] ENDOSCOPY;  Service: Gastroenterology;  Laterality: N/A;   • ENDOSCOPY N/A 10/5/2021    Procedure: ESOPHAGOGASTRODUODENOSCOPY;  Surgeon: Jack Ellis MD;  Location: [facility] ENDOSCOPY;  Service:  Gastroenterology;  Laterality: N/A;  39-45  blue/yellow eso dilation   • EYE SURGERY Bilateral 02/2018    B cataract   • HAMMER TOE REPAIR  08/02/2011    Hammertoe repair, left second toe.   • HYSTERECTOMY     • INCISION AND DRAINAGE ABSCESS  01/21/2015   • INJECTION OF MEDICATION  11/15/2013   • INJECTION OF MEDICATION  05/06/2013    Celestone (betamethasone) (1)      • INJECTION OF MEDICATION  03/17/2016    Kenalog (3)      • NECK SURGERY  01/2018   • TEMPORAL ARTERY BIOPSY Left 11/10/2020    Procedure: LEFT TEMPORAL ARTERY BIOPSY;  Surgeon: Anjel Olea MD;  Location: [facility];  Service: General;  Laterality: Left;   • UPPER GASTROINTESTINAL ENDOSCOPY  12/14/2009   • UPPER GASTROINTESTINAL ENDOSCOPY  01/06/2021   • UPPER GASTROINTESTINAL ENDOSCOPY  01/28/2021   • UPPER GASTROINTESTINAL ENDOSCOPY  06/21/2021   • UPPER GASTROINTESTINAL ENDOSCOPY  08/25/2021   • UPPER GASTROINTESTINAL ENDOSCOPY  10/05/2021       Family History:  Family History   Problem Relation Age of Onset   • Diabetes Other    • Heart disease Other    • Stroke Other    • Coronary artery disease Mother    • Coronary artery disease Father        Social History:   reports that she has quit smoking. Her smoking use included cigarettes. She has never used smokeless tobacco. She reports that she does not drink alcohol and does not use drugs.    Medications:   Prior to Admission medications    Medication Sig Start Date End Date Taking? Authorizing Provider   allopurinol (ZYLOPRIM) 100 MG tablet Take 1 tablet by mouth Daily. take with food 9/8/21  Yes Maggi William APRN   amLODIPine (NORVASC) 5 MG tablet Take 1 tablet by mouth Daily. 9/15/21  Yes Maggi William APRN   Diclofenac Sodium (VOLTAREN) 1 % gel gel Apply 4 g topically to the appropriate area as directed 4 (Four) Times a Day As Needed (joint pain). 9/8/21  Yes Maggi William APRN   hydrOXYzine (ATARAX) 10 MG tablet Take 10 mg by mouth At Night As Needed for Itching.   Yes Provider,  MD Vipin   loperamide (IMODIUM) 2 MG capsule Take 2 mg by mouth 4 (Four) Times a Day As Needed for Diarrhea.   Yes Provider, MD Vipin   losartan (COZAAR) 50 MG tablet Take 1 tablet by mouth Daily. 9/8/21  Yes Maggi William APRN   magnesium oxide (MAG-OX) 400 MG tablet Take 400 mg by mouth. 10/14/21  Yes ProviderVipin MD   metoprolol succinate XL (Toprol XL) 25 MG 24 hr tablet Take 1 tablet by mouth Daily. 7/13/21  Yes Maggi William APRN   mupirocin (BACTROBAN) 2 % ointment Apply 1 application topically to the appropriate area as directed 2 (Two) Times a Day As Needed (rash). 9/8/21  Yes Maggi William APRN   nystatin (MYCOSTATIN) 289629 UNIT/ML suspension Swish and swallow 5 mL 4 (Four) Times a Day. 9/8/21  Yes Maggi William APRN   omeprazole (priLOSEC) 20 MG capsule Take 1 capsule by mouth 2 (Two) Times a Day. 9/16/21  Yes Maggi William APRN   terconazole (TERAZOL 3) 0.8 % vaginal cream INSERT 1 APPLICATORFUL VAGINALLY EVERY NIGHT FOR 3 DAYS 9/29/21  Yes Provider, MD Vipin   tolterodine LA (DETROL LA) 4 MG 24 hr capsule Take 1 capsule by mouth Daily. 9/8/21  Yes Maggi William APRN   zolpidem (Ambien) 5 MG tablet Take 1 tablet by mouth At Night As Needed for Sleep. 10/4/21  Yes Maggi William APRN   omeprazole (priLOSEC) 20 MG capsule Take 1 capsule by mouth 2 (Two) Times a Day. 8/3/20   Ora Walden APRN       Allergies:  Aleve [naproxen sodium], Atenolol, Gabapentin, Keflex [cephalexin], Lisinopril, Relafen [nabumetone], Zanaflex [tizanidine], Acetaminophen, Aspirin, Codeine, Diflucan [fluconazole], and Sulfa antibiotics    ROS:    Review of Systems   Constitutional: Negative for activity change, appetite change, chills, diaphoresis, fatigue, fever and unexpected weight change.   HENT: Positive for trouble swallowing. Negative for sore throat.    Respiratory: Negative for shortness of breath.    Gastrointestinal: Negative for abdominal distention, abdominal  "pain, anal bleeding, blood in stool, constipation, diarrhea, nausea, rectal pain and vomiting.   Endocrine: Negative for polydipsia, polyphagia and polyuria.   Genitourinary: Negative for difficulty urinating.   Musculoskeletal: Negative for arthralgias.   Skin: Negative for pallor.   Allergic/Immunologic: Negative for food allergies.   Neurological: Negative for weakness and light-headedness.   Psychiatric/Behavioral: Negative for behavioral problems.     Objective     Blood pressure 169/73, pulse 71, height 152.4 cm (60\"), weight 59.3 kg (130 lb 12.8 oz), not currently breastfeeding.    Physical Exam  Constitutional:       General: She is not in acute distress.     Appearance: She is well-developed. She is not diaphoretic.   HENT:      Head: Normocephalic and atraumatic.   Cardiovascular:      Rate and Rhythm: Normal rate and regular rhythm.      Heart sounds: Normal heart sounds. No murmur heard.  No friction rub. No gallop.    Pulmonary:      Effort: No respiratory distress.      Breath sounds: Normal breath sounds. No wheezing or rales.   Chest:      Chest wall: No tenderness.   Abdominal:      General: Bowel sounds are normal. There is no distension.      Palpations: Abdomen is soft. There is no mass.      Tenderness: There is no abdominal tenderness. There is no guarding or rebound.      Hernia: No hernia is present.   Musculoskeletal:         General: Normal range of motion.   Skin:     General: Skin is warm and dry.      Coloration: Skin is not pale.      Findings: No erythema or rash.   Neurological:      Mental Status: She is alert and oriented to person, place, and time.   Psychiatric:         Behavior: Behavior normal.         Thought Content: Thought content normal.         Judgment: Judgment normal.          Assessment/Plan   Diagnoses and all orders for this visit:    1. Other dysphagia (Primary)        * Surgery not found *     Diagnosis Plan   1. Other dysphagia         Anticipated Surgical " Procedure:  No orders of the defined types were placed in this encounter.      The risks, benefits, and alternatives of this procedure have been discussed with the patient or the responsible party- the patient understands and agrees to proceed.

## 2021-10-14 NOTE — PATIENT INSTRUCTIONS
"Nicolas's Neurology in Clinical Practice (8th ed., pp. 152-163). Kristen PA: Elsevier.\"> Zac Textbook of Surgery (21st ed., pp. 1111-5169). VANE Peterson: Elsevier[employer] Inc.\">   Dysphagia    Dysphagia is trouble swallowing. This condition occurs when solids and liquids stick in a person's throat on the way down to the stomach, or when food takes longer to get to the stomach than usual.  You may have problems swallowing food, liquids, or both. You may also have pain while trying to swallow. It may take you more time and effort to swallow something.  What are the causes?  This condition may be caused by:  · Muscle problems. They may make it difficult for you to move food and liquids through the esophagus, which is the tube that connects your mouth to your stomach.  · Blockages. You may have ulcers, scar tissue, or inflammation that blocks the normal passage of food and liquids. Causes of these problems include:  ? Acid reflux from your stomach into your esophagus (gastroesophageal reflux).  ? Infections.  ? Radiation treatment for cancer.  ? Medicines taken without enough fluids to wash them down into your stomach.  · Stroke. This can affect the nerves and make it difficult to swallow.  · Nerve problems. These prevent signals from being sent to the muscles of your esophagus to squeeze (contract) and move what you swallow down to your stomach.  · Globus pharyngeus. This is a common problem that involves a feeling like something is stuck in your throat or a sense of trouble with swallowing, even though nothing is wrong with the swallowing passages.  · Certain conditions, such as cerebral palsy or Parkinson's disease.  What are the signs or symptoms?  Common symptoms of this condition include:  · A feeling that solids or liquids are stuck in your throat on the way down to the stomach.  · Pain while swallowing.  · Coughing or gagging while trying to swallow.  Other symptoms include:  · Food moving back from your " stomach to your mouth (regurgitation).  · Noises coming from your throat.  · Chest discomfort with swallowing.  · A feeling of fullness when swallowing.  · Drooling, especially when the throat is blocked.  · Heartburn.  How is this diagnosed?  This condition may be diagnosed by:  · Barium X-ray. In this test, you will swallow a white liquid that sticks to the inside of your esophagus. X-ray images are then taken.  · Endoscopy. In this test, a flexible telescope is inserted down your throat to look at your esophagus and your stomach.  · CT scans and an MRI.  How is this treated?  Treatment for dysphagia depends on the cause of this condition, such as:  · If the dysphagia is caused by acid reflux or infection, medicines may be used. They may include antibiotics and heartburn medicines.  · If the dysphagia is caused by problems with the muscles, swallowing therapy may be used to help you strengthen your swallowing muscles. You may have to do specific exercises to strengthen the muscles or stretch them.  · If the dysphagia is caused by a blockage or mass, procedures to remove the blockage may be done. You may need surgery and a feeding tube.  You may need to make diet changes. Ask your health care provider for specific instructions.  Follow these instructions at home:  Medicines  · Take over-the-counter and prescription medicines only as told by your health care provider.  · If you were prescribed an antibiotic medicine, take it as told by your health care provider. Do not stop taking the antibiotic even if you start to feel better.  Eating and drinking    · Follow any diet changes as told by your health care provider.  · Work with a diet and nutrition specialist (dietitian) to create an eating plan that will help you get the nutrients you need in order to stay healthy.  · Eat soft foods that are easier to swallow.  · Cut your food into small pieces and eat slowly. Take small bites.  · Eat and drink only when you are  sitting upright.  · Do not drink alcohol or caffeine. If you need help quitting, ask your health care provider.    General instructions  · Check your weight every day to make sure you are not losing weight.  · Do not use any products that contain nicotine or tobacco, such as cigarettes, e-cigarettes, and chewing tobacco. If you need help quitting, ask your health care provider.  · Keep all follow-up visits as told by your health care provider. This is important.  Contact a health care provider if you:  · Lose weight because you cannot swallow.  · Cough when you drink liquids.  · Cough up partially digested food.  Get help right away if you:  · Cannot swallow your saliva.  · Have shortness of breath, a fever, or both.  · Have a hoarse voice and also have trouble swallowing.  Summary  · Dysphagia is trouble swallowing. This condition occurs when solids and liquids stick in a person's throat on the way down to the stomach. You may cough or gag while trying to swallow.  · Dysphagia has many possible causes.  · Treatment for dysphagia depends on the cause of the condition.  · Keep all follow-up visits as told by your health care provider. This is important.  This information is not intended to replace advice given to you by your health care provider. Make sure you discuss any questions you have with your health care provider.  Document Revised: 05/13/2020 Document Reviewed: 05/13/2020  Else"[employer] Patient Education © 2021 Elsevier Inc.

## 2021-10-21 ENCOUNTER — OFFICE VISIT (OUTPATIENT)
Dept: FAMILY MEDICINE CLINIC | Facility: CLINIC | Age: 86
End: 2021-10-21

## 2021-10-21 VITALS
SYSTOLIC BLOOD PRESSURE: 142 MMHG | WEIGHT: 129.8 LBS | TEMPERATURE: 97 F | BODY MASS INDEX: 25.48 KG/M2 | DIASTOLIC BLOOD PRESSURE: 70 MMHG | RESPIRATION RATE: 20 BRPM | OXYGEN SATURATION: 100 % | HEART RATE: 60 BPM | HEIGHT: 60 IN

## 2021-10-21 DIAGNOSIS — I10 ESSENTIAL HYPERTENSION: Primary | ICD-10-CM

## 2021-10-21 DIAGNOSIS — G47.00 INSOMNIA, UNSPECIFIED TYPE: ICD-10-CM

## 2021-10-21 DIAGNOSIS — N18.31 STAGE 3A CHRONIC KIDNEY DISEASE (HCC): ICD-10-CM

## 2021-10-21 PROCEDURE — 99214 OFFICE O/P EST MOD 30 MIN: CPT | Performed by: NURSE PRACTITIONER

## 2021-10-21 RX ORDER — ZOLPIDEM TARTRATE 5 MG/1
TABLET ORAL
Qty: 30 TABLET | Refills: 2 | Status: SHIPPED | OUTPATIENT
Start: 2021-10-21 | End: 2022-01-26 | Stop reason: SDUPTHER

## 2021-10-21 NOTE — PROGRESS NOTES
Chief Complaint  Hypertension    Subjective    History of Present Illness {CC  Problem List  Visit  Diagnosis   Encounters  Notes  Medications  Labs  Result Review Imaging  Media :23}     Kristy Ramirez presents to [facility] [place] for   3 mos f/u on HTN - BP appears controlled - sees Dr. Myles for Afib and BP mgmt - denies HA, blurry vision, CP, SOA or worsening edema. BP log readings low/hi of 104/49; 156/71 and all remaining readings 120-150/60-70. Currently seeing nephrology for stage 3a CKD; Gyn for pessary maintenance and GI for esophageal strictures - just recently had esophagus stretched and states her sx are much improved and is swallowing well.        Objective     Physical Exam  Vitals and nursing note reviewed.   Constitutional:       General: She is not in acute distress.     Appearance: Normal appearance. She is normal weight. She is not ill-appearing.   HENT:      Head: Normocephalic and atraumatic.   Eyes:      Conjunctiva/sclera: Conjunctivae normal.      Pupils: Pupils are equal, round, and reactive to light.   Cardiovascular:      Rate and Rhythm: Normal rate and regular rhythm.      Heart sounds: Normal heart sounds.   Pulmonary:      Effort: Pulmonary effort is normal.      Breath sounds: Normal breath sounds.   Musculoskeletal:         General: Tenderness (back) present. Normal range of motion.      Cervical back: Normal range of motion and neck supple.   Skin:     General: Skin is warm and dry.   Neurological:      General: No focal deficit present.      Mental Status: She is alert.   Psychiatric:         Mood and Affect: Mood normal.         Behavior: Behavior normal.        Result Review  Data Reviewed:{ Labs  Result Review  Imaging  Med Tab  Media :23}   The following data was reviewed by (Optional):[id number]}  Common labs    Common Labsle 11/10/20 11/10/20 2/23/21 2/23/21 6/30/21 6/30/21 6/30/21    0743 0743 1032 1032 1122  "1122 1122   Glucose  121 (A)  89 83     BUN  25 (A)  25 (A) 28 (A)     Creatinine  0.92  1.29 (A) 1.40 (A)     eGFR  Am  70  47 (A) 43 (A)     Sodium  138  139 138     Potassium  3.5  3.4 (A) 3.8     Chloride  106  102 99     Calcium  9.3  10.0 9.9     WBC 10.82 (A)  8.07       Hemoglobin 8.0 (A)  10.8 (A)       Hematocrit 24.6 (A)  32.2 (A)       Platelets 254  214       Hemoglobin A1C       5.50   Uric Acid      6.7 (A)    (A) Abnormal value          No Images in the past 120 days found..       Vital Signs:   /70 (BP Location: Left arm, Patient Position: Sitting, Cuff Size: Adult)   Pulse 60   Temp 97 °F (36.1 °C) (Temporal)   Resp 20   Ht 152.4 cm (60\")   Wt 58.9 kg (129 lb 12.8 oz)   SpO2 100%   BMI 25.35 kg/m²          Assessment and Plan {CC Problem List  Visit Diagnosis  ROS  Review (Popup)  Health Maintenance  Quality  BestPractice  Medications  SmartSets  SnapShot Encounters  Media :23}   Problem List Items Addressed This Visit        Cardiac and Vasculature    Essential hypertension - Primary    Overview                   Genitourinary and Reproductive     Stage 3a chronic kidney disease (HCC)       Sleep    Insomnia    Relevant Medications    zolpidem (Ambien) 5 MG tablet         Diagnosis Plan   1. Essential hypertension     2. Insomnia, unspecified type  zolpidem (Ambien) 5 MG tablet   3. Stage 3a chronic kidney disease (HCC)       - BP well controlled currently - cont mgmt by Dr. Myles - cont amlodipine, losartan and metoprolol at current dosings - no refills needed today.  - Isomnia - much improved - discussed short term use of zolpidem - pt to continue 1/2 to 1 tab at HS PRN insomnia - discussed caution with use d/t sedating effects and to use extreme caution if she needs to get up in night - pt v/u and has walker at bed side to help ambulate. DAVID reviewed and no red flags. Refill of zolpidem submitted.   - CKD - confirmed that pt is seeing nephrology - last appt " with Adalberto Soni NP was 10/14/21 - with f/u appt 2/17/22 - pts goal was to increase her oral water intake.   - RTC 3 mos for f/u or PRN    Follow Up {Instructions Charge Capture  Follow-up Communications :23}   Return in about 3 months (around 1/21/2022) for Recheck.  Patient was given instructions and counseling regarding her condition or for health maintenance advice. Please see specific information pulled into the AVS if appropriate            .  This document has been electronically signed by NADYA Duke on October 26, 2021 22:26 CDT

## 2021-12-06 DIAGNOSIS — M10.9 GOUT, UNSPECIFIED CAUSE, UNSPECIFIED CHRONICITY, UNSPECIFIED SITE: ICD-10-CM

## 2021-12-06 DIAGNOSIS — I48.0 PAROXYSMAL ATRIAL FIBRILLATION (HCC): ICD-10-CM

## 2021-12-06 DIAGNOSIS — N39.3 STRESS INCONTINENCE: ICD-10-CM

## 2021-12-06 DIAGNOSIS — I10 ESSENTIAL HYPERTENSION: ICD-10-CM

## 2021-12-06 RX ORDER — TOLTERODINE 4 MG/1
4 CAPSULE, EXTENDED RELEASE ORAL DAILY
Qty: 90 CAPSULE | Refills: 1 | Status: SHIPPED | OUTPATIENT
Start: 2021-12-06 | End: 2022-06-03 | Stop reason: SDUPTHER

## 2021-12-06 RX ORDER — LOSARTAN POTASSIUM 50 MG/1
50 TABLET ORAL DAILY
Qty: 90 TABLET | Refills: 1 | Status: SHIPPED | OUTPATIENT
Start: 2021-12-06 | End: 2022-06-03 | Stop reason: SDUPTHER

## 2021-12-06 RX ORDER — METOPROLOL SUCCINATE 25 MG/1
25 TABLET, EXTENDED RELEASE ORAL DAILY
Qty: 30 TABLET | Refills: 3 | Status: SHIPPED | OUTPATIENT
Start: 2021-12-06 | End: 2022-03-10

## 2021-12-06 RX ORDER — ALLOPURINOL 100 MG/1
100 TABLET ORAL DAILY
Qty: 90 TABLET | Refills: 1 | Status: SHIPPED | OUTPATIENT
Start: 2021-12-06 | End: 2022-06-03 | Stop reason: SDUPTHER

## 2021-12-06 RX ORDER — AMLODIPINE BESYLATE 5 MG/1
5 TABLET ORAL DAILY
Qty: 90 TABLET | Refills: 1 | Status: SHIPPED | OUTPATIENT
Start: 2021-12-06 | End: 2022-06-03 | Stop reason: SDUPTHER

## 2021-12-06 NOTE — TELEPHONE ENCOUNTER
Incoming Refill Request      Medication requested (name and dose): allopurinol (ZYLOPRIM) 100 MG tablet, amLODIPine (NORVASC) 5 MG tablet, tolterodine LA (DETROL LA) 4 MG 24 hr capsule, losartan (COZAAR) 50 MG tablet, Aloratadine 10 mg    Pharm[facility] where request [facility] be sent[facility]    Additional details provided by patient: no    Best call back number: [phone]    Does the patient have less than a 3 day supply:  [x] Yes  [] No    Rebecca Fuller  12/06/21, 10:29 CST

## 2021-12-15 ENCOUNTER — OFFICE VISIT (OUTPATIENT)
Dept: OBSTETRICS AND GYNECOLOGY | Facility: CLINIC | Age: 86
End: 2021-12-15

## 2021-12-15 VITALS
DIASTOLIC BLOOD PRESSURE: 68 MMHG | HEIGHT: 60 IN | SYSTOLIC BLOOD PRESSURE: 120 MMHG | WEIGHT: 134 LBS | BODY MASS INDEX: 26.31 KG/M2

## 2021-12-15 DIAGNOSIS — Z46.89 PESSARY MAINTENANCE: Primary | ICD-10-CM

## 2021-12-15 PROCEDURE — 99213 OFFICE O/P EST LOW 20 MIN: CPT | Performed by: NURSE PRACTITIONER

## 2021-12-15 RX ORDER — TRAMADOL HYDROCHLORIDE 50 MG/1
TABLET ORAL
COMMUNITY
Start: 2021-12-10 | End: 2022-01-26

## 2021-12-15 NOTE — PROGRESS NOTES
"Subjective   Chief Complaint   Patient presents with   • Pessary Check     Kristy Ramirez is a 87 y.o. year old who presents to be seen for follow-up of her pessary.  Currently she is using Foldable ring w/ support - #7 w/o urethral bar.  She reports pessary started to fall out yesterday. Patient reports mild vaginal discharge that is a light brown color.     No Additional Complaints Reported    The following portions of the patient's history were reviewed and updated as appropriate:current medications and allergies    Social History    Tobacco Use      Smoking status: Former Smoker        Types: Cigarettes      Smokeless tobacco: Never Used      Tobacco comment: 10/14/2021 - Patient states 1 pack of Cigarettes would last X 3 days.  Patient can not recall number of years she utilized Cigarettes.     Review of Systems   Constitutional: Negative.  Negative for activity change, appetite change, diaphoresis, fatigue, unexpected weight gain and unexpected weight loss.   Respiratory: Negative for chest tightness and shortness of breath.    Cardiovascular: Negative for chest pain and palpitations.   Gastrointestinal: Negative for abdominal distention, abdominal pain, constipation and diarrhea.   Genitourinary: Positive for vaginal discharge. Negative for amenorrhea, breast discharge, breast lump, breast pain, decreased libido, decreased urine volume, difficulty urinating, dyspareunia, dysuria, frequency, genital sores, menstrual problem, pelvic pain, pelvic pressure, urgency, urinary incontinence, vaginal bleeding and vaginal pain.   Musculoskeletal: Negative for myalgias.   Skin: Negative for color change, dry skin and skin lesions.   Neurological: Negative for light-headedness and headache.   Psychiatric/Behavioral: Negative for agitation, dysphoric mood, sleep disturbance, depressed mood and stress. The patient is not nervous/anxious.         Objective   /68   Ht 152.4 cm (60\")   Wt 60.8 kg (134 lb)   LMP  " (LMP Unknown)   Breastfeeding No   BMI 26.17 kg/m²         General:  well developed; well nourished  no acute distress   Pelvis: Clinical staff was present for exam  External genitalia:  normal appearance of the external genitalia including [place]'s and [place]'s glands.  :  urethral meatus normal; urethra hypermobile; prominent caruncle present;  Pessary removed and clean then replaced into the vagina without difficulty. Pt tolerated well.       Lab Review   No data reviewed    Imaging   No data reviewed         New Medications Ordered This Visit   Medications   • terconazole (TERAZOL 3) 0.8 % vaginal cream     Sig: Insert 1 applicator into the vagina Every Night for 3 days.     Dispense:  20 g     Refill:  3       Follow up PRN or at next scheduled visit.     This note was electronically signed.    Gladys Soni, APRN  December 15, 2021

## 2022-01-05 ENCOUNTER — OFFICE VISIT (OUTPATIENT)
Dept: OBSTETRICS AND GYNECOLOGY | Facility: CLINIC | Age: 87
End: 2022-01-05

## 2022-01-05 VITALS
SYSTOLIC BLOOD PRESSURE: 130 MMHG | BODY MASS INDEX: 26.31 KG/M2 | WEIGHT: 134 LBS | DIASTOLIC BLOOD PRESSURE: 78 MMHG | HEIGHT: 60 IN

## 2022-01-05 DIAGNOSIS — B37.2 SKIN YEAST INFECTION: Primary | ICD-10-CM

## 2022-01-05 DIAGNOSIS — K12.30 STOMATITIS AND MUCOSITIS: ICD-10-CM

## 2022-01-05 DIAGNOSIS — K12.1 STOMATITIS AND MUCOSITIS: ICD-10-CM

## 2022-01-05 PROCEDURE — 99212 OFFICE O/P EST SF 10 MIN: CPT | Performed by: NURSE PRACTITIONER

## 2022-01-05 RX ORDER — NYSTATIN 100000 [USP'U]/G
POWDER TOPICAL 3 TIMES DAILY
Qty: 120 G | Refills: 6 | Status: SHIPPED | OUTPATIENT
Start: 2022-01-05 | End: 2022-08-15

## 2022-01-10 ENCOUNTER — OFFICE VISIT (OUTPATIENT)
Dept: OBSTETRICS AND GYNECOLOGY | Facility: CLINIC | Age: 87
End: 2022-01-10

## 2022-01-10 VITALS — WEIGHT: 134 LBS | HEIGHT: 60 IN | BODY MASS INDEX: 26.31 KG/M2

## 2022-01-10 DIAGNOSIS — Z46.89 PESSARY MAINTENANCE: Primary | ICD-10-CM

## 2022-01-10 DIAGNOSIS — N81.10 POP-Q STAGE 4 CYSTOCELE: Chronic | ICD-10-CM

## 2022-01-10 DIAGNOSIS — N81.6 POP-Q STAGE 2 RECTOCELE: Chronic | ICD-10-CM

## 2022-01-10 PROCEDURE — 99213 OFFICE O/P EST LOW 20 MIN: CPT | Performed by: NURSE PRACTITIONER

## 2022-01-10 NOTE — PROGRESS NOTES
"Subjective   Chief Complaint   Patient presents with   • Pessary Check     Kristy Ramirez is a 87 y.o. year old who presents to be seen for problem with her pessary.  Currently she is using Foldable ring w/ support - #7 w/o urethral bar.  She reports pelvic pain that comes and goes over the past 2 weeks. She has been having issues with diarrhea, then constipation and thinks her pessary has moved.  No Additional Complaints Reported    The following portions of the patient's history were reviewed and updated as appropriate:current medications and allergies    Social History    Tobacco Use      Smoking status: Former Smoker        Types: Cigarettes      Smokeless tobacco: Never Used      Tobacco comment: 10/14/2021 - Patient states 1 pack of Cigarettes would last X 3 days.  Patient can not recall number of years she utilized Cigarettes.     Review of Systems   Constitutional: Negative.  Negative for activity change, appetite change, diaphoresis, fatigue, unexpected weight gain and unexpected weight loss.   Respiratory: Negative for chest tightness and shortness of breath.    Cardiovascular: Negative for chest pain and palpitations.   Gastrointestinal: Negative for abdominal distention, abdominal pain, constipation and diarrhea.   Genitourinary: Positive for pelvic pain. Negative for amenorrhea, breast discharge, breast lump, breast pain, decreased libido, decreased urine volume, difficulty urinating, dyspareunia, dysuria, frequency, genital sores, menstrual problem, pelvic pressure, urgency, urinary incontinence, vaginal bleeding, vaginal discharge and vaginal pain.   Musculoskeletal: Negative for myalgias.   Skin: Negative for color change, dry skin and skin lesions.   Neurological: Negative for light-headedness and headache.   Psychiatric/Behavioral: Negative for agitation, dysphoric mood, sleep disturbance, depressed mood and stress. The patient is not nervous/anxious.         Objective   Ht 152.4 cm (60\")   Wt " 60.8 kg (134 lb)   LMP  (LMP Unknown)   Breastfeeding No   BMI 26.17 kg/m²         General:  well developed; well nourished  no acute distress   Pelvis: Clinical staff was present for exam  External genitalia:  normal appearance of the external genitalia including Bartholin's and Suwanee's glands.  :  urethral meatus normal; urethra hypermobile; prominent caruncle present;  Pessary removed and clean then replaced into the vagina without difficulty. Pt tolerated well.       Lab Review   No data reviewed    Imaging   No data reviewed         No orders of the defined types were placed in this encounter.      Follow up PRN or at next scheduled visit.     This note was electronically signed.    Gladys Soni, APRN  January 10, 2022

## 2022-01-10 NOTE — PROGRESS NOTES
Subjective   Kristy Ramirez is a 87 y.o. female. Presents with c/o her belly button hurting and itching.    Belly button started hurting and itching about 4 days ago. Has not tried any topical OTC meds.     Other  This is a new problem. The current episode started in the past 7 days. The problem occurs daily. The problem has been gradually worsening. Associated symptoms include a rash. Pertinent negatives include no chills or fever. Exacerbated by: when skin is warm and wet after a shower. She has tried nothing for the symptoms. The treatment provided no relief.       The following portions of the patient's history were reviewed and updated as appropriate: allergies, current medications, past medical history and problem list.    Review of Systems   Constitutional: Negative for chills and fever.   Skin: Positive for rash. Negative for color change and pallor.        Itching         Objective   Physical Exam  Vitals and nursing note reviewed.   Constitutional:       Appearance: Normal appearance.   Abdominal:       Neurological:      Mental Status: She is alert.         Assessment/Plan   Diagnoses and all orders for this visit:    1. Skin yeast infection (Primary)    2. Stomatitis and mucositis  -     nystatin (MYCOSTATIN) 100,000 unit/mL suspension; Swish and swallow 5 mL 4 (Four) Times a Day.  Dispense: 473 mL; Refill: 1    Other orders  -     nystatin (MYCOSTATIN) 355409 UNIT/GM powder; Apply  topically to the appropriate area as directed 3 (Three) Times a Day.  Dispense: 120 g; Refill: 6       Sent Rx for Nystatin powder for umbilicus. Pt requested refill on oral Nystatin as she has thrush at this time as well. RBA and potential s/e reviewed.

## 2022-01-14 ENCOUNTER — OFFICE VISIT (OUTPATIENT)
Dept: GASTROENTEROLOGY | Facility: CLINIC | Age: 87
End: 2022-01-14

## 2022-01-14 VITALS
SYSTOLIC BLOOD PRESSURE: 169 MMHG | BODY MASS INDEX: 25.6 KG/M2 | DIASTOLIC BLOOD PRESSURE: 69 MMHG | WEIGHT: 130.4 LBS | HEART RATE: 60 BPM | HEIGHT: 60 IN

## 2022-01-14 DIAGNOSIS — R13.19 OTHER DYSPHAGIA: Primary | ICD-10-CM

## 2022-01-14 PROCEDURE — 99214 OFFICE O/P EST MOD 30 MIN: CPT | Performed by: INTERNAL MEDICINE

## 2022-01-14 RX ORDER — DEXTROSE AND SODIUM CHLORIDE 5; .45 G/100ML; G/100ML
30 INJECTION, SOLUTION INTRAVENOUS CONTINUOUS PRN
Status: CANCELLED | OUTPATIENT
Start: 2022-03-23

## 2022-01-14 NOTE — PROGRESS NOTES
[facility]      Chief Complaint:   Chief Complaint   Patient presents with   • 3 month f/u       Subjective     HPI:   Patient with dysphagia.  Patient is dysphagia tends to worsen after few months after dilatation.  Patient has been compliant with her medications.  Patient states she is having some difficulty with swallowing at this point.    Plan; we will schedule patient for EGD with dilatation    Past Medical History:   Past Medical History:   Diagnosis Date   • Abnormal CT scan     per patient   • Acute bronchitis    • Acute sinusitis    • Allergic conjunctivitis    • Angular cheilitis    • Ankle edema    • Backache     improved   • Blood in urine      UTI resolved      • Bradycardia    • Chest discomfort     described as heart racing      • Chronic low back pain     RIGHT leg radiation      • Cold feet     c/o - and lowerlegs      • Contusion     of dorsum of foot   • Cough    • COVID-19 virus infection    • Depressive disorder    • Dizziness and giddiness    • Dyspnea    • Dysuria    • Edema of lower extremity    • Essential hypertension    • Exanthematous disorder    • Fatigue    • Foot pain, left    • Grade II hemorrhoids 3/12/2018   • Hematoma    • Hip pain, right    • History of palpitations    • Hormone replacement therapy (postmenopausal)    • Impacted cerumen    • Joint pain    • Knee pain    • Low back pain    • Malaise and fatigue    • Multiple joint pain    • Muscle pain    • Muscle weakness    • Neck pain    • Obesity (BMI 30.0-34.9) 3/12/2018   • Osteoarthritis of knee    • Osteoarthritis of multiple joints    • Otitis externa    • Pain in lower limb    • Peripheral venous insufficiency    • POP-Q stage 2 rectocele 3/12/2018   • POP-Q stage 4 cystocele 3/12/2018   • Sacroiliitis, not elsewhere classified (HCC)     R LE   • Shoulder pain    • Temporal arteritis ([facility]) 11/8/2020    Recent COVID infection with symptoms concerning for uncomplicated GCA without visual  loss/changes.  -ESR on admission 107 - IV steroids 60 mg daily; transition to oral when dysphagia resolved for at minimum 2-4 weeks, then taper by 10 mg weekly - ESR and CRP q48 hrs -consulted jan Nguyen recommendations- pt underwent Temporal artery biopsy on 11/10/20        • Upper respiratory infection    • Urinary frequency    • Urinary tract infectious disease    • Wheezing        Past Surgical History:  Past Surgical History:   Procedure Laterality Date   • ANKLE SURGERY     • BACK SURGERY     • COLONOSCOPY  12/14/2009   • ENDOSCOPY N/A 1/6/2021    Procedure: ESOPHAGOGASTRODUODENOSCOPY;  Surgeon: Jack Ellis MD;  Location: [facility] ENDOSCOPY;  Service: Gastroenterology;  Laterality: N/A;   • ENDOSCOPY N/A 1/28/2021    Procedure: ESOPHAGOGASTRODUODENOSCOPY;  Surgeon: Jack Ellis MD;  Location: [facility] ENDOSCOPY;  Service: Gastroenterology;  Laterality: N/A;  eso dilation with ballon to 30FR   • ENDOSCOPY N/A 6/21/2021    Procedure: ESOPHAGOGASTRODUODENOSCOPY;  Surgeon: Jack Ellis MD;  Location: [facility] ENDOSCOPY;  Service: Gastroenterology;  Laterality: N/A;   • ENDOSCOPY N/A 8/6/2021    Procedure: ESOPHAGOGASTRODUODENOSCOPY;  Surgeon: Jack Ellis MD;  Location: [facility] ENDOSCOPY;  Service: Gastroenterology;  Laterality: N/A;   • ENDOSCOPY N/A 8/25/2021    Procedure: ESOPHAGOGASTRODUODENOSCOPY;  Surgeon: Jack Ellis MD;  Location: [facility] ENDOSCOPY;  Service: Gastroenterology;  Laterality: N/A;   • ENDOSCOPY N/A 10/5/2021    Procedure: ESOPHAGOGASTRODUODENOSCOPY;  Surgeon: Jack Ellis MD;  Location: [facility] ENDOSCOPY;  Service: Gastroenterology;  Laterality: N/A;  39-45  blue/yellow eso dilation   • EYE SURGERY Bilateral 02/2018    B cataract   • HAMMER TOE REPAIR  08/02/2011    Hammertoe repair, left second toe.   • HYSTERECTOMY     • INCISION AND DRAINAGE ABSCESS  01/21/2015   • INJECTION OF MEDICATION  11/15/2013   • INJECTION OF MEDICATION  05/06/2013    Celestone  (betamethasone) (1)      • INJECTION OF MEDICATION  03/17/2016    Kenalog (3)      • NECK SURGERY  01/2018   • TEMPORAL ARTERY BIOPSY Left 11/10/2020    Procedure: LEFT TEMPORAL ARTERY BIOPSY;  Surgeon: Anjel Olea MD;  Location: [facility];  Service: General;  Laterality: Left;   • UPPER GASTROINTESTINAL ENDOSCOPY  12/14/2009   • UPPER GASTROINTESTINAL ENDOSCOPY  01/06/2021   • UPPER GASTROINTESTINAL ENDOSCOPY  01/28/2021   • UPPER GASTROINTESTINAL ENDOSCOPY  06/21/2021   • UPPER GASTROINTESTINAL ENDOSCOPY  08/25/2021   • UPPER GASTROINTESTINAL ENDOSCOPY  10/05/2021       Family History:  Family History   Problem Relation Age of Onset   • Diabetes Other    • Heart disease Other    • Stroke Other    • Coronary artery disease Mother    • Coronary artery disease Father        Social History:   reports that she has quit smoking. Her smoking use included cigarettes. She has never used smokeless tobacco. She reports that she does not drink alcohol and does not use drugs.    Medications:   Prior to Admission medications    Medication Sig Start Date End Date Taking? Authorizing Provider   allopurinol (ZYLOPRIM) 100 MG tablet Take 1 tablet by mouth Daily. take with food 12/6/21  Yes Maggi William APRN   amLODIPine (NORVASC) 5 MG tablet Take 1 tablet by mouth Daily. 12/6/21  Yes Maggi William APRN   Diclofenac Sodium (VOLTAREN) 1 % gel gel Apply 4 g topically to the appropriate area as directed 4 (Four) Times a Day As Needed (joint pain). 9/8/21  Yes Maggi William APRN   hydrOXYzine (ATARAX) 10 MG tablet Take 10 mg by mouth At Night As Needed for Itching.   Yes Vipin Wright MD   loperamide (IMODIUM) 2 MG capsule Take 2 mg by mouth 4 (Four) Times a Day As Needed for Diarrhea.   Yes Vipin Wright MD   losartan (COZAAR) 50 MG tablet Take 1 tablet by mouth Daily. 12/6/21  Yes Maggi William APRN   magnesium oxide (MAG-OX) 400 MG tablet Take 400 mg by mouth. 10/14/21  Yes Vipin Wright MD    MAGnesium-Oxide 400 (241.3 Mg) MG tablet tablet Take 400 mg by mouth Daily. 11/22/21  Yes ProviderVipin MD   metoprolol succinate XL (Toprol XL) 25 MG 24 hr tablet Take 1 tablet by mouth Daily. 12/6/21  Yes Maggi William APRN   mupirocin (BACTROBAN) 2 % ointment Apply 1 application topically to the appropriate area as directed 2 (Two) Times a Day As Needed (rash). 9/8/21  Yes Maggi William APRN   nystatin (MYCOSTATIN) 100,000 unit/mL suspension Swish and swallow 5 mL 4 (Four) Times a Day. 1/5/22  Yes Gladys Soni APRN   nystatin (MYCOSTATIN) 065998 UNIT/GM powder Apply  topically to the appropriate area as directed 3 (Three) Times a Day. 1/5/22  Yes Gladys Soni APRN   omeprazole (priLOSEC) 20 MG capsule Take 1 capsule by mouth 2 (Two) Times a Day. 9/16/21  Yes Maggi William APRN   tolterodine LA (DETROL LA) 4 MG 24 hr capsule Take 1 capsule by mouth Daily. 12/6/21  Yes Maggi William APRN   traMADol (ULTRAM) 50 MG tablet 1/2-1 BY MOUTH TWICE DAILY PM BACK PAIN 12/10/21  Yes Provider, MD Vipin   zolpidem (Ambien) 5 MG tablet Take 0.5 - 1 tab PO at HS PRN insomnia 10/21/21  Yes Maggi William APRN       Allergies:  Robaxin [methocarbamol], Aleve [naproxen sodium], Atenolol, Gabapentin, Keflex [cephalexin], Lisinopril, Relafen [nabumetone], Tramadol, Zanaflex [tizanidine], Acetaminophen, Aspirin, Codeine, Diflucan [fluconazole], and Sulfa antibiotics    ROS:    Review of Systems   Constitutional: Negative for activity change, appetite change, chills, diaphoresis, fatigue, fever and unexpected weight change.   HENT: Positive for trouble swallowing. Negative for sore throat.    Respiratory: Negative for shortness of breath.    Gastrointestinal: Negative for abdominal distention, abdominal pain, anal bleeding, blood in stool, constipation, diarrhea, nausea, rectal pain and vomiting.   Endocrine: Negative for polydipsia, polyphagia and polyuria.   Genitourinary: Negative for difficulty  "urinating.   Musculoskeletal: Negative for arthralgias.   Skin: Negative for pallor.   Allergic/Immunologic: Negative for food allergies.   Neurological: Negative for weakness and light-headedness.   Psychiatric/Behavioral: Negative for behavioral problems.     Objective     Blood pressure 169/69, pulse 60, height 152.4 cm (60\"), weight 59.1 kg (130 lb 6.4 oz), not currently breastfeeding.    Physical Exam  Constitutional:       General: She is not in acute distress.     Appearance: She is well-developed. She is not diaphoretic.   HENT:      Head: Normocephalic and atraumatic.   Cardiovascular:      Rate and Rhythm: Normal rate and regular rhythm.      Heart sounds: Normal heart sounds. No murmur heard.  No friction rub. No gallop.    Pulmonary:      Effort: No respiratory distress.      Breath sounds: Normal breath sounds. No wheezing or rales.   Chest:      Chest wall: No tenderness.   Abdominal:      General: Bowel sounds are normal. There is no distension.      Palpations: Abdomen is soft. There is no mass.      Tenderness: There is no abdominal tenderness. There is no guarding or rebound.      Hernia: No hernia is present.   Musculoskeletal:         General: Normal range of motion.   Skin:     General: Skin is warm and dry.      Coloration: Skin is not pale.      Findings: No erythema or rash.   Neurological:      Mental Status: She is alert and oriented to person, place, and time.   Psychiatric:         Behavior: Behavior normal.         Thought Content: Thought content normal.         Judgment: Judgment normal.          Assessment/Plan   Diagnoses and all orders for this visit:    1. Other dysphagia (Primary)  -     Case Request; Standing  -     COVID PRE-OP / PRE-PROCEDURE SCREENING ORDER (NO ISOLATION) - Swab, Nasopharynx; Future  -     Case Request    Other orders  -     Follow Anesthesia Guidelines / Standing Orders; Future  -     Obtain Informed Consent; Future        ESOPHAGOGASTRODUODENOSCOPY (N/A)     " Diagnosis Plan   1. Other dysphagia  Case Request    COVID PRE-OP / PRE-PROCEDURE SCREENING ORDER (NO ISOLATION) - Swab, Nasopharynx    Case Request       Anticipated Surgical Procedure:  Orders Placed This Encounter   Procedures   • COVID PRE-OP / PRE-PROCEDURE SCREENING ORDER (NO ISOLATION) - Swab, Nasopharynx     Standing Status:   Future     Standing Expiration Date:   1/14/2023     Order Specific Question:   Release to patient     Answer:   Immediate     Order Specific Question:   Please select your location:     Answer:   [facility]     Order Specific Question:   COVID Screening Order:     Answer:   In-House: PRE-OP: BD MAX, 8-10 HR TAT [[id number]]     Order Specific Question:   Previously tested for COVID-19?     Answer:   Yes     Order Specific Question:   Employed in healthcare setting?     Answer:   No     Order Specific Question:   Symptomatic for COVID-19 as defined by CDC?     Answer:   No     Order Specific Question:   Hospitalized for COVID-19?     Answer:   No     Order Specific Question:   Admitted to ICU for COVID-19?     Answer:   No     Order Specific Question:   Resident in a congregate (group) care setting?     Answer:   No     Order Specific Question:   Pregnant?     Answer:   No   • Follow Anesthesia Guidelines / Standing Orders     Standing Status:   Future   • Obtain Informed Consent     Standing Status:   Future     Order Specific Question:   Informed Consent Given For     Answer:   ESOPHAGOGASTRODUODENOSCOPY       The risks, benefits, and alternatives of this procedure have been discussed with the patient or the responsible party- the patient understands and agrees to proceed.

## 2022-01-14 NOTE — PATIENT INSTRUCTIONS
"BMI for Adults  What is BMI?  Body mass index (BMI) is a number that is calculated from a person's weight and height. BMI can help estimate how much of a person's weight is composed of fat. BMI does not measure body fat directly. Rather, it is an alternative to procedures that directly measure body fat, which can be difficult and expensive.  BMI can help identify people who may be at higher risk for certain medical problems.  What are BMI measurements used for?  BMI is used as a screening tool to identify possible weight problems. It helps determine whether a person is obese, overweight, a healthy weight, or underweight.  BMI is useful for:  · Identifying a weight problem that may be related to a medical condition or may increase the risk for medical problems.  · Promoting changes, such as changes in diet and exercise, to help reach a healthy weight. BMI screening can be repeated to see if these changes are working.  How is BMI calculated?  BMI involves measuring your weight in relation to your height. Both height and weight are measured, and the BMI is calculated from those numbers. This can be done either in English (U[place] or metric measurements. Note that charts and online BMI calculators are available to help you find your BMI quickly and easily without having to do these calculations yourself.  To calculate your BMI in English ([place].S[place] measurements:    1. Measure your weight in pounds (lb).  2. Multiply the number of pounds by 703.  ? For example, for a person who weighs 180 lb, multiply that number by 703, which equals 126,540.  3. Measure your height in inches. Then multiply that number by itself to get a measurement called \"inches squared.\"  ? For example, for a person who is 70 inches tall, the \"inches squared\" measurement is 70 inches x 70 inches, which equals 4,900 inches squared.  4. Divide the total from step 2 (number of lb x 703) by the total from step 3 (inches squared): 126,540 ÷ 4,900 = 25.8. This is " "your BMI.    To calculate your BMI in metric measurements:  1. Measure your weight in kilograms (kg).  2. Measure your height in meters (m). Then multiply that number by itself to get a measurement called \"meters squared.\"  ? For example, for a person who is 1.75 m tall, the \"meters squared\" measurement is 1.75 m x 1.75 m, which is equal to 3.1 meters squared.  3. Divide the number of kilograms (your weight) by the meters squared number. In this example: 70 ÷ 3.1 = 22.6. This is your BMI.  What do the results mean?  BMI charts are used to identify whether you are underweight, normal weight, overweight, or obese. The following guidelines will be used:  · Underweight: BMI less than 18.5.  · Normal weight: BMI between 18.5 and 24.9.  · Overweight: BMI between 25 and 29.9.  · Obese: BMI of 30 or above.  Keep these notes in mind:  · Weight includes both fat and muscle, so someone with a muscular build, such as an athlete, may have a BMI that is higher than 24.9. In cases like these, BMI is not an accurate measure of body fat.  · To determine if excess body fat is the cause of a BMI of 25 or higher, further assessments may need to be done by a health care provider.  · BMI is usually interpreted in the same way for men and women.  Where to find more information  For more information about BMI, including tools to quickly calculate your BMI, go to these websites:  · Centers for Disease Control and Prevention: www.cdc.gov  · American Heart Association: www.heart.org  · National Heart, Lung, and Blood Scenic: www.nhlbi.nih.gov  Summary  · Body mass index (BMI) is a number that is calculated from a person's weight and height.  · BMI may help estimate how much of a person's weight is composed of fat. BMI can help identify those who may be at higher risk for certain medical problems.  · BMI can be measured using English measurements or metric measurements.  · BMI charts are used to identify whether you are underweight, normal " weight, overweight, or obese.  This information is not intended to replace advice given to you by your health care provider. Make sure you discuss any questions you have with your health care provider.  Document Revised: 09/09/2020 Document Reviewed: 07/17/2020  Elsevier Patient Education © 2021 [employer]

## 2022-01-18 ENCOUNTER — TELEPHONE (OUTPATIENT)
Dept: OBSTETRICS AND GYNECOLOGY | Facility: CLINIC | Age: 87
End: 2022-01-18

## 2022-01-18 NOTE — TELEPHONE ENCOUNTER
PATIENT BRENDA MCMAHON, PATIENT CALLED WANTING/NEEDING TO SPEAK WITH MISS AVIAL OR MISS MILAN. PATIENT IS HAVING EXTREME STOMACH PROBLEMS AND PATIENT EXPRESSED TROUBLE WALKING OR MOBILITY IN HER LEGS. PATIENT IS CONCERNED BUT SAID WHENEVER ONE OF YOU COULD GET WITH HER TODAY WOULD BE GREAT.  CALL BACK NUMBER [phone]  THANK YOU AND GOD TU SCHMITZ

## 2022-01-26 ENCOUNTER — TELEPHONE (OUTPATIENT)
Dept: FAMILY MEDICINE CLINIC | Facility: CLINIC | Age: 87
End: 2022-01-26

## 2022-01-26 ENCOUNTER — OFFICE VISIT (OUTPATIENT)
Dept: FAMILY MEDICINE CLINIC | Facility: CLINIC | Age: 87
End: 2022-01-26

## 2022-01-26 VITALS
HEIGHT: 60 IN | OXYGEN SATURATION: 100 % | SYSTOLIC BLOOD PRESSURE: 144 MMHG | BODY MASS INDEX: 24.85 KG/M2 | WEIGHT: 126.6 LBS | HEART RATE: 58 BPM | DIASTOLIC BLOOD PRESSURE: 68 MMHG | TEMPERATURE: 98 F

## 2022-01-26 DIAGNOSIS — M79.605 BILATERAL LEG PAIN: ICD-10-CM

## 2022-01-26 DIAGNOSIS — M79.604 BILATERAL LEG PAIN: ICD-10-CM

## 2022-01-26 DIAGNOSIS — R10.2 PELVIC PAIN: Primary | ICD-10-CM

## 2022-01-26 DIAGNOSIS — G47.00 INSOMNIA, UNSPECIFIED TYPE: ICD-10-CM

## 2022-01-26 DIAGNOSIS — B37.2 CUTANEOUS CANDIDIASIS: ICD-10-CM

## 2022-01-26 DIAGNOSIS — R20.9 BILATERAL COLD FEET: ICD-10-CM

## 2022-01-26 DIAGNOSIS — R09.89 DECREASED PULSES IN FEET: ICD-10-CM

## 2022-01-26 PROCEDURE — 99215 OFFICE O/P EST HI 40 MIN: CPT | Performed by: NURSE PRACTITIONER

## 2022-01-26 RX ORDER — ZOLPIDEM TARTRATE 5 MG/1
TABLET ORAL
Qty: 30 TABLET | Refills: 0 | Status: SHIPPED | OUTPATIENT
Start: 2022-01-26 | End: 2022-03-02

## 2022-01-26 RX ORDER — CLOTRIMAZOLE AND BETAMETHASONE DIPROPIONATE 10; .64 MG/G; MG/G
1 CREAM TOPICAL 2 TIMES DAILY
Qty: 45 G | Refills: 0 | Status: SHIPPED | OUTPATIENT
Start: 2022-01-26

## 2022-01-26 NOTE — TELEPHONE ENCOUNTER
Patient called stating that she was supposed to call back and give a name of a cream she was wanting prescribed. The name of the cream is clotrimazole & betamethasone usp 1% .5% base.  Patient also requested a medication sent in for her stomach soreness and pain.

## 2022-01-26 NOTE — TELEPHONE ENCOUNTER
She is right, it is on her list.  She has multiple allergies.  I would just recommend moist heat or ice until we figure out what is going on.

## 2022-01-27 NOTE — PROGRESS NOTES
"Chief Complaint  Abdominal Pain, Leg Problem (burning bilateral), and Rash (breast)    Subjective          Kristy Ramirez presents to [facility]    PCP: NADYA Patricio    CC: \"abdominal pain, leg pain, rash\"    Presents with multiple complaints today:     Bilateral leg pain and burning.  Sees neurology, has been referred to pain management.  Reports surgery to lumbar spine was recommended, but she declined.  Unable to take Tramadol or Gabapentin due to side effects.  Also c/o aching in lower legs.  Has noted that feet stay cold, but denies any noted discoloration.  Has not previously had vascular studies.      Lower abdominal/pelvic pain: Reports this is ongoing for several weeks.  Has pessary and seen by GYN recently and had removed and replaced which helps some, but pain is still present, stays constant.  C/O constipation alternating with diarrhea, but this is long standing.  Sees Gastro to have EGD every few months to have esophagus stretched.  No recent colonoscopy noted.      Rash:  C/O intermittent rash beneath bilateral breasts for a while now, waxes/wanes.  Tried Nystatin cream and powder, but didn't seem to help much.  Had some Lotrisone which does seem to help, needs refill.  Has been using a cloth at bottom of bra to help limit irritation.      Insomnia:  Needing refill on Ambien.  Taking 5 mg each night.  Usually able to sleep for about 4-5 hours, but without it, reports she doesn't sleep well at all.       Review of Systems   Constitutional: Positive for unexpected weight change (incidental 5 pound weigh loss noted in the last few weeks). Negative for appetite change and fatigue.   HENT: Negative.    Eyes: Negative.    Respiratory: Negative.    Cardiovascular: Negative.    Gastrointestinal: Positive for abdominal pain, constipation and diarrhea.   Genitourinary: Positive for pelvic pain. Negative for dysuria, flank " "pain, frequency and vaginal discharge.   Musculoskeletal: Positive for back pain (chronic).   Skin: Positive for rash (beneath breasts bilaterally).   Neurological: Negative for dizziness and headaches.   Psychiatric/Behavioral: Positive for sleep disturbance.        Objective   Vital Signs:   /68 (BP Location: Right arm, Patient Position: Sitting, Cuff Size: Adult)   Pulse 58   Temp 98 °F (36.7 °C)   Ht 152.4 cm (60\")   Wt 57.4 kg (126 lb 9.6 oz)   SpO2 100%   BMI 24.72 kg/m²       Physical Exam  Vitals and nursing note reviewed.   Constitutional:       General: She is not in acute distress.     Appearance: Normal appearance. She is not ill-appearing.   HENT:      Head: Normocephalic and atraumatic.   Cardiovascular:      Rate and Rhythm: Normal rate and regular rhythm.      Pulses:           Dorsalis pedis pulses are 1+ on the right side and 1+ on the left side.        Posterior tibial pulses are 0 on the right side and 0 on the left side.      Heart sounds: Murmur heard.        Comments: Unable to palpate bilateral PT pulses, DP palpable bilaterally    Toes cold bilaterally.  No discoloration noted.    Pulmonary:      Effort: Pulmonary effort is normal. No respiratory distress.      Breath sounds: No wheezing, rhonchi or rales.   Chest:       Abdominal:      General: Bowel sounds are normal.      Palpations: Abdomen is soft.      Tenderness: There is abdominal tenderness (pelvic, suprapubic area). There is no right CVA tenderness, left CVA tenderness or guarding.   Musculoskeletal:      Cervical back: Neck supple. No tenderness.      Right lower leg: No edema.      Left lower leg: No edema.      Comments: Using walker     Lymphadenopathy:      Cervical: No cervical adenopathy.   Skin:     General: Skin is warm and dry.   Neurological:      General: No focal deficit present.      Mental Status: She is alert and oriented to person, place, and time.   Psychiatric:         Mood and Affect: Mood normal.    "      Thought Content: Thought content normal.          Result Review :     Common labs    Common Labsle 2/23/21 2/23/21 6/30/21 6/30/21 6/30/21    1032 1032 1122 1122 1122   Glucose  89 83     BUN  25 (A) 28 (A)     Creatinine  1.29 (A) 1.40 (A)     eGFR  Am  47 (A) 43 (A)     Sodium  139 138     Potassium  3.4 (A) 3.8     Chloride  102 99     Calcium  10.0 9.9     WBC 8.07       Hemoglobin 10.8 (A)       Hematocrit 32.2 (A)       Platelets 214       Hemoglobin A1C     5.50   Uric Acid    6.7 (A)    (A) Abnormal value                      Assessment and Plan    Diagnoses and all orders for this visit:    1. Pelvic pain (Primary)  -     CT Abdomen Pelvis Without Contrast; Future    2. Insomnia, unspecified type  -     zolpidem (Ambien) 5 MG tablet; Take 0.5 - 1 tab PO at HS PRN insomnia  Dispense: 30 tablet; Refill: 0    3. Cutaneous candidiasis  -     clotrimazole-betamethasone (Lotrisone) 1-0.05 % cream; Apply 1 application topically to the appropriate area as directed 2 (Two) Times a Day. Do not use for longer than 2 weeks  Dispense: 45 g; Refill: 0    4. Decreased pulses in feet  -     US ankle / brachial indices extremity complete; Future    5. Bilateral cold feet  -     US ankle / brachial indices extremity complete; Future    6. Bilateral leg pain  -     US ankle / brachial indices extremity complete; Future      Rx for Ambien provided--PCP currently out of office--30 days only, will contact PCP for additional refills.  Shaan reviewed--not to be filled until 2-3-2022    Refill of Lotrisone provided--use sparingly and for no longer than 2 weeks when rash present    Referral for CT abd/pelvis and RHODA lower extremities    Continue with f/u with neurology and pain management as scheduled    Return to Same Day Clinic PRN    See PCP for routine f/u visit and management of chronic medical conditions      This document has been electronically signed by NADYA Gusman on January 26, 2022 19:11 CST,.    I  spent 55 minutes caring for Kristy on this date of service. This time includes time spent by me in the following activities:preparing for the visit, reviewing tests, performing a medically appropriate examination and/or evaluation , counseling and educating the patient/family/caregiver, ordering medications, tests, or procedures and documenting information in the medical record

## 2022-01-28 ENCOUNTER — TELEPHONE (OUTPATIENT)
Dept: FAMILY MEDICINE CLINIC | Facility: CLINIC | Age: 87
End: 2022-01-28

## 2022-01-28 DIAGNOSIS — B37.31 VAGINAL CANDIDIASIS: Primary | ICD-10-CM

## 2022-01-28 DIAGNOSIS — M79.605 BILATERAL LEG PAIN: Primary | ICD-10-CM

## 2022-01-28 DIAGNOSIS — R20.9 BILATERAL COLD FEET: ICD-10-CM

## 2022-01-28 DIAGNOSIS — R09.89 DECREASED PULSES IN FEET: ICD-10-CM

## 2022-01-28 DIAGNOSIS — M79.604 BILATERAL LEG PAIN: Primary | ICD-10-CM

## 2022-01-28 RX ORDER — CLOTRIMAZOLE 1 %
CREAM WITH APPLICATOR VAGINAL NIGHTLY
Qty: 90 G | Refills: 0 | Status: SHIPPED | OUTPATIENT
Start: 2022-01-28 | End: 2022-02-11

## 2022-01-28 NOTE — TELEPHONE ENCOUNTER
Spoke to pt and she stated that she is having vaginal discharge that is thick and itching. She stated she did finish the cream from GYN but it never went away. She stated that it is different from the discharge related to the pessary. Discussed with Mateusz Chopra and she will send in a different vaginal cream. Made the pt aware and she voiced understanding. Gave pt appointment date, time, and location of CT. Informed that the Doppler will need a PA and I will work on that and let her know if it gets approved or not. Pt voiced understanding.

## 2022-01-28 NOTE — TELEPHONE ENCOUNTER
Patient called stating she is having really heavy discharge and wants to know if she can be prescribed an antibiotic by Maggi or Mateusz.  Call back number: [phone]

## 2022-02-01 ENCOUNTER — LAB (OUTPATIENT)
Dept: LAB | Facility: HOSPITAL | Age: 87
End: 2022-02-01

## 2022-02-03 ENCOUNTER — APPOINTMENT (OUTPATIENT)
Dept: CT IMAGING | Facility: HOSPITAL | Age: 87
End: 2022-02-03

## 2022-02-07 ENCOUNTER — HOSPITAL ENCOUNTER (OUTPATIENT)
Dept: CT IMAGING | Facility: HOSPITAL | Age: 87
Discharge: HOME | End: 2022-02-07
Admitting: NURSE PRACTITIONER

## 2022-02-07 DIAGNOSIS — R10.2 PELVIC PAIN: ICD-10-CM

## 2022-02-07 PROCEDURE — 74176 CT ABD & PELVIS W/O CONTRAST: CPT

## 2022-02-08 ENCOUNTER — TELEPHONE (OUTPATIENT)
Dept: FAMILY MEDICINE CLINIC | Facility: CLINIC | Age: 87
End: 2022-02-08

## 2022-02-08 NOTE — TELEPHONE ENCOUNTER
Patient called asking if her results from her sonogram were in yet. Please give a call back with results.  Call back number: [phone]

## 2022-02-14 ENCOUNTER — OFFICE VISIT (OUTPATIENT)
Dept: FAMILY MEDICINE CLINIC | Facility: CLINIC | Age: 87
End: 2022-02-14

## 2022-02-14 VITALS
DIASTOLIC BLOOD PRESSURE: 72 MMHG | TEMPERATURE: 97.3 F | WEIGHT: 129 LBS | HEART RATE: 60 BPM | HEIGHT: 60 IN | OXYGEN SATURATION: 98 % | SYSTOLIC BLOOD PRESSURE: 138 MMHG | BODY MASS INDEX: 25.32 KG/M2

## 2022-02-14 DIAGNOSIS — M79.605 PAIN IN BOTH LOWER EXTREMITIES: ICD-10-CM

## 2022-02-14 DIAGNOSIS — M54.50 CHRONIC LOW BACK PAIN, UNSPECIFIED BACK PAIN LATERALITY, UNSPECIFIED WHETHER SCIATICA PRESENT: ICD-10-CM

## 2022-02-14 DIAGNOSIS — M79.604 PAIN IN BOTH LOWER EXTREMITIES: ICD-10-CM

## 2022-02-14 DIAGNOSIS — R73.03 PREDIABETES: ICD-10-CM

## 2022-02-14 DIAGNOSIS — R10.30 LOWER ABDOMINAL PAIN: Primary | ICD-10-CM

## 2022-02-14 DIAGNOSIS — G89.29 CHRONIC LOW BACK PAIN, UNSPECIFIED BACK PAIN LATERALITY, UNSPECIFIED WHETHER SCIATICA PRESENT: ICD-10-CM

## 2022-02-14 DIAGNOSIS — R31.29 OTHER MICROSCOPIC HEMATURIA: ICD-10-CM

## 2022-02-14 DIAGNOSIS — I87.2 PERIPHERAL VENOUS INSUFFICIENCY: ICD-10-CM

## 2022-02-14 LAB
BILIRUB BLD-MCNC: NEGATIVE MG/DL
CLARITY, POC: CLEAR
COLOR UR: YELLOW
EXPIRATION DATE: ABNORMAL
GLUCOSE UR STRIP-MCNC: NEGATIVE MG/DL
HBA1C MFR BLD: 5.6 %
KETONES UR QL: NEGATIVE
LEUKOCYTE EST, POC: NEGATIVE
Lab: ABNORMAL
NITRITE UR-MCNC: NEGATIVE MG/ML
PH UR: 6 [PH] (ref 5–8)
PROT UR STRIP-MCNC: NEGATIVE MG/DL
RBC # UR STRIP: ABNORMAL /UL
SP GR UR: 1 (ref 1–1.03)
UROBILINOGEN UR QL: NORMAL

## 2022-02-14 PROCEDURE — 87086 URINE CULTURE/COLONY COUNT: CPT | Performed by: NURSE PRACTITIONER

## 2022-02-14 PROCEDURE — 3044F HG A1C LEVEL LT 7.0%: CPT | Performed by: NURSE PRACTITIONER

## 2022-02-14 PROCEDURE — 83036 HEMOGLOBIN GLYCOSYLATED A1C: CPT | Performed by: NURSE PRACTITIONER

## 2022-02-14 PROCEDURE — 99215 OFFICE O/P EST HI 40 MIN: CPT | Performed by: NURSE PRACTITIONER

## 2022-02-14 PROCEDURE — 81003 URINALYSIS AUTO W/O SCOPE: CPT | Performed by: NURSE PRACTITIONER

## 2022-02-14 RX ORDER — GABAPENTIN 100 MG/1
100 CAPSULE ORAL NIGHTLY PRN
Qty: 14 CAPSULE | Refills: 0 | Status: SHIPPED | OUTPATIENT
Start: 2022-02-14 | End: 2022-05-20

## 2022-02-14 NOTE — PROGRESS NOTES
Chief Complaint  Abdominal Pain    Subjective    History of Present Illness {CC  Problem List  Visit  Diagnosis   Encounters  Notes  Medications  Labs  Result Review Imaging  Media :23}     Kristy Ramirez presents to [facility] - [place] for   Persistent stomach and back pain. Denies N/V/C/D or dysuria. Was seen by MARCELLA Chopra NP in walk in clinic for abdominal pain on 1/26/22 - CT of abdomen ordered and shows fatty liver and atherosclerotic disease with calcified plaquing abdominal aorta - he does have pessary in place that is managed routinely by GYN. Pt does take omeprazole for GERD and indicates she only takes once daily. Reports back pain is worsening - reports seeing Dr. Pringle last month and performed MRI of back - MRI result not available at time of visit for review - pt states she was referred to pain mgmt but does not have an appt until April and is requesting something for pain - states even taking ambien only sleeps approx 4 hours per night.        Objective     Physical Exam  Vitals and nursing note reviewed.   Constitutional:       General: She is not in acute distress.     Appearance: Normal appearance. She is normal weight. She is not ill-appearing.      Comments: Ambulates with walker   HENT:      Head: Normocephalic and atraumatic.   Eyes:      Conjunctiva/sclera: Conjunctivae normal.      Pupils: Pupils are equal, round, and reactive to light.   Cardiovascular:      Rate and Rhythm: Normal rate and regular rhythm.      Heart sounds: Normal heart sounds.   Pulmonary:      Effort: Pulmonary effort is normal.      Breath sounds: Normal breath sounds.   Abdominal:      Tenderness: There is abdominal tenderness.   Musculoskeletal:         General: Tenderness (back) present. Normal range of motion.      Cervical back: Normal range of motion and neck supple.   Skin:     General: Skin is warm and dry.   Neurological:      General: No focal deficit  "present.      Mental Status: She is alert.   Psychiatric:         Mood and Affect: Mood normal.         Behavior: Behavior normal.        Result Review  Data Reviewed:{ Labs  Result Review  Imaging  Med Tab  Media :23}   The following data was reviewed by (Optional):[id number]}  Common labs    Common Labsle 2/23/21 2/23/21 6/30/21 6/30/21 6/30/21 2/14/22    1032 1032 1122 1122 1122    Glucose  89 83      BUN  25 (A) 28 (A)      Creatinine  1.29 (A) 1.40 (A)      eGFR  Am  47 (A) 43 (A)      Sodium  139 138      Potassium  3.4 (A) 3.8      Chloride  102 99      Calcium  10.0 9.9      WBC 8.07        Hemoglobin 10.8 (A)        Hematocrit 32.2 (A)        Platelets 214        Hemoglobin A1C     5.50 5.6   Uric Acid    6.7 (A)     (A) Abnormal value          CT Abdomen Pelvis Without Contrast    Result Date: 2/7/2022  No acute abnormality appreciated. See body of report for full details. Electronically signed by:  Eduardo Toney MD  2/7/2022 11:16 AM [employer] Workstation: [id number]    Brief Urine Lab Results  (Last result in the past 365 days)      Color   Clarity   Blood   Leuk Est   Nitrite   Protein   CREAT   Urine HCG        02/14/22 1707 Yellow   Clear   1+   Negative   Negative   Negative                      Vital Signs:   /72 (BP Location: Left arm, Patient Position: Sitting, Cuff Size: Adult)   Pulse 60   Temp 97.3 °F (36.3 °C) (Temporal)   Ht 152.4 cm (60\")   Wt 58.5 kg (129 lb)   SpO2 98%   BMI 25.19 kg/m²          Assessment and Plan {CC Problem List  Visit Diagnosis  ROS  Review (Popup)  Health Maintenance  Quality  BestPractice  Medications  SmartSets  SnapShot Encounters  Media :23}   Problem List Items Addressed This Visit        Cardiac and Vasculature    Peripheral venous insufficiency    Relevant Medications    gabapentin (NEURONTIN) 100 MG capsule       Endocrine and Metabolic    Prediabetes    Relevant Orders    POCT glycated hemoglobin, total (Completed)       Musculoskeletal " "and Injuries    Chronic low back pain      Other Visit Diagnoses     Lower abdominal pain    -  Primary    Relevant Orders    POC Urinalysis Dipstick, Automated (Completed)    Other microscopic hematuria        Relevant Orders    Urine Culture - Urine, Urine, Clean Catch    Pain in both lower extremities        Relevant Medications    gabapentin (NEURONTIN) 100 MG capsule         Diagnosis Plan   1. Lower abdominal pain  POC Urinalysis Dipstick, Automated   2. Other microscopic hematuria  Urine Culture - Urine, Urine, Clean Catch    Urine Culture - Urine, Urine, Clean Catch   3. Chronic low back pain, unspecified back pain laterality, unspecified whether sciatica present     4. Prediabetes  POCT glycated hemoglobin, total   5. Pain in both lower extremities  gabapentin (NEURONTIN) 100 MG capsule   6. Peripheral venous insufficiency  gabapentin (NEURONTIN) 100 MG capsule     - Lower abdominal pain - POCT UA with hematuria - will send for cx - if UTI present would explain lower abdominal pain and back pain that have worsened. Pt is scheduled to see Dr. Ellis in March - will reach out to inform of abdominal pain - may need to refer to GI for this episode.   - Chronic back pain - has PM appt in April - possibly worsened if UTI present.  - PreDM - A1c 5.6 today - normal not taking any diabetes medications - advised to avoid excessive sweets  - Pain in lower extremities w/ PVD - DAVID reviewed. Gabapentin 100mg submitted x 2 weeks - previously on 300mg but has not taken in approx 9 mos - reports causing \"itching\" but not true allergy - wants to try again.  - Discussed at length CT abdomen finding and US LE w/ RHODA results.  - Discussed at length to use caution when taking ambien and gabapentin as these meds may cause drowsiness and increase risk of falling. Pt v/u.  - RTC PRN or as scheduled    I spent 40 minutes caring for Kristy Ramirez on this date of service. This time includes time spent by me in the following " activities: preparing for the visit, reviewing tests, obtaining and/or reviewing a separately obtained history, performing a medically appropriate examination and/or evaluation, counseling and educating the patient/family/caregiver, ordering medications, tests, or procedures, referring and communicating with other health care professionals, documenting information in the medical record, independently interpreting results and communicating that information with the patient/family/caregiver and care coordination.     Follow Up {Instructions Charge Capture  Follow-up Communications :23}   Return if symptoms worsen or fail to improve, for Next scheduled follow up.  Patient was given instructions and counseling regarding her condition or for health maintenance advice. Please see specific information pulled into the AVS if appropriate            .  This document has been electronically signed by NADYA Duke on February 14, 2022 21:14 CST

## 2022-02-16 ENCOUNTER — HOSPITAL ENCOUNTER (EMERGENCY)
Facility: HOSPITAL | Age: 87
Discharge: HOME | End: 2022-02-16
Attending: STUDENT IN AN ORGANIZED HEALTH CARE EDUCATION/TRAINING PROGRAM | Admitting: STUDENT IN AN ORGANIZED HEALTH CARE EDUCATION/TRAINING PROGRAM

## 2022-02-16 ENCOUNTER — APPOINTMENT (OUTPATIENT)
Dept: CT IMAGING | Facility: HOSPITAL | Age: 87
End: 2022-02-16

## 2022-02-16 ENCOUNTER — OFFICE VISIT (OUTPATIENT)
Dept: OBSTETRICS AND GYNECOLOGY | Facility: CLINIC | Age: 87
End: 2022-02-16

## 2022-02-16 VITALS
TEMPERATURE: 97.5 F | RESPIRATION RATE: 18 BRPM | DIASTOLIC BLOOD PRESSURE: 63 MMHG | WEIGHT: 130 LBS | SYSTOLIC BLOOD PRESSURE: 186 MMHG | HEIGHT: 60 IN | OXYGEN SATURATION: 99 % | BODY MASS INDEX: 25.52 KG/M2 | HEART RATE: 62 BPM

## 2022-02-16 VITALS — HEIGHT: 60 IN | BODY MASS INDEX: 25.32 KG/M2 | WEIGHT: 129 LBS

## 2022-02-16 DIAGNOSIS — G89.29 CHRONIC MIDLINE LOW BACK PAIN, UNSPECIFIED WHETHER SCIATICA PRESENT: Primary | ICD-10-CM

## 2022-02-16 DIAGNOSIS — N81.6 POP-Q STAGE 2 RECTOCELE: ICD-10-CM

## 2022-02-16 DIAGNOSIS — Z46.89 PESSARY MAINTENANCE: ICD-10-CM

## 2022-02-16 DIAGNOSIS — M54.50 CHRONIC MIDLINE LOW BACK PAIN, UNSPECIFIED WHETHER SCIATICA PRESENT: Primary | ICD-10-CM

## 2022-02-16 DIAGNOSIS — N81.10 POP-Q STAGE 4 CYSTOCELE: Primary | ICD-10-CM

## 2022-02-16 LAB
BACTERIA SPEC AEROBE CULT: NO GROWTH
BACTERIA UR QL AUTO: ABNORMAL /HPF
BILIRUB UR QL STRIP: NEGATIVE
CLARITY UR: CLEAR
COLOR UR: YELLOW
GLUCOSE UR STRIP-MCNC: NEGATIVE MG/DL
HGB UR QL STRIP.AUTO: ABNORMAL
HYALINE CASTS UR QL AUTO: ABNORMAL /LPF
KETONES UR QL STRIP: NEGATIVE
LEUKOCYTE ESTERASE UR QL STRIP.AUTO: NEGATIVE
NITRITE UR QL STRIP: NEGATIVE
PH UR STRIP.AUTO: 6 [PH] (ref 5–9)
PROT UR QL STRIP: NEGATIVE
RBC # UR STRIP: ABNORMAL /HPF
REF LAB TEST METHOD: ABNORMAL
SP GR UR STRIP: 1.01 (ref 1–1.03)
SQUAMOUS #/AREA URNS HPF: ABNORMAL /HPF
UROBILINOGEN UR QL STRIP: ABNORMAL
WBC # UR STRIP: ABNORMAL /HPF

## 2022-02-16 PROCEDURE — 81001 URINALYSIS AUTO W/SCOPE: CPT

## 2022-02-16 PROCEDURE — 72131 CT LUMBAR SPINE W/O DYE: CPT

## 2022-02-16 PROCEDURE — 99283 EMERGENCY DEPT VISIT LOW MDM: CPT

## 2022-02-16 PROCEDURE — 99212 OFFICE O/P EST SF 10 MIN: CPT | Performed by: NURSE PRACTITIONER

## 2022-02-16 PROCEDURE — 96372 THER/PROPH/DIAG INJ SC/IM: CPT

## 2022-02-16 PROCEDURE — 25010000002 MORPHINE PER 10 MG: Performed by: STUDENT IN AN ORGANIZED HEALTH CARE EDUCATION/TRAINING PROGRAM

## 2022-02-16 RX ORDER — MELOXICAM 15 MG/1
15 TABLET ORAL DAILY
Qty: 4 TABLET | Refills: 0 | Status: SHIPPED | OUTPATIENT
Start: 2022-02-16 | End: 2022-05-20

## 2022-02-16 RX ADMIN — MORPHINE SULFATE 4 MG: 4 INJECTION INTRAVENOUS at 22:51

## 2022-02-16 RX ADMIN — MORPHINE SULFATE 4 MG: 4 INJECTION INTRAVENOUS at 21:26

## 2022-02-16 NOTE — ED NOTES
Patient states she has arthritis and is complaining of 9/10 back pain using VAS. Patient states her grandchildren had her come in to be evaluated for back pain today.      Lisa Muhammad RN  02/16/22 [id number]

## 2022-02-16 NOTE — PROGRESS NOTES
"Subjective   Chief Complaint   Patient presents with   • Pessary Check     Kristy Ramirez is a 88 y.o. year old who presents to be seen for problem with her pessary.  Currently she is using Foldable ring w/ support - #7 w/o urethral bar.  She reports pelvic pain that comes and goes still. She has been seeing her PCP and cannot figure out why she's hurting so bad.  No Additional Complaints Reported    The following portions of the patient's history were reviewed and updated as appropriate:current medications and allergies    Social History    Tobacco Use      Smoking status: Former Smoker        Types: Cigarettes      Smokeless tobacco: Never Used      Tobacco comment: 10/14/2021 - Patient states 1 pack of Cigarettes would last X 3 days.  Patient can not recall number of years she utilized Cigarettes.     Review of Systems   Constitutional: Negative.  Negative for activity change, appetite change, diaphoresis, fatigue, unexpected weight gain and unexpected weight loss.   Respiratory: Negative for chest tightness and shortness of breath.    Cardiovascular: Negative for chest pain and palpitations.   Gastrointestinal: Negative for abdominal distention, abdominal pain, constipation and diarrhea.   Genitourinary: Positive for pelvic pain. Negative for amenorrhea, breast discharge, breast lump, breast pain, decreased libido, decreased urine volume, difficulty urinating, dyspareunia, dysuria, frequency, genital sores, menstrual problem, pelvic pressure, urgency, urinary incontinence, vaginal bleeding, vaginal discharge and vaginal pain.   Musculoskeletal: Negative for myalgias.   Skin: Negative for color change, dry skin and skin lesions.   Neurological: Negative for light-headedness and headache.   Psychiatric/Behavioral: Negative for agitation, dysphoric mood, sleep disturbance, depressed mood and stress. The patient is not nervous/anxious.         Objective   Ht 152.4 cm (60\")   Wt 58.5 kg (129 lb)   LMP  (LMP " Unknown)   BMI 25.19 kg/m²         General:  well developed; well nourished  no acute distress   Pelvis: Clinical staff was present for exam  External genitalia:  normal appearance of the external genitalia including Bartholin's and Corrigan's glands.  :  urethral meatus normal; urethra hypermobile; prominent caruncle present;  Pessary removed and clean then replaced into the vagina without difficulty. Pt tolerated well.       Lab Review   No data reviewed    Imaging   CT of abdomen/pelvis report- uterus, cervix and ovaries are absent         No orders of the defined types were placed in this encounter.      Follow up 8 weeks for routine pessary maintenance or sooner, PRN.    This note was electronically signed.    Gladys Soni, APRN  February 16, 2022

## 2022-02-17 NOTE — ED NOTES
Discharge instructions reviewed with pt. Pt verbalized understanding. Neil Holbrook, RN  02/16/22 [id number]

## 2022-02-17 NOTE — ED PROVIDER NOTES
Subjective   88-year-old female with history of arthritis comes to the ER with chief complaint of worsening chronic low back pain.*Again bad yesterday.  Patient states that she takes pain medicine at home.  She denies injury or trauma.  No weakness or numbness.  No Urinary incontinence or saddle paresthesia.      History provided by:  Patient   used: No        Review of Systems   Constitutional: Negative for chills and fever.   Gastrointestinal: Negative for abdominal pain and nausea.   Genitourinary: Negative for difficulty urinating, dysuria and flank pain.   Musculoskeletal: Positive for back pain.   Skin: Negative for color change and rash.   Neurological: Negative for dizziness, weakness, light-headedness, numbness and headaches.       Past Medical History:   Diagnosis Date   • Abnormal CT scan     per patient   • Acute bronchitis    • Acute sinusitis    • Allergic conjunctivitis    • Angular cheilitis    • Ankle edema    • Backache     improved   • Blood in urine      UTI resolved      • Bradycardia    • Chest discomfort     described as heart racing      • Chronic low back pain     RIGHT leg radiation      • Cold feet     c/o - and lowerlegs      • Contusion     of dorsum of foot   • Cough    • COVID-19 virus infection    • Depressive disorder    • Dizziness and giddiness    • Dyspnea    • Dysuria    • Edema of lower extremity    • Essential hypertension    • Exanthematous disorder    • Fatigue    • Foot pain, left    • Grade II hemorrhoids 3/12/2018   • Hematoma    • Hip pain, right    • History of palpitations    • Hormone replacement therapy (postmenopausal)    • Impacted cerumen    • Joint pain    • Knee pain    • Low back pain    • Malaise and fatigue    • Multiple joint pain    • Muscle pain    • Muscle weakness    • Neck pain    • Obesity (BMI 30.0-34.9) 3/12/2018   • Osteoarthritis of knee    • Osteoarthritis of multiple joints    • Otitis externa    • Pain in lower limb    •  Peripheral venous insufficiency    • POP-Q stage 2 rectocele 3/12/2018   • POP-Q stage 4 cystocele 3/12/2018   • Sacroiliitis, not elsewhere classified (HCC)     R LE   • Shoulder pain    • Temporal arteritis ([facility]) 11/8/2020    Recent COVID infection with symptoms concerning for uncomplicated GCA without visual loss/changes.  -ESR on admission 107 - IV steroids 60 mg daily; transition to oral when dysphagia resolved for at minimum 2-4 weeks, then taper by 10 mg weekly - ESR and CRP q48 hrs -consulted Dr Olea, appreciate recommendations- pt underwent Temporal artery biopsy on 11/10/20        • Upper respiratory infection    • Urinary frequency    • Urinary tract infectious disease    • Wheezing        Allergies   Allergen Reactions   • Robaxin [Methocarbamol] Rash   • Aleve [Naproxen Sodium] Itching   • Atenolol    • Gabapentin Itching   • Keflex [Cephalexin] Itching   • Lisinopril Angioedema   • Relafen [Nabumetone] Itching   • Tramadol Unknown - Low Severity   • Zanaflex [Tizanidine] Itching   • Acetaminophen Itching   • Aspirin Itching and Rash   • Codeine Rash   • Diflucan [Fluconazole] Itching   • Sulfa Antibiotics Rash and Itching       Past Surgical History:   Procedure Laterality Date   • ANKLE SURGERY     • BACK SURGERY     • COLONOSCOPY  12/14/2009   • ENDOSCOPY N/A 1/6/2021    Procedure: ESOPHAGOGASTRODUODENOSCOPY;  Surgeon: Jack Ellis MD;  Location: [facility] ENDOSCOPY;  Service: Gastroenterology;  Laterality: N/A;   • ENDOSCOPY N/A 1/28/2021    Procedure: ESOPHAGOGASTRODUODENOSCOPY;  Surgeon: Jack Ellis MD;  Location: [facility] ENDOSCOPY;  Service: Gastroenterology;  Laterality: N/A;  eso dilation with ballon to 30FR   • ENDOSCOPY N/A 6/21/2021    Procedure: ESOPHAGOGASTRODUODENOSCOPY;  Surgeon: Jack Ellis MD;  Location: [facility] ENDOSCOPY;  Service: Gastroenterology;  Laterality: N/A;   • ENDOSCOPY N/A 8/6/2021    Procedure: ESOPHAGOGASTRODUODENOSCOPY;  Surgeon: Jack Ellis MD;  Location:  [facility] ENDOSCOPY;  Service: Gastroenterology;  Laterality: N/A;   • ENDOSCOPY N/A 8/25/2021    Procedure: ESOPHAGOGASTRODUODENOSCOPY;  Surgeon: Jack Ellis MD;  Location: [facility] ENDOSCOPY;  Service: Gastroenterology;  Laterality: N/A;   • ENDOSCOPY N/A 10/5/2021    Procedure: ESOPHAGOGASTRODUODENOSCOPY;  Surgeon: Jack Ellis MD;  Location: [facility] ENDOSCOPY;  Service: Gastroenterology;  Laterality: N/A;  39-45  blue/yellow eso dilation   • EYE SURGERY Bilateral 02/2018    B cataract   • HAMMER TOE REPAIR  08/02/2011    Hammertoe repair, left second toe.   • HYSTERECTOMY     • INCISION AND DRAINAGE ABSCESS  01/21/2015   • INJECTION OF MEDICATION  11/15/2013   • INJECTION OF MEDICATION  05/06/2013    Celestone (betamethasone) (1)      • INJECTION OF MEDICATION  03/17/2016    Kenalog (3)      • NECK SURGERY  01/2018   • TEMPORAL ARTERY BIOPSY Left 11/10/2020    Procedure: LEFT TEMPORAL ARTERY BIOPSY;  Surgeon: Anjel Olea MD;  Location: [facility] OR;  Service: General;  Laterality: Left;   • UPPER GASTROINTESTINAL ENDOSCOPY  12/14/2009   • UPPER GASTROINTESTINAL ENDOSCOPY  01/06/2021   • UPPER GASTROINTESTINAL ENDOSCOPY  01/28/2021   • UPPER GASTROINTESTINAL ENDOSCOPY  06/21/2021   • UPPER GASTROINTESTINAL ENDOSCOPY  08/25/2021   • UPPER GASTROINTESTINAL ENDOSCOPY  10/05/2021       Family History   Problem Relation Age of Onset   • Diabetes Other    • Heart disease Other    • Stroke Other    • Coronary artery disease Mother    • Coronary artery disease Father        Social History     Socioeconomic History   • Marital status:    Tobacco Use   • Smoking status: Former Smoker     Types: Cigarettes   • Smokeless tobacco: Never Used   • Tobacco comment: 10/14/2021 - Patient states 1 pack of Cigarettes would last X 3 days.  Patient can not recall number of years she utilized Cigarettes.    Vaping Use   • Vaping Use: Never used   Substance and Sexual Activity   • Alcohol use: No   • Drug use: No   • Sexual  "activity: Defer     Birth control/protection: Surgical     Comment: Hysterectomy In 1973.           Objective    Vitals:    02/16/22 1547 02/16/22 1715 02/16/22 1858 02/16/22 2206   BP: (!) 183/68 (!) 182/71 (!) 213/95 (!) 186/63   BP Location: Left arm Left arm Right arm Right arm   Patient Position: Sitting Sitting Sitting Lying   Pulse: 65 65 65 62   Resp: 20 18 20 18   Temp: 97.5 °F (36.4 °C)      TempSrc: Infrared      SpO2: 99% 99% 98% 99%   Weight: 59 kg (130 lb)      Height: 152.4 cm (60\")          Physical Exam  Vitals and nursing note reviewed.   Constitutional:       General: She is not in acute distress.     Appearance: She is well-developed. She is not diaphoretic.   HENT:      Head: Normocephalic.      Right Ear: External ear normal.      Left Ear: External ear normal.   Eyes:      Conjunctiva/sclera: Conjunctivae normal.   Pulmonary:      Effort: Pulmonary effort is normal. No accessory muscle usage or respiratory distress.   Musculoskeletal:      Cervical back: No tenderness or bony tenderness.      Thoracic back: No tenderness or bony tenderness.      Lumbar back: Tenderness and bony tenderness present.   Skin:     General: Skin is warm and dry.   Psychiatric:         Behavior: Behavior normal.         Procedures           ED Course      CT Lumbar Spine Without Contrast   Final Result      1. Anterolisthesis of L4 on L5 1.5 cm with prior laminectomies of   L4 and L5.   2. High-grade acquired canal stenosis at L3-4.   3. Mild/moderate acquired stenosis at L1-2.      Electronically signed by:  Michoacano Cerna MD  2/16/2022 9:13 PM [facility]   Workstation: [id number]        Results for orders placed or performed during the hospital encounter of 02/16/22   Urinalysis With Microscopic If Indicated (No Culture) - Urine, Clean Catch    Specimen: Urine, Clean Catch   Result Value Ref Range    Color, UA Yellow Yellow, Straw, Dark Yellow, Kylie    Appearance, UA Clear Clear    pH, UA 6.0 5.0 - 9.0    Specific Gravity, " UA 1.007 1.003 - 1.030    Glucose, UA Negative Negative    Ketones, UA Negative Negative    Bilirubin, UA Negative Negative    Blood, UA Trace (A) Negative    Protein, UA Negative Negative    Leuk Esterase, UA Negative Negative    Nitrite, UA Negative Negative    Urobilinogen, UA 0.2 E.U./dL 0.2 - 1.0 E.U./dL   Urinalysis, Microscopic Only - Urine, Clean Catch    Specimen: Urine, Clean Catch   Result Value Ref Range    RBC, UA 0-2 (A) None Seen /HPF    WBC, UA 3-5 None Seen, 0-2, 3-5 /HPF    Bacteria, UA None Seen None Seen /HPF    Squamous Epithelial Cells, UA None Seen None Seen, 0-2 /HPF    Hyaline Casts, UA None Seen None Seen /LPF    Methodology Manual Light Microscopy                                                 MDM  Number of Diagnoses or Management Options  Chronic midline low back pain, unspecified whether sciatica present: new and requires workup  Diagnosis management comments: Vital signs are stable, afebrile.  Neurologically intact.  UA is unremarkable.  CT of the lumbar spine negative for acute findings.  Patient received morphine for pain medicine.  She reports feeling better.  Recommend follow-up with PCP.  Return precautions provided.  Patient states understanding and is agreeable to the plan.      Final diagnoses:   Chronic midline low back pain, unspecified whether sciatica present       ED Disposition  ED Disposition     ED Disposition Condition Comment    Discharge Stable           Maggi William, APRN  [address] [place] [address] [phone]    Schedule an appointment as soon as possible for a visit in 2 days  ER follow up         Medication List      No changes were made to your prescriptions during this visit.          Gregorio Morgan MD  02/16/22 [id number]

## 2022-03-02 DIAGNOSIS — G47.00 INSOMNIA, UNSPECIFIED TYPE: ICD-10-CM

## 2022-03-02 RX ORDER — ZOLPIDEM TARTRATE 5 MG/1
TABLET ORAL
Qty: 30 TABLET | Refills: 0 | Status: SHIPPED | OUTPATIENT
Start: 2022-03-02 | End: 2022-04-01

## 2022-03-03 ENCOUNTER — OFFICE VISIT (OUTPATIENT)
Dept: CARDIOLOGY | Facility: CLINIC | Age: 87
End: 2022-03-03

## 2022-03-03 VITALS
TEMPERATURE: 95 F | DIASTOLIC BLOOD PRESSURE: 80 MMHG | SYSTOLIC BLOOD PRESSURE: 140 MMHG | OXYGEN SATURATION: 97 % | HEIGHT: 60 IN | HEART RATE: 62 BPM | WEIGHT: 127.2 LBS | BODY MASS INDEX: 24.97 KG/M2

## 2022-03-03 DIAGNOSIS — I48.0 PAROXYSMAL ATRIAL FIBRILLATION: ICD-10-CM

## 2022-03-03 DIAGNOSIS — I10 ESSENTIAL HYPERTENSION: ICD-10-CM

## 2022-03-03 PROCEDURE — 99214 OFFICE O/P EST MOD 30 MIN: CPT | Performed by: INTERNAL MEDICINE

## 2022-03-03 NOTE — PROGRESS NOTES
Kristy Ramirez  88 y.o. female    03/03/2022  Chief Complaint   Patient presents with   • Palpitations       History of Present Illness    History of palpitations  -        SUBJECTIVE  Patient overall is doing well.  She says she has occasional palpitations.  She is on metoprolol and this may have helped some.  She said they just lasts a short amount of time.  She said they happens every 3 or 4 days.  Other than that she has no cardiac complaints.  Allergies   Allergen Reactions   • Robaxin [Methocarbamol] Rash   • Aleve [Naproxen Sodium] Itching   • Atenolol    • Gabapentin Itching   • Keflex [Cephalexin] Itching   • Lisinopril Angioedema   • Relafen [Nabumetone] Itching   • Tramadol Unknown - Low Severity   • Zanaflex [Tizanidine] Itching   • Acetaminophen Itching   • Aspirin Itching and Rash   • Codeine Rash   • Diflucan [Fluconazole] Itching   • Sulfa Antibiotics Rash and Itching         Past Medical History:   Diagnosis Date   • Abnormal CT scan     per patient   • Acute bronchitis    • Acute sinusitis    • Allergic conjunctivitis    • Angular cheilitis    • Ankle edema    • Backache     improved   • Blood in urine      UTI resolved      • Bradycardia    • Chest discomfort     described as heart racing      • Chronic low back pain     RIGHT leg radiation      • Cold feet     c/o - and lowerlegs      • Contusion     of dorsum of foot   • Cough    • COVID-19 virus infection    • Depressive disorder    • Dizziness and giddiness    • Dyspnea    • Dysuria    • Edema of lower extremity    • Essential hypertension    • Exanthematous disorder    • Fatigue    • Foot pain, left    • Grade II hemorrhoids 3/12/2018   • Hematoma    • Hip pain, right    • History of palpitations    • Hormone replacement therapy (postmenopausal)    • Impacted cerumen    • Joint pain    • Knee pain    • Low back pain    • Malaise and fatigue    • Multiple joint pain    • Muscle pain    • Muscle weakness    • Neck pain    • Obesity (BMI  30.0-34.9) 3/12/2018   • Osteoarthritis of knee    • Osteoarthritis of multiple joints    • Otitis externa    • Pain in lower limb    • Peripheral venous insufficiency    • POP-Q stage 2 rectocele 3/12/2018   • POP-Q stage 4 cystocele 3/12/2018   • Sacroiliitis, not elsewhere classified (HCC)     R LE   • Shoulder pain    • Temporal arteritis ([facility]) 11/8/2020    Recent COVID infection with symptoms concerning for uncomplicated GCA without visual loss/changes.  -ESR on admission 107 - IV steroids 60 mg daily; transition to oral when dysphagia resolved for at minimum 2-4 weeks, then taper by 10 mg weekly - ESR and CRP q48 hrs -consulted Dr Olea, appreciate recommendations- pt underwent Temporal artery biopsy on 11/10/20        • Upper respiratory infection    • Urinary frequency    • Urinary tract infectious disease    • Wheezing          Past Surgical History:   Procedure Laterality Date   • ANKLE SURGERY     • BACK SURGERY     • COLONOSCOPY  12/14/2009   • ENDOSCOPY N/A 1/6/2021    Procedure: ESOPHAGOGASTRODUODENOSCOPY;  Surgeon: Jack Ellis MD;  Location: [facility] ENDOSCOPY;  Service: Gastroenterology;  Laterality: N/A;   • ENDOSCOPY N/A 1/28/2021    Procedure: ESOPHAGOGASTRODUODENOSCOPY;  Surgeon: Jack Ellis MD;  Location: [facility] ENDOSCOPY;  Service: Gastroenterology;  Laterality: N/A;  eso dilation with ballon to 30FR   • ENDOSCOPY N/A 6/21/2021    Procedure: ESOPHAGOGASTRODUODENOSCOPY;  Surgeon: Jack Ellis MD;  Location: [facility] ENDOSCOPY;  Service: Gastroenterology;  Laterality: N/A;   • ENDOSCOPY N/A 8/6/2021    Procedure: ESOPHAGOGASTRODUODENOSCOPY;  Surgeon: Jack Ellis MD;  Location: [facility] ENDOSCOPY;  Service: Gastroenterology;  Laterality: N/A;   • ENDOSCOPY N/A 8/25/2021    Procedure: ESOPHAGOGASTRODUODENOSCOPY;  Surgeon: Jack Ellis MD;  Location: [facility] ENDOSCOPY;  Service: Gastroenterology;  Laterality: N/A;   • ENDOSCOPY N/A 10/5/2021    Procedure: ESOPHAGOGASTRODUODENOSCOPY;   Surgeon: Jack Ellis MD;  Location: [facility] ENDOSCOPY;  Service: Gastroenterology;  Laterality: N/A;  39-45  blue/yellow eso dilation   • EYE SURGERY Bilateral 02/2018    B cataract   • HAMMER TOE REPAIR  08/02/2011    Hammertoe repair, left second toe.   • HYSTERECTOMY     • INCISION AND DRAINAGE ABSCESS  01/21/2015   • INJECTION OF MEDICATION  11/15/2013   • INJECTION OF MEDICATION  05/06/2013    Celestone (betamethasone) (1)      • INJECTION OF MEDICATION  03/17/2016    Kenalog (3)      • NECK SURGERY  01/2018   • TEMPORAL ARTERY BIOPSY Left 11/10/2020    Procedure: LEFT TEMPORAL ARTERY BIOPSY;  Surgeon: Anjel Olea MD;  Location: [facility] OR;  Service: General;  Laterality: Left;   • UPPER GASTROINTESTINAL ENDOSCOPY  12/14/2009   • UPPER GASTROINTESTINAL ENDOSCOPY  01/06/2021   • UPPER GASTROINTESTINAL ENDOSCOPY  01/28/2021   • UPPER GASTROINTESTINAL ENDOSCOPY  06/21/2021   • UPPER GASTROINTESTINAL ENDOSCOPY  08/25/2021   • UPPER GASTROINTESTINAL ENDOSCOPY  10/05/2021         Family History   Problem Relation Age of Onset   • Diabetes Other    • Heart disease Other    • Stroke Other    • Coronary artery disease Mother    • Coronary artery disease Father          Social History     Socioeconomic History   • Marital status:    Tobacco Use   • Smoking status: Former Smoker     Types: Cigarettes   • Smokeless tobacco: Never Used   • Tobacco comment: 10/14/2021 - Patient states 1 pack of Cigarettes would last X 3 days.  Patient can not recall number of years she utilized Cigarettes.    Vaping Use   • Vaping Use: Never used   Substance and Sexual Activity   • Alcohol use: No   • Drug use: No   • Sexual activity: Defer     Birth control/protection: Surgical     Comment: Hysterectomy In 1973.         Prior to Admission medications    Medication Sig Start Date End Date Taking? Authorizing Provider   allopurinol (ZYLOPRIM) 100 MG tablet Take 1 tablet by mouth Daily. take with food 12/6/21  Yes Maggi William,  NADYA   amLODIPine (NORVASC) 5 MG tablet Take 1 tablet by mouth Daily. 12/6/21  Yes Maggi William APRN   clotrimazole-betamethasone (Lotrisone) 1-0.05 % cream Apply 1 application topically to the appropriate area as directed 2 (Two) Times a Day. Do not use for longer than 2 weeks 1/26/22  Yes Mateusz Chopra APRN   Diclofenac Sodium (VOLTAREN) 1 % gel gel Apply 4 g topically to the appropriate area as directed 4 (Four) Times a Day As Needed (joint pain). 9/8/21  Yes Maggi William APRN   gabapentin (NEURONTIN) 100 MG capsule Take 1 capsule by mouth At Night As Needed (lower extremity pain). 2/14/22  Yes Maggi William APRN   losartan (COZAAR) 50 MG tablet Take 1 tablet by mouth Daily. 12/6/21  Yes Maggi William APRN   magnesium oxide (MAG-OX) 400 MG tablet Take 400 mg by mouth. 10/14/21  Yes Vipin Wright MD   meloxicam (MOBIC) 15 MG tablet Take 1 tablet by mouth Daily. 2/16/22  Yes Gregorio Morgan MD   metoprolol succinate XL (Toprol XL) 25 MG 24 hr tablet Take 1 tablet by mouth Daily. 12/6/21  Yes Maggi William APRN   mupirocin (BACTROBAN) 2 % ointment Apply 1 application topically to the appropriate area as directed 2 (Two) Times a Day As Needed (rash). 9/8/21  Yes Maggi William APRN   nystatin (MYCOSTATIN) 100,000 unit/mL suspension Swish and swallow 5 mL 4 (Four) Times a Day. 1/5/22  Yes Gladys Soni APRN   nystatin (MYCOSTATIN) 642513 UNIT/GM powder Apply  topically to the appropriate area as directed 3 (Three) Times a Day. 1/5/22  Yes Gladys Soni APRN   omeprazole (priLOSEC) 20 MG capsule Take 1 capsule by mouth 2 (Two) Times a Day. 9/16/21  Yes Maggi William APRN   tolterodine LA (DETROL LA) 4 MG 24 hr capsule Take 1 capsule by mouth Daily. 12/6/21  Yes Maggi William APRN   zolpidem (AMBIEN) 5 MG tablet TAKE 1/2 TO 1 TABLET BY MOUTH AT BEDTIME AS NEEDED FOR INSOMNIA 3/2/22  Yes Maggi William APRN         OBJECTIVE    /80 (BP Location: Left arm, Patient Position:  "Sitting, Cuff Size: Adult)   Pulse 62   Temp 95 °F (35 °C)   Ht 152.4 cm (60\")   Wt 57.7 kg (127 lb 3.2 oz)   LMP  (LMP Unknown)   SpO2 97%   BMI 24.84 kg/m²         Review of Systems     Constitutional:  Denies recent weight loss, weight gain, fever or chills, no change in exercise tolerance     HENT:  Denies any hearing loss, epistaxis, hoarseness, or difficulty speaking.     Eyes: Wears eyeglasses or contact lenses     Respiratory:  Denies dyspnea with exertion,no cough, wheezing, or hemoptysis.     Cardiovascular: Negative for palpations, chest pain, orthopnea, PND, peripheral edema, syncope, or claudication.     Gastrointestinal:  Denies change in bowel habits, dyspepsia, ulcer disease, hematochezia, or melena.     Endocrine: Negative for cold intolerance, heat intolerance, polydipsia, polyphagia and polyuria. Denies any history of weight change, heat/cold intolerance, polydipsia, polyuria     Genitourinary: Negative.      Musculoskeletal: Denies any history of arthritic symptoms or back problems     Skin:  Denies any change in hair or nails, rashes, or skin lesions.     Allergic/Immunologic: Negative.  Negative for environmental allergies, food allergies and immunocompromised state.     Neurological:  Denies any history of recurrent headaches, strokes, TIA, or seizure disorder.     Hematological: Denies any food allergies, seasonal allergies, bleeding disorders, or lymphadenopathy.     Psychiatric/Behavioral: Denies any history of depression, substance abuse, or change in cognitive function.         Physical Exam     Constitutional: Cooperative, alert and oriented, well-developed, well-nourished, in no acute distress.     HENT:   Head: Normocephalic, normal hair patterns, no masses or tenderness.  Ears, Nose, and Throat: No gross abnormalities. No pallor or cyanosis. Dentition good.   Eyes: EOMS intact, PERRL, conjunctivae and lids unremarkable. Fundoscopic exam and visual fields not performed.   Neck: " No palpable masses or adenopathy, no thyromegaly, no JVD, carotid pulses are full and equal bilaterally and without  Bruits.     Cardiovascular: Regular rhythm, S1 and S2 normal, no S3 or S4. Apical impulse not displaced. No murmurs, gallops, or rubs detected.     Pulmonary/Chest: Chest: normal symmetry, no tenderness to palpation, normal respiratory excursion, no intercostal retraction, no use of accessory muscles.            Pulmonary: Normal breath sounds. No rales or ronchi.    Abdominal: Abdomen soft, bowel sounds normoactive, no masses, no hepatosplenomegaly, non-tender, no bruits.     Musculoskeletal: No deformities, clubbing, cyanosis, erythema, or edema observed. There are no spinal abnormalities noted. Normal muscle strength and tone. Pulses full and equal in all extremities, no bruits auscultated.     Neurological: No gross motor or sensory deficits noted, affect appropriate, oriented to time, person, place.     Skin: Warm and dry to the touch, no apparent skin lesions or masses noted.     Psychiatric: She has a normal mood and affect. Her behavior is normal. Judgment and thought content normal.         Procedures      Lab Results   Component Value Date    WBC 8.07 02/23/2021    HGB 10.8 (L) 02/23/2021    HCT 32.2 (L) 02/23/2021    MCV 87.5 02/23/2021     02/23/2021     Lab Results   Component Value Date    GLUCOSE 83 06/30/2021    BUN 28 (H) 06/30/2021    CREATININE 1.40 (H) 06/30/2021    EGFRIFAFRI 43 (L) 06/30/2021    BCR 20.0 06/30/2021    CO2 27.3 06/30/2021    CALCIUM 9.9 06/30/2021    ALBUMIN 3.70 11/08/2020    AST 11 11/08/2020    ALT 5 11/08/2020     No results found for: CHOL  No results found for: TRIG  No results found for: HDL  No components found for: LDLCALC  No results found for: LDL  No results found for: HDLLDLRATIO  No components found for: CHOLHDL  Lab Results   Component Value Date    HGBA1C 5.6 02/14/2022     Lab Results   Component Value Date    TSH 2.450 06/30/2021            ASSESSMENT AND PLAN      Diagnoses and all orders for this visit:    1. Essential hypertension    2. Paroxysmal atrial fibrillation (HCC)    Patient overall is stable.  She had temporal arteritis and during that episode had a short burst of atrial fibrillation.  It was decided at that time not to put her on anticoagulation as she has done well since then.  I have told her she could always take extra metoprolol succinate if she feels the palpitations bothering her too much.  She is agreeable.    Patient's Body mass index is 24.84 kg/m². indicating that she is within normal range (BMI 18.5-24.9). No BMI management plan needed..                Ludwin Banda MD  3/3/2022  11:21 CST

## 2022-03-07 ENCOUNTER — HOSPITAL ENCOUNTER (EMERGENCY)
Facility: HOSPITAL | Age: 87
Discharge: HOME | End: 2022-03-07
Attending: EMERGENCY MEDICINE | Admitting: EMERGENCY MEDICINE

## 2022-03-07 VITALS
RESPIRATION RATE: 18 BRPM | DIASTOLIC BLOOD PRESSURE: 75 MMHG | TEMPERATURE: 97.8 F | SYSTOLIC BLOOD PRESSURE: 170 MMHG | OXYGEN SATURATION: 99 % | HEIGHT: 60 IN | HEART RATE: 62 BPM | BODY MASS INDEX: 24.94 KG/M2 | WEIGHT: 127 LBS

## 2022-03-07 DIAGNOSIS — I10 ESSENTIAL HYPERTENSION: ICD-10-CM

## 2022-03-07 DIAGNOSIS — I48.0 PAROXYSMAL ATRIAL FIBRILLATION: ICD-10-CM

## 2022-03-07 DIAGNOSIS — H60.502 ACUTE OTITIS EXTERNA OF LEFT EAR, UNSPECIFIED TYPE: ICD-10-CM

## 2022-03-07 DIAGNOSIS — M79.2 NEURALGIA: ICD-10-CM

## 2022-03-07 DIAGNOSIS — K12.1 STOMATITIS AND MUCOSITIS: ICD-10-CM

## 2022-03-07 DIAGNOSIS — M54.41 CHRONIC RIGHT-SIDED LOW BACK PAIN WITH RIGHT-SIDED SCIATICA: Primary | ICD-10-CM

## 2022-03-07 DIAGNOSIS — G89.29 CHRONIC RIGHT-SIDED LOW BACK PAIN WITH RIGHT-SIDED SCIATICA: Primary | ICD-10-CM

## 2022-03-07 DIAGNOSIS — K12.30 STOMATITIS AND MUCOSITIS: ICD-10-CM

## 2022-03-07 LAB
HOLD SPECIMEN: NORMAL
HOLD SPECIMEN: NORMAL
WHOLE BLOOD HOLD SPECIMEN: NORMAL

## 2022-03-07 PROCEDURE — 96372 THER/PROPH/DIAG INJ SC/IM: CPT

## 2022-03-07 PROCEDURE — 99283 EMERGENCY DEPT VISIT LOW MDM: CPT

## 2022-03-07 PROCEDURE — 25010000002 METHYLPREDNISOLONE PER 125 MG: Performed by: EMERGENCY MEDICINE

## 2022-03-07 RX ORDER — METHYLPREDNISOLONE SODIUM SUCCINATE 125 MG/2ML
80 INJECTION, POWDER, LYOPHILIZED, FOR SOLUTION INTRAMUSCULAR; INTRAVENOUS ONCE
Status: COMPLETED | OUTPATIENT
Start: 2022-03-07 | End: 2022-03-07

## 2022-03-07 RX ORDER — SODIUM CHLORIDE 0.9 % (FLUSH) 0.9 %
10 SYRINGE (ML) INJECTION AS NEEDED
Status: DISCONTINUED | OUTPATIENT
Start: 2022-03-07 | End: 2022-03-07 | Stop reason: HOSPADM

## 2022-03-07 RX ORDER — LIDOCAINE 50 MG/G
1 PATCH TOPICAL ONCE
Status: DISCONTINUED | OUTPATIENT
Start: 2022-03-07 | End: 2022-03-07 | Stop reason: HOSPADM

## 2022-03-07 RX ADMIN — LIDOCAINE 1 PATCH: 50 PATCH CUTANEOUS at 16:06

## 2022-03-07 RX ADMIN — METHYLPREDNISOLONE SODIUM SUCCINATE 80 MG: 125 INJECTION, POWDER, FOR SOLUTION INTRAMUSCULAR; INTRAVENOUS at 18:10

## 2022-03-07 NOTE — ED PROVIDER NOTES
"Subjective   88-year-old female presents to the emergency department with multiple seemingly unrelated complaints.  She reports chronic back pain that has worsened over the last couple of days with no new injury.  She was seen for this few weeks ago and referred reports receiving \"2 shots\" that helped a lot.  She reports pain is to the right of the spine and she reports pain in her right leg and states that this is because she was \"diagnosed with fibromyalgia in that leg\".  She also complains of left ear discomfort and drainage.  She reports that she was putting sweet oil in the ear and scratching it with a Q-tip and thinks she might have perforated her eardrum.  She reports there was some bloody discharge.  She has been keeping cotton in the ear to help with the drainage.  She notes over the last few days intermittently she has had a couple of sharp shooting pains across the top of her head.  She denies any consistency with the direction or location of it.  She has no weakness, numbness, tingling or lightheadedness.  No speech difficulties or confusion.  She denies \"headache\".    Family history, surgical history, social history, current medications and allergies are reviewed with the patient and medical record; and triage documentation and vitals are reviewed.      History provided by:  Patient   used: No        Review of Systems   Constitutional: Negative for chills and fever.   HENT: Positive for ear discharge and ear pain. Negative for congestion and sore throat.    Eyes: Negative for photophobia and visual disturbance.   Respiratory: Negative for cough, shortness of breath and wheezing.    Cardiovascular: Negative for chest pain, palpitations and leg swelling.   Gastrointestinal: Negative for abdominal pain, diarrhea, nausea and vomiting.   Endocrine: Negative for polydipsia, polyphagia and polyuria.   Genitourinary: Negative for difficulty urinating, dysuria, frequency and urgency. "   Musculoskeletal: Positive for back pain. Negative for arthralgias, myalgias and neck pain.   Skin: Negative for rash and wound.   Allergic/Immunologic: Negative.    Neurological: Negative for dizziness, speech difficulty, weakness, light-headedness, numbness and headaches.   Hematological: Negative.    Psychiatric/Behavioral: Negative for confusion.       Past Medical History:   Diagnosis Date   • Abnormal CT scan     per patient   • Acute bronchitis    • Acute sinusitis    • Allergic conjunctivitis    • Angular cheilitis    • Ankle edema    • Backache     improved   • Blood in urine      UTI resolved      • Bradycardia    • Chest discomfort     described as heart racing      • Chronic low back pain     RIGHT leg radiation      • Cold feet     c/o - and lowerlegs      • Contusion     of dorsum of foot   • Cough    • COVID-19 virus infection    • Depressive disorder    • Dizziness and giddiness    • Dyspnea    • Dysuria    • Edema of lower extremity    • Essential hypertension    • Exanthematous disorder    • Fatigue    • Foot pain, left    • Grade II hemorrhoids 3/12/2018   • Hematoma    • Hip pain, right    • History of palpitations    • Hormone replacement therapy (postmenopausal)    • Impacted cerumen    • Joint pain    • Knee pain    • Low back pain    • Malaise and fatigue    • Multiple joint pain    • Muscle pain    • Muscle weakness    • Neck pain    • Obesity (BMI 30.0-34.9) 3/12/2018   • Osteoarthritis of knee    • Osteoarthritis of multiple joints    • Otitis externa    • Pain in lower limb    • Peripheral venous insufficiency    • POP-Q stage 2 rectocele 3/12/2018   • POP-Q stage 4 cystocele 3/12/2018   • Sacroiliitis, not elsewhere classified (HCC)     R LE   • Shoulder pain    • Temporal arteritis ([facility]) 11/8/2020    Recent COVID infection with symptoms concerning for uncomplicated GCA without visual loss/changes.  -ESR on admission 107 - IV steroids 60 mg daily; transition to oral when dysphagia  resolved for at minimum 2-4 weeks, then taper by 10 mg weekly - ESR and CRP q48 hrs -consulted jan Nguyen recommendations- pt underwent Temporal artery biopsy on 11/10/20        • Upper respiratory infection    • Urinary frequency    • Urinary tract infectious disease    • Wheezing        Allergies   Allergen Reactions   • Robaxin [Methocarbamol] Rash   • Aleve [Naproxen Sodium] Itching   • Atenolol    • Gabapentin Itching   • Keflex [Cephalexin] Itching   • Lisinopril Angioedema   • Relafen [Nabumetone] Itching   • Tramadol Unknown - Low Severity   • Zanaflex [Tizanidine] Itching   • Acetaminophen Itching   • Aspirin Itching and Rash   • Codeine Rash   • Diflucan [Fluconazole] Itching   • Sulfa Antibiotics Rash and Itching       Past Surgical History:   Procedure Laterality Date   • ANKLE SURGERY     • BACK SURGERY     • COLONOSCOPY  12/14/2009   • ENDOSCOPY N/A 1/6/2021    Procedure: ESOPHAGOGASTRODUODENOSCOPY;  Surgeon: Jack Ellis MD;  Location: [facility] ENDOSCOPY;  Service: Gastroenterology;  Laterality: N/A;   • ENDOSCOPY N/A 1/28/2021    Procedure: ESOPHAGOGASTRODUODENOSCOPY;  Surgeon: Jack Ellis MD;  Location: [facility] ENDOSCOPY;  Service: Gastroenterology;  Laterality: N/A;  eso dilation with ballon to 30FR   • ENDOSCOPY N/A 6/21/2021    Procedure: ESOPHAGOGASTRODUODENOSCOPY;  Surgeon: Jack Ellis MD;  Location: [facility] ENDOSCOPY;  Service: Gastroenterology;  Laterality: N/A;   • ENDOSCOPY N/A 8/6/2021    Procedure: ESOPHAGOGASTRODUODENOSCOPY;  Surgeon: Jack Ellis MD;  Location: [facility] ENDOSCOPY;  Service: Gastroenterology;  Laterality: N/A;   • ENDOSCOPY N/A 8/25/2021    Procedure: ESOPHAGOGASTRODUODENOSCOPY;  Surgeon: Jack Ellis MD;  Location: [facility] ENDOSCOPY;  Service: Gastroenterology;  Laterality: N/A;   • ENDOSCOPY N/A 10/5/2021    Procedure: ESOPHAGOGASTRODUODENOSCOPY;  Surgeon: Jack Ellis MD;  Location: [facility] ENDOSCOPY;  Service: Gastroenterology;  Laterality:  N/A;  39-45  blue/yellow eso dilation   • EYE SURGERY Bilateral 02/2018    B cataract   • HAMMER TOE REPAIR  08/02/2011    Hammertoe repair, left second toe.   • HYSTERECTOMY     • INCISION AND DRAINAGE ABSCESS  01/21/2015   • INJECTION OF MEDICATION  11/15/2013   • INJECTION OF MEDICATION  05/06/2013    Celestone (betamethasone) (1)      • INJECTION OF MEDICATION  03/17/2016    Kenalog (3)      • NECK SURGERY  01/2018   • TEMPORAL ARTERY BIOPSY Left 11/10/2020    Procedure: LEFT TEMPORAL ARTERY BIOPSY;  Surgeon: Anjel Olea MD;  Location: [facility];  Service: General;  Laterality: Left;   • UPPER GASTROINTESTINAL ENDOSCOPY  12/14/2009   • UPPER GASTROINTESTINAL ENDOSCOPY  01/06/2021   • UPPER GASTROINTESTINAL ENDOSCOPY  01/28/2021   • UPPER GASTROINTESTINAL ENDOSCOPY  06/21/2021   • UPPER GASTROINTESTINAL ENDOSCOPY  08/25/2021   • UPPER GASTROINTESTINAL ENDOSCOPY  10/05/2021       Family History   Problem Relation Age of Onset   • Diabetes Other    • Heart disease Other    • Stroke Other    • Coronary artery disease Mother    • Coronary artery disease Father        Social History     Socioeconomic History   • Marital status:    Tobacco Use   • Smoking status: Former Smoker     Types: Cigarettes   • Smokeless tobacco: Never Used   • Tobacco comment: 10/14/2021 - Patient states 1 pack of Cigarettes would last X 3 days.  Patient can not recall number of years she utilized Cigarettes.    Vaping Use   • Vaping Use: Never used   Substance and Sexual Activity   • Alcohol use: No   • Drug use: No   • Sexual activity: Defer     Birth control/protection: Surgical     Comment: Hysterectomy In 1973.           Objective   Physical Exam  Vitals and nursing note reviewed.   Constitutional:       General: She is not in acute distress.     Appearance: Normal appearance. She is not ill-appearing, toxic-appearing or diaphoretic.   HENT:      Head: Normocephalic and atraumatic.      Left Ear: Tympanic membrane normal.       Ears:      Comments: After ear is flushed on left and a clot of old blood is removed TM is visualized, no perforation  Eyes:      General: No scleral icterus.     Extraocular Movements: Extraocular movements intact.      Conjunctiva/sclera: Conjunctivae normal.      Pupils: Pupils are equal, round, and reactive to light.   Cardiovascular:      Rate and Rhythm: Normal rate and regular rhythm.      Pulses: Normal pulses.      Heart sounds: No murmur heard.  Pulmonary:      Effort: Pulmonary effort is normal.      Breath sounds: Normal breath sounds.   Abdominal:      General: Bowel sounds are normal.      Palpations: Abdomen is soft.      Tenderness: There is no abdominal tenderness. There is no guarding or rebound.   Musculoskeletal:         General: Normal range of motion.      Cervical back: Normal, normal range of motion and neck supple. No tenderness or bony tenderness. Normal range of motion.      Thoracic back: Normal.      Lumbar back: Spasms and tenderness present. No bony tenderness. Normal range of motion. Negative right straight leg raise test and negative left straight leg raise test.        Back:    Skin:     General: Skin is warm and dry.      Capillary Refill: Capillary refill takes less than 2 seconds.   Neurological:      General: No focal deficit present.      Mental Status: She is alert and oriented to person, place, and time.         Procedures  none         ED Course      Labs Reviewed   RAINBOW DRAW    Narrative:     The following orders were created for panel order [place] Draw.  Procedure                               Abnormality         Status                     ---------                               -----------         ------                     Green Top (Gel)[[id number]]                                  Final result               Lavender Top[[id number]]                                     Final result               Gold Top - [facility][[id number]]                                   Final result                Light Blue Top[[id number]]                                                                Please view results for these tests on the individual orders.   GREEN TOP   LAVENDER TOP   GOLD TOP - [facility]   LIGHT BLUE TOP     CT Abdomen Pelvis Without Contrast    Result Date: 2/7/2022  Narrative: CT abdomen and pelvis without IV contrast February 7, 2022 INDICATION: Postmenopausal lower abdominal/pelvic pain TECHNIQUE: Spiral images obtained from diaphragm through rectum without IV or rectal contrast. Oral contrast administered prior to imaging. Sagittal, axial and coronal reformatted images generated retrospectively. This exam was performed according to our departmental dose-optimization program, which includes automated exposure control, adjustment of the mA and/or kV according to patient size and/or use of iterative reconstruction technique. FINDINGS: Minimal scarring/atelectasis in the lower lung fields. Evidence of previous granulomatous disease. Scattered splenic and hepatic granulomata. Suspect diffuse fatty liver. No worrisome splenic pathology. Atrophic pancreas without definite focal pancreatic pathology. Adrenals normal. No definite focal or acute renal pathology. No bowel obstruction or free intraperitoneal air. Appendix not seen with certainty. No pericecal inflammation to suggest presence of acute appendicitis. No free fluid in the pelvis. Status post hysterectomy. Pessary in place. Urinary bladder not well distended and appears grossly normal. No masses, fluid collections or significant adenopathy. No abdominal aortic aneurysm. Atherosclerotic disease with calcified plaquing abdominal aorta and major branch vessels.     Impression: No acute abnormality appreciated. See body of report for full details. Electronically signed by:  Eduardo Toney MD  2/7/2022 11:16 AM CST Workstation: [id number]    CT Lumbar Spine Without Contrast    Result Date: 2/16/2022  Narrative: CT LUMBAR SPINE WO CONTRAST RPID: CT  LUMBAR SPINE WITHOUT IV CONTRAST CLINICAL INDICATION: 88 years old Female with a history of  low back pain COMPARISON: None TECHNIQUE: Contiguous axial CT images were obtained of the lumbar spine without IV contrast.  From this data, sagittal and coronal reformatted images were generated. RADIATION DOSE REDUCTION: This exam was performed according to our departmental dose-optimization program, which includes automated exposure control, adjustment of the mA and kV according to patient size, and iterative reconstruction technique if available. FINDINGS: Postsurgical changes are noted at L4-5 with laminectomies present. 1.5 cm anterolisthesis of L4 on L5 is seen related to this. Findings are consistent with a 2 spondylolisthesis. The pars appear fractured or severely degenerated. Degenerative changes are seen at L3-4 with posterior ligamentous thickening and facet arthropathy. There is severe acquired canal stenosis at this level secondary to these findings L1-2 disc bulging is noted with mild to moderate acquired canal stenosis. There are granulomatous calcifications in the lung bases. There are several small right renal cysts projecting exophytically suspected, too small to completely characterize. No acute retroperitoneal abnormalities are seen.     Impression: 1. Anterolisthesis of L4 on L5 1.5 cm with prior laminectomies of L4 and L5. 2. High-grade acquired canal stenosis at L3-4. 3. Mild/moderate acquired stenosis at L1-2. Electronically signed by:  Michoacano Cerna MD  2/16/2022 9:13 PM CST Workstation: [id number]    Doppler Ankle Brachial Index Single Level CAR    Result Date: 2/9/2022  Narrative: · Right Conclusion: The right RHODA is normal. · Left Conclusion: The left RHODA is normal.            MDM  Number of Diagnoses or Management Options     Amount and/or Complexity of Data Reviewed  Tests in the radiology section of CPT®: reviewed (From previous visit)    Patient Progress  Patient progress: stable    Patient  with chronic back pain with no new injury.  Imaging reviewed from her recent visit.  Patient has multiple medication allergies and has tried multiple things in the past.  She is agreeable to an IM dose of Solu-Medrol to try to help with inflammation.  She also never picked up the meloxicam prescription that was sent at the last visit.  Her TM is not perforated but there is evidence of otitis externa and drops are sent.  Patient's neuralgia on her head likely will be helped with the treatment for her back.  There is no neurologic deficit or indication with trauma or other findings to warrant imaging at this time.  Agreeable to plan and discharge.    Final diagnoses:   Chronic right-sided low back pain with right-sided sciatica   Acute otitis externa of left ear, unspecified type   Neuralgia       ED Disposition  ED Disposition     ED Disposition   Discharge    Condition   Stable    Comment   --             No follow-up provider specified.       Medication List      No changes were made to your prescriptions during this visit.          Amor Moyer, DO  03/07/22 [id number]

## 2022-03-07 NOTE — ED TRIAGE NOTES
Pt presents to ED with complaints of headache, earache, and back pain. Pt states she has had sharp shooting intermittent pains in her head for about a week. Pt states she had chronic back pain that is acting up again. Pt states she was cleaning her left ear with a q-tip and went to far and her ear started bleeding. VSS. NAD noted. A&OX4

## 2022-03-08 NOTE — EXTERNAL PATIENT INSTRUCTIONS
Patient Education   Table of Contents       Neuropathic Pain       Otitis Externa     To view videos and all your education online visit,   [email or web address]   or scan this QR code with your smartphone.                  Neuropathic Pain     Neuropathic pain is pain caused by damage to the nerves that are responsible for certain sensations in your body (sensory nerves). The pain can be caused by:       Damage to the sensory nerves that send signals to your spinal cord and brain (peripheral nervous system).       Damage to the sensory nerves in your brain or spinal cord (central nervous system).      Neuropathic pain can make you more sensitive to pain. Even a minor sensation can feel very painful. This is usually a long-term condition that can be difficult to treat. The type of pain differs from person to person. It may:       Start suddenly (acute), or it may develop slowly and last for a long time (chronic).       Come and go as damaged nerves heal, or it may stay at the same level for years.       Cause emotional distress, loss of sleep, and a lower quality of life.     What are the causes?    The most common cause of this condition is diabetes. Many other diseases and conditions can also cause neuropathic pain. Causes of neuropathic pain can be classified as:       Toxic. This is caused by medicines and chemicals. The most common cause of toxic neuropathic pain is damage from cancer treatments (chemotherapy).      Metabolic. This can be caused by:       Diabetes. This is the most common disease that damages the nerves.       Lack of vitamin B from long-term alcohol abuse.       Traumatic. Any injury that cuts, crushes, or stretches a nerve can cause damage and pain. A common example is feeling pain after losing an arm or leg (phantom limb pain).       Compression-related. If a sensory nerve gets trapped or compressed for a long period of time, the blood supply to the nerve can be cut off.        Vascular. Many blood vessel diseases can cause neuropathic pain by decreasing blood supply and oxygen to nerves.       Autoimmune. This type of pain results from diseases in which the body's defense system (immune system) mistakenly attacks sensory nerves. Examples of autoimmune diseases that can cause neuropathic pain include lupus and multiple sclerosis.       Infectious. Many types of viral infections can damage sensory nerves and cause pain. Shingles infection is a common cause of this type of pain.       Inherited. Neuropathic pain can be a symptom of many diseases that are passed down through families (genetic).     What increases the risk?    You are more likely to develop this condition if:       You have diabetes.       You smoke.       You drink too much alcohol.       You are taking certain medicines, including medicines that kill cancer cells (chemotherapy) or that treat immune system disorders.     What are the signs or symptoms?    The main symptom is pain. Neuropathic pain is often described as:       Burning.       Shock-like.       Stinging.       Hot or cold.       Itching.     How is this diagnosed?    No single test can diagnose neuropathic pain. It is diagnosed based on:       Physical exam and your symptoms. Your health care provider will ask you about your pain. You may be asked to use a pain scale to describe how bad your pain is.      Tests. These may be done to see if you have a high sensitivity to pain and to help find the cause and location of any sensory nerve damage. They include:       Nerve conduction studies to test how well nerve signals travel through your sensory nerves (electrodiagnostic testing).       Stimulating your sensory nerves through electrodes on your skin and measuring the response in your spinal cord and brain (somatosensory evoked potential).      Imaging studies, such as:       X-rays.       CT scan.       MRI.     How is this treated?    Treatment for neuropathic  pain may change over time. You may need to try different treatment options or a combination of treatments. Some options include:       Treating the underlying cause of the neuropathy, such as diabetes, kidney disease, or vitamin deficiencies.       Stopping medicines that can cause neuropathy, such as chemotherapy.      Medicine to relieve pain. Medicines may include:       Prescription or over-the-counter pain medicine.       Anti-seizure medicine.       Antidepressant medicines.       Pain-relieving patches that are applied to painful areas of skin.       A medicine to numb the area (local anesthetic), which can be injected as a nerve block.       Transcutaneous nerve stimulation. This uses electrical currents to block painful nerve signals. The treatment is painless.      Alternative treatments, such as:       Acupuncture.       Meditation.       Massage.       Physical therapy.       Pain management programs.       Counseling.     Follow these instructions at home:   Medicines            Take over-the-counter and prescription medicines only as told by your health care provider.      Do not  drive or use heavy machinery while taking prescription pain medicine.      If you are taking prescription pain medicine, take actions to prevent or treat constipation. Your health care provider may recommend that you:       Drink enough fluid to keep your urine pale yellow.       Eat foods that are high in fiber, such as fresh fruits and vegetables, whole grains, and beans.       Limit foods that are high in fat and processed sugars, such as fried or sweet foods.       Take an over-the-counter or prescription medicine for constipation.     Lifestyle            Have a good support system at home.       Consider joining a chronic pain support group.      Do not  use any products that contain nicotine or tobacco, such as cigarettes and e-cigarettes. If you need help quitting, ask your health care provider.      Do not  drink  alcohol.       General instructions         Learn as much as you can about your condition.       Work closely with all your health care providers to find the treatment plan that works best for you.       Ask your health care provider what activities are safe for you.       Keep all follow-up visits as told by your health care provider. This is important.       Contact a health care provider if:         Your pain treatments are not working.       You are having side effects from your medicines.       You are struggling with tiredness (fatigue), mood changes, depression, or anxiety.     Summary         Neuropathic pain is pain caused by damage to the nerves that are responsible for certain sensations in your body (sensory nerves).       Neuropathic pain may come and go as damaged nerves heal, or it may stay at the same level for years.       Neuropathic pain is usually a long-term condition that can be difficult to treat. Consider joining a chronic pain support group.     This information is not intended to replace advice given to you by your health care provider. Make sure you discuss any questions you have with your health care provider.     Document Released: 09/14/2005Document Revised: 04/09/2020Document Reviewed: 01/04/2019     Elsevier Patient Education ? 2021 [employer]         Otitis Externa        Otitis externa is an infection of the outer ear canal. The outer ear canal is the area between the outside of the ear and the eardrum. Otitis externa is sometimes called swimmer's ear.     What are the causes?    Common causes of this condition include:       Swimming in dirty water.       Moisture in the ear.       An injury to the inside of the ear.       An object stuck in the ear.       A cut or scrape on the outside of the ear.     What increases the risk?   You are more likely to get this condition if you go swimming often.   What are the signs or symptoms?         Itching in the ear. This is often the  first symptom.       Swelling of the ear.       Redness in the ear.       Ear pain. The pain may get worse when you pull on your ear.       Pus coming from the ear.     How is this treated?    This condition may be treated with:       Antibiotic ear drops. These are often given for 10?14 days.       Medicines to reduce itching and swelling.     Follow these instructions at home:         If you were given antibiotic ear drops, use them as told by your doctor. Do not  stop using them even if your condition gets better.       Take over-the-counter and prescription medicines only as told by your doctor.       Avoid getting water in your ears as told by your doctor. You may be told to avoid swimming or water sports for a few days.       Keep all follow-up visits as told by your doctor. This is important.     How is this prevented?         Keep your ears dry. Use the corner of a towel to dry your ears after you swim or bathe.       Try not to scratch or put things in your ear. Doing these things makes it easier for germs to grow in your ear.       Avoid swimming in lakes, dirty water, or pools that may not have the right amount of a chemical called chlorine.     Contact a doctor if:         You have a fever.       Your ear is still red, swollen, or painful after 3 days.       You still have pus coming from your ear after 3 days.       Your redness, swelling, or pain gets worse.       You have a really bad headache.       You have redness, swelling, pain, or tenderness behind your ear.     Summary         Otitis externa is an infection of the outer ear canal.       Symptoms include pain, redness, and swelling of the ear.       If you were given antibiotic ear drops, use them as told by your doctor. Do not  stop using them even if your condition gets better.       Try not to scratch or put things in your ear.     This information is not intended to replace advice given to you by your health care provider. Make sure you  discuss any questions you have with your health care provider.     Document Released: 06/05/2009Document Revised: 05/24/2019Document Reviewed: 05/24/2019     Elsevier Patient Education ? 2021 [employer]

## 2022-03-08 NOTE — DISCHARGE INSTRUCTIONS
Please return with new or worsening symptoms.  Follow closely with primary care and pain management.  The medication sent from your last visit was sent to [facility].  Please pick that up and see if it helps with your pain.  Also get the eardrop prescription and take as directed for the next week.

## 2022-03-10 DIAGNOSIS — I48.0 PAROXYSMAL ATRIAL FIBRILLATION: ICD-10-CM

## 2022-03-10 DIAGNOSIS — I10 ESSENTIAL HYPERTENSION: ICD-10-CM

## 2022-03-10 RX ORDER — METOPROLOL SUCCINATE 25 MG/1
TABLET, EXTENDED RELEASE ORAL
Qty: 90 TABLET | Refills: 1 | Status: SHIPPED | OUTPATIENT
Start: 2022-03-10 | End: 2022-08-22 | Stop reason: SDUPTHER

## 2022-03-18 ENCOUNTER — LAB (OUTPATIENT)
Dept: LAB | Facility: HOSPITAL | Age: 87
End: 2022-03-18

## 2022-03-21 ENCOUNTER — LAB (OUTPATIENT)
Dept: LAB | Facility: HOSPITAL | Age: 87
End: 2022-03-21

## 2022-03-21 DIAGNOSIS — R13.19 OTHER DYSPHAGIA: ICD-10-CM

## 2022-03-21 LAB — SARS-COV-2 N GENE RESP QL NAA+PROBE: NOT DETECTED

## 2022-03-21 PROCEDURE — C9803 HOPD COVID-19 SPEC COLLECT: HCPCS

## 2022-03-21 PROCEDURE — 87635 SARS-COV-2 COVID-19 AMP PRB: CPT

## 2022-03-23 ENCOUNTER — HOSPITAL ENCOUNTER (OUTPATIENT)
Facility: HOSPITAL | Age: 87
Setting detail: HOSPITAL OUTPATIENT SURGERY
Discharge: HOME | End: 2022-03-23
Attending: INTERNAL MEDICINE | Admitting: INTERNAL MEDICINE

## 2022-03-23 ENCOUNTER — ANESTHESIA EVENT (OUTPATIENT)
Dept: GASTROENTEROLOGY | Facility: HOSPITAL | Age: 87
End: 2022-03-23

## 2022-03-23 ENCOUNTER — ANESTHESIA (OUTPATIENT)
Dept: GASTROENTEROLOGY | Facility: HOSPITAL | Age: 87
End: 2022-03-23

## 2022-03-23 VITALS
SYSTOLIC BLOOD PRESSURE: 189 MMHG | WEIGHT: 125.22 LBS | HEIGHT: 60 IN | TEMPERATURE: 96.3 F | OXYGEN SATURATION: 99 % | HEART RATE: 56 BPM | BODY MASS INDEX: 24.58 KG/M2 | RESPIRATION RATE: 16 BRPM | DIASTOLIC BLOOD PRESSURE: 84 MMHG

## 2022-03-23 DIAGNOSIS — R13.19 OTHER DYSPHAGIA: ICD-10-CM

## 2022-03-23 PROCEDURE — 25010000002 PROPOFOL 10 MG/ML EMULSION: Performed by: NURSE ANESTHETIST, CERTIFIED REGISTERED

## 2022-03-23 PROCEDURE — C1769 GUIDE WIRE: HCPCS | Performed by: INTERNAL MEDICINE

## 2022-03-23 PROCEDURE — 43248 EGD GUIDE WIRE INSERTION: CPT | Performed by: INTERNAL MEDICINE

## 2022-03-23 RX ORDER — PROPOFOL 10 MG/ML
VIAL (ML) INTRAVENOUS AS NEEDED
Status: DISCONTINUED | OUTPATIENT
Start: 2022-03-23 | End: 2022-03-23 | Stop reason: SURG

## 2022-03-23 RX ORDER — LIDOCAINE HYDROCHLORIDE 20 MG/ML
INJECTION, SOLUTION INTRAVENOUS AS NEEDED
Status: DISCONTINUED | OUTPATIENT
Start: 2022-03-23 | End: 2022-03-23 | Stop reason: SURG

## 2022-03-23 RX ORDER — DEXTROSE AND SODIUM CHLORIDE 5; .45 G/100ML; G/100ML
30 INJECTION, SOLUTION INTRAVENOUS CONTINUOUS PRN
Status: DISCONTINUED | OUTPATIENT
Start: 2022-03-23 | End: 2022-03-23 | Stop reason: HOSPADM

## 2022-03-23 RX ADMIN — LIDOCAINE HYDROCHLORIDE 50 MG: 20 INJECTION, SOLUTION INTRAVENOUS at 11:12

## 2022-03-23 RX ADMIN — DEXTROSE AND SODIUM CHLORIDE 30 ML/HR: 5; 450 INJECTION, SOLUTION INTRAVENOUS at 11:08

## 2022-03-23 RX ADMIN — PROPOFOL 40 MG: 10 INJECTION, EMULSION INTRAVENOUS at 11:12

## 2022-03-23 RX ADMIN — PROPOFOL 10 MG: 10 INJECTION, EMULSION INTRAVENOUS at 11:15

## 2022-03-23 NOTE — ANESTHESIA POSTPROCEDURE EVALUATION
Patient: Kristy Ramirez    Procedure Summary     Date: 03/23/22 Room / Location: [facility] ENDOSCOPY 3 / [facility] ENDOSCOPY    Anesthesia Start: 1111 Anesthesia Stop: 1118    Procedure: ESOPHAGOGASTRODUODENOSCOPY (N/A ) Diagnosis:       Other dysphagia      (Other dysphagia [R13.19])    Surgeons: Jack Ellis MD Provider: Katlin Simental CRNA    Anesthesia Type: MAC ASA Status: 3          Anesthesia Type: MAC    Vitals  No vitals data found for the desired time range.          Post Anesthesia Care and Evaluation    Patient location during evaluation: PHASE II  Patient participation: complete - patient participated  Level of consciousness: awake and alert  Pain score: 0  Pain management: adequate  Airway patency: patent  Anesthetic complications: No anesthetic complications  PONV Status: none  Cardiovascular status: acceptable  Respiratory status: acceptable  Hydration status: acceptable    Comments: 177/77  64  100%

## 2022-03-23 NOTE — H&P
[facility]      Chief Complaint:   No chief complaint on file.      Subjective     HPI:   Patient with dysphagia.  Patient is dysphagia tends to worsen after few months after dilatation.  Patient has been compliant with her medications.  Patient states she is having some difficulty with swallowing at this point.    Plan; we will schedule patient for EGD with dilatation    Past Medical History:   Past Medical History:   Diagnosis Date   • Abnormal CT scan     per patient   • Acute bronchitis    • Acute sinusitis    • Allergic conjunctivitis    • Angular cheilitis    • Ankle edema    • Backache     improved   • Blood in urine      UTI resolved      • Bradycardia    • Chest discomfort     described as heart racing      • Chronic low back pain     RIGHT leg radiation      • Cold feet     c/o - and lowerlegs      • Contusion     of dorsum of foot   • Cough    • COVID-19 virus infection    • Depressive disorder    • Dizziness and giddiness    • Dyspnea    • Dysuria    • Edema of lower extremity    • Essential hypertension    • Exanthematous disorder    • Fatigue    • Foot pain, left    • Grade II hemorrhoids 3/12/2018   • Hematoma    • Hip pain, right    • History of palpitations    • Hormone replacement therapy (postmenopausal)    • Impacted cerumen    • Joint pain    • Knee pain    • Low back pain    • Malaise and fatigue    • Multiple joint pain    • Muscle pain    • Muscle weakness    • Neck pain    • Obesity (BMI 30.0-34.9) 3/12/2018   • Osteoarthritis of knee    • Osteoarthritis of multiple joints    • Otitis externa    • Pain in lower limb    • Peripheral venous insufficiency    • POP-Q stage 2 rectocele 3/12/2018   • POP-Q stage 4 cystocele 3/12/2018   • Sacroiliitis, not elsewhere classified (HCC)     R LE   • Shoulder pain    • Temporal arteritis ([facility]) 11/8/2020    Recent COVID infection with symptoms concerning for uncomplicated GCA without visual loss/changes.  -ESR on admission 107 -  IV steroids 60 mg daily; transition to oral when dysphagia resolved for at minimum 2-4 weeks, then taper by 10 mg weekly - ESR and CRP q48 hrs -consulted Dr Olea, appreciate recommendations- pt underwent Temporal artery biopsy on 11/10/20        • Upper respiratory infection    • Urinary frequency    • Urinary tract infectious disease    • Wheezing        Past Surgical History:    Past Surgical History:   Procedure Laterality Date   • ANKLE SURGERY     • BACK SURGERY     • COLONOSCOPY  12/14/2009   • ENDOSCOPY N/A 1/6/2021    Procedure: ESOPHAGOGASTRODUODENOSCOPY;  Surgeon: Jack Ellis MD;  Location: [facility] ENDOSCOPY;  Service: Gastroenterology;  Laterality: N/A;   • ENDOSCOPY N/A 1/28/2021    Procedure: ESOPHAGOGASTRODUODENOSCOPY;  Surgeon: Jack Ellis MD;  Location: [facility] ENDOSCOPY;  Service: Gastroenterology;  Laterality: N/A;  eso dilation with ballon to 30FR   • ENDOSCOPY N/A 6/21/2021    Procedure: ESOPHAGOGASTRODUODENOSCOPY;  Surgeon: Jack Ellis MD;  Location: [facility] ENDOSCOPY;  Service: Gastroenterology;  Laterality: N/A;   • ENDOSCOPY N/A 8/6/2021    Procedure: ESOPHAGOGASTRODUODENOSCOPY;  Surgeon: Jack Ellis MD;  Location: [facility] ENDOSCOPY;  Service: Gastroenterology;  Laterality: N/A;   • ENDOSCOPY N/A 8/25/2021    Procedure: ESOPHAGOGASTRODUODENOSCOPY;  Surgeon: Jack Ellis MD;  Location: [facility] ENDOSCOPY;  Service: Gastroenterology;  Laterality: N/A;   • ENDOSCOPY N/A 10/5/2021    Procedure: ESOPHAGOGASTRODUODENOSCOPY;  Surgeon: Jack Ellis MD;  Location: [facility] ENDOSCOPY;  Service: Gastroenterology;  Laterality: N/A;  39-45  blue/yellow eso dilation   • EYE SURGERY Bilateral 02/2018    B cataract   • HAMMER TOE REPAIR  08/02/2011    Hammertoe repair, left second toe.   • HYSTERECTOMY     • INCISION AND DRAINAGE ABSCESS  01/21/2015   • INJECTION OF MEDICATION  11/15/2013   • INJECTION OF MEDICATION  05/06/2013    Celestone (betamethasone) (1)      • INJECTION OF  MEDICATION  03/17/2016    Kenalog (3)      • NECK SURGERY  01/2018   • TEMPORAL ARTERY BIOPSY Left 11/10/2020    Procedure: LEFT TEMPORAL ARTERY BIOPSY;  Surgeon: Anjel Olea MD;  Location: [facility];  Service: General;  Laterality: Left;   • UPPER GASTROINTESTINAL ENDOSCOPY  12/14/2009   • UPPER GASTROINTESTINAL ENDOSCOPY  01/06/2021   • UPPER GASTROINTESTINAL ENDOSCOPY  01/28/2021   • UPPER GASTROINTESTINAL ENDOSCOPY  06/21/2021   • UPPER GASTROINTESTINAL ENDOSCOPY  08/25/2021   • UPPER GASTROINTESTINAL ENDOSCOPY  10/05/2021       Family History:  Family History   Problem Relation Age of Onset   • Diabetes Other    • Heart disease Other    • Stroke Other    • Coronary artery disease Mother    • Coronary artery disease Father        Social History:   reports that she has quit smoking. Her smoking use included cigarettes. She has never used smokeless tobacco. She reports that she does not drink alcohol and does not use drugs.    Medications:   Prior to Admission medications    Medication Sig Start Date End Date Taking? Authorizing Provider   allopurinol (ZYLOPRIM) 100 MG tablet Take 1 tablet by mouth Daily. take with food 12/6/21  Yes Maggi William APRN   amLODIPine (NORVASC) 5 MG tablet Take 1 tablet by mouth Daily. 12/6/21  Yes Maggi William APRN   Diclofenac Sodium (VOLTAREN) 1 % gel gel Apply 4 g topically to the appropriate area as directed 4 (Four) Times a Day As Needed (joint pain). 9/8/21  Yes Maggi William APRN   hydrOXYzine (ATARAX) 10 MG tablet Take 10 mg by mouth At Night As Needed for Itching.   Yes ProviderVipin MD   loperamide (IMODIUM) 2 MG capsule Take 2 mg by mouth 4 (Four) Times a Day As Needed for Diarrhea.   Yes ProviderVipin MD   losartan (COZAAR) 50 MG tablet Take 1 tablet by mouth Daily. 12/6/21  Yes Maggi William APRN   magnesium oxide (MAG-OX) 400 MG tablet Take 400 mg by mouth. 10/14/21  Yes Vipin Wright MD   MAGnesium-Oxide 400 (241.3 Mg) MG tablet  tablet Take 400 mg by mouth Daily. 11/22/21  Yes ProviderVipin MD   metoprolol succinate XL (Toprol XL) 25 MG 24 hr tablet Take 1 tablet by mouth Daily. 12/6/21  Yes Maggi William APRN   mupirocin (BACTROBAN) 2 % ointment Apply 1 application topically to the appropriate area as directed 2 (Two) Times a Day As Needed (rash). 9/8/21  Yes Maggi William APRN   nystatin (MYCOSTATIN) 100,000 unit/mL suspension Swish and swallow 5 mL 4 (Four) Times a Day. 1/5/22  Yes Gladys Soni APRN   nystatin (MYCOSTATIN) 684747 UNIT/GM powder Apply  topically to the appropriate area as directed 3 (Three) Times a Day. 1/5/22  Yes Gladys Soni APRN   omeprazole (priLOSEC) 20 MG capsule Take 1 capsule by mouth 2 (Two) Times a Day. 9/16/21  Yes Maggi William APRN   tolterodine LA (DETROL LA) 4 MG 24 hr capsule Take 1 capsule by mouth Daily. 12/6/21  Yes Maggi William APRN   traMADol (ULTRAM) 50 MG tablet 1/2-1 BY MOUTH TWICE DAILY PM BACK PAIN 12/10/21  Yes ProviderVipin MD   zolpidem (Ambien) 5 MG tablet Take 0.5 - 1 tab PO at HS PRN insomnia 10/21/21  Yes Maggi William APRN       Allergies:  Robaxin [methocarbamol], Aleve [naproxen sodium], Atenolol, Gabapentin, Keflex [cephalexin], Lisinopril, Relafen [nabumetone], Tramadol, Zanaflex [tizanidine], Acetaminophen, Aspirin, Codeine, Diflucan [fluconazole], and Sulfa antibiotics    ROS:    Review of Systems   Constitutional: Negative for activity change, appetite change, chills, diaphoresis, fatigue, fever and unexpected weight change.   HENT: Positive for trouble swallowing. Negative for sore throat.    Respiratory: Negative for shortness of breath.    Gastrointestinal: Negative for abdominal distention, abdominal pain, anal bleeding, blood in stool, constipation, diarrhea, nausea, rectal pain and vomiting.   Endocrine: Negative for polydipsia, polyphagia and polyuria.   Genitourinary: Negative for difficulty urinating.   Musculoskeletal: Negative for  "arthralgias.   Skin: Negative for pallor.   Allergic/Immunologic: Negative for food allergies.   Neurological: Negative for weakness and light-headedness.   Psychiatric/Behavioral: Negative for behavioral problems.     Objective     Blood pressure (!) 203/86, pulse 57, temperature 96.9 °F (36.1 °C), resp. rate 16, height 152.4 cm (60\"), weight 56.8 kg (125 lb 3.5 oz), SpO2 99 %, not currently breastfeeding.    Physical Exam  Constitutional:       General: She is not in acute distress.     Appearance: She is well-developed. She is not diaphoretic.   HENT:      Head: Normocephalic and atraumatic.   Cardiovascular:      Rate and Rhythm: Normal rate and regular rhythm.      Heart sounds: Normal heart sounds. No murmur heard.    No friction rub. No gallop.   Pulmonary:      Effort: No respiratory distress.      Breath sounds: Normal breath sounds. No wheezing or rales.   Chest:      Chest wall: No tenderness.   Abdominal:      General: Bowel sounds are normal. There is no distension.      Palpations: Abdomen is soft. There is no mass.      Tenderness: There is no abdominal tenderness. There is no guarding or rebound.      Hernia: No hernia is present.   Musculoskeletal:         General: Normal range of motion.   Skin:     General: Skin is warm and dry.      Coloration: Skin is not pale.      Findings: No erythema or rash.   Neurological:      Mental Status: She is alert and oriented to person, place, and time.   Psychiatric:         Behavior: Behavior normal.         Thought Content: Thought content normal.         Judgment: Judgment normal.          Assessment/Plan   Diagnoses and all orders for this visit:    1. Other dysphagia  -     dextrose 5 % and sodium chloride 0.45 % infusion    Other orders  -     Implement Anesthesia Orders Day of Procedure; Standing  -     Obtain Informed Consent; Standing  -     POC Glucose Once; Standing  -     Insert Peripheral IV; Standing  -     Implement Anesthesia Orders Day of " Procedure  -     Obtain Informed Consent  -     POC Glucose Once  -     Insert Peripheral IV        ESOPHAGOGASTRODUODENOSCOPY (N/A)     Diagnosis Plan   1. Other dysphagia  dextrose 5 % and sodium chloride 0.45 % infusion       Anticipated Surgical Procedure:  Orders Placed This Encounter   Procedures   • Implement Anesthesia Orders Day of Procedure     Standing Status:   Standing     Number of Occurrences:   1   • Obtain Informed Consent     Standing Status:   Standing     Number of Occurrences:   1     Order Specific Question:   Informed Consent Given For     Answer:   egd   • POC Glucose Once     Prior to Procedure on ALL Diabetic Patients     Standing Status:   Standing     Number of Occurrences:   1   • Insert Peripheral IV     Standing Status:   Standing     Number of Occurrences:   1       The risks, benefits, and alternatives of this procedure have been discussed with the patient or the responsible party- the patient understands and agrees to proceed.

## 2022-03-28 ENCOUNTER — TELEPHONE (OUTPATIENT)
Dept: FAMILY MEDICINE CLINIC | Facility: CLINIC | Age: 87
End: 2022-03-28

## 2022-03-28 NOTE — TELEPHONE ENCOUNTER
Patient called and stated she seen Dr. Ellis. He took a biopsy and she was wondering if xavi has heard anything. She asked for a call back.

## 2022-03-29 DIAGNOSIS — G47.00 INSOMNIA, UNSPECIFIED TYPE: ICD-10-CM

## 2022-03-29 NOTE — TELEPHONE ENCOUNTER
Spoke with patient and informed her that those results would have to come from 's office. We can't give out results from another provider.

## 2022-04-01 RX ORDER — ZOLPIDEM TARTRATE 5 MG/1
TABLET ORAL
Qty: 30 TABLET | Refills: 0 | Status: SHIPPED | OUTPATIENT
Start: 2022-04-01 | End: 2022-05-05 | Stop reason: SDUPTHER

## 2022-04-07 ENCOUNTER — OFFICE VISIT (OUTPATIENT)
Dept: GASTROENTEROLOGY | Facility: CLINIC | Age: 87
End: 2022-04-07

## 2022-04-07 DIAGNOSIS — R13.19 OTHER DYSPHAGIA: Primary | ICD-10-CM

## 2022-04-07 PROCEDURE — 99442 PR PHYS/QHP TELEPHONE EVALUATION 11-20 MIN: CPT | Performed by: INTERNAL MEDICINE

## 2022-04-08 ENCOUNTER — OFFICE VISIT (OUTPATIENT)
Dept: GASTROENTEROLOGY | Facility: CLINIC | Age: 87
End: 2022-04-08

## 2022-04-08 DIAGNOSIS — R13.19 OTHER DYSPHAGIA: Primary | ICD-10-CM

## 2022-04-08 PROCEDURE — 99442 PR PHYS/QHP TELEPHONE EVALUATION 11-20 MIN: CPT | Performed by: INTERNAL MEDICINE

## 2022-04-08 RX ORDER — SUCRALFATE ORAL 1 G/10ML
1 SUSPENSION ORAL 4 TIMES DAILY
Qty: 4 G | Refills: 5 | Status: SHIPPED | OUTPATIENT
Start: 2022-04-08 | End: 2022-04-11 | Stop reason: SDUPTHER

## 2022-04-08 RX ORDER — DEXTROSE AND SODIUM CHLORIDE 5; .45 G/100ML; G/100ML
30 INJECTION, SOLUTION INTRAVENOUS CONTINUOUS PRN
Status: CANCELLED | OUTPATIENT
Start: 2022-04-27

## 2022-04-08 NOTE — PROGRESS NOTES
[facility]      Chief Complaint:   No chief complaint on file.      Subjective   This visit has been rescheduled as a phone visit to comply with patient safety concerns in accordance with CDC recommendations. Total time of discussion was *20 minutes.    You have chosen to receive care through a telephone visit. Do you consent to use a telephone visit for your medical care today? Yes  HPI:   Patient with dysphagia.  Patient states she also get some epigastric abdominal pain.  Patient states some improvement in dysphagia but she has not had this go away completely.    Plan; we will schedule patient for repeat EGD with dilatation done patient has only been dilated to 45 [place].  We will also add Carafate 1 g 4 times daily    Past Medical History:   Past Medical History:   Diagnosis Date   • Abnormal CT scan     per patient   • Acute bronchitis    • Acute sinusitis    • Allergic conjunctivitis    • Angular cheilitis    • Ankle edema    • Backache     improved   • Blood in urine      UTI resolved      • Bradycardia    • Chest discomfort     described as heart racing      • Chronic low back pain     RIGHT leg radiation      • Cold feet     c/o - and lowerlegs      • Contusion     of dorsum of foot   • Cough    • COVID-19 virus infection    • Depressive disorder    • Dizziness and giddiness    • Dyspnea    • Dysuria    • Edema of lower extremity    • Essential hypertension    • Exanthematous disorder    • Fatigue    • Foot pain, left    • Grade II hemorrhoids 3/12/2018   • Hematoma    • Hip pain, right    • History of palpitations    • Hormone replacement therapy (postmenopausal)    • Impacted cerumen    • Joint pain    • Knee pain    • Low back pain    • Malaise and fatigue    • Multiple joint pain    • Muscle pain    • Muscle weakness    • Neck pain    • Obesity (BMI 30.0-34.9) 3/12/2018   • Osteoarthritis of knee    • Osteoarthritis of multiple joints    • Otitis externa    • Pain in lower limb     • Peripheral venous insufficiency    • POP-Q stage 2 rectocele 3/12/2018   • POP-Q stage 4 cystocele 3/12/2018   • Sacroiliitis, not elsewhere classified (HCC)     R LE   • Shoulder pain    • Temporal arteritis ([facility]) 11/8/2020    Recent COVID infection with symptoms concerning for uncomplicated GCA without visual loss/changes.  -ESR on admission 107 - IV steroids 60 mg daily; transition to oral when dysphagia resolved for at minimum 2-4 weeks, then taper by 10 mg weekly - ESR and CRP q48 hrs -consulted Dr Olea, appreciate recommendations- pt underwent Temporal artery biopsy on 11/10/20        • Upper respiratory infection    • Urinary frequency    • Urinary tract infectious disease    • Wheezing        Past Surgical History:  Past Surgical History:   Procedure Laterality Date   • ANKLE SURGERY     • BACK SURGERY     • COLONOSCOPY  12/14/2009   • ENDOSCOPY N/A 1/6/2021    Procedure: ESOPHAGOGASTRODUODENOSCOPY;  Surgeon: Jack Ellis MD;  Location: [facility] ENDOSCOPY;  Service: Gastroenterology;  Laterality: N/A;   • ENDOSCOPY N/A 1/28/2021    Procedure: ESOPHAGOGASTRODUODENOSCOPY;  Surgeon: Jack Ellis MD;  Location: [facility] ENDOSCOPY;  Service: Gastroenterology;  Laterality: N/A;  eso dilation with ballon to 30FR   • ENDOSCOPY N/A 6/21/2021    Procedure: ESOPHAGOGASTRODUODENOSCOPY;  Surgeon: Jack Ellis MD;  Location: [facility] ENDOSCOPY;  Service: Gastroenterology;  Laterality: N/A;   • ENDOSCOPY N/A 8/6/2021    Procedure: ESOPHAGOGASTRODUODENOSCOPY;  Surgeon: Jack Ellis MD;  Location: [facility] ENDOSCOPY;  Service: Gastroenterology;  Laterality: N/A;   • ENDOSCOPY N/A 8/25/2021    Procedure: ESOPHAGOGASTRODUODENOSCOPY;  Surgeon: Jack Ellis MD;  Location: [facility] ENDOSCOPY;  Service: Gastroenterology;  Laterality: N/A;   • ENDOSCOPY N/A 10/5/2021    Procedure: ESOPHAGOGASTRODUODENOSCOPY;  Surgeon: Jack Ellis MD;  Location: [facility] ENDOSCOPY;  Service: Gastroenterology;  Laterality: N/A;  39-45   blue/yellow eso dilation   • ENDOSCOPY N/A 3/23/2022    Procedure: ESOPHAGOGASTRODUODENOSCOPY;  Surgeon: Jack Ellis MD;  Location: [facility] ENDOSCOPY;  Service: Gastroenterology;  Laterality: N/A;   • EYE SURGERY Bilateral 02/2018    B cataract   • HAMMER TOE REPAIR  08/02/2011    Hammertoe repair, left second toe.   • HYSTERECTOMY     • INCISION AND DRAINAGE ABSCESS  01/21/2015   • INJECTION OF MEDICATION  11/15/2013   • INJECTION OF MEDICATION  05/06/2013    Celestone (betamethasone) (1)      • INJECTION OF MEDICATION  03/17/2016    Kenalog (3)      • NECK SURGERY  01/2018   • TEMPORAL ARTERY BIOPSY Left 11/10/2020    Procedure: LEFT TEMPORAL ARTERY BIOPSY;  Surgeon: Anjel Olea MD;  Location: [facility] OR;  Service: General;  Laterality: Left;   • UPPER GASTROINTESTINAL ENDOSCOPY  12/14/2009   • UPPER GASTROINTESTINAL ENDOSCOPY  01/06/2021   • UPPER GASTROINTESTINAL ENDOSCOPY  01/28/2021   • UPPER GASTROINTESTINAL ENDOSCOPY  06/21/2021   • UPPER GASTROINTESTINAL ENDOSCOPY  08/25/2021   • UPPER GASTROINTESTINAL ENDOSCOPY  10/05/2021       Family History:  Family History   Problem Relation Age of Onset   • Diabetes Other    • Heart disease Other    • Stroke Other    • Coronary artery disease Mother    • Coronary artery disease Father        Social History:   reports that she has quit smoking. Her smoking use included cigarettes. She has never used smokeless tobacco. She reports that she does not drink alcohol and does not use drugs.    Medications:   Prior to Admission medications    Medication Sig Start Date End Date Taking? Authorizing Provider   allopurinol (ZYLOPRIM) 100 MG tablet Take 1 tablet by mouth Daily. take with food 12/6/21   Maggi William APRN   amLODIPine (NORVASC) 5 MG tablet Take 1 tablet by mouth Daily. 12/6/21   Maggi William APRN   clotrimazole-betamethasone (Lotrisone) 1-0.05 % cream Apply 1 application topically to the appropriate area as directed 2 (Two) Times a Day. Do not use for  longer than 2 weeks 1/26/22   Mateusz Chopra APRN   Diclofenac Sodium (VOLTAREN) 1 % gel gel Apply 4 g topically to the appropriate area as directed 4 (Four) Times a Day As Needed (joint pain). 9/8/21   Maggi William APRN   gabapentin (NEURONTIN) 100 MG capsule Take 1 capsule by mouth At Night As Needed (lower extremity pain). 2/14/22   Maggi William APRN   losartan (COZAAR) 50 MG tablet Take 1 tablet by mouth Daily. 12/6/21   Maggi William APRN   magnesium oxide (MAG-OX) 400 MG tablet Take 400 mg by mouth. 10/14/21   Vipin Wright MD   meloxicam (MOBIC) 15 MG tablet Take 1 tablet by mouth Daily. 2/16/22   Gregorio Morgan MD   metoprolol succinate XL (TOPROL-XL) 25 MG 24 hr tablet TAKE 1 TABLET EVERY DAY 3/10/22   Maggi William APRN   mupirocin (BACTROBAN) 2 % ointment Apply 1 application topically to the appropriate area as directed 2 (Two) Times a Day As Needed (rash). 9/8/21   Maggi William APRN   neomycin-polymyxin-hydrocortisone (CORTISPORIN) 3.5-81798-4 otic solution Administer 3 drops into both ears 4 (Four) Times a Day. 3/7/22   Amor Moyer DO   nystatin (MYCOSTATIN) 100,000 unit/mL suspension SWISH AND SWALLOW 5 ML BY MOUTH FOUR TIMES DAILY 3/7/22   Gladys Soni APRN   nystatin (MYCOSTATIN) 503436 UNIT/GM powder Apply  topically to the appropriate area as directed 3 (Three) Times a Day. 1/5/22   Gladys Soni APRN   omeprazole (priLOSEC) 20 MG capsule Take 1 capsule by mouth 2 (Two) Times a Day. 9/16/21   Maggi William APRN   sucralfate (Carafate) 1 GM/10ML suspension Take 10 mL by mouth 4 (Four) Times a Day. 4/8/22   Jack Ellis MD   tolterodine LA (DETROL LA) 4 MG 24 hr capsule Take 1 capsule by mouth Daily. 12/6/21   Maggi William APRN   zolpidem (AMBIEN) 5 MG tablet TAKE 1/2 TO 1 TABLET BY MOUTH AT BEDTIME AS NEEDED FOR INSOMNIA 4/1/22   Maggi William APRN       Allergies:  Robaxin [methocarbamol], Aleve [naproxen sodium], Atenolol, Gabapentin, Keflex  [cephalexin], Lisinopril, Relafen [nabumetone], Tramadol, Zanaflex [tizanidine], Acetaminophen, Aspirin, Codeine, Diflucan [fluconazole], and Sulfa antibiotics    ROS:    Review of Systems   Constitutional: Negative for activity change, appetite change, chills, diaphoresis, fatigue, fever and unexpected weight change.   HENT: Negative for sore throat and trouble swallowing.    Respiratory: Negative for shortness of breath.    Gastrointestinal: Negative for abdominal distention, abdominal pain, anal bleeding, blood in stool, constipation, diarrhea, nausea, rectal pain and vomiting.   Endocrine: Negative for polydipsia, polyphagia and polyuria.   Genitourinary: Negative for difficulty urinating.   Musculoskeletal: Negative for arthralgias.   Skin: Negative for pallor.   Allergic/Immunologic: Negative for food allergies.   Neurological: Negative for weakness and light-headedness.   Psychiatric/Behavioral: Negative for behavioral problems.     Objective     not currently breastfeeding.    Physical Exam     Assessment/Plan   Diagnoses and all orders for this visit:    1. Other dysphagia (Primary)  -     Case Request; Standing  -     COVID PRE-OP / PRE-PROCEDURE SCREENING ORDER (NO ISOLATION) - Swab, Nasopharynx; Future  -     Case Request    Other orders  -     Follow Anesthesia Guidelines / Standing Orders; Future  -     Obtain Informed Consent; Future  -     sucralfate (Carafate) 1 GM/10ML suspension; Take 10 mL by mouth 4 (Four) Times a Day.  Dispense: 4 g; Refill: 5        ESOPHAGOGASTRODUODENOSCOPY (N/A)     Diagnosis Plan   1. Other dysphagia  Case Request    COVID PRE-OP / PRE-PROCEDURE SCREENING ORDER (NO ISOLATION) - Swab, Nasopharynx    Case Request       Anticipated Surgical Procedure:  Orders Placed This Encounter   Procedures   • COVID PRE-OP / PRE-PROCEDURE SCREENING ORDER (NO ISOLATION) - Swab, Nasopharynx     Standing Status:   Future     Standing Expiration Date:   4/8/2023     Order Specific Question:    Release to patient     Answer:   Immediate     Order Specific Question:   Please select your location:     Answer:   [facility]     Order Specific Question:   COVID Screening Order:     Answer:   In-House: PRE-OP: BD MAX, 8-10 HR TAT [[id number]]     Order Specific Question:   Previously tested for COVID-19?     Answer:   Yes     Order Specific Question:   Employed in healthcare setting?     Answer:   No     Order Specific Question:   Symptomatic for COVID-19 as defined by CDC?     Answer:   No     Order Specific Question:   Hospitalized for COVID-19?     Answer:   No     Order Specific Question:   Admitted to ICU for COVID-19?     Answer:   No     Order Specific Question:   Resident in a congregate (group) care setting?     Answer:   No     Order Specific Question:   Pregnant?     Answer:   No   • Follow Anesthesia Guidelines / Standing Orders     Standing Status:   Future   • Obtain Informed Consent     Standing Status:   Future     Order Specific Question:   Informed Consent Given For     Answer:   ESOPHAGOGASTRODUODENOSCOPY       The risks, benefits, and alternatives of this procedure have been discussed with the patient or the responsible party- the patient understands and agrees to proceed.

## 2022-04-11 ENCOUNTER — TELEPHONE (OUTPATIENT)
Dept: GASTROENTEROLOGY | Facility: CLINIC | Age: 87
End: 2022-04-11

## 2022-04-11 RX ORDER — SUCRALFATE ORAL 1 G/10ML
1 SUSPENSION ORAL 4 TIMES DAILY
Qty: 1200 ML | Refills: 5 | Status: SHIPPED | OUTPATIENT
Start: 2022-04-11 | End: 2022-09-21 | Stop reason: SDUPTHER

## 2022-04-11 NOTE — TELEPHONE ENCOUNTER
04/11/2022, 1055 - Patient telephoned per this staff member [phone] with notification of date/time of EGD: Wednesday, April 27, 2022 with an arrival time of 12:30 P.M.  Patient made aware of need to complete Covid 19 Testing Monday, April 25, 2022 between 9:00 A.M. - 10:00 A.M. at [facility].  Patient confirmed mailing address: [address] [place] [address], [address], [address].  Patient made aware EGD instruction and After Visit Summary will be submitted to patient via mail.  Patient verbalized understanding.  .  Note - Dr. Jack Ignacio M.D. submitted prescription medication Sucralfate 1 GM/ 10 ML Suspension to [facility] 04/08/2022.  Patient stated pharmacy notified patient prescription medication was not received (receipt confirmed by pharmacy 04/08/2022 at 2:45 P.M. per E-Scribing Status).  Prescription medication resubmitted per this staff member.

## 2022-04-12 NOTE — PROGRESS NOTES
[facility]      Chief Complaint:   No chief complaint on file.  This visit has been rescheduled as a phone visit to comply with patient safety concerns in accordance with CDC recommendations. Total time of discussion was 20minutes.  You have chosen to receive care through a telephone visit. Do you consent to use a telephone visit for your medical care today? Yes  Subjective     HPI:   Patient with dysphagia.  Patient states that swallowing has improved some since then dilatation.    Plan; patient follow-up in few months    Past Medical History:   Past Medical History:   Diagnosis Date   • Abnormal CT scan     per patient   • Acute bronchitis    • Acute sinusitis    • Allergic conjunctivitis    • Angular cheilitis    • Ankle edema    • Backache     improved   • Blood in urine      UTI resolved      • Bradycardia    • Chest discomfort     described as heart racing      • Chronic low back pain     RIGHT leg radiation      • Cold feet     c/o - and lowerlegs      • Contusion     of dorsum of foot   • Cough    • COVID-19 virus infection    • Depressive disorder    • Dizziness and giddiness    • Dyspnea    • Dysuria    • Edema of lower extremity    • Essential hypertension    • Exanthematous disorder    • Fatigue    • Foot pain, left    • Grade II hemorrhoids 3/12/2018   • Hematoma    • Hip pain, right    • History of palpitations    • Hormone replacement therapy (postmenopausal)    • Impacted cerumen    • Joint pain    • Knee pain    • Low back pain    • Malaise and fatigue    • Multiple joint pain    • Muscle pain    • Muscle weakness    • Neck pain    • Obesity (BMI 30.0-34.9) 3/12/2018   • Osteoarthritis of knee    • Osteoarthritis of multiple joints    • Otitis externa    • Pain in lower limb    • Peripheral venous insufficiency    • POP-Q stage 2 rectocele 3/12/2018   • POP-Q stage 4 cystocele 3/12/2018   • Sacroiliitis, not elsewhere classified (HCC)     R LE   • Shoulder pain    • Temporal  arteritis (HCC) 11/8/2020    Recent COVID infection with symptoms concerning for uncomplicated GCA without visual loss/changes.  -ESR on admission 107 - IV steroids 60 mg daily; transition to oral when dysphagia resolved for at minimum 2-4 weeks, then taper by 10 mg weekly - ESR and CRP q48 hrs -consulted Dr Olea appreciate recommendations- pt underwent Temporal artery biopsy on 11/10/20        • Upper respiratory infection    • Urinary frequency    • Urinary tract infectious disease    • Wheezing        Past Surgical History:  Past Surgical History:   Procedure Laterality Date   • ANKLE SURGERY     • BACK SURGERY     • COLONOSCOPY  12/14/2009   • ENDOSCOPY N/A 1/6/2021    Procedure: ESOPHAGOGASTRODUODENOSCOPY;  Surgeon: Jack Ellis MD;  Location: [facility] ENDOSCOPY;  Service: Gastroenterology;  Laterality: N/A;   • ENDOSCOPY N/A 1/28/2021    Procedure: ESOPHAGOGASTRODUODENOSCOPY;  Surgeon: Jack Ellis MD;  Location: [facility] ENDOSCOPY;  Service: Gastroenterology;  Laterality: N/A;  eso dilation with ballon to 30FR   • ENDOSCOPY N/A 6/21/2021    Procedure: ESOPHAGOGASTRODUODENOSCOPY;  Surgeon: Jack Ellis MD;  Location: [facility] ENDOSCOPY;  Service: Gastroenterology;  Laterality: N/A;   • ENDOSCOPY N/A 8/6/2021    Procedure: ESOPHAGOGASTRODUODENOSCOPY;  Surgeon: Jack Ellis MD;  Location: [facility] ENDOSCOPY;  Service: Gastroenterology;  Laterality: N/A;   • ENDOSCOPY N/A 8/25/2021    Procedure: ESOPHAGOGASTRODUODENOSCOPY;  Surgeon: Jack Ellis MD;  Location: [facility] ENDOSCOPY;  Service: Gastroenterology;  Laterality: N/A;   • ENDOSCOPY N/A 10/5/2021    Procedure: ESOPHAGOGASTRODUODENOSCOPY;  Surgeon: Jack Ellis MD;  Location: [facility] ENDOSCOPY;  Service: Gastroenterology;  Laterality: N/A;  39-45  blue/yellow eso dilation   • ENDOSCOPY N/A 3/23/2022    Procedure: ESOPHAGOGASTRODUODENOSCOPY;  Surgeon: Jack Ellis MD;  Location: [facility] ENDOSCOPY;  Service: Gastroenterology;  Laterality: N/A;    • EYE SURGERY Bilateral 02/2018    B cataract   • HAMMER TOE REPAIR  08/02/2011    Hammertoe repair, left second toe.   • HYSTERECTOMY     • INCISION AND DRAINAGE ABSCESS  01/21/2015   • INJECTION OF MEDICATION  11/15/2013   • INJECTION OF MEDICATION  05/06/2013    Celestone (betamethasone) (1)      • INJECTION OF MEDICATION  03/17/2016    Kenalog (3)      • NECK SURGERY  01/2018   • TEMPORAL ARTERY BIOPSY Left 11/10/2020    Procedure: LEFT TEMPORAL ARTERY BIOPSY;  Surgeon: Anjel Olea MD;  Location: [facility];  Service: General;  Laterality: Left;   • UPPER GASTROINTESTINAL ENDOSCOPY  12/14/2009   • UPPER GASTROINTESTINAL ENDOSCOPY  01/06/2021   • UPPER GASTROINTESTINAL ENDOSCOPY  01/28/2021   • UPPER GASTROINTESTINAL ENDOSCOPY  06/21/2021   • UPPER GASTROINTESTINAL ENDOSCOPY  08/25/2021   • UPPER GASTROINTESTINAL ENDOSCOPY  10/05/2021       Family History:  Family History   Problem Relation Age of Onset   • Diabetes Other    • Heart disease Other    • Stroke Other    • Coronary artery disease Mother    • Coronary artery disease Father        Social History:   reports that she has quit smoking. Her smoking use included cigarettes. She has never used smokeless tobacco. She reports that she does not drink alcohol and does not use drugs.    Medications:   Prior to Admission medications    Medication Sig Start Date End Date Taking? Authorizing Provider   allopurinol (ZYLOPRIM) 100 MG tablet Take 1 tablet by mouth Daily. take with food 12/6/21   Maggi William APRN   amLODIPine (NORVASC) 5 MG tablet Take 1 tablet by mouth Daily. 12/6/21   Maggi William APRN   clotrimazole-betamethasone (Lotrisone) 1-0.05 % cream Apply 1 application topically to the appropriate area as directed 2 (Two) Times a Day. Do not use for longer than 2 weeks 1/26/22   Mateusz Chopra APRN   Diclofenac Sodium (VOLTAREN) 1 % gel gel Apply 4 g topically to the appropriate area as directed 4 (Four) Times a Day As Needed (joint pain). 9/8/21    Maggi William APRN   gabapentin (NEURONTIN) 100 MG capsule Take 1 capsule by mouth At Night As Needed (lower extremity pain). 2/14/22   Maggi William APRN   losartan (COZAAR) 50 MG tablet Take 1 tablet by mouth Daily. 12/6/21   Maggi William APRN   magnesium oxide (MAG-OX) 400 MG tablet Take 400 mg by mouth. 10/14/21   Vipin Wright MD   meloxicam (MOBIC) 15 MG tablet Take 1 tablet by mouth Daily. 2/16/22   Gregorio Morgan MD   metoprolol succinate XL (TOPROL-XL) 25 MG 24 hr tablet TAKE 1 TABLET EVERY DAY 3/10/22   Maggi William APRN   mupirocin (BACTROBAN) 2 % ointment Apply 1 application topically to the appropriate area as directed 2 (Two) Times a Day As Needed (rash). 9/8/21   Maggi William APRN   neomycin-polymyxin-hydrocortisone (CORTISPORIN) 3.5-88925-8 otic solution Administer 3 drops into both ears 4 (Four) Times a Day. 3/7/22   Amor Moyer DO   nystatin (MYCOSTATIN) 100,000 unit/mL suspension SWISH AND SWALLOW 5 ML BY MOUTH FOUR TIMES DAILY 3/7/22   Gladys Soni APRN   nystatin (MYCOSTATIN) 688503 UNIT/GM powder Apply  topically to the appropriate area as directed 3 (Three) Times a Day. 1/5/22   Gladys Soni APRN   omeprazole (priLOSEC) 20 MG capsule Take 1 capsule by mouth 2 (Two) Times a Day. 9/16/21   Maggi William APRN   sucralfate (Carafate) 1 GM/10ML suspension Take 10 mL by mouth 4 (Four) Times a Day. 4/11/22   Jack Ellis MD   tolterodine LA (DETROL LA) 4 MG 24 hr capsule Take 1 capsule by mouth Daily. 12/6/21   Maggi William APRN   zolpidem (AMBIEN) 5 MG tablet TAKE 1/2 TO 1 TABLET BY MOUTH AT BEDTIME AS NEEDED FOR INSOMNIA 4/1/22   Maggi William APRN       Allergies:  Robaxin [methocarbamol], Aleve [naproxen sodium], Atenolol, Gabapentin, Keflex [cephalexin], Lisinopril, Relafen [nabumetone], Tramadol, Zanaflex [tizanidine], Acetaminophen, Aspirin, Codeine, Diflucan [fluconazole], and Sulfa antibiotics    ROS:    Review of Systems    Constitutional: Negative for activity change, appetite change, chills, diaphoresis, fatigue, fever and unexpected weight change.   HENT: Negative for sore throat and trouble swallowing.    Respiratory: Negative for shortness of breath.    Gastrointestinal: Negative for abdominal distention, abdominal pain, anal bleeding, blood in stool, constipation, diarrhea, nausea, rectal pain and vomiting.   Endocrine: Negative for polydipsia, polyphagia and polyuria.   Genitourinary: Negative for difficulty urinating.   Musculoskeletal: Negative for arthralgias.   Skin: Negative for pallor.   Allergic/Immunologic: Negative for food allergies.   Neurological: Negative for weakness and light-headedness.   Psychiatric/Behavioral: Negative for behavioral problems.     Objective     not currently breastfeeding.    Physical Exam     Assessment/Plan   There are no diagnoses linked to this encounter.    * Surgery not found *    No diagnosis found.    Anticipated Surgical Procedure:  No orders of the defined types were placed in this encounter.      The risks, benefits, and alternatives of this procedure have been discussed with the patient or the responsible party- the patient understands and agrees to proceed.

## 2022-04-18 ENCOUNTER — OFFICE VISIT (OUTPATIENT)
Dept: OBSTETRICS AND GYNECOLOGY | Facility: CLINIC | Age: 87
End: 2022-04-18

## 2022-04-18 VITALS
WEIGHT: 125 LBS | SYSTOLIC BLOOD PRESSURE: 146 MMHG | BODY MASS INDEX: 24.54 KG/M2 | DIASTOLIC BLOOD PRESSURE: 68 MMHG | HEIGHT: 60 IN

## 2022-04-18 DIAGNOSIS — Z46.89 PESSARY MAINTENANCE: Primary | ICD-10-CM

## 2022-04-18 DIAGNOSIS — N81.10 POP-Q STAGE 4 CYSTOCELE: Chronic | ICD-10-CM

## 2022-04-18 PROCEDURE — 99213 OFFICE O/P EST LOW 20 MIN: CPT | Performed by: NURSE PRACTITIONER

## 2022-04-18 NOTE — PROGRESS NOTES
"Subjective   Chief Complaint   Patient presents with   • Pessary Check     Kristy Ramirez is a 88 y.o. year old who presents to be seen for follow-up of her pessary.  Currently she is using Foldable ring w/ support - #7 w/o urethral bar.  She reports no problems with her pessary at this time.     No Additional Complaints Reported    The following portions of the patient's history were reviewed and updated as appropriate:problem list, current medications and allergies    Review of Systems   Constitutional: Negative for activity change, appetite change, chills, diaphoresis, fatigue, fever, unexpected weight gain and unexpected weight loss.   Gastrointestinal: Negative for abdominal distention, abdominal pain, anal bleeding, blood in stool, constipation, diarrhea, nausea, rectal pain and vomiting.   Genitourinary: Negative for decreased urine volume, difficulty urinating, pelvic pain, pelvic pressure, urgency, urinary incontinence, vaginal bleeding, vaginal discharge and vaginal pain.        Objective   /68   Ht 152.4 cm (60\")   Wt 56.7 kg (125 lb)   LMP  (LMP Unknown)   Breastfeeding No   BMI 24.41 kg/m²     General:  well developed; well nourished  no acute distress   Pelvis: Clinical staff was present for exam  External genitalia:  normal appearance of the external genitalia including Bartholin's and [place]'s glands.  :  urethral meatus normal; urethra hypermobile; prominent caruncle present; bladder is tender to palpation  Vaginal:  atrophic mucosal changes are present;  Cervix:  absent.  Uterus:  absent.  Adnexa:  non palpable bilaterally.  Cystocele GRADE 2  Rectocele GRADE 2  Pessary removed and cleaned. After vaginal inspection the pessary was lubricated and placed back into the vaginal vault. She tolerated this well.     Lab Review   No data reviewed    Imaging   No data reviewed         Diagnoses and all orders for this visit:    Pessary maintenance    POP-Q stage 4 cystocele        No " orders of the defined types were placed in this encounter.    F/U in 8 weeks for routine pessary maintenance.       This document was electronically signed.     Gladys Soni, NADYA  April 18, 2022

## 2022-04-25 ENCOUNTER — LAB (OUTPATIENT)
Dept: LAB | Facility: HOSPITAL | Age: 87
End: 2022-04-25

## 2022-04-25 DIAGNOSIS — R13.19 OTHER DYSPHAGIA: ICD-10-CM

## 2022-04-25 LAB — SARS-COV-2 N GENE RESP QL NAA+PROBE: NOT DETECTED

## 2022-04-25 PROCEDURE — C9803 HOPD COVID-19 SPEC COLLECT: HCPCS

## 2022-04-25 PROCEDURE — 87635 SARS-COV-2 COVID-19 AMP PRB: CPT

## 2022-04-27 ENCOUNTER — ANESTHESIA (OUTPATIENT)
Dept: GASTROENTEROLOGY | Facility: HOSPITAL | Age: 87
End: 2022-04-27

## 2022-04-27 ENCOUNTER — ANESTHESIA EVENT (OUTPATIENT)
Dept: GASTROENTEROLOGY | Facility: HOSPITAL | Age: 87
End: 2022-04-27

## 2022-04-27 ENCOUNTER — HOSPITAL ENCOUNTER (OUTPATIENT)
Facility: HOSPITAL | Age: 87
Setting detail: HOSPITAL OUTPATIENT SURGERY
Discharge: HOME | End: 2022-04-27
Attending: INTERNAL MEDICINE | Admitting: INTERNAL MEDICINE

## 2022-04-27 DIAGNOSIS — R13.19 OTHER DYSPHAGIA: ICD-10-CM

## 2022-04-27 PROCEDURE — G0463 HOSPITAL OUTPT CLINIC VISIT: HCPCS | Performed by: INTERNAL MEDICINE

## 2022-04-27 RX ORDER — DEXTROSE AND SODIUM CHLORIDE 5; .45 G/100ML; G/100ML
30 INJECTION, SOLUTION INTRAVENOUS CONTINUOUS PRN
Status: DISCONTINUED | OUTPATIENT
Start: 2022-04-27 | End: 2022-04-27 | Stop reason: HOSPADM

## 2022-05-05 DIAGNOSIS — G47.00 INSOMNIA, UNSPECIFIED TYPE: ICD-10-CM

## 2022-05-05 RX ORDER — MAGNESIUM OXIDE 400 MG/1
400 TABLET ORAL DAILY
Qty: 90 TABLET | Refills: 1 | Status: SHIPPED | OUTPATIENT
Start: 2022-05-05

## 2022-05-05 RX ORDER — ZOLPIDEM TARTRATE 5 MG/1
5 TABLET ORAL NIGHTLY PRN
Qty: 30 TABLET | Refills: 0 | Status: SHIPPED | OUTPATIENT
Start: 2022-05-05 | End: 2022-05-20

## 2022-05-20 RX ORDER — MORPHINE SULFATE 15 MG/1
15 TABLET ORAL DAILY
COMMUNITY
End: 2022-08-10

## 2022-05-20 RX ORDER — FUROSEMIDE 20 MG/1
20 TABLET ORAL DAILY PRN
COMMUNITY

## 2022-05-25 ENCOUNTER — ANESTHESIA EVENT (OUTPATIENT)
Dept: GASTROENTEROLOGY | Facility: HOSPITAL | Age: 87
End: 2022-05-25

## 2022-05-25 ENCOUNTER — ANESTHESIA (OUTPATIENT)
Dept: GASTROENTEROLOGY | Facility: HOSPITAL | Age: 87
End: 2022-05-25

## 2022-05-25 ENCOUNTER — HOSPITAL ENCOUNTER (OUTPATIENT)
Facility: HOSPITAL | Age: 87
Setting detail: HOSPITAL OUTPATIENT SURGERY
Discharge: HOME | End: 2022-05-25
Attending: INTERNAL MEDICINE | Admitting: INTERNAL MEDICINE

## 2022-05-25 VITALS
BODY MASS INDEX: 24.24 KG/M2 | HEART RATE: 66 BPM | DIASTOLIC BLOOD PRESSURE: 73 MMHG | SYSTOLIC BLOOD PRESSURE: 183 MMHG | OXYGEN SATURATION: 100 % | TEMPERATURE: 96.9 F | WEIGHT: 123.46 LBS | HEIGHT: 60 IN | RESPIRATION RATE: 20 BRPM

## 2022-05-25 PROCEDURE — C1769 GUIDE WIRE: HCPCS | Performed by: INTERNAL MEDICINE

## 2022-05-25 PROCEDURE — 25010000002 PROPOFOL 10 MG/ML EMULSION: Performed by: NURSE ANESTHETIST, CERTIFIED REGISTERED

## 2022-05-25 PROCEDURE — 43248 EGD GUIDE WIRE INSERTION: CPT | Performed by: INTERNAL MEDICINE

## 2022-05-25 RX ORDER — DEXTROSE AND SODIUM CHLORIDE 5; .45 G/100ML; G/100ML
20 INJECTION, SOLUTION INTRAVENOUS CONTINUOUS
Status: DISCONTINUED | OUTPATIENT
Start: 2022-05-25 | End: 2022-05-25 | Stop reason: HOSPADM

## 2022-05-25 RX ORDER — PROPOFOL 10 MG/ML
VIAL (ML) INTRAVENOUS AS NEEDED
Status: DISCONTINUED | OUTPATIENT
Start: 2022-05-25 | End: 2022-05-25 | Stop reason: SURG

## 2022-05-25 RX ADMIN — PROPOFOL 60 MG: 10 INJECTION, EMULSION INTRAVENOUS at 10:43

## 2022-05-25 RX ADMIN — PROPOFOL 20 MG: 10 INJECTION, EMULSION INTRAVENOUS at 10:45

## 2022-05-25 RX ADMIN — PROPOFOL 20 MG: 10 INJECTION, EMULSION INTRAVENOUS at 10:47

## 2022-05-25 RX ADMIN — DEXTROSE AND SODIUM CHLORIDE 20 ML/HR: 5; 450 INJECTION, SOLUTION INTRAVENOUS at 10:15

## 2022-05-25 NOTE — ANESTHESIA PREPROCEDURE EVALUATION
Anesthesia Evaluation     NPO Solid Status: > 8 hours  NPO Liquid Status: > 2 hours           Airway   Mallampati: II  TM distance: >3 FB  Neck ROM: full  no difficulty expected  Dental    (+) upper dentures and lower dentures    Pulmonary - normal exam   (+) shortness of breath, recent URI,   Cardiovascular - normal exam    (+) hypertension, dysrhythmias,       Neuro/Psych  (+) dizziness/light headedness, weakness, psychiatric history,    GI/Hepatic/Renal/Endo    (+)  GERD,  renal disease,     Musculoskeletal     (+) neck pain,   Abdominal    Substance History      OB/GYN          Other   arthritis,                      Anesthesia Plan    ASA 3     MAC     intravenous induction     Anesthetic plan, all risks, benefits, and alternatives have been provided, discussed and informed consent has been obtained with: patient.        CODE STATUS:        Patient called clinic, verified by RN. Patient reports she feels weak and \"wobbly\", took blood pressure twice this morning with two systolic reads <75. Patient had recent decrease in diuretic (spironolactone, from 150mg daily to 100mg daily). Patient advised to go to ER for evaluation, patient agrees with plan. RN called report to [facility] ER.

## 2022-05-25 NOTE — ANESTHESIA POSTPROCEDURE EVALUATION
Patient: Kristy Ramirez    Procedure Summary     Date: 05/25/22 Room / Location: [facility] ENDOSCOPY 3 / [facility] ENDOSCOPY    Anesthesia Start: 1041 Anesthesia Stop: 1052    Procedure: ESOPHAGOGASTRODUODENOSCOPY (N/A ) Diagnosis:       Other dysphagia      (Other dysphagia [R13.19])    Surgeons: Jack Ellis MD Provider: Rudolph Anderson CRNA    Anesthesia Type: MAC ASA Status: 3          Anesthesia Type: MAC    Vitals  No vitals data found for the desired time range.          Post Anesthesia Care and Evaluation    Patient location during evaluation: bedside  Patient participation: waiting for patient participation  Level of consciousness: responsive to verbal stimuli  Pain management: adequate  Airway patency: patent  Anesthetic complications: No anesthetic complications  PONV Status: none  Cardiovascular status: acceptable  Respiratory status: acceptable  Hydration status: acceptable    Comments: ---------------------------               05/25/22                      1006         ---------------------------   BP:        (!) 199/100      Pulse:         66           Resp:          20           Temp:   97.2 °F (36.2 °C)   SpO2:          99%         ---------------------------

## 2022-05-25 NOTE — H&P
[facility]      Chief Complaint:   No chief complaint on file.      Subjective   This visit has been rescheduled as a phone visit to comply with patient safety concerns in accordance with CDC recommendations. Total time of discussion was *20 minutes.    You have chosen to receive care through a telephone visit. Do you consent to use a telephone visit for your medical care today? Yes  HPI:   Patient with dysphagia.  Patient states she also get some epigastric abdominal pain.  Patient states some improvement in dysphagia but she has not had this go away completely.    Plan; we will schedule patient for repeat EGD with dilatation done patient has only been dilated to 45 [place].  We will also add Carafate 1 g 4 times daily    Past Medical History:   Past Medical History:   Diagnosis Date   • Abnormal CT scan     per patient   • Acute bronchitis    • Acute sinusitis    • Allergic conjunctivitis    • Angular cheilitis    • Ankle edema    • Backache     improved   • Blood in urine      UTI resolved      • Bradycardia    • Chest discomfort     described as heart racing      • Chronic low back pain     RIGHT leg radiation      • Cold feet     c/o - and lowerlegs      • Contusion     of dorsum of foot   • Cough    • COVID-19 virus infection    • Depressive disorder    • Dizziness and giddiness    • Dyspnea    • Dysuria    • Edema of lower extremity    • Essential hypertension    • Exanthematous disorder    • Fatigue    • Foot pain, left    • Grade II hemorrhoids 3/12/2018   • Hematoma    • Hip pain, right    • History of palpitations    • Hormone replacement therapy (postmenopausal)    • Impacted cerumen    • Joint pain    • Knee pain    • Low back pain    • Malaise and fatigue    • Multiple joint pain    • Muscle pain    • Muscle weakness    • Neck pain    • Obesity (BMI 30.0-34.9) 3/12/2018   • Osteoarthritis of knee    • Osteoarthritis of multiple joints    • Otitis externa    • Pain in lower limb     • Peripheral venous insufficiency    • POP-Q stage 2 rectocele 3/12/2018   • POP-Q stage 4 cystocele 3/12/2018   • Sacroiliitis, not elsewhere classified (HCC)     R LE   • Shoulder pain    • Temporal arteritis ([facility]) 11/8/2020    Recent COVID infection with symptoms concerning for uncomplicated GCA without visual loss/changes.  -ESR on admission 107 - IV steroids 60 mg daily; transition to oral when dysphagia resolved for at minimum 2-4 weeks, then taper by 10 mg weekly - ESR and CRP q48 hrs -consulted Dr Olea, appreciate recommendations- pt underwent Temporal artery biopsy on 11/10/20        • Upper respiratory infection    • Urinary frequency    • Urinary tract infectious disease    • Wheezing        Past Surgical History:    Past Surgical History:   Procedure Laterality Date   • ANKLE SURGERY     • BACK SURGERY     • COLONOSCOPY  12/14/2009   • ENDOSCOPY N/A 1/6/2021    Procedure: ESOPHAGOGASTRODUODENOSCOPY;  Surgeon: Jack Ellis MD;  Location: [facility] ENDOSCOPY;  Service: Gastroenterology;  Laterality: N/A;   • ENDOSCOPY N/A 1/28/2021    Procedure: ESOPHAGOGASTRODUODENOSCOPY;  Surgeon: Jack Ellis MD;  Location: [facility] ENDOSCOPY;  Service: Gastroenterology;  Laterality: N/A;  eso dilation with ballon to 30FR   • ENDOSCOPY N/A 6/21/2021    Procedure: ESOPHAGOGASTRODUODENOSCOPY;  Surgeon: Jack Ellis MD;  Location: [facility] ENDOSCOPY;  Service: Gastroenterology;  Laterality: N/A;   • ENDOSCOPY N/A 8/6/2021    Procedure: ESOPHAGOGASTRODUODENOSCOPY;  Surgeon: Jack Ellis MD;  Location: [facility] ENDOSCOPY;  Service: Gastroenterology;  Laterality: N/A;   • ENDOSCOPY N/A 8/25/2021    Procedure: ESOPHAGOGASTRODUODENOSCOPY;  Surgeon: Jack Ellis MD;  Location: [facility] ENDOSCOPY;  Service: Gastroenterology;  Laterality: N/A;   • ENDOSCOPY N/A 10/5/2021    Procedure: ESOPHAGOGASTRODUODENOSCOPY;  Surgeon: Jack Ellis MD;  Location: [facility] ENDOSCOPY;  Service: Gastroenterology;  Laterality: N/A;   39-45  blue/yellow eso dilation   • ENDOSCOPY N/A 3/23/2022    Procedure: ESOPHAGOGASTRODUODENOSCOPY;  Surgeon: Jack Ellis MD;  Location: [facility] ENDOSCOPY;  Service: Gastroenterology;  Laterality: N/A;   • EYE SURGERY Bilateral 02/2018    B cataract   • HAMMER TOE REPAIR  08/02/2011    Hammertoe repair, left second toe.   • HYSTERECTOMY     • INCISION AND DRAINAGE ABSCESS  01/21/2015   • INJECTION OF MEDICATION  11/15/2013   • INJECTION OF MEDICATION  05/06/2013    Celestone (betamethasone) (1)      • INJECTION OF MEDICATION  03/17/2016    Kenalog (3)      • NECK SURGERY  01/2018   • TEMPORAL ARTERY BIOPSY Left 11/10/2020    Procedure: LEFT TEMPORAL ARTERY BIOPSY;  Surgeon: Anjel Olea MD;  Location: [facility] OR;  Service: General;  Laterality: Left;   • UPPER GASTROINTESTINAL ENDOSCOPY  12/14/2009   • UPPER GASTROINTESTINAL ENDOSCOPY  01/06/2021   • UPPER GASTROINTESTINAL ENDOSCOPY  01/28/2021   • UPPER GASTROINTESTINAL ENDOSCOPY  06/21/2021   • UPPER GASTROINTESTINAL ENDOSCOPY  08/25/2021   • UPPER GASTROINTESTINAL ENDOSCOPY  10/05/2021       Family History:  Family History   Problem Relation Age of Onset   • Diabetes Other    • Heart disease Other    • Stroke Other    • Coronary artery disease Mother    • Coronary artery disease Father        Social History:   reports that she has quit smoking. Her smoking use included cigarettes. She has never used smokeless tobacco. She reports that she does not drink alcohol and does not use drugs.    Medications:   Prior to Admission medications    Medication Sig Start Date End Date Taking? Authorizing Provider   allopurinol (ZYLOPRIM) 100 MG tablet Take 1 tablet by mouth Daily. take with food 12/6/21   Maggi William APRN   amLODIPine (NORVASC) 5 MG tablet Take 1 tablet by mouth Daily. 12/6/21   Maggi William APRN   clotrimazole-betamethasone (Lotrisone) 1-0.05 % cream Apply 1 application topically to the appropriate area as directed 2 (Two) Times a Day. Do not  use for longer than 2 weeks 1/26/22   Mateusz Chopra APRN   Diclofenac Sodium (VOLTAREN) 1 % gel gel Apply 4 g topically to the appropriate area as directed 4 (Four) Times a Day As Needed (joint pain). 9/8/21   Maggi William APRN   gabapentin (NEURONTIN) 100 MG capsule Take 1 capsule by mouth At Night As Needed (lower extremity pain). 2/14/22   Maggi William APRN   losartan (COZAAR) 50 MG tablet Take 1 tablet by mouth Daily. 12/6/21   Maggi William APRN   magnesium oxide (MAG-OX) 400 MG tablet Take 400 mg by mouth. 10/14/21   Vipin Wright MD   meloxicam (MOBIC) 15 MG tablet Take 1 tablet by mouth Daily. 2/16/22   Gregorio Morgan MD   metoprolol succinate XL (TOPROL-XL) 25 MG 24 hr tablet TAKE 1 TABLET EVERY DAY 3/10/22   Maggi William APRN   mupirocin (BACTROBAN) 2 % ointment Apply 1 application topically to the appropriate area as directed 2 (Two) Times a Day As Needed (rash). 9/8/21   Maggi William APRN   neomycin-polymyxin-hydrocortisone (CORTISPORIN) 3.5-25171-4 otic solution Administer 3 drops into both ears 4 (Four) Times a Day. 3/7/22   Amor Moyer DO   nystatin (MYCOSTATIN) 100,000 unit/mL suspension SWISH AND SWALLOW 5 ML BY MOUTH FOUR TIMES DAILY 3/7/22   Gladys Soni APRN   nystatin (MYCOSTATIN) 260100 UNIT/GM powder Apply  topically to the appropriate area as directed 3 (Three) Times a Day. 1/5/22   Gladys Soni APRN   omeprazole (priLOSEC) 20 MG capsule Take 1 capsule by mouth 2 (Two) Times a Day. 9/16/21   Maggi William APRN   sucralfate (Carafate) 1 GM/10ML suspension Take 10 mL by mouth 4 (Four) Times a Day. 4/8/22   Jack Ellis MD   tolterodine LA (DETROL LA) 4 MG 24 hr capsule Take 1 capsule by mouth Daily. 12/6/21   Maggi William APRN   zolpidem (AMBIEN) 5 MG tablet TAKE 1/2 TO 1 TABLET BY MOUTH AT BEDTIME AS NEEDED FOR INSOMNIA 4/1/22   Maggi William APRN       Allergies:  Robaxin [methocarbamol], Aleve [naproxen sodium], Atenolol, Gabapentin,  Keflex [cephalexin], Lisinopril, Relafen [nabumetone], Tramadol, Zanaflex [tizanidine], Acetaminophen, Aspirin, Codeine, Diflucan [fluconazole], and Sulfa antibiotics    ROS:    Review of Systems   Constitutional: Negative for activity change, appetite change, chills, diaphoresis, fatigue, fever and unexpected weight change.   HENT: Negative for sore throat and trouble swallowing.    Respiratory: Negative for shortness of breath.    Gastrointestinal: Negative for abdominal distention, abdominal pain, anal bleeding, blood in stool, constipation, diarrhea, nausea, rectal pain and vomiting.   Endocrine: Negative for polydipsia, polyphagia and polyuria.   Genitourinary: Negative for difficulty urinating.   Musculoskeletal: Negative for arthralgias.   Skin: Negative for pallor.   Allergic/Immunologic: Negative for food allergies.   Neurological: Negative for weakness and light-headedness.   Psychiatric/Behavioral: Negative for behavioral problems.     Objective     not currently breastfeeding.    Physical Exam  Constitutional:       General: She is not in acute distress.     Appearance: She is well-developed. She is not diaphoretic.   HENT:      Head: Normocephalic and atraumatic.   Cardiovascular:      Rate and Rhythm: Normal rate and regular rhythm.      Heart sounds: Normal heart sounds. No murmur heard.    No friction rub. No gallop.   Pulmonary:      Effort: No respiratory distress.      Breath sounds: Normal breath sounds. No wheezing or rales.   Chest:      Chest wall: No tenderness.   Abdominal:      General: Bowel sounds are normal. There is no distension.      Palpations: Abdomen is soft. There is no mass.      Tenderness: There is no abdominal tenderness. There is no guarding or rebound.      Hernia: No hernia is present.   Musculoskeletal:         General: Normal range of motion.   Skin:     General: Skin is warm and dry.      Coloration: Skin is not pale.      Findings: No erythema or rash.   Neurological:       Mental Status: She is alert and oriented to person, place, and time.   Psychiatric:         Behavior: Behavior normal.         Thought Content: Thought content normal.         Judgment: Judgment normal.           Assessment & Plan   There are no diagnoses linked to this encounter.    ESOPHAGOGASTRODUODENOSCOPY (N/A)    No diagnosis found.    Anticipated Surgical Procedure:  No orders of the defined types were placed in this encounter.      The risks, benefits, and alternatives of this procedure have been discussed with the patient or the responsible party- the patient understands and agrees to proceed.

## 2022-06-03 ENCOUNTER — OFFICE VISIT (OUTPATIENT)
Dept: GASTROENTEROLOGY | Facility: CLINIC | Age: 87
End: 2022-06-03

## 2022-06-03 VITALS
BODY MASS INDEX: 24.42 KG/M2 | HEART RATE: 63 BPM | WEIGHT: 124.4 LBS | DIASTOLIC BLOOD PRESSURE: 62 MMHG | SYSTOLIC BLOOD PRESSURE: 154 MMHG | HEIGHT: 60 IN

## 2022-06-03 DIAGNOSIS — N39.3 STRESS INCONTINENCE: ICD-10-CM

## 2022-06-03 DIAGNOSIS — I48.0 PAROXYSMAL ATRIAL FIBRILLATION: ICD-10-CM

## 2022-06-03 DIAGNOSIS — M10.9 GOUT, UNSPECIFIED CAUSE, UNSPECIFIED CHRONICITY, UNSPECIFIED SITE: ICD-10-CM

## 2022-06-03 DIAGNOSIS — R13.19 OTHER DYSPHAGIA: Primary | ICD-10-CM

## 2022-06-03 DIAGNOSIS — I10 ESSENTIAL HYPERTENSION: ICD-10-CM

## 2022-06-03 PROCEDURE — 99214 OFFICE O/P EST MOD 30 MIN: CPT | Performed by: INTERNAL MEDICINE

## 2022-06-03 RX ORDER — MAGNESIUM OXIDE TAB 400 MG (241.3 MG ELEMENTAL MG) 400 (241.3 MG) MG
1 TAB ORAL DAILY
COMMUNITY
Start: 2022-04-29 | End: 2022-06-03 | Stop reason: SDUPTHER

## 2022-06-03 NOTE — PROGRESS NOTES
[facility]      Chief Complaint:   Chief Complaint   Patient presents with   • Difficulty Swallowing       Subjective     HPI:   Patient with history of dysphagia.  Patient will come in for EGD with dilatation and had dilated up to 48.  Patient states marked improvement but still has some food products that do get caught when she swallows them.  Patient states that she does not want any further evaluation at this time but may consider this in the future.  Discussed with patient that if her swallowing gets worse we will need to the dilate her esophagus again.    Plan; let patient follow-up in 3 months continue patient on current medication    Past Medical History:   Past Medical History:   Diagnosis Date   • Abnormal CT scan     per patient   • Acute bronchitis    • Acute sinusitis    • Allergic conjunctivitis    • Angular cheilitis    • Ankle edema    • Backache     improved   • Blood in urine      UTI resolved      • Bradycardia    • Chest discomfort     described as heart racing      • Chronic low back pain     RIGHT leg radiation      • Cold feet     c/o - and lowerlegs      • Contusion     of dorsum of foot   • Cough    • COVID-19 virus infection    • Depressive disorder    • Dizziness and giddiness    • Dyspnea    • Dysuria    • Edema of lower extremity    • Essential hypertension    • Exanthematous disorder    • Fatigue    • Foot pain, left    • Grade II hemorrhoids 3/12/2018   • Hematoma    • Hip pain, right    • History of palpitations    • Hormone replacement therapy (postmenopausal)    • Impacted cerumen    • Joint pain    • Knee pain    • Low back pain    • Malaise and fatigue    • Multiple joint pain    • Muscle pain    • Muscle weakness    • Neck pain    • Obesity (BMI 30.0-34.9) 3/12/2018   • Osteoarthritis of knee    • Osteoarthritis of multiple joints    • Otitis externa    • Pain in lower limb    • Peripheral venous insufficiency    • POP-Q stage 2 rectocele 3/12/2018   •  POP-Q stage 4 cystocele 3/12/2018   • Sacroiliitis, not elsewhere classified (HCC)     R LE   • Shoulder pain    • Temporal arteritis (HCC) 11/8/2020    Recent COVID infection with symptoms concerning for uncomplicated GCA without visual loss/changes.  -ESR on admission 107 - IV steroids 60 mg daily; transition to oral when dysphagia resolved for at minimum 2-4 weeks, then taper by 10 mg weekly - ESR and CRP q48 hrs -consulted Dr Olea, appreciate recommendations- pt underwent Temporal artery biopsy on 11/10/20        • Upper respiratory infection    • Urinary frequency    • Urinary tract infectious disease    • Wheezing        Past Surgical History:    Past Surgical History:   Procedure Laterality Date   • ANKLE SURGERY     • BACK SURGERY     • COLONOSCOPY  12/14/2009   • ENDOSCOPY N/A 1/6/2021    Procedure: ESOPHAGOGASTRODUODENOSCOPY;  Surgeon: Jack Ellis MD;  Location: [facility] ENDOSCOPY;  Service: Gastroenterology;  Laterality: N/A;   • ENDOSCOPY N/A 1/28/2021    Procedure: ESOPHAGOGASTRODUODENOSCOPY;  Surgeon: Jack Ellis MD;  Location: [facility] ENDOSCOPY;  Service: Gastroenterology;  Laterality: N/A;  eso dilation with ballon to 30FR   • ENDOSCOPY N/A 6/21/2021    Procedure: ESOPHAGOGASTRODUODENOSCOPY;  Surgeon: Jack Ellis MD;  Location: [facility] [place] ENDOSCOPY;  Service: Gastroenterology;  Laterality: N/A;   • ENDOSCOPY N/A 8/6/2021    Procedure: ESOPHAGOGASTRODUODENOSCOPY;  Surgeon: Jack Ellis MD;  Location: [facility] [place] ENDOSCOPY;  Service: Gastroenterology;  Laterality: N/A;   • ENDOSCOPY N/A 8/25/2021    Procedure: ESOPHAGOGASTRODUODENOSCOPY;  Surgeon: Jack Ellis MD;  Location: [facility] ENDOSCOPY;  Service: Gastroenterology;  Laterality: N/A;   • ENDOSCOPY N/A 10/5/2021    Procedure: ESOPHAGOGASTRODUODENOSCOPY;  Surgeon: Jack Ellis MD;  Location: [facility] ENDOSCOPY;  Service: Gastroenterology;  Laterality: N/A;  39-45  blue/yellow eso dilation   • ENDOSCOPY N/A 3/23/2022    Procedure:  ESOPHAGOGASTRODUODENOSCOPY;  Surgeon: Jack Ellis MD;  Location: [facility] ENDOSCOPY;  Service: Gastroenterology;  Laterality: N/A;   • ENDOSCOPY N/A 5/25/2022    Procedure: ESOPHAGOGASTRODUODENOSCOPY;  Surgeon: Jack Ellis MD;  Location: [facility] ENDOSCOPY;  Service: Gastroenterology;  Laterality: N/A;   • EYE SURGERY Bilateral 02/2018    B cataract   • HAMMER TOE REPAIR  08/02/2011    Hammertoe repair, left second toe.   • HYSTERECTOMY     • INCISION AND DRAINAGE ABSCESS  01/21/2015   • INJECTION OF MEDICATION  11/15/2013   • INJECTION OF MEDICATION  05/06/2013    Celestone (betamethasone) (1)      • INJECTION OF MEDICATION  03/17/2016    Kenalog (3)      • NECK SURGERY  01/2018   • TEMPORAL ARTERY BIOPSY Left 11/10/2020    Procedure: LEFT TEMPORAL ARTERY BIOPSY;  Surgeon: Anjel Olea MD;  Location: [facility] OR;  Service: General;  Laterality: Left;   • UPPER GASTROINTESTINAL ENDOSCOPY  12/14/2009   • UPPER GASTROINTESTINAL ENDOSCOPY  01/06/2021   • UPPER GASTROINTESTINAL ENDOSCOPY  01/28/2021   • UPPER GASTROINTESTINAL ENDOSCOPY  06/21/2021   • UPPER GASTROINTESTINAL ENDOSCOPY  08/25/2021   • UPPER GASTROINTESTINAL ENDOSCOPY  10/05/2021       Family History:  Family History   Problem Relation Age of Onset   • Diabetes Other    • Heart disease Other    • Stroke Other    • Coronary artery disease Mother    • Coronary artery disease Father        Social History:   reports that she has quit smoking. Her smoking use included cigarettes. She has never used smokeless tobacco. She reports that she does not drink alcohol and does not use drugs.    Medications:   Prior to Admission medications    Medication Sig Start Date End Date Taking? Authorizing Provider   allopurinol (ZYLOPRIM) 100 MG tablet Take 1 tablet by mouth Daily. take with food 12/6/21   Maggi William APRN   amLODIPine (NORVASC) 5 MG tablet Take 1 tablet by mouth Daily. 12/6/21   Maggi William APRN   clotrimazole-betamethasone (Lotrisone) 1-0.05 %  cream Apply 1 application topically to the appropriate area as directed 2 (Two) Times a Day. Do not use for longer than 2 weeks 1/26/22   Mateusz Chopra APRN   Diclofenac Sodium (VOLTAREN) 1 % gel gel Apply 4 g topically to the appropriate area as directed 4 (Four) Times a Day As Needed (joint pain). 9/8/21   Maggi William APRN   furosemide (LASIX) 20 MG tablet Take 20 mg by mouth Daily As Needed.    Provider, MD Vipin   losartan (COZAAR) 50 MG tablet Take 1 tablet by mouth Daily. 12/6/21   Maggi William APRN   magnesium oxide (MAG-OX) 400 MG tablet Take 1 tablet by mouth Daily. 5/5/22   Maggi William APRN   metoprolol succinate XL (TOPROL-XL) 25 MG 24 hr tablet TAKE 1 TABLET EVERY DAY 3/10/22   Maggi William APRN   Morphine (MSIR) 15 MG tablet Take 15 mg by mouth Daily.    Provider, MD Vipin   mupirocin (BACTROBAN) 2 % ointment Apply 1 application topically to the appropriate area as directed 2 (Two) Times a Day As Needed (rash). 9/8/21   Maggi William APRN   neomycin-polymyxin-hydrocortisone (CORTISPORIN) 3.5-42006-7 otic solution Administer 3 drops into both ears 4 (Four) Times a Day. 3/7/22   Amor Moyer,    nystatin (MYCOSTATIN) 100,000 unit/mL suspension SWISH AND SWALLOW 5 ML BY MOUTH FOUR TIMES DAILY 3/7/22   Gladys Soni APRN   nystatin (MYCOSTATIN) 307743 UNIT/GM powder Apply  topically to the appropriate area as directed 3 (Three) Times a Day. 1/5/22   Gladys Soni APRN   omeprazole (priLOSEC) 20 MG capsule Take 1 capsule by mouth 2 (Two) Times a Day. 9/16/21   Maggi William APRN   sucralfate (Carafate) 1 GM/10ML suspension Take 10 mL by mouth 4 (Four) Times a Day. 4/11/22   Jack Ellis MD   tolterodine LA (DETROL LA) 4 MG 24 hr capsule Take 1 capsule by mouth Daily. 12/6/21   Maggi William APRN   MAGnesium-Oxide 400 (240 Mg) MG tablet Take 1 tablet by mouth Daily. 4/29/22 6/3/22  Provider, MD Vipin       Allergies:  Robaxin [methocarbamol], Aleve  "[naproxen sodium], Atenolol, Gabapentin, Keflex [cephalexin], Lisinopril, Relafen [nabumetone], Tramadol, Zanaflex [tizanidine], Acetaminophen, Aspirin, Codeine, Diflucan [fluconazole], and Sulfa antibiotics    ROS:    Review of Systems   Constitutional: Negative for activity change, appetite change, chills, diaphoresis, fatigue, fever and unexpected weight change.   HENT: Negative for sore throat and trouble swallowing.    Respiratory: Negative for shortness of breath.    Gastrointestinal: Negative for abdominal distention, abdominal pain, anal bleeding, blood in stool, constipation, diarrhea, nausea, rectal pain and vomiting.   Endocrine: Negative for polydipsia, polyphagia and polyuria.   Genitourinary: Negative for difficulty urinating.   Musculoskeletal: Negative for arthralgias.   Skin: Negative for pallor.   Allergic/Immunologic: Negative for food allergies.   Neurological: Negative for weakness and light-headedness.   Psychiatric/Behavioral: Negative for behavioral problems.     Objective     Blood pressure 154/62, pulse 63, height 152.4 cm (60\"), weight 56.4 kg (124 lb 6.4 oz), not currently breastfeeding.    Physical Exam  Constitutional:       General: She is not in acute distress.     Appearance: She is well-developed. She is not diaphoretic.   HENT:      Head: Normocephalic and atraumatic.   Cardiovascular:      Rate and Rhythm: Normal rate and regular rhythm.      Heart sounds: Normal heart sounds. No murmur heard.    No friction rub. No gallop.   Pulmonary:      Effort: No respiratory distress.      Breath sounds: Normal breath sounds. No wheezing or rales.   Chest:      Chest wall: No tenderness.   Abdominal:      General: Bowel sounds are normal. There is no distension.      Palpations: Abdomen is soft. There is no mass.      Tenderness: There is no abdominal tenderness. There is no guarding or rebound.      Hernia: No hernia is present.   Musculoskeletal:         General: Normal range of motion. "   Skin:     General: Skin is warm and dry.      Coloration: Skin is not pale.      Findings: No erythema or rash.   Neurological:      Mental Status: She is alert and oriented to person, place, and time.   Psychiatric:         Behavior: Behavior normal.         Thought Content: Thought content normal.         Judgment: Judgment normal.          Assessment & Plan   Diagnoses and all orders for this visit:    1. Other dysphagia (Primary)        * Surgery not found *     Diagnosis Plan   1. Other dysphagia         Anticipated Surgical Procedure:  No orders of the defined types were placed in this encounter.      The risks, benefits, and alternatives of this procedure have been discussed with the patient or the responsible party- the patient understands and agrees to proceed.

## 2022-06-03 NOTE — TELEPHONE ENCOUNTER
Incoming Refill Request      Medication requested (name and dose): magnesium oxide (MAG-OX) 400 MG tablet,  allopurinol (ZYLOPRIM) 100 MG tablet,  AND zolpidem 5 mg    Pharm[facility] where [facility] be sent[facility]  Additional details provided by patient: no    Best call back number:   [phone]    Does the patient have less than a 3 day supply:  [x] Yes  [] No    Rebecca Fuller  06/03/22, 08:29 CDT

## 2022-06-06 RX ORDER — LOSARTAN POTASSIUM 50 MG/1
50 TABLET ORAL DAILY
Qty: 90 TABLET | Refills: 3 | Status: SHIPPED | OUTPATIENT
Start: 2022-06-06

## 2022-06-06 RX ORDER — MAGNESIUM OXIDE 400 MG/1
400 TABLET ORAL DAILY
Qty: 90 TABLET | Refills: 1 | OUTPATIENT
Start: 2022-06-06

## 2022-06-06 RX ORDER — TOLTERODINE 4 MG/1
4 CAPSULE, EXTENDED RELEASE ORAL DAILY
Qty: 90 CAPSULE | Refills: 3 | Status: SHIPPED | OUTPATIENT
Start: 2022-06-06

## 2022-06-06 RX ORDER — METOPROLOL SUCCINATE 25 MG/1
25 TABLET, EXTENDED RELEASE ORAL DAILY
Qty: 90 TABLET | Refills: 1 | OUTPATIENT
Start: 2022-06-06

## 2022-06-06 RX ORDER — AMLODIPINE BESYLATE 5 MG/1
5 TABLET ORAL DAILY
Qty: 90 TABLET | Refills: 1 | Status: SHIPPED | OUTPATIENT
Start: 2022-06-06

## 2022-06-06 RX ORDER — ALLOPURINOL 100 MG/1
100 TABLET ORAL DAILY
Qty: 90 TABLET | Refills: 3 | Status: SHIPPED | OUTPATIENT
Start: 2022-06-06

## 2022-06-07 ENCOUNTER — TELEPHONE (OUTPATIENT)
Dept: FAMILY MEDICINE CLINIC | Facility: CLINIC | Age: 87
End: 2022-06-07

## 2022-06-07 DIAGNOSIS — I10 ESSENTIAL HYPERTENSION: ICD-10-CM

## 2022-06-07 NOTE — TELEPHONE ENCOUNTER
Patient called stated she is needing a refill on her zolpidem (AMBIEN) 5 MG tablet. I see she has been previously taken off of this. Patient said she needs this.

## 2022-06-10 DIAGNOSIS — G47.00 INSOMNIA, UNSPECIFIED TYPE: Primary | ICD-10-CM

## 2022-06-10 RX ORDER — ZOLPIDEM TARTRATE 5 MG/1
TABLET ORAL
Qty: 30 TABLET | Refills: 0 | Status: SHIPPED | OUTPATIENT
Start: 2022-06-10 | End: 2022-07-08

## 2022-06-10 NOTE — TELEPHONE ENCOUNTER
Patient called about her zolpidem and was notified the medication had been sent to the pharmacy, but they may not have it ready yet. Patient v/u.

## 2022-06-24 ENCOUNTER — OFFICE VISIT (OUTPATIENT)
Dept: FAMILY MEDICINE CLINIC | Facility: CLINIC | Age: 87
End: 2022-06-24

## 2022-06-24 VITALS
HEIGHT: 60 IN | HEART RATE: 89 BPM | WEIGHT: 122.2 LBS | OXYGEN SATURATION: 98 % | TEMPERATURE: 99 F | SYSTOLIC BLOOD PRESSURE: 122 MMHG | DIASTOLIC BLOOD PRESSURE: 52 MMHG | BODY MASS INDEX: 23.99 KG/M2

## 2022-06-24 DIAGNOSIS — M54.50 CHRONIC MIDLINE LOW BACK PAIN, UNSPECIFIED WHETHER SCIATICA PRESENT: Chronic | ICD-10-CM

## 2022-06-24 DIAGNOSIS — G89.29 CHRONIC MIDLINE LOW BACK PAIN, UNSPECIFIED WHETHER SCIATICA PRESENT: Chronic | ICD-10-CM

## 2022-06-24 DIAGNOSIS — R50.9 FEVER, UNSPECIFIED FEVER CAUSE: ICD-10-CM

## 2022-06-24 DIAGNOSIS — U07.1 COVID: Primary | ICD-10-CM

## 2022-06-24 LAB
EXPIRATION DATE: ABNORMAL
EXPIRATION DATE: NORMAL
FLUAV AG UPPER RESP QL IA.RAPID: NOT DETECTED
FLUBV AG UPPER RESP QL IA.RAPID: NOT DETECTED
INTERNAL CONTROL: ABNORMAL
INTERNAL CONTROL: NORMAL
Lab: ABNORMAL
Lab: NORMAL
S PYO AG THROAT QL: NEGATIVE
SARS-COV-2 AG UPPER RESP QL IA.RAPID: DETECTED

## 2022-06-24 PROCEDURE — 99214 OFFICE O/P EST MOD 30 MIN: CPT | Performed by: NURSE PRACTITIONER

## 2022-06-24 PROCEDURE — 87428 SARSCOV & INF VIR A&B AG IA: CPT | Performed by: NURSE PRACTITIONER

## 2022-06-24 PROCEDURE — 87880 STREP A ASSAY W/OPTIC: CPT | Performed by: NURSE PRACTITIONER

## 2022-06-24 RX ORDER — OXYCODONE HYDROCHLORIDE 5 MG/1
TABLET ORAL
COMMUNITY
Start: 2022-06-09 | End: 2022-08-10

## 2022-06-24 RX ORDER — ONDANSETRON 4 MG/1
TABLET, FILM COATED ORAL
COMMUNITY
Start: 2022-06-07 | End: 2022-08-10

## 2022-06-24 RX ORDER — DEXAMETHASONE SODIUM PHOSPHATE 4 MG/ML
8 INJECTION, SOLUTION INTRA-ARTICULAR; INTRALESIONAL; INTRAMUSCULAR; INTRAVENOUS; SOFT TISSUE ONCE
Status: DISCONTINUED | OUTPATIENT
Start: 2022-06-24 | End: 2022-08-10

## 2022-06-24 RX ORDER — MAGNESIUM OXIDE TAB 400 MG (241.3 MG ELEMENTAL MG) 400 (241.3 MG) MG
1 TAB ORAL DAILY
COMMUNITY
Start: 2022-06-03 | End: 2022-08-10 | Stop reason: SDUPTHER

## 2022-06-24 NOTE — PROGRESS NOTES
"Chief Complaint  Illness (Cough. Cold, runny nose. Sore throat.)    Subjective          Kristy Ramirez presents to [facility]    PCP: NADYA Patricio    CC: \"cough, cold, runny nose, sore throat\"    Reports she was at a large gathering on 6-17 to 6-19 and then developed symptoms on 6-21.  Reports she knows of 5-6 people from the gathering that have tested + for Covid.  Low grade fever, mild headache, head congestion and sore throat.  No dyspnea or loss of smell/taste.  Using throat lozenges which help.      Also requesting steroid injection for chronic low back pain.  Has bilateral sciatica symptoms, but seem to be worse on the right.  Hasn't had injection in 10+ months.  Unable to take gabapentin or lyrica.  No new injury.  Stenosis noted previous imaging studies.      Illness  This is a new problem. Episode onset: 6-21. The problem occurs daily. The problem has been unchanged. Associated symptoms include congestion, coughing ( mild), fatigue, a fever (low grade), headaches (mild) and a sore throat. Pertinent negatives include no abdominal pain, anorexia, arthralgias, change in bowel habit, chest pain, chills, diaphoresis, joint swelling, myalgias, nausea, neck pain, numbness, rash, urinary symptoms, vertigo, visual change, vomiting or weakness. Nothing aggravates the symptoms. She has tried rest (throat lozenges) for the symptoms. The treatment provided mild relief.       Review of Systems   Constitutional: Positive for fatigue and fever (low grade). Negative for chills and diaphoresis.   HENT: Positive for congestion, rhinorrhea (clear), sinus pressure and sore throat. Negative for ear discharge, ear pain, nosebleeds, postnasal drip, sinus pain, sneezing and trouble swallowing.    Eyes: Negative.    Respiratory: Positive for cough ( mild). Negative for chest tightness, shortness of breath and wheezing.    Cardiovascular: " "Negative.  Negative for chest pain.   Gastrointestinal: Negative.  Negative for abdominal pain, anorexia, change in bowel habit, nausea and vomiting.   Musculoskeletal: Positive for back pain (chronic). Negative for arthralgias, joint swelling, myalgias and neck pain.   Skin: Negative.  Negative for rash.   Neurological: Positive for headaches (mild). Negative for dizziness, vertigo, weakness and numbness.        Objective   Vital Signs:   /52 (BP Location: Right arm, Patient Position: Sitting, Cuff Size: Adult)   Pulse 89   Temp 99 °F (37.2 °C)   Ht 152.4 cm (60\")   Wt 55.4 kg (122 lb 3.2 oz)   SpO2 98%   BMI 23.87 kg/m²       Physical Exam  Vitals and nursing note reviewed.   Constitutional:       General: She is not in acute distress.     Appearance: Normal appearance. She is not ill-appearing.   HENT:      Head: Normocephalic and atraumatic.      Right Ear: Tympanic membrane and ear canal normal.      Left Ear: Tympanic membrane and ear canal normal.      Nose: Congestion (mild) present.      Comments: No TTP over the sinuses       Mouth/Throat:      Mouth: Mucous membranes are moist.      Pharynx: Posterior oropharyngeal erythema (minmal injection, PND) present. No oropharyngeal exudate.   Eyes:      General:         Right eye: No discharge.         Left eye: No discharge.      Conjunctiva/sclera: Conjunctivae normal.   Cardiovascular:      Rate and Rhythm: Normal rate and regular rhythm.   Pulmonary:      Effort: Pulmonary effort is normal. No respiratory distress.      Breath sounds: Normal breath sounds. No wheezing, rhonchi or rales.      Comments: +loose cough  Musculoskeletal:         General: Tenderness present.      Cervical back: Neck supple. No tenderness.      Lumbar back: Tenderness present. Decreased range of motion.      Comments: Using walker to help with ambulation   Lymphadenopathy:      Cervical: No cervical adenopathy.   Skin:     General: Skin is warm and dry.   Neurological:     "  General: No focal deficit present.      Mental Status: She is alert and oriented to person, place, and time.   Psychiatric:         Mood and Affect: Mood normal.         Thought Content: Thought content normal.          Result Review :            CT LUMBAR SPINE WO CONTRAST  RPID: CT LUMBAR SPINE WITHOUT IV CONTRAST     CLINICAL INDICATION: 88 years old Female with a history of  low  back pain     COMPARISON: None  TECHNIQUE:   Contiguous axial CT images were obtained of the lumbar spine  without IV contrast.  From this data, sagittal and coronal  reformatted images were generated.  RADIATION DOSE REDUCTION: This exam was performed according to  our departmental dose-optimization program, which includes  automated exposure control, adjustment of the mA and kV according  to patient size, and iterative reconstruction technique if  available.     FINDINGS:      Postsurgical changes are noted at L4-5 with laminectomies  present. 1.5 cm anterolisthesis of L4 on L5 is seen related to  this. Findings are consistent with a 2 spondylolisthesis. The  pars appear fractured or severely degenerated. Degenerative  changes are seen at L3-4 with posterior ligamentous thickening  and facet arthropathy. There is severe acquired canal stenosis at  this level secondary to these findings     L1-2 disc bulging is noted with mild to moderate acquired canal  stenosis.      There are granulomatous calcifications in the lung bases. There  are several small right renal cysts projecting exophytically  suspected, too small to completely characterize. No acute  retroperitoneal abnormalities are seen.      IMPRESSION:     1. Anterolisthesis of L4 on L5 1.5 cm with prior laminectomies of  L4 and L5.  2. High-grade acquired canal stenosis at L3-4.  3. Mild/moderate acquired stenosis at L1-2.     Electronically signed by:  Michoacano Cerna MD  2/16/2022 9:13 PM [facility]  Workstation: [id number]        Recent Results (from the past 24 hour(s))   POCT  SARS-CoV-2 Antigen POOJA + Flu    Collection Time: 06/24/22 11:50 AM    Specimen: Swab   Result Value Ref Range    SARS Antigen Detected (A) Not Detected, Presumptive Negative    Influenza A Antigen POOJA Not Detected Not Detected    Influenza B Antigen POOJA Not Detected Not Detected    Internal Control Passed Passed    Lot Number [id number]     Expiration Date 2023/03/11    POC Rapid Strep A    Collection Time: 06/24/22 11:52 AM    Specimen: Swab   Result Value Ref Range    Rapid Strep A Screen Negative Negative, VALID, INVALID, Not Performed    Internal Control Passed Passed    Lot Number [id number]     Expiration Date 2023/10/31        Assessment and Plan    Diagnoses and all orders for this visit:    1. COVID (Primary)    2. Fever, unspecified fever cause  -     POC Rapid Strep A  -     POCT SARS-CoV-2 Antigen POOJA + Flu    3. Chronic midline low back pain, unspecified whether sciatica present  -     dexamethasone (DECADRON) injection 8 mg      Push fluids  Rest  Continue with throat lozenges as needed  Antivirals discussed, declines Rx  Quarantine instructions given  Covid form faxed to [facility]    Decadron 8 mg IM x 1 in office for chronic LBP    See PCP or RTC if symptoms persist/worsen  See PCP for routine f/u visit and management of chronic medical conditions      This document has been electronically signed by NADYA Gusman on June 24, 2022 12:55 CDT,.    I spent 30 minutes caring for Kristy on this date of service. This time includes time spent by me in the following activities:preparing for the visit, reviewing tests, performing a medically appropriate examination and/or evaluation , counseling and educating the patient/family/caregiver, ordering medications, tests, or procedures and documenting information in the medical record

## 2022-07-01 ENCOUNTER — TELEPHONE (OUTPATIENT)
Dept: FAMILY MEDICINE CLINIC | Facility: CLINIC | Age: 87
End: 2022-07-01

## 2022-07-01 NOTE — TELEPHONE ENCOUNTER
Ms Ramirez called in wanting to know if she needed to come back in for another COVID test now that she has quarantined for fourteen days. Please give her a call back.

## 2022-07-05 DIAGNOSIS — G47.00 INSOMNIA, UNSPECIFIED TYPE: ICD-10-CM

## 2022-07-06 NOTE — TELEPHONE ENCOUNTER
Spoke with patient and informed her that she is not to take more than 1 tablet per night of the Ambien. She wanted to know if you could give her something to help with the leg pain at night other than Gabapentin.

## 2022-07-08 ENCOUNTER — DOCUMENTATION (OUTPATIENT)
Dept: FAMILY MEDICINE CLINIC | Facility: CLINIC | Age: 87
End: 2022-07-08

## 2022-07-08 RX ORDER — ZOLPIDEM TARTRATE 5 MG/1
TABLET ORAL
Qty: 30 TABLET | Refills: 0 | Status: SHIPPED | OUTPATIENT
Start: 2022-07-08 | End: 2022-08-08 | Stop reason: SDUPTHER

## 2022-07-08 NOTE — TELEPHONE ENCOUNTER
Patient states she is not taking the oxy anymore, she understands that she is only to take the 1 tablet only. Please send to [facility]. Thank you.

## 2022-07-11 ENCOUNTER — OFFICE VISIT (OUTPATIENT)
Dept: OBSTETRICS AND GYNECOLOGY | Facility: CLINIC | Age: 87
End: 2022-07-11

## 2022-07-11 VITALS
HEIGHT: 60 IN | WEIGHT: 122 LBS | BODY MASS INDEX: 23.95 KG/M2 | DIASTOLIC BLOOD PRESSURE: 88 MMHG | SYSTOLIC BLOOD PRESSURE: 158 MMHG

## 2022-07-11 DIAGNOSIS — N81.10 POP-Q STAGE 4 CYSTOCELE: ICD-10-CM

## 2022-07-11 DIAGNOSIS — N81.6 POP-Q STAGE 2 RECTOCELE: ICD-10-CM

## 2022-07-11 DIAGNOSIS — Z46.89 PESSARY MAINTENANCE: Primary | ICD-10-CM

## 2022-07-11 PROCEDURE — 99213 OFFICE O/P EST LOW 20 MIN: CPT | Performed by: NURSE PRACTITIONER

## 2022-07-11 NOTE — PROGRESS NOTES
"Subjective   Chief Complaint   Patient presents with   • Pessary Check     Kristy Ramirez is a 88 y.o. year old who presents to be seen for follow-up of her pessary.  Currently she is using Foldable ring w/ support - #7 w/o urethral bar.  She reports no problems with her pessary at this time.     No Additional Complaints Reported    The following portions of the patient's history were reviewed and updated as appropriate:problem list, current medications and allergies    Review of Systems   Constitutional: Negative for activity change, appetite change, chills, diaphoresis, fatigue, fever, unexpected weight gain and unexpected weight loss.   Gastrointestinal: Negative for abdominal distention, abdominal pain, anal bleeding, blood in stool, constipation, diarrhea, nausea, rectal pain and vomiting.   Genitourinary: Negative for decreased urine volume, difficulty urinating, pelvic pain, pelvic pressure, urgency, urinary incontinence, vaginal bleeding, vaginal discharge and vaginal pain.        Objective   /88   Ht 152.4 cm (60\")   Wt 55.3 kg (122 lb)   LMP  (LMP Unknown)   Breastfeeding No   BMI 23.83 kg/m²     General:  well developed; well nourished  no acute distress   Pelvis: Clinical staff was present for exam  External genitalia:  normal appearance of the external genitalia including Bartholin's and Prosperity's glands.  :  urethral meatus normal; urethra hypermobile; prominent caruncle present; bladder is tender to palpation  Vaginal:  atrophic mucosal changes are present;  Cervix:  absent.  Uterus:  absent.  Adnexa:  non palpable bilaterally.  Cystocele GRADE 2  Rectocele GRADE 2  Pessary removed and cleaned. After vaginal inspection the pessary was lubricated and placed back into the vaginal vault. She tolerated this well.     Lab Review   No data reviewed    Imaging   No data reviewed         Diagnoses and all orders for this visit:    Pessary maintenance    POP-Q stage 4 cystocele    POP-Q " stage 2 rectocele        No orders of the defined types were placed in this encounter.    F/U in 8 weeks for routine pessary maintenance.       This document was electronically signed.     Gladys Soni, NADYA  July 11, 2022

## 2022-07-12 ENCOUNTER — TELEPHONE (OUTPATIENT)
Dept: OBSTETRICS AND GYNECOLOGY | Facility: CLINIC | Age: 87
End: 2022-07-12

## 2022-07-12 NOTE — TELEPHONE ENCOUNTER
PT seen yesterday for procedure,having a few issues she would like to talk to you about..[phone]

## 2022-08-02 DIAGNOSIS — B37.31 VAGINAL CANDIDIASIS: ICD-10-CM

## 2022-08-02 RX ORDER — CLOTRIMAZOLE 1 %
CREAM WITH APPLICATOR VAGINAL
Qty: 90 G | Refills: 0 | OUTPATIENT
Start: 2022-08-02

## 2022-08-08 DIAGNOSIS — G47.00 INSOMNIA, UNSPECIFIED TYPE: ICD-10-CM

## 2022-08-08 DIAGNOSIS — Z79.899 LONG-TERM USE OF HIGH-RISK MEDICATION: Primary | ICD-10-CM

## 2022-08-08 RX ORDER — ZOLPIDEM TARTRATE 5 MG/1
TABLET ORAL
Qty: 30 TABLET | OUTPATIENT
Start: 2022-08-08

## 2022-08-08 RX ORDER — ZOLPIDEM TARTRATE 5 MG/1
TABLET ORAL
Qty: 30 TABLET | Refills: 0 | Status: SHIPPED | OUTPATIENT
Start: 2022-08-08 | End: 2022-09-06

## 2022-08-08 NOTE — TELEPHONE ENCOUNTER
Rx Refill Note  Requested Prescriptions     Refused Prescriptions Disp Refills   • zolpidem (AMBIEN) 5 MG tablet [Pharmacy Med Name: ZOLPIDEM 5MG TABLETS] 30 tablet      Sig: TAKE 1/2 TO 1 TABLET BY MOUTH EVERY NIGHT AT BEDTIME AS NEEDED FOR INSOMNIA      Last office visit with prescribing clinician: 2/14/2022      Next office visit with prescribing clinician: 8/10/2022   {TIP  Encounters:  Duplicate request. Rx already sent today.             Carl Del Toro LPN  08/08/22, 15:44 CDT

## 2022-08-08 NOTE — TELEPHONE ENCOUNTER
Rx Refill Note  Requested Prescriptions     Pending Prescriptions Disp Refills   • zolpidem (AMBIEN) 5 MG tablet 30 tablet 0     Sig: TAKE 1/2 TO 1 TABLET BY MOUTH EVERY NIGHT AT BEDTIME AS NEEDED FOR INSOMNIA      Last office visit with prescribing clinician: 2/14/2022      Next office visit with prescribing clinician: 8/10/2022   {TIP  Encounters;     Rx Controlled Substance Protocol Failed 08/08/2022 11:04 AM   Protocol Details  Urine Toxicology Performed in Last 12 Months      PT HAS AN UPCOMING APPT ON 8/10/22    {TIP  Please add Last Relevant Lab Date if appropriate  {TIP  Is Refill Pharmacy correct? yes  Carl Del Toro, LPN  08/08/22, 11:43 CDT

## 2022-08-10 ENCOUNTER — LAB (OUTPATIENT)
Dept: LAB | Facility: HOSPITAL | Age: 87
End: 2022-08-10

## 2022-08-10 ENCOUNTER — OFFICE VISIT (OUTPATIENT)
Dept: FAMILY MEDICINE CLINIC | Facility: CLINIC | Age: 87
End: 2022-08-10

## 2022-08-10 VITALS
WEIGHT: 122.8 LBS | BODY MASS INDEX: 24.11 KG/M2 | HEIGHT: 60 IN | TEMPERATURE: 97.1 F | SYSTOLIC BLOOD PRESSURE: 130 MMHG | HEART RATE: 57 BPM | OXYGEN SATURATION: 99 % | DIASTOLIC BLOOD PRESSURE: 60 MMHG | RESPIRATION RATE: 20 BRPM

## 2022-08-10 DIAGNOSIS — Z79.899 LONG-TERM USE OF HIGH-RISK MEDICATION: ICD-10-CM

## 2022-08-10 DIAGNOSIS — R73.03 PREDIABETES: ICD-10-CM

## 2022-08-10 DIAGNOSIS — G89.29 CHRONIC MIDLINE LOW BACK PAIN, UNSPECIFIED WHETHER SCIATICA PRESENT: Primary | ICD-10-CM

## 2022-08-10 DIAGNOSIS — F32.A DEPRESSION, UNSPECIFIED DEPRESSION TYPE: ICD-10-CM

## 2022-08-10 DIAGNOSIS — M54.50 CHRONIC MIDLINE LOW BACK PAIN, UNSPECIFIED WHETHER SCIATICA PRESENT: Primary | ICD-10-CM

## 2022-08-10 DIAGNOSIS — Z00.00 MEDICARE ANNUAL WELLNESS VISIT, SUBSEQUENT: Primary | ICD-10-CM

## 2022-08-10 LAB
AMPHET+METHAMPHET UR QL: NEGATIVE
AMPHETAMINES UR QL: NEGATIVE
BARBITURATES UR QL SCN: NEGATIVE
BENZODIAZ UR QL SCN: NEGATIVE
BUPRENORPHINE SERPL-MCNC: NEGATIVE NG/ML
CANNABINOIDS SERPL QL: NEGATIVE
COCAINE UR QL: NEGATIVE
METHADONE UR QL SCN: NEGATIVE
OPIATES UR QL: NEGATIVE
OXYCODONE UR QL SCN: NEGATIVE
PCP UR QL SCN: NEGATIVE
PROPOXYPH UR QL: NEGATIVE
TRICYCLICS UR QL SCN: NEGATIVE

## 2022-08-10 PROCEDURE — 1170F FXNL STATUS ASSESSED: CPT | Performed by: NURSE PRACTITIONER

## 2022-08-10 PROCEDURE — G0439 PPPS, SUBSEQ VISIT: HCPCS | Performed by: NURSE PRACTITIONER

## 2022-08-10 PROCEDURE — 1159F MED LIST DOCD IN RCRD: CPT | Performed by: NURSE PRACTITIONER

## 2022-08-10 PROCEDURE — 82043 UR ALBUMIN QUANTITATIVE: CPT | Performed by: NURSE PRACTITIONER

## 2022-08-10 PROCEDURE — 96372 THER/PROPH/DIAG INJ SC/IM: CPT | Performed by: NURSE PRACTITIONER

## 2022-08-10 PROCEDURE — 80306 DRUG TEST PRSMV INSTRMNT: CPT

## 2022-08-10 PROCEDURE — 1125F AMNT PAIN NOTED PAIN PRSNT: CPT | Performed by: NURSE PRACTITIONER

## 2022-08-10 PROCEDURE — 96160 PT-FOCUSED HLTH RISK ASSMT: CPT | Performed by: NURSE PRACTITIONER

## 2022-08-10 RX ORDER — DEXAMETHASONE SODIUM PHOSPHATE 4 MG/ML
4 INJECTION, SOLUTION INTRA-ARTICULAR; INTRALESIONAL; INTRAMUSCULAR; INTRAVENOUS; SOFT TISSUE ONCE
Status: COMPLETED | OUTPATIENT
Start: 2022-08-10 | End: 2022-08-10

## 2022-08-10 RX ORDER — DEXAMETHASONE SODIUM PHOSPHATE 4 MG/ML
4 INJECTION, SOLUTION INTRA-ARTICULAR; INTRALESIONAL; INTRAMUSCULAR; INTRAVENOUS; SOFT TISSUE ONCE
Status: DISCONTINUED | OUTPATIENT
Start: 2022-08-10 | End: 2022-08-10

## 2022-08-10 RX ORDER — METOPROLOL SUCCINATE 25 MG/1
25 TABLET, EXTENDED RELEASE ORAL DAILY
Qty: 90 TABLET | Refills: 1 | Status: CANCELLED | OUTPATIENT
Start: 2022-08-10

## 2022-08-10 RX ORDER — OMEPRAZOLE 20 MG/1
20 CAPSULE, DELAYED RELEASE ORAL 2 TIMES DAILY
Qty: 180 CAPSULE | Refills: 1 | Status: CANCELLED | OUTPATIENT
Start: 2022-08-10

## 2022-08-10 RX ADMIN — DEXAMETHASONE SODIUM PHOSPHATE 4 MG: 4 INJECTION, SOLUTION INTRA-ARTICULAR; INTRALESIONAL; INTRAMUSCULAR; INTRAVENOUS; SOFT TISSUE at 10:39

## 2022-08-10 NOTE — PROGRESS NOTES
The ABCs of the Annual Wellness Visit  Subsequent Medicare Wellness Visit    Chief Complaint   Patient presents with   • Medicare Wellness-subsequent      Subjective    History of Present Illness:  Kristy Ramirez is a 88 y.o. female who presents for a Subsequent Medicare Wellness Visit.    The following portions of the patient's history were reviewed and   updated as appropriate: allergies, current medications, past family history, past medical history, past social history, past surgical history and problem list.    Compared to one year ago, the patient feels her physical   health is worse.    Compared to one year ago, the patient feels her mental   health is worse.    Recent Hospitalizations:  She was not admitted to the hospital during the last year.       Current Medical Providers:  Patient Care Team:  Maggi William APRN as PCP - General (Family Medicine)    Outpatient Medications Prior to Visit   Medication Sig Dispense Refill   • allopurinol (ZYLOPRIM) 100 MG tablet Take 1 tablet by mouth Daily. take with food 90 tablet 3   • amLODIPine (NORVASC) 5 MG tablet Take 1 tablet by mouth Daily. 90 tablet 1   • clotrimazole-betamethasone (Lotrisone) 1-0.05 % cream Apply 1 application topically to the appropriate area as directed 2 (Two) Times a Day. Do not use for longer than 2 weeks 45 g 0   • Diclofenac Sodium (VOLTAREN) 1 % gel gel Apply 4 g topically to the appropriate area as directed 4 (Four) Times a Day As Needed (joint pain). 150 g 3   • furosemide (LASIX) 20 MG tablet Take 20 mg by mouth Daily As Needed.     • losartan (COZAAR) 50 MG tablet Take 1 tablet by mouth Daily. 90 tablet 3   • magnesium oxide (MAG-OX) 400 MG tablet Take 1 tablet by mouth Daily. 90 tablet 1   • metoprolol succinate XL (TOPROL-XL) 25 MG 24 hr tablet TAKE 1 TABLET EVERY DAY 90 tablet 1   • nystatin (MYCOSTATIN) 100,000 unit/mL suspension SWISH AND SWALLOW 5 ML BY MOUTH FOUR TIMES DAILY 480 mL 0   • omeprazole (priLOSEC) 20 MG capsule  Take 1 capsule by mouth 2 (Two) Times a Day. 180 capsule 1   • sucralfate (Carafate) 1 GM/10ML suspension Take 10 mL by mouth 4 (Four) Times a Day. 1200 mL 5   • tolterodine LA (DETROL LA) 4 MG 24 hr capsule Take 1 capsule by mouth Daily. 90 capsule 3   • zolpidem (AMBIEN) 5 MG tablet TAKE 1/2 TO 1 TABLET BY MOUTH EVERY NIGHT AT BEDTIME AS NEEDED FOR INSOMNIA 30 tablet 0   • mupirocin (BACTROBAN) 2 % ointment Apply 1 application topically to the appropriate area as directed 2 (Two) Times a Day As Needed (rash). 44 g 5   • neomycin-polymyxin-hydrocortisone (CORTISPORIN) 3.5-64254-5 otic solution Administer 3 drops into both ears 4 (Four) Times a Day. 10 mL 0   • nystatin (MYCOSTATIN) 028931 UNIT/GM powder Apply  topically to the appropriate area as directed 3 (Three) Times a Day. 120 g 6   • MAGnesium-Oxide 400 (240 Mg) MG tablet Take 1 tablet by mouth Daily.     • Morphine (MSIR) 15 MG tablet Take 15 mg by mouth Daily.     • ondansetron (ZOFRAN) 4 MG tablet TAKE 1/2 TO 1 TABLET BY MOUTH TWICE DAILY FOR 15 DAYS AS NEEDED FOR NAUSEA     • oxyCODONE (ROXICODONE) 5 MG immediate release tablet TAKE 1 TABLET BY MOUTH AS NEEDED FOR UP TO 2 DOSES PER DAY     • dexamethasone (DECADRON) injection 8 mg        No facility-administered medications prior to visit.       No opioid medication identified on active medication list. I have reviewed chart for other potential  high risk medication/s and harmful drug interactions in the elderly.          Aspirin is not on active medication list.  Aspirin use is not indicated based on review of current medical condition/s. Risk of harm outweighs potential benefits.  .    Patient Active Problem List   Diagnosis   • Weakness   • Chronic low back pain   • Rash and nonspecific skin eruption   • Choking   • Diarrhea   • Pain of right hip joint   • Magnesium disorder   • Gout   • Gastroesophageal reflux disease   • Essential hypertension   • Stress incontinence   • Low back pain   • Angioedema  "  • Allergic reaction   • Neck pain   • Prediabetes   • POP-Q stage 4 cystocele   • POP-Q stage 2 rectocele   • Grade II hemorrhoids   • Eustachian tube dysfunction, bilateral   • Dependent edema   • Neuropathy   • Pessary maintenance   • Muscle spasm   • Primary osteoarthritis of right hand   • Cervical stenosis of spinal canal   • Degeneration of lumbar intervertebral disc   • Abnormal bowel habits   • Blood in stool   • Esophageal dysphagia   • Anemia   • Paroxysmal atrial fibrillation (HCC)   • Other dysphagia   • Stomatitis and mucositis   • Insomnia   • Peripheral venous insufficiency   • Bradycardia   • Stage 3a chronic kidney disease (HCC)   • Arthralgia     Advance Care Planning  Advance Directive is not on file.  ACP discussion was held with the patient during this visit. Patient does not have an advance directive, information provided.          Objective    Vitals:    08/10/22 1005   BP: 130/60   BP Location: Left arm   Patient Position: Sitting   Cuff Size: Adult   Pulse: 57   Resp: 20   Temp: 97.1 °F (36.2 °C)   TempSrc: Temporal   SpO2: 99%   Weight: 55.7 kg (122 lb 12.8 oz)   Height: 152.4 cm (60\")   PainSc:   5   PainLoc: Back     Estimated body mass index is 23.98 kg/m² as calculated from the following:    Height as of this encounter: 152.4 cm (60\").    Weight as of this encounter: 55.7 kg (122 lb 12.8 oz).    BMI is within normal parameters. No other follow-up for BMI required.      Does the patient have evidence of cognitive impairment? Yes    Physical Exam  Vitals and nursing note reviewed.   Constitutional:       General: She is not in acute distress.     Appearance: Normal appearance. She is normal weight. She is not ill-appearing.   HENT:      Head: Normocephalic and atraumatic.   Eyes:      Conjunctiva/sclera: Conjunctivae normal.      Pupils: Pupils are equal, round, and reactive to light.   Neck:      Vascular: No carotid bruit.   Cardiovascular:      Rate and Rhythm: Regular rhythm. " Bradycardia present.      Heart sounds: Normal heart sounds.   Pulmonary:      Effort: Pulmonary effort is normal.      Breath sounds: Normal breath sounds. No wheezing or rhonchi.   Musculoskeletal:         General: Normal range of motion.      Cervical back: Normal range of motion and neck supple.   Lymphadenopathy:      Cervical: No cervical adenopathy.   Skin:     General: Skin is warm and dry.   Neurological:      General: No focal deficit present.      Mental Status: She is alert and oriented to person, place, and time.      Motor: Weakness present.      Gait: Gait abnormal.   Psychiatric:         Mood and Affect: Mood normal.         Behavior: Behavior normal.                 HEALTH RISK ASSESSMENT    Smoking Status:  Social History     Tobacco Use   Smoking Status Former Smoker   • Types: Cigarettes   Smokeless Tobacco Never Used   Tobacco Comment    10/14/2021 - Patient states 1 pack of Cigarettes would last X 3 days.  Patient can not recall number of years she utilized Cigarettes.      Alcohol Consumption:  Social History     Substance and Sexual Activity   Alcohol Use No     Fall Risk Screen:    RENE Fall Risk Assessment was completed, and patient is at MODERATE risk for falls. Assessment completed on:8/10/2022    Depression Screening:  PHQ-2/PHQ-9 Depression Screening 8/10/2022   Retired Total Score -   Little Interest or Pleasure in Doing Things 0-->not at all   Feeling Down, Depressed or Hopeless 1-->several days   PHQ-9: Brief Depression Severity Measure Score 1       Health Habits and Functional and Cognitive Screening:  Functional & Cognitive Status 8/10/2022   Do you have difficulty preparing food and eating? Yes   Do you have difficulty bathing yourself, getting dressed or grooming yourself? Yes   Do you have difficulty using the toilet? Yes   Do you have difficulty moving around from place to place? Yes   Do you have trouble with steps or getting out of a bed or a chair? Yes   Current Diet  Other   Dental Exam Up to date   Eye Exam Not up to date   Exercise (times per week) 0 times per week   Current Exercises Include No Regular Exercise   Do you need help using the phone?  No   Are you deaf or do you have serious difficulty hearing?  No   Do you need help with transportation? Yes   Do you need help shopping? Yes   Do you need help preparing meals?  Yes   Do you need help with housework?  Yes   Do you need help with laundry? Yes   Do you need help taking your medications? No   Do you need help managing money? No   Do you ever drive or ride in a car without wearing a seat belt? No   Have you felt unusual stress, anger or loneliness in the last month? Yes   Who do you live with? Alone   If you need help, do you have trouble finding someone available to you? No   Have you been bothered in the last four weeks by sexual problems? No   Do you have difficulty concentrating, remembering or making decisions? Yes       Age-appropriate Screening Schedule:  Refer to the list below for future screening recommendations based on patient's age, sex and/or medical conditions. Orders for these recommended tests are listed in the plan section. The patient has been provided with a written plan.    Health Maintenance   Topic Date Due   • DXA SCAN  Never done   • ZOSTER VACCINE (2 of 2) 07/10/2017   • DIABETIC EYE EXAM  08/19/2021   • HEMOGLOBIN A1C  08/14/2022   • INFLUENZA VACCINE  10/01/2022   • URINE MICROALBUMIN  08/10/2023   • TDAP/TD VACCINES (2 - Td or Tdap) 05/15/2027              Assessment & Plan   CMS Preventative Services Quick Reference  Risk Factors Identified During Encounter  Cardiovascular Disease  Chronic Pain   Dementia/Memory   Fall Risk-High or Moderate  Immunizations Discussed/Encouraged (specific Immunizations; Prevnar 20 (Pneumococcal 20-valent conjugate), Shingrix and COVID19  Inadequate Social Support, Isolation, Loneliness, Lack of Transportation, Financial Difficulties, or Caregiver Stress    Polypharmacy  Urinary Incontinence  The above risks/problems have been discussed with the patient.  Follow up actions/plans if indicated are seen below in the Assessment/Plan Section.  Pertinent information has been shared with the patient in the After Visit Summary.    Diagnoses and all orders for this visit:    1. Medicare annual wellness visit, subsequent (Primary)    Other orders  -     Discontinue: dexamethasone (DECADRON) injection 4 mg        Follow Up:   Return in about 1 year (around 8/10/2023), or if symptoms worsen or fail to improve, for Medicare Wellness.     An After Visit Summary and PPPS were made available to the patient.                     This document has been electronically signed by NADYA Duke on August 15, 2022 00:59 CDT

## 2022-08-10 NOTE — PATIENT INSTRUCTIONS
Medicare Wellness  Personal Prevention Plan of Service     Date of Office Visit:    Encounter Provider:  NADYA Duke  Place of Service:  [facility]  Patient Name: Kristy Ramirez  :  1934    As part of the Medicare Wellness portion of your visit today, we are providing you with this personalized preventive plan of services (PPPS). This plan is based upon recommendations of the [place] Preventive Services Task Force (USPSTF) and the Advisory Committee on Immunization Practices (ACIP).    This lists the preventive care services that should be considered, and provides dates of when you are due. Items listed as completed are up-to-date and do not require any further intervention.    Health Maintenance   Topic Date Due    DXA SCAN  Never done    Pneumococcal Vaccine 65+ (1 - PCV) Never done    ZOSTER VACCINE (2 of 2) 07/10/2017    URINE MICROALBUMIN  2019    DIABETIC EYE EXAM  2021    GASTROSCOPY (EGD)  2021    COVID-19 Vaccine (4 - Booster) 2022    ANNUAL WELLNESS VISIT  2022    HEMOGLOBIN A1C  2022    INFLUENZA VACCINE  10/01/2022    TDAP/TD VACCINES (2 - Td or Tdap) 05/15/2027       No orders of the defined types were placed in this encounter.      No follow-ups on file.

## 2022-08-11 LAB — ALBUMIN UR-MCNC: 8.3 MG/DL

## 2022-08-22 DIAGNOSIS — I10 ESSENTIAL HYPERTENSION: ICD-10-CM

## 2022-08-22 DIAGNOSIS — Z13.29 SCREENING FOR THYROID DISORDER: ICD-10-CM

## 2022-08-22 DIAGNOSIS — M25.50 ARTHRALGIA, UNSPECIFIED JOINT: ICD-10-CM

## 2022-08-22 DIAGNOSIS — E55.9 VITAMIN D DEFICIENCY: ICD-10-CM

## 2022-08-22 DIAGNOSIS — R73.03 PREDIABETES: ICD-10-CM

## 2022-08-22 DIAGNOSIS — M10.9 GOUT, UNSPECIFIED CAUSE, UNSPECIFIED CHRONICITY, UNSPECIFIED SITE: ICD-10-CM

## 2022-08-22 DIAGNOSIS — Z79.899 LONG-TERM USE OF HIGH-RISK MEDICATION: ICD-10-CM

## 2022-08-22 DIAGNOSIS — I10 ESSENTIAL HYPERTENSION: Primary | ICD-10-CM

## 2022-08-22 DIAGNOSIS — Z13.220 SCREENING, LIPID: ICD-10-CM

## 2022-08-22 DIAGNOSIS — K21.9 GASTROESOPHAGEAL REFLUX DISEASE, UNSPECIFIED WHETHER ESOPHAGITIS PRESENT: Primary | ICD-10-CM

## 2022-08-22 DIAGNOSIS — I48.0 PAROXYSMAL ATRIAL FIBRILLATION: ICD-10-CM

## 2022-08-22 RX ORDER — OMEPRAZOLE 20 MG/1
20 CAPSULE, DELAYED RELEASE ORAL 2 TIMES DAILY
Qty: 180 CAPSULE | Refills: 1 | Status: SHIPPED | OUTPATIENT
Start: 2022-08-22 | End: 2022-10-27 | Stop reason: SDUPTHER

## 2022-08-22 RX ORDER — METOPROLOL SUCCINATE 25 MG/1
25 TABLET, EXTENDED RELEASE ORAL DAILY
Qty: 90 TABLET | Refills: 1 | Status: SHIPPED | OUTPATIENT
Start: 2022-08-22

## 2022-08-22 NOTE — TELEPHONE ENCOUNTER
Rx Refill Note  Requested Prescriptions     Pending Prescriptions Disp Refills   • omeprazole (priLOSEC) 20 MG capsule 180 capsule 1     Sig: Take 1 capsule by mouth 2 (Two) Times a Day.   • Diclofenac Sodium (VOLTAREN) 1 % gel gel 150 g 3     Sig: Apply 4 g topically to the appropriate area as directed 4 (Four) Times a Day As Needed (joint pain).   • metoprolol succinate XL (TOPROL-XL) 25 MG 24 hr tablet 90 tablet 1     Sig: Take 1 tablet by mouth Daily.      Last office visit with prescribing clinician: 8/10/2022      Next office visit with prescribing clinician: 11/10/2022            Anila Pepper MA  08/22/22, 17:21 CDT

## 2022-09-05 DIAGNOSIS — G47.00 INSOMNIA, UNSPECIFIED TYPE: ICD-10-CM

## 2022-09-06 RX ORDER — ZOLPIDEM TARTRATE 5 MG/1
TABLET ORAL
Qty: 30 TABLET | Refills: 0 | Status: SHIPPED | OUTPATIENT
Start: 2022-09-06

## 2022-09-07 ENCOUNTER — OFFICE VISIT (OUTPATIENT)
Dept: OBSTETRICS AND GYNECOLOGY | Facility: CLINIC | Age: 87
End: 2022-09-07

## 2022-09-07 VITALS
HEIGHT: 60 IN | SYSTOLIC BLOOD PRESSURE: 122 MMHG | WEIGHT: 122 LBS | BODY MASS INDEX: 23.95 KG/M2 | DIASTOLIC BLOOD PRESSURE: 60 MMHG

## 2022-09-07 DIAGNOSIS — N81.10 POP-Q STAGE 4 CYSTOCELE: ICD-10-CM

## 2022-09-07 DIAGNOSIS — Z46.89 PESSARY MAINTENANCE: Primary | ICD-10-CM

## 2022-09-07 DIAGNOSIS — N81.6 POP-Q STAGE 2 RECTOCELE: ICD-10-CM

## 2022-09-07 PROCEDURE — 99213 OFFICE O/P EST LOW 20 MIN: CPT | Performed by: NURSE PRACTITIONER

## 2022-09-07 NOTE — PROGRESS NOTES
"Subjective   Chief Complaint   Patient presents with   • Pessary Check     Kristy Ramirez is a 88 y.o. year old who presents to be seen for follow-up of her pessary.  Currently she is using Foldable ring w/ support - #7 w/o urethral bar.  She reports no problems with her pessary at this time.     No Additional Complaints Reported    The following portions of the patient's history were reviewed and updated as appropriate:problem list, current medications and allergies    Review of Systems   Constitutional: Negative for activity change, appetite change, chills, diaphoresis, fatigue, fever, unexpected weight gain and unexpected weight loss.   Gastrointestinal: Negative for abdominal distention, abdominal pain, anal bleeding, blood in stool, constipation, diarrhea, nausea, rectal pain and vomiting.   Genitourinary: Negative for decreased urine volume, difficulty urinating, pelvic pain, pelvic pressure, urgency, urinary incontinence, vaginal bleeding, vaginal discharge and vaginal pain.        Objective   /60   Ht 152.4 cm (60\")   Wt 55.3 kg (122 lb)   LMP  (LMP Unknown)   Breastfeeding No   BMI 23.83 kg/m²     General:  well developed; well nourished  no acute distress   Pelvis: Clinical staff was present for exam  External genitalia:  normal appearance of the external genitalia including Bartholin's and Hubbardston's glands.  :  urethral meatus normal; urethra hypermobile; prominent caruncle present; bladder is tender to palpation  Vaginal:  atrophic mucosal changes are present;  Cervix:  absent.  Uterus:  absent.  Adnexa:  non palpable bilaterally.  Cystocele GRADE 2  Rectocele GRADE 2  Pessary removed and cleaned. After vaginal inspection the pessary was lubricated and placed back into the vaginal vault. She tolerated this well.     Lab Review   No data reviewed    Imaging   No data reviewed         Diagnoses and all orders for this visit:    Pessary maintenance    POP-Q stage 4 cystocele    POP-Q stage 2 " rectocele        No orders of the defined types were placed in this encounter.    F/U in 8 weeks for routine pessary maintenance.       This document was electronically signed.     Gladys Soni, NADYA  September 7, 2022

## 2022-09-21 RX ORDER — SUCRALFATE ORAL 1 G/10ML
1 SUSPENSION ORAL 4 TIMES DAILY
Qty: 1200 ML | Refills: 5 | Status: SHIPPED | OUTPATIENT
Start: 2022-09-21 | End: 2022-10-27 | Stop reason: SDUPTHER

## 2022-09-28 DIAGNOSIS — M25.50 ARTHRALGIA, UNSPECIFIED JOINT: ICD-10-CM

## 2022-10-03 DIAGNOSIS — G47.00 INSOMNIA, UNSPECIFIED TYPE: ICD-10-CM

## 2022-10-04 NOTE — TELEPHONE ENCOUNTER
Patient has requested a controlled medication. This is unable to be filled unless a provider signs it. Please contact patient to get her scheduled with someone else.

## 2022-10-05 RX ORDER — ZOLPIDEM TARTRATE 5 MG/1
TABLET ORAL
Qty: 30 TABLET | Refills: 0 | OUTPATIENT
Start: 2022-10-05

## 2022-10-06 NOTE — TELEPHONE ENCOUNTER
Left message for patient to call office in regards to medication refill and establishing with another provider.

## 2022-10-27 ENCOUNTER — OFFICE VISIT (OUTPATIENT)
Dept: GASTROENTEROLOGY | Facility: CLINIC | Age: 87
End: 2022-10-27

## 2022-10-27 VITALS
HEART RATE: 57 BPM | SYSTOLIC BLOOD PRESSURE: 137 MMHG | HEIGHT: 60 IN | BODY MASS INDEX: 24.35 KG/M2 | WEIGHT: 124 LBS | DIASTOLIC BLOOD PRESSURE: 60 MMHG

## 2022-10-27 DIAGNOSIS — K21.9 GASTROESOPHAGEAL REFLUX DISEASE, UNSPECIFIED WHETHER ESOPHAGITIS PRESENT: ICD-10-CM

## 2022-10-27 PROCEDURE — 99214 OFFICE O/P EST MOD 30 MIN: CPT | Performed by: INTERNAL MEDICINE

## 2022-10-27 RX ORDER — SUCRALFATE ORAL 1 G/10ML
1 SUSPENSION ORAL 4 TIMES DAILY
Qty: 1200 ML | Refills: 5 | Status: SHIPPED | OUTPATIENT
Start: 2022-10-27 | End: 2023-03-13 | Stop reason: SDUPTHER

## 2022-10-27 RX ORDER — OMEPRAZOLE 20 MG/1
20 CAPSULE, DELAYED RELEASE ORAL 2 TIMES DAILY
Qty: 180 CAPSULE | Refills: 1 | Status: SHIPPED | OUTPATIENT
Start: 2022-10-27

## 2022-10-27 NOTE — PROGRESS NOTES
[facility]      Chief Complaint:   Chief Complaint   Patient presents with   • Follow-up   • Difficulty Swallowing       Subjective     HPI:   Patient with history of dysphagia.  Patient states she is markedly improved at this time with dilatations and does not need dilatation at this time.  Patient also states that she is on Carafate and Prilosec doing well.    Plan; outpatient follow-up in 6 months continue patient on Carafate and probably    Past Medical History:   Past Medical History:   Diagnosis Date   • Abnormal CT scan     per patient   • Acute bronchitis    • Acute sinusitis    • Allergic conjunctivitis    • Angular cheilitis    • Ankle edema    • Backache     improved   • Blood in urine      UTI resolved      • Bradycardia    • Chest discomfort     described as heart racing      • Chronic low back pain     RIGHT leg radiation      • Cold feet     c/o - and lowerlegs      • Contusion     of dorsum of foot   • Cough    • COVID-19 virus infection    • Depressive disorder    • Dizziness and giddiness    • Dyspnea    • Dysuria    • Edema of lower extremity    • Essential hypertension    • Exanthematous disorder    • Fatigue    • Foot pain, left    • Grade II hemorrhoids 3/12/2018   • Hematoma    • Hip pain, right    • History of palpitations    • Hormone replacement therapy (postmenopausal)    • Impacted cerumen    • Joint pain    • Knee pain    • Low back pain    • Malaise and fatigue    • Multiple joint pain    • Muscle pain    • Muscle weakness    • Neck pain    • Obesity (BMI 30.0-34.9) 3/12/2018   • Osteoarthritis of knee    • Osteoarthritis of multiple joints    • Otitis externa    • Pain in lower limb    • Peripheral venous insufficiency    • POP-Q stage 2 rectocele 3/12/2018   • POP-Q stage 4 cystocele 3/12/2018   • Sacroiliitis, not elsewhere classified (HCC)     R LE   • Shoulder pain    • Temporal arteritis (HCC) 11/8/2020    Recent COVID infection with  symptoms concerning for uncomplicated GCA without visual loss/changes.  -ESR on admission 107 - IV steroids 60 mg daily; transition to oral when dysphagia resolved for at minimum 2-4 weeks, then taper by 10 mg weekly - ESR and CRP q48 hrs -consulted Dr Olea, appreciate recommendations- pt underwent Temporal artery biopsy on 11/10/20        • Upper respiratory infection    • Urinary frequency    • Urinary tract infectious disease    • Wheezing        Past Surgical History:  Past Surgical History:   Procedure Laterality Date   • ANKLE SURGERY     • BACK SURGERY     • COLONOSCOPY  12/14/2009   • ENDOSCOPY N/A 1/6/2021    Procedure: ESOPHAGOGASTRODUODENOSCOPY;  Surgeon: Jack Ellis MD;  Location: [facility] ENDOSCOPY;  Service: Gastroenterology;  Laterality: N/A;   • ENDOSCOPY N/A 1/28/2021    Procedure: ESOPHAGOGASTRODUODENOSCOPY;  Surgeon: Jack Ellis MD;  Location: [facility] ENDOSCOPY;  Service: Gastroenterology;  Laterality: N/A;  eso dilation with ballon to 30FR   • ENDOSCOPY N/A 6/21/2021    Procedure: ESOPHAGOGASTRODUODENOSCOPY;  Surgeon: Jack Ellis MD;  Location: [facility] ENDOSCOPY;  Service: Gastroenterology;  Laterality: N/A;   • ENDOSCOPY N/A 8/6/2021    Procedure: ESOPHAGOGASTRODUODENOSCOPY;  Surgeon: Jack Ellis MD;  Location: [facility] ENDOSCOPY;  Service: Gastroenterology;  Laterality: N/A;   • ENDOSCOPY N/A 8/25/2021    Procedure: ESOPHAGOGASTRODUODENOSCOPY;  Surgeon: Jack Ellis MD;  Location: [facility] ENDOSCOPY;  Service: Gastroenterology;  Laterality: N/A;   • ENDOSCOPY N/A 10/5/2021    Procedure: ESOPHAGOGASTRODUODENOSCOPY;  Surgeon: Jack Ellis MD;  Location: [facility] ENDOSCOPY;  Service: Gastroenterology;  Laterality: N/A;  39-45  blue/yellow eso dilation   • ENDOSCOPY N/A 3/23/2022    Procedure: ESOPHAGOGASTRODUODENOSCOPY;  Surgeon: Jack Ellis MD;  Location: [facility] ENDOSCOPY;  Service: Gastroenterology;  Laterality: N/A;   • ENDOSCOPY N/A 5/25/2022    Procedure:  ESOPHAGOGASTRODUODENOSCOPY;  Surgeon: Jack Ellis MD;  Location: [facility] ENDOSCOPY;  Service: Gastroenterology;  Laterality: N/A;   • EYE SURGERY Bilateral 02/2018    B cataract   • HAMMER TOE REPAIR  08/02/2011    Hammertoe repair, left second toe.   • HYSTERECTOMY     • INCISION AND DRAINAGE ABSCESS  01/21/2015   • INJECTION OF MEDICATION  11/15/2013   • INJECTION OF MEDICATION  05/06/2013    Celestone (betamethasone) (1)      • INJECTION OF MEDICATION  03/17/2016    Kenalog (3)      • NECK SURGERY  01/2018   • TEMPORAL ARTERY BIOPSY Left 11/10/2020    Procedure: LEFT TEMPORAL ARTERY BIOPSY;  Surgeon: Anjel Olea MD;  Location: [facility] OR;  Service: General;  Laterality: Left;   • UPPER GASTROINTESTINAL ENDOSCOPY  12/14/2009   • UPPER GASTROINTESTINAL ENDOSCOPY  01/06/2021   • UPPER GASTROINTESTINAL ENDOSCOPY  01/28/2021   • UPPER GASTROINTESTINAL ENDOSCOPY  06/21/2021   • UPPER GASTROINTESTINAL ENDOSCOPY  08/25/2021   • UPPER GASTROINTESTINAL ENDOSCOPY  10/05/2021       Family History:  Family History   Problem Relation Age of Onset   • Diabetes Other    • Heart disease Other    • Stroke Other    • Coronary artery disease Mother    • Coronary artery disease Father        Social History:   reports that she has quit smoking. Her smoking use included cigarettes. She has never used smokeless tobacco. She reports that she does not drink alcohol and does not use drugs.    Medications:   Prior to Admission medications    Medication Sig Start Date End Date Taking? Authorizing Provider   allopurinol (ZYLOPRIM) 100 MG tablet Take 1 tablet by mouth Daily. take with food 6/6/22  Yes Maggi William APRN   amLODIPine (NORVASC) 5 MG tablet Take 1 tablet by mouth Daily. 6/6/22  Yes Maggi William APRN   clotrimazole-betamethasone (Lotrisone) 1-0.05 % cream Apply 1 application topically to the appropriate area as directed 2 (Two) Times a Day. Do not use for longer than 2 weeks 1/26/22  Yes Mateusz Chopra APRN    Diclofenac Sodium (VOLTAREN) 1 % gel gel APPLY 4 GRAMS TOPICALLY TO THE APPROPRIATE AREA AS DIRECTED 4 (FOUR) TIMES A DAY AS NEEDED (JOINT PAIN). 9/28/22  Yes Maggi William APRN   furosemide (LASIX) 20 MG tablet Take 20 mg by mouth Daily As Needed.   Yes ProviderVipin MD   losartan (COZAAR) 50 MG tablet Take 1 tablet by mouth Daily. 6/6/22  Yes Maggi William APRN   magnesium oxide (MAG-OX) 400 MG tablet Take 1 tablet by mouth Daily. 5/5/22  Yes Maggi William APRN   metoprolol succinate XL (TOPROL-XL) 25 MG 24 hr tablet Take 1 tablet by mouth Daily. 8/22/22  Yes Maggi William APRN   nystatin (MYCOSTATIN) 100,000 unit/mL suspension SWISH AND SWALLOW 5 ML BY MOUTH FOUR TIMES DAILY 3/7/22  Yes Gladys Soni APRN   omeprazole (priLOSEC) 20 MG capsule Take 1 capsule by mouth 2 (Two) Times a Day. 10/27/22  Yes Jack Ellis MD   sucralfate (Carafate) 1 GM/10ML suspension Take 10 mL by mouth 4 (Four) Times a Day. 10/27/22  Yes Jack Ellis MD   tolterodine LA (DETROL LA) 4 MG 24 hr capsule Take 1 capsule by mouth Daily. 6/6/22  Yes Maggi William APRN   zolpidem (AMBIEN) 5 MG tablet TAKE 1/2 TO 1 TABLET BY MOUTH EVERY NIGHT AT BEDTIME AS NEEDED FOR INSOMNIA 9/6/22  Yes Maggi William APRN   omeprazole (priLOSEC) 20 MG capsule Take 1 capsule by mouth 2 (Two) Times a Day. 8/22/22 10/27/22 Yes Maggi William APRN   sucralfate (Carafate) 1 GM/10ML suspension Take 10 mL by mouth 4 (Four) Times a Day. 9/21/22 10/27/22 Yes Jack Ellis MD       Allergies:  Robaxin [methocarbamol], Aleve [naproxen sodium], Atenolol, Gabapentin, Keflex [cephalexin], Lisinopril, Relafen [nabumetone], Tramadol, Zanaflex [tizanidine], Acetaminophen, Aspirin, Codeine, Diflucan [fluconazole], and Sulfa antibiotics    ROS:    Review of Systems   Constitutional: Negative for activity change, appetite change, chills, diaphoresis, fatigue, fever and unexpected weight change.   HENT: Negative for sore throat and  "trouble swallowing.    Respiratory: Negative for shortness of breath.    Gastrointestinal: Negative for abdominal distention, abdominal pain, anal bleeding, blood in stool, constipation, diarrhea, nausea, rectal pain and vomiting.   Endocrine: Negative for polydipsia, polyphagia and polyuria.   Genitourinary: Negative for difficulty urinating.   Musculoskeletal: Negative for arthralgias.   Skin: Negative for pallor.   Allergic/Immunologic: Negative for food allergies.   Neurological: Negative for weakness and light-headedness.   Psychiatric/Behavioral: Negative for behavioral problems.     Objective     Blood pressure 137/60, pulse 57, height 152.4 cm (60\"), weight 56.2 kg (124 lb), not currently breastfeeding.    Physical Exam  Constitutional:       General: She is not in acute distress.     Appearance: She is well-developed. She is not diaphoretic.   HENT:      Head: Normocephalic and atraumatic.   Cardiovascular:      Rate and Rhythm: Normal rate and regular rhythm.      Heart sounds: Normal heart sounds. No murmur heard.    No friction rub. No gallop.   Pulmonary:      Effort: No respiratory distress.      Breath sounds: Normal breath sounds. No wheezing or rales.   Chest:      Chest wall: No tenderness.   Abdominal:      General: Bowel sounds are normal. There is no distension.      Palpations: Abdomen is soft. There is no mass.      Tenderness: There is no abdominal tenderness. There is no guarding or rebound.      Hernia: No hernia is present.   Musculoskeletal:         General: Normal range of motion.   Skin:     General: Skin is warm and dry.      Coloration: Skin is not pale.      Findings: No erythema or rash.   Neurological:      Mental Status: She is alert and oriented to person, place, and time.   Psychiatric:         Behavior: Behavior normal.         Thought Content: Thought content normal.         Judgment: Judgment normal.          Assessment & Plan   Diagnoses and all orders for this visit:    1. " Gastroesophageal reflux disease, unspecified whether esophagitis present  -     omeprazole (priLOSEC) 20 MG capsule; Take 1 capsule by mouth 2 (Two) Times a Day.  Dispense: 180 capsule; Refill: 1    Other orders  -     sucralfate (Carafate) 1 GM/10ML suspension; Take 10 mL by mouth 4 (Four) Times a Day.  Dispense: 1200 mL; Refill: 5        * Surgery not found *     Diagnosis Plan   1. Gastroesophageal reflux disease, unspecified whether esophagitis present  omeprazole (priLOSEC) 20 MG capsule          Anticipated Surgical Procedure:  No orders of the defined types were placed in this encounter.      The risks, benefits, and alternatives of this procedure have been discussed with the patient or the responsible party- the patient understands and agrees to proceed.

## 2022-11-03 ENCOUNTER — OFFICE VISIT (OUTPATIENT)
Dept: CARDIOLOGY | Facility: CLINIC | Age: 87
End: 2022-11-03

## 2022-11-03 VITALS
TEMPERATURE: 97.7 F | SYSTOLIC BLOOD PRESSURE: 136 MMHG | HEART RATE: 65 BPM | HEIGHT: 60 IN | DIASTOLIC BLOOD PRESSURE: 68 MMHG | WEIGHT: 137.8 LBS | BODY MASS INDEX: 27.05 KG/M2 | OXYGEN SATURATION: 97 %

## 2022-11-03 DIAGNOSIS — I35.1 NONRHEUMATIC AORTIC VALVE INSUFFICIENCY: ICD-10-CM

## 2022-11-03 DIAGNOSIS — I10 ESSENTIAL HYPERTENSION: Primary | ICD-10-CM

## 2022-11-03 DIAGNOSIS — I48.0 PAROXYSMAL ATRIAL FIBRILLATION: ICD-10-CM

## 2022-11-03 PROCEDURE — 99214 OFFICE O/P EST MOD 30 MIN: CPT | Performed by: INTERNAL MEDICINE

## 2022-11-03 PROCEDURE — 93000 ELECTROCARDIOGRAM COMPLETE: CPT | Performed by: INTERNAL MEDICINE

## 2022-11-03 RX ORDER — LOPERAMIDE HYDROCHLORIDE 2 MG/1
2 CAPSULE ORAL 4 TIMES DAILY PRN
COMMUNITY

## 2022-11-03 NOTE — PROGRESS NOTES
Kristy Ramirez  88 y.o. female    11/03/2022  1. Essential hypertension    2. Paroxysmal atrial fibrillation (HCC)    3. Nonrheumatic aortic valve insufficiency        History of Present Illness  Kristy Ramirez is an 88-year-old female who is being seen by me for the first time.  She was a patient of Dr. Banda in the past.  Her history is remarkable for evaluation for intermittent palpitation.  Apparently there is one documented episode of paroxysmal atrial fibrillation which occurred during a hospitalization for flareup of temporal arteritis couple of years prior.  She was placed on metoprolol XL but was not started on anticoagulation.  On review of the record the following tests were done to further evaluate palpitation:  Event monitor in September 2021 showed:  The patient was monitored for 13 days 23 hours and 45 minutes. Indications for this exam include afib. Average HR:  66. Min HR:  48. Max HR:  93.     Study Findings    Patient diary was not submitted. Palpitations was reported during the monitoring period. Palpitations correlated with episodes of premature ventricular contractions. Patient reported rare episodes of palpitations. The diary states 1 episodes of palpitations occurred. No complications noted. The predominant rhythm noted during the testing period was sinus rhythm. Premature atrial contractions occured rarely. Evidence of non-specified atrial arrhythmias was noted.     Echocardiogram in September 2021 showed:  • Left ventricular ejection fraction appears to be 56 - 60%. Left ventricular systolic function is normal.  • Left ventricular diastolic function was normal.  • Systolic anterior motion of the anterior mitral leaflet is present.  • Estimated right ventricular systolic pressure from tricuspid regurgitation is normal (<35 mmHg).  • There is moderate calcification of the aortic valve.  • Mild to moderate aortic insufficiency.    The patient has progressed well and denied any chest pain and she  does have rare palpitations which are few and far between.  In spite of her advanced age she is active and able to perform her activities of daily living.  Her predominant complaint is tingling in the left arm for which she was seen by her primary care provider and has an x-ray of the cervical spine ordered which will be done later today.  She has had cervical spine surgery in the past.    EKG today showed sinus rhythm with heart rate of 65 bpm.  Left ventricular hypertrophy.  Baseline artifacts.  Poor R wave progression anteroseptal leads.  Nonspecific T wave changes.    SUBJECTIVE    Allergies   Allergen Reactions   • Robaxin [Methocarbamol] Rash   • Aleve [Naproxen Sodium] Itching   • Atenolol    • Gabapentin Itching   • Keflex [Cephalexin] Itching   • Lisinopril Angioedema   • Relafen [Nabumetone] Itching   • Tramadol Unknown - Low Severity   • Zanaflex [Tizanidine] Itching   • Acetaminophen Itching   • Aspirin Itching and Rash   • Codeine Rash   • Diflucan [Fluconazole] Itching   • Sulfa Antibiotics Rash and Itching         Past Medical History:   Diagnosis Date   • Abnormal CT scan     per patient   • Acute bronchitis    • Acute sinusitis    • Allergic conjunctivitis    • Angular cheilitis    • Ankle edema    • Backache     improved   • Blood in urine      UTI resolved      • Bradycardia    • Chest discomfort     described as heart racing      • Chronic low back pain     RIGHT leg radiation      • Cold feet     c/o - and lowerlegs      • Contusion     of dorsum of foot   • Cough    • COVID-19 virus infection    • Depressive disorder    • Dizziness and giddiness    • Dyspnea    • Dysuria    • Edema of lower extremity    • Essential hypertension    • Exanthematous disorder    • Fatigue    • Foot pain, left    • Grade II hemorrhoids 3/12/2018   • Hematoma    • Hip pain, right    • History of palpitations    • Hormone replacement therapy (postmenopausal)    • Impacted cerumen    • Joint pain    • Knee pain    •  Low back pain    • Malaise and fatigue    • Multiple joint pain    • Muscle pain    • Muscle weakness    • Neck pain    • Obesity (BMI 30.0-34.9) 3/12/2018   • Osteoarthritis of knee    • Osteoarthritis of multiple joints    • Otitis externa    • Pain in lower limb    • Peripheral venous insufficiency    • POP-Q stage 2 rectocele 3/12/2018   • POP-Q stage 4 cystocele 3/12/2018   • Sacroiliitis, not elsewhere classified (HCC)     R LE   • Shoulder pain    • Temporal arteritis ([facility]) 11/8/2020    Recent COVID infection with symptoms concerning for uncomplicated GCA without visual loss/changes.  -ESR on admission 107 - IV steroids 60 mg daily; transition to oral when dysphagia resolved for at minimum 2-4 weeks, then taper by 10 mg weekly - ESR and CRP q48 hrs -consulted jan Nguyen recommendations- pt underwent Temporal artery biopsy on 11/10/20        • Upper respiratory infection    • Urinary frequency    • Urinary tract infectious disease    • Wheezing          Past Surgical History:   Procedure Laterality Date   • ANKLE SURGERY     • BACK SURGERY     • COLONOSCOPY  12/14/2009   • ENDOSCOPY N/A 1/6/2021    Procedure: ESOPHAGOGASTRODUODENOSCOPY;  Surgeon: Jack Ellis MD;  Location: [facility] ENDOSCOPY;  Service: Gastroenterology;  Laterality: N/A;   • ENDOSCOPY N/A 1/28/2021    Procedure: ESOPHAGOGASTRODUODENOSCOPY;  Surgeon: Jack Ellis MD;  Location: [facility] ENDOSCOPY;  Service: Gastroenterology;  Laterality: N/A;  eso dilation with ballon to 30FR   • ENDOSCOPY N/A 6/21/2021    Procedure: ESOPHAGOGASTRODUODENOSCOPY;  Surgeon: Jack Ellis MD;  Location: [facility] ENDOSCOPY;  Service: Gastroenterology;  Laterality: N/A;   • ENDOSCOPY N/A 8/6/2021    Procedure: ESOPHAGOGASTRODUODENOSCOPY;  Surgeon: Jack Ellis MD;  Location: [facility] ENDOSCOPY;  Service: Gastroenterology;  Laterality: N/A;   • ENDOSCOPY N/A 8/25/2021    Procedure: ESOPHAGOGASTRODUODENOSCOPY;  Surgeon: Jack Ellis MD;  Location:  [facility] ENDOSCOPY;  Service: Gastroenterology;  Laterality: N/A;   • ENDOSCOPY N/A 10/5/2021    Procedure: ESOPHAGOGASTRODUODENOSCOPY;  Surgeon: Jack Ellis MD;  Location: [facility] ENDOSCOPY;  Service: Gastroenterology;  Laterality: N/A;  39-45  blue/yellow eso dilation   • ENDOSCOPY N/A 3/23/2022    Procedure: ESOPHAGOGASTRODUODENOSCOPY;  Surgeon: Jack Ellis MD;  Location: [facility] ENDOSCOPY;  Service: Gastroenterology;  Laterality: N/A;   • ENDOSCOPY N/A 5/25/2022    Procedure: ESOPHAGOGASTRODUODENOSCOPY;  Surgeon: Jack Ellis MD;  Location: [facility] ENDOSCOPY;  Service: Gastroenterology;  Laterality: N/A;   • EYE SURGERY Bilateral 02/2018    B cataract   • HAMMER TOE REPAIR  08/02/2011    Hammertoe repair, left second toe.   • HYSTERECTOMY     • INCISION AND DRAINAGE ABSCESS  01/21/2015   • INJECTION OF MEDICATION  11/15/2013   • INJECTION OF MEDICATION  05/06/2013    Celestone (betamethasone) (1)      • INJECTION OF MEDICATION  03/17/2016    Kenalog (3)      • NECK SURGERY  01/2018   • TEMPORAL ARTERY BIOPSY Left 11/10/2020    Procedure: LEFT TEMPORAL ARTERY BIOPSY;  Surgeon: Anjel Olea MD;  Location: [facility];  Service: General;  Laterality: Left;   • UPPER GASTROINTESTINAL ENDOSCOPY  12/14/2009   • UPPER GASTROINTESTINAL ENDOSCOPY  01/06/2021   • UPPER GASTROINTESTINAL ENDOSCOPY  01/28/2021   • UPPER GASTROINTESTINAL ENDOSCOPY  06/21/2021   • UPPER GASTROINTESTINAL ENDOSCOPY  08/25/2021   • UPPER GASTROINTESTINAL ENDOSCOPY  10/05/2021         Family History   Problem Relation Age of Onset   • Diabetes Other    • Heart disease Other    • Stroke Other    • Coronary artery disease Mother    • Coronary artery disease Father          Social History     Socioeconomic History   • Marital status:    Tobacco Use   • Smoking status: Former     Types: Cigarettes   • Smokeless tobacco: Never   • Tobacco comments:     10/14/2021 - Patient states 1 pack of Cigarettes would last X 3 days.  Patient can  not recall number of years she utilized Cigarettes.    Vaping Use   • Vaping Use: Never used   Substance and Sexual Activity   • Alcohol use: No   • Drug use: No   • Sexual activity: Defer     Birth control/protection: Surgical     Comment: Hysterectomy In 1973.         Current Outpatient Medications   Medication Sig Dispense Refill   • allopurinol (ZYLOPRIM) 100 MG tablet Take 1 tablet by mouth Daily. take with food 90 tablet 3   • amLODIPine (NORVASC) 5 MG tablet Take 1 tablet by mouth Daily. 90 tablet 1   • furosemide (LASIX) 20 MG tablet Take 20 mg by mouth Daily As Needed.     • loperamide (IMODIUM) 2 MG capsule Take 1 capsule by mouth 4 (Four) Times a Day As Needed for Diarrhea.     • losartan (COZAAR) 50 MG tablet Take 1 tablet by mouth Daily. 90 tablet 3   • magnesium oxide (MAG-OX) 400 MG tablet Take 1 tablet by mouth Daily. 90 tablet 1   • metoprolol succinate XL (TOPROL-XL) 25 MG 24 hr tablet Take 1 tablet by mouth Daily. 90 tablet 1   • nystatin (MYCOSTATIN) 100,000 unit/mL suspension SWISH AND SWALLOW 5 ML BY MOUTH FOUR TIMES DAILY 480 mL 0   • omeprazole (priLOSEC) 20 MG capsule Take 1 capsule by mouth 2 (Two) Times a Day. 180 capsule 1   • sucralfate (Carafate) 1 GM/10ML suspension Take 10 mL by mouth 4 (Four) Times a Day. 1200 mL 5   • tolterodine LA (DETROL LA) 4 MG 24 hr capsule Take 1 capsule by mouth Daily. 90 capsule 3   • zolpidem (AMBIEN) 5 MG tablet TAKE 1/2 TO 1 TABLET BY MOUTH EVERY NIGHT AT BEDTIME AS NEEDED FOR INSOMNIA 30 tablet 0   • clotrimazole-betamethasone (Lotrisone) 1-0.05 % cream Apply 1 application topically to the appropriate area as directed 2 (Two) Times a Day. Do not use for longer than 2 weeks 45 g 0   • Diclofenac Sodium (VOLTAREN) 1 % gel gel APPLY 4 GRAMS TOPICALLY TO THE APPROPRIATE AREA AS DIRECTED 4 (FOUR) TIMES A DAY AS NEEDED (JOINT PAIN). 200 g 0     No current facility-administered medications for this visit.         OBJECTIVE    /68 (BP Location: Left arm,  "Patient Position: Sitting, Cuff Size: Adult)   Pulse 65   Temp 97.7 °F (36.5 °C)   Ht 152.4 cm (60\")   Wt 62.5 kg (137 lb 12.8 oz)   LMP  (LMP Unknown)   SpO2 97%   BMI 26.91 kg/m²         Review of Systems     Constitutional:  Denies recent weight loss, weight gain, fever or chills     HENT:  Denies any hearing loss, epistaxis, hoarseness, or difficulty speaking.     Eyes: Wears eyeglasses or contact lenses     Respiratory:  Denies dyspnea with exertion, no cough, wheezing, or hemoptysis.     Cardiovascular: See HPI    Gastrointestinal:  Denies change in bowel habits, dyspepsia, ulcer disease, hematochezia, or melena.     Endocrine: Negative for cold intolerance, heat intolerance, polydipsia, polyphagia and polyuria.     Genitourinary: Negative.      Musculoskeletal: DJD.  History of pain in the back of the neck    Skin:  Denies any change in hair or nails, rashes, or skin lesions.     Allergic/Immunologic: Negative.  Negative for environmental allergies, food allergies and/or immunocompromised state.     Neurological:  Denies any history of recurrent headaches, strokes, TIA, or seizure disorder.  Has tingling left arm    Hematological: Denies any food allergies, seasonal allergies, bleeding disorders, or lymphadenopathy.     Psychiatric/Behavioral: Denies any history of depression, substance abuse, or change in cognitive function.         Physical Exam     Constitutional: Cooperative, alert and oriented, in no acute distress.     HENT:   Head: Normocephalic, normal hair patterns, no masses or tenderness.  Ears, Nose, and Throat: No gross abnormalities. No pallor or cyanosis. Dentition good.   Eyes: EOMS intact, PERRL, conjunctivae and lids unremarkable. Fundoscopic exam and visual fields not performed.   Neck: No palpable masses or adenopathy, no thyromegaly, no JVD, carotid pulses are full and equal bilaterally and without bruit.     Cardiovascular: Regular rhythm, S1 and S2 normal, no S3 or S4.  No " murmurs, gallops, or rubs detected.     Pulmonary/Chest: Chest: normal symmetry, normal respiratory excursion, no intercostal retraction, no use of accessory muscles.     Pulmonary: Normal breath sounds. No rales or rhonchi.    Abdominal: Abdomen soft, bowel sounds normoactive, no masses, no hepatosplenomegaly, nontender, no bruit.     Musculoskeletal: No deformities, clubbing, cyanosis, erythema, or edema observed.    Neurological: No gross motor or sensory deficits noted, affect appropriate, oriented to time, person, place.     Skin: Warm and dry to the touch, no apparent skin lesion or mass noted.     Psychiatric: She has a normal mood and affect. Her behavior is normal. Judgment and thought content normal.         Procedures      Lab Results   Component Value Date    WBC 8.07 02/23/2021    HGB 10.8 (L) 02/23/2021    HCT 32.2 (L) 02/23/2021    MCV 87.5 02/23/2021     02/23/2021     Lab Results   Component Value Date    GLUCOSE 83 06/30/2021    BUN 28 (H) 06/30/2021    CREATININE 1.40 (H) 06/30/2021    EGFRIFAFRI 43 (L) 06/30/2021    BCR 20.0 06/30/2021    CO2 27.3 06/30/2021    CALCIUM 9.9 06/30/2021    ALBUMIN 3.70 11/08/2020    AST 11 11/08/2020    ALT 5 11/08/2020     No results found for: CHOL  No results found for: TRIG  No results found for: HDL  No components found for: LDLCALC  No results found for: LDL  No results found for: HDLLDLRATIO  No components found for: CHOLHDL  Lab Results   Component Value Date    HGBA1C 5.6 02/14/2022     Lab Results   Component Value Date    TSH 2.450 06/30/2021           ASSESSMENT AND PLAN  Kristy Lopez is an 88-year-old female with multiple medical issues as discussed in the history of present illness.  She is stable at this time with no evidence of significant palpitation.  Her symptoms are few and far between.  She has been compliant with her medications.  She does have mild to moderate aortic valve insufficiency which is clinically stable at this time.  No signs  of angina or congestive heart failure was noted.    After reviewing her medicines, I have continued antihypertensive therapy with losartan, amlodipine, metoprolol succinate and low-dose diuretic have been continued.  She has had lab work done by her primary care physician and I understand that these are being followed closely.    Diagnoses and all orders for this visit:    1. Essential hypertension (Primary)  -     ECG 12 Lead    2. Paroxysmal atrial fibrillation (HCC)    3. Nonrheumatic aortic valve insufficiency        BMI is >= 25 and <30. (Overweight) The following options were offered after discussion;: nutrition counseling/recommendations      Kristy Ramirez  reports that she has quit smoking. Her smoking use included cigarettes. She has never used smokeless tobacco.          Alicia Dunn MD  11/3/2022  10:31 CDT

## 2022-11-06 LAB
QT INTERVAL: 426 MS
QTC INTERVAL: 443 MS

## 2022-11-09 ENCOUNTER — OFFICE VISIT (OUTPATIENT)
Dept: OBSTETRICS AND GYNECOLOGY | Facility: CLINIC | Age: 87
End: 2022-11-09

## 2022-11-09 VITALS
BODY MASS INDEX: 26.9 KG/M2 | WEIGHT: 137 LBS | HEIGHT: 60 IN | DIASTOLIC BLOOD PRESSURE: 70 MMHG | SYSTOLIC BLOOD PRESSURE: 160 MMHG

## 2022-11-09 DIAGNOSIS — Z46.89 PESSARY MAINTENANCE: Primary | ICD-10-CM

## 2022-11-09 DIAGNOSIS — N81.6 POP-Q STAGE 2 RECTOCELE: ICD-10-CM

## 2022-11-09 DIAGNOSIS — N81.10 POP-Q STAGE 4 CYSTOCELE: ICD-10-CM

## 2022-11-09 DIAGNOSIS — N89.8 VAGINAL DISCHARGE: ICD-10-CM

## 2022-11-09 PROCEDURE — 87510 GARDNER VAG DNA DIR PROBE: CPT | Performed by: NURSE PRACTITIONER

## 2022-11-09 PROCEDURE — 87480 CANDIDA DNA DIR PROBE: CPT | Performed by: NURSE PRACTITIONER

## 2022-11-09 PROCEDURE — 87660 TRICHOMONAS VAGIN DIR PROBE: CPT | Performed by: NURSE PRACTITIONER

## 2022-11-09 PROCEDURE — 99213 OFFICE O/P EST LOW 20 MIN: CPT | Performed by: NURSE PRACTITIONER

## 2022-11-09 NOTE — PROGRESS NOTES
"Subjective   Chief Complaint   Patient presents with   • Pessary Check     Kristy Ramirez is a 88 y.o. year old who presents to be seen for follow-up of her pessary.  Currently she is using Foldable ring w/ support - #7 w/o urethral bar.  She reports no problems with her pessary at this time. C/O yellow vaginal d/c for the past few days.    No Additional Complaints Reported    The following portions of the patient's history were reviewed and updated as appropriate:problem list, current medications and allergies    Review of Systems   Constitutional: Negative for activity change, appetite change, chills, diaphoresis, fatigue, fever, unexpected weight gain and unexpected weight loss.   Gastrointestinal: Negative for abdominal distention, abdominal pain, anal bleeding, blood in stool, constipation, diarrhea, nausea, rectal pain and vomiting.   Genitourinary: Positive for vaginal discharge. Negative for decreased urine volume, difficulty urinating, pelvic pain, pelvic pressure, urgency, urinary incontinence, vaginal bleeding and vaginal pain.        Objective   /70   Ht 152.4 cm (60\")   Wt 62.1 kg (137 lb)   LMP  (LMP Unknown)   BMI 26.76 kg/m²     General:  well developed; well nourished  no acute distress   Pelvis: Clinical staff was present for exam  External genitalia:  normal appearance of the external genitalia including Bartholin's and [place]'s glands.  :  urethral meatus normal; urethra hypermobile; prominent caruncle present; bladder is tender to palpation  Vaginal:  atrophic mucosal changes are present; no d/c present. Vag panel obtained.  Cervix:  absent.  Uterus:  absent.  Adnexa:  non palpable bilaterally.  Cystocele GRADE 2  Rectocele GRADE 2  Pessary removed and cleaned. After vaginal inspection the pessary was lubricated and placed back into the vaginal vault. She tolerated this well.     Lab Review   No data reviewed    Imaging   No data reviewed         Diagnoses and all orders for this " visit:    Pessary maintenance  -     Gardnerella vaginalis, Trichomonas vaginalis, Candida albicans, DNA - Swab, Vagina    POP-Q stage 4 cystocele  -     Gardnerella vaginalis, Trichomonas vaginalis, Candida albicans, DNA - Swab, Vagina    POP-Q stage 2 rectocele  -     Gardnerella vaginalis, Trichomonas vaginalis, Candida albicans, DNA - Swab, Vagina    Vaginal discharge  -     Gardnerella vaginalis, Trichomonas vaginalis, Candida albicans, DNA - Swab, Vagina        No orders of the defined types were placed in this encounter.    F/U in 8 weeks for routine pessary maintenance.       This document was electronically signed.     Gladys Soni, APRN  November 9, 2022

## 2022-12-07 ENCOUNTER — TELEPHONE (OUTPATIENT)
Dept: OBSTETRICS AND GYNECOLOGY | Facility: CLINIC | Age: 87
End: 2022-12-07

## 2022-12-07 RX ORDER — METRONIDAZOLE 7.5 MG/G
GEL VAGINAL NIGHTLY
Qty: 70 G | Refills: 0 | Status: SHIPPED | OUTPATIENT
Start: 2022-12-07 | End: 2022-12-12

## 2022-12-07 NOTE — TELEPHONE ENCOUNTER
PT called requesting a refill of the cream because she is still having a discharge. The refill can be sent to the pharmacy on file.

## 2023-01-09 ENCOUNTER — OFFICE VISIT (OUTPATIENT)
Dept: OBSTETRICS AND GYNECOLOGY | Facility: CLINIC | Age: 88
End: 2023-01-09
Payer: MEDICARE

## 2023-01-09 VITALS
WEIGHT: 137 LBS | SYSTOLIC BLOOD PRESSURE: 150 MMHG | HEIGHT: 60 IN | DIASTOLIC BLOOD PRESSURE: 62 MMHG | BODY MASS INDEX: 26.9 KG/M2

## 2023-01-09 DIAGNOSIS — Z46.89 PESSARY MAINTENANCE: Primary | ICD-10-CM

## 2023-01-09 DIAGNOSIS — N81.10 POP-Q STAGE 4 CYSTOCELE: ICD-10-CM

## 2023-01-09 DIAGNOSIS — N81.6 POP-Q STAGE 2 RECTOCELE: ICD-10-CM

## 2023-01-09 PROCEDURE — 99213 OFFICE O/P EST LOW 20 MIN: CPT | Performed by: NURSE PRACTITIONER

## 2023-01-09 RX ORDER — NEOMYCIN SULFATE, POLYMYXIN B SULFATE AND HYDROCORTISONE 10; 3.5; 1 MG/ML; MG/ML; [USP'U]/ML
SUSPENSION/ DROPS AURICULAR (OTIC)
COMMUNITY
Start: 2022-12-07

## 2023-01-09 RX ORDER — ONDANSETRON 4 MG/1
TABLET, FILM COATED ORAL
COMMUNITY

## 2023-01-09 RX ORDER — MORPHINE SULFATE 15 MG/1
TABLET, FILM COATED, EXTENDED RELEASE ORAL
COMMUNITY
End: 2023-01-09 | Stop reason: SDUPTHER

## 2023-01-09 RX ORDER — SUCRALFATE ORAL 1 G/10ML
SUSPENSION ORAL
COMMUNITY
Start: 2022-10-27 | End: 2023-01-09 | Stop reason: SDUPTHER

## 2023-01-09 RX ORDER — DIAZEPAM 5 MG/1
TABLET ORAL
COMMUNITY

## 2023-01-09 RX ORDER — DULOXETIN HYDROCHLORIDE 30 MG/1
CAPSULE, DELAYED RELEASE ORAL
COMMUNITY

## 2023-01-09 NOTE — PROGRESS NOTES
Subjective   Chief Complaint   Patient presents with   • Pessary Check     Kristy Ramirez is a 88 y.o. year old who presents to be seen for follow-up of her pessary.  Currently she is using Foldable ring w/ support - #7 w/o urethral bar.  She reports no problems with her pessary at this time. C/O yellow vaginal d/c for the past few days.    No Additional Complaints Reported    The following portions of the patient's history were reviewed and updated as appropriate:problem list, current medications and allergies    Review of Systems   Constitutional: Negative for activity change, appetite change, chills, diaphoresis, fatigue, fever, unexpected weight gain and unexpected weight loss.   Gastrointestinal: Negative for abdominal distention, abdominal pain, anal bleeding, blood in stool, constipation, diarrhea, nausea, rectal pain and vomiting.   Genitourinary: Positive for vaginal discharge. Negative for decreased urine volume, difficulty urinating, pelvic pain, pelvic pressure, urgency, urinary incontinence, vaginal bleeding and vaginal pain.        Objective   /62   Ht 152.4 cm (60\")   Wt 62.1 kg (137 lb)   LMP  (LMP Unknown)   BMI 26.76 kg/m²     General:  well developed; well nourished  no acute distress   Pelvis: Clinical staff was present for exam  External genitalia:  normal appearance of the external genitalia including Bartholin's and [place]'s glands.  :  urethral meatus normal; urethra hypermobile; prominent caruncle present; bladder is tender to palpation  Vaginal:  atrophic mucosal changes are present. Thick, chunky white-yellow discharge noted.  Cervix:  absent.  Uterus:  absent.  Adnexa:  non palpable bilaterally.  Cystocele GRADE 2  Rectocele GRADE 2  Pessary removed and cleaned. After vaginal inspection the pessary was lubricated and placed back into the vaginal vault. She tolerated this well.     Lab Review   No data reviewed    Imaging   No data reviewed         Diagnoses and all orders for  this visit:    Pessary maintenance    POP-Q stage 4 cystocele    POP-Q stage 2 rectocele    Other orders  -     Discontinue: sucralfate (CARAFATE) 1 GM/10ML suspension  -     ondansetron (ZOFRAN) 4 MG tablet; ondansetron HCl 4 mg tablet   TAKE 1/2 TO 1 TABLET BY MOUTH TWICE DAILY FOR 15 DAYS AS NEEDED FOR NAUSEA  -     neomycin-polymyxin-hydrocortisone (CORTISPORIN) 3.5-49751-8 otic suspension; SHAKE LIQUID AND INSTILL 4 DROPS TO AFFECTED EAR THREE TIMES DAILY  -     Discontinue: Morphine (MS CONTIN) 15 MG 12 hr tablet; morphine ER 15 mg tablet,extended release   TAKE 1 TABLET BY MOUTH EVERY DAY  -     DULoxetine (CYMBALTA) 30 MG capsule; duloxetine 30 mg capsule,delayed release   TAKE 1 CAPSULE BY MOUTH EVERY DAY FOR LOWER BACK PAIN  -     diazePAM (VALIUM) 5 MG tablet; diazepam 5 mg tablet   TAKE 1 TABLET BY MOUTH AS NEEDED 30 MINUTES PRIOR TO PROCEDURE  -     terconazole (TERAZOL 3) 0.8 % vaginal cream; Insert 1 applicator into the vagina Every Night for 3 days.        New Medications Ordered This Visit   Medications   • terconazole (TERAZOL 3) 0.8 % vaginal cream     Sig: Insert 1 applicator into the vagina Every Night for 3 days.     Dispense:  20 g     Refill:  2     F/U in 8 weeks for routine pessary maintenance.       This document was electronically signed.     Gladys Soni, NADYA  January 9, 2023

## 2023-03-13 RX ORDER — SUCRALFATE ORAL 1 G/10ML
1 SUSPENSION ORAL 4 TIMES DAILY
Qty: 1200 ML | Refills: 5 | Status: SHIPPED | OUTPATIENT
Start: 2023-03-13

## 2023-04-26 ENCOUNTER — OFFICE VISIT (OUTPATIENT)
Dept: OBSTETRICS AND GYNECOLOGY | Facility: CLINIC | Age: 88
End: 2023-04-26
Payer: MEDICARE

## 2023-04-26 VITALS
HEIGHT: 60 IN | WEIGHT: 137 LBS | SYSTOLIC BLOOD PRESSURE: 130 MMHG | DIASTOLIC BLOOD PRESSURE: 80 MMHG | BODY MASS INDEX: 26.9 KG/M2

## 2023-04-26 DIAGNOSIS — N81.10 POP-Q STAGE 4 CYSTOCELE: ICD-10-CM

## 2023-04-26 DIAGNOSIS — N81.6 POP-Q STAGE 2 RECTOCELE: ICD-10-CM

## 2023-04-26 DIAGNOSIS — Z46.89 PESSARY MAINTENANCE: Primary | ICD-10-CM

## 2023-04-26 NOTE — PROGRESS NOTES
"Subjective   Chief Complaint   Patient presents with   • Pessary Check     Kristy Ramirez is a 89 y.o. year old who presents to be seen for follow-up of her pessary.  Currently she is using Foldable ring w/ support - #7 w/o urethral bar.  She reports no problems with her pessary at this time. C/O yellow vaginal d/c for the past few days.    No Additional Complaints Reported    The following portions of the patient's history were reviewed and updated as appropriate:problem list, current medications and allergies    Review of Systems   Constitutional: Negative for activity change, appetite change, chills, diaphoresis, fatigue, fever, unexpected weight gain and unexpected weight loss.   Gastrointestinal: Negative for abdominal distention, abdominal pain, anal bleeding, blood in stool, constipation, diarrhea, nausea, rectal pain and vomiting.   Genitourinary: Negative for decreased urine volume, difficulty urinating, pelvic pain, pelvic pressure, urgency, urinary incontinence, vaginal bleeding, vaginal discharge and vaginal pain.        Objective   /80   Ht 152.4 cm (60\")   Wt 62.1 kg (137 lb)   LMP  (LMP Unknown)   BMI 26.76 kg/m²     General:  well developed; well nourished  no acute distress   Pelvis: Clinical staff was present for exam  External genitalia:  normal appearance of the external genitalia including Bartholin's and [place]'s glands.  :  urethral meatus normal; urethra hypermobile; prominent caruncle present; bladder is tender to palpation  Vaginal:  atrophic mucosal changes are present.   Cervix:  absent.  Uterus:  absent.  Adnexa:  non palpable bilaterally.  Cystocele GRADE 4  Rectocele GRADE 2  Pessary removed and cleaned. After vaginal inspection the pessary was lubricated and placed back into the vaginal vault. She tolerated this well.     Lab Review   No data reviewed    Imaging   No data reviewed         Diagnoses and all orders for this visit:    Pessary maintenance    POP-Q stage 4 " cystocele    POP-Q stage 2 rectocele    Other orders  -     Moderna COVID-19 Bival Booster 50 MCG/0.5ML suspension  -     mupirocin (BACTROBAN) 2 % ointment        No orders of the defined types were placed in this encounter.    F/U in 8 weeks for routine pessary maintenance.       This document was electronically signed.     Gladys Soni, APRN  April 26, 2023

## 2023-04-27 ENCOUNTER — OFFICE VISIT (OUTPATIENT)
Dept: GASTROENTEROLOGY | Facility: CLINIC | Age: 88
End: 2023-04-27
Payer: MEDICARE

## 2023-04-27 VITALS
BODY MASS INDEX: 26.35 KG/M2 | WEIGHT: 134.2 LBS | DIASTOLIC BLOOD PRESSURE: 64 MMHG | SYSTOLIC BLOOD PRESSURE: 176 MMHG | HEIGHT: 60 IN | HEART RATE: 58 BPM

## 2023-04-27 DIAGNOSIS — K21.9 GASTROESOPHAGEAL REFLUX DISEASE, UNSPECIFIED WHETHER ESOPHAGITIS PRESENT: ICD-10-CM

## 2023-04-27 DIAGNOSIS — R13.19 ESOPHAGEAL DYSPHAGIA: Primary | ICD-10-CM

## 2023-04-27 RX ORDER — SUCRALFATE ORAL 1 G/10ML
1 SUSPENSION ORAL 4 TIMES DAILY
Qty: 1200 ML | Refills: 5 | Status: SHIPPED | OUTPATIENT
Start: 2023-04-27

## 2023-04-27 RX ORDER — SUCRALFATE ORAL 1 G/10ML
1 SUSPENSION ORAL 4 TIMES DAILY
Qty: 1200 ML | Refills: 5 | Status: SHIPPED | OUTPATIENT
Start: 2023-04-27 | End: 2023-04-27 | Stop reason: SDUPTHER

## 2023-04-27 RX ORDER — OMEPRAZOLE 20 MG/1
20 CAPSULE, DELAYED RELEASE ORAL 2 TIMES DAILY
Qty: 180 CAPSULE | Refills: 1 | Status: SHIPPED | OUTPATIENT
Start: 2023-04-27

## 2023-04-27 NOTE — PROGRESS NOTES
[facility]      Chief Complaint:   Chief Complaint   Patient presents with   • Difficulty Swallowing   • Heartburn     6 Month Follow Up   • Abdominal Pain       Subjective     HPI:   Patient with history of dysphagia currently on Carafate and Prilosec.  Patient is doing well stating that her swallowing is markedly improved.  Patient's blood pressure slightly elevated today patient states she can hear it in her ears.  Discussed with patient importance of following up with her primary doctor and we did make her appointment from Monday to see her primary doctor at 1:00.  Patient is not dizzy or feeling weak.  Patient does not wish to be evaluated in the emergency room today.    Plan; we will have patient follow-up in 6 months continue patient on Carafate and Prilosec.  Discussed with patient that if dysphagia gets worse she needs to follow-up earlier    Past Medical History:   Past Medical History:   Diagnosis Date   • Abnormal CT scan     per patient   • Acute bronchitis    • Acute sinusitis    • Allergic conjunctivitis    • Angular cheilitis    • Ankle edema    • Backache     improved   • Blood in urine      UTI resolved      • Bradycardia    • Chest discomfort     described as heart racing      • Chronic low back pain     RIGHT leg radiation      • Cold feet     c/o - and lowerlegs      • Contusion     of dorsum of foot   • Cough    • COVID-19 virus infection    • Depressive disorder    • Dizziness and giddiness    • Dyspnea    • Dysuria    • Edema of lower extremity    • Essential hypertension    • Exanthematous disorder    • Fatigue    • Foot pain, left    • Grade II hemorrhoids 3/12/2018   • Hematoma    • Hip pain, right    • History of palpitations    • Hormone replacement therapy (postmenopausal)    • Impacted cerumen    • Joint pain    • Knee pain    • Low back pain    • Malaise and fatigue    • Multiple joint pain    • Muscle pain    • Muscle weakness    • Neck pain     • Obesity (BMI 30.0-34.9) 3/12/2018   • Osteoarthritis of knee    • Osteoarthritis of multiple joints    • Otitis externa    • Pain in lower limb    • Peripheral venous insufficiency    • POP-Q stage 2 rectocele 3/12/2018   • POP-Q stage 4 cystocele 3/12/2018   • Sacroiliitis, not elsewhere classified     R LE   • Shoulder pain    • Temporal arteritis 11/8/2020    Recent COVID infection with symptoms concerning for uncomplicated GCA without visual loss/changes.  -ESR on admission 107 - IV steroids 60 mg daily; transition to oral when dysphagia resolved for at minimum 2-4 weeks, then taper by 10 mg weekly - ESR and CRP q48 hrs -consulted Dr Olea, appreciate recommendations- pt underwent Temporal artery biopsy on 11/10/20        • Upper respiratory infection    • Urinary frequency    • Urinary tract infectious disease    • Wheezing        Past Surgical History:    Past Surgical History:   Procedure Laterality Date   • ANKLE SURGERY     • BACK SURGERY     • COLONOSCOPY  12/14/2009   • ENDOSCOPY N/A 1/6/2021    Procedure: ESOPHAGOGASTRODUODENOSCOPY;  Surgeon: Jack Ellis MD;  Location: [facility] ENDOSCOPY;  Service: Gastroenterology;  Laterality: N/A;   • ENDOSCOPY N/A 1/28/2021    Procedure: ESOPHAGOGASTRODUODENOSCOPY;  Surgeon: Jack Ellis MD;  Location: [facility] ENDOSCOPY;  Service: Gastroenterology;  Laterality: N/A;  eso dilation with ballon to 30FR   • ENDOSCOPY N/A 6/21/2021    Procedure: ESOPHAGOGASTRODUODENOSCOPY;  Surgeon: Jack Ellis MD;  Location: [facility] ENDOSCOPY;  Service: Gastroenterology;  Laterality: N/A;   • ENDOSCOPY N/A 8/6/2021    Procedure: ESOPHAGOGASTRODUODENOSCOPY;  Surgeon: Jack Ellis MD;  Location: [facility] ENDOSCOPY;  Service: Gastroenterology;  Laterality: N/A;   • ENDOSCOPY N/A 8/25/2021    Procedure: ESOPHAGOGASTRODUODENOSCOPY;  Surgeon: Jack Ellis MD;  Location: [facility] ENDOSCOPY;  Service: Gastroenterology;  Laterality: N/A;   • ENDOSCOPY N/A 10/5/2021    Procedure:  ESOPHAGOGASTRODUODENOSCOPY;  Surgeon: Jack Ellis MD;  Location: [facility] ENDOSCOPY;  Service: Gastroenterology;  Laterality: N/A;  39-45  blue/yellow eso dilation   • ENDOSCOPY N/A 3/23/2022    Procedure: ESOPHAGOGASTRODUODENOSCOPY;  Surgeon: Jack Ellis MD;  Location: [facility] ENDOSCOPY;  Service: Gastroenterology;  Laterality: N/A;   • ENDOSCOPY N/A 5/25/2022    Procedure: ESOPHAGOGASTRODUODENOSCOPY;  Surgeon: Jack Ellis MD;  Location: [facility] ENDOSCOPY;  Service: Gastroenterology;  Laterality: N/A;   • EYE SURGERY Bilateral 02/2018    B cataract   • HAMMER TOE REPAIR  08/02/2011    Hammertoe repair, left second toe.   • HYSTERECTOMY     • INCISION AND DRAINAGE ABSCESS  01/21/2015   • INJECTION OF MEDICATION  11/15/2013   • INJECTION OF MEDICATION  05/06/2013    Celestone (betamethasone) (1)      • INJECTION OF MEDICATION  03/17/2016    Kenalog (3)      • NECK SURGERY  01/2018   • TEMPORAL ARTERY BIOPSY Left 11/10/2020    Procedure: LEFT TEMPORAL ARTERY BIOPSY;  Surgeon: Anjel Olea MD;  Location: [facility] OR;  Service: General;  Laterality: Left;   • UPPER GASTROINTESTINAL ENDOSCOPY  12/14/2009   • UPPER GASTROINTESTINAL ENDOSCOPY  01/06/2021   • UPPER GASTROINTESTINAL ENDOSCOPY  01/28/2021   • UPPER GASTROINTESTINAL ENDOSCOPY  06/21/2021   • UPPER GASTROINTESTINAL ENDOSCOPY  08/25/2021   • UPPER GASTROINTESTINAL ENDOSCOPY  10/05/2021       Family History:  Family History   Problem Relation Age of Onset   • Diabetes Other    • Heart disease Other    • Stroke Other    • Coronary artery disease Mother    • Coronary artery disease Father        Social History:   reports that she has quit smoking. Her smoking use included cigarettes. She has never used smokeless tobacco. She reports that she does not drink alcohol and does not use drugs.    Medications:   Prior to Admission medications    Medication Sig Start Date End Date Taking? Authorizing Provider   allopurinol (ZYLOPRIM) 100 MG tablet Take 1 tablet  by mouth Daily. take with food 6/6/22  Yes Maggi William APRN   amLODIPine (NORVASC) 5 MG tablet Take 1 tablet by mouth Daily. 6/6/22  Yes Maggi William APRN   clotrimazole-betamethasone (Lotrisone) 1-0.05 % cream Apply 1 application topically to the appropriate area as directed 2 (Two) Times a Day. Do not use for longer than 2 weeks 1/26/22  Yes Mateusz Chopra APRN   diazePAM (VALIUM) 5 MG tablet diazepam 5 mg tablet   TAKE 1 TABLET BY MOUTH AS NEEDED 30 MINUTES PRIOR TO PROCEDURE   Yes Provider, MD Vipin   Diclofenac Sodium (VOLTAREN) 1 % gel gel APPLY 4 GRAMS TOPICALLY TO THE APPROPRIATE AREA AS DIRECTED 4 (FOUR) TIMES A DAY AS NEEDED (JOINT PAIN). 9/28/22  Yes Maggi William APRN   DULoxetine (CYMBALTA) 30 MG capsule duloxetine 30 mg capsule,delayed release   TAKE 1 CAPSULE BY MOUTH EVERY DAY FOR LOWER BACK PAIN   Yes Provider, MD Vipin   furosemide (LASIX) 20 MG tablet Take 1 tablet by mouth Daily As Needed.   Yes Provider, MD Vipin   loperamide (IMODIUM) 2 MG capsule Take 1 capsule by mouth 4 (Four) Times a Day As Needed for Diarrhea.   Yes Provider, MD Vipin   losartan (COZAAR) 50 MG tablet Take 1 tablet by mouth Daily. 6/6/22  Yes Maggi William APRN   magnesium oxide (MAG-OX) 400 MG tablet Take 1 tablet by mouth Daily. 5/5/22  Yes Maggi William APRN   metoprolol succinate XL (TOPROL-XL) 25 MG 24 hr tablet Take 1 tablet by mouth Daily. 8/22/22  Yes Maggi William APRN   Moderna COVID-19 Bival Booster 50 MCG/0.5ML suspension  2/2/23  Yes ProviderVipin MD   mupirocin (BACTROBAN) 2 % ointment  3/24/23  Yes ProviderVipin MD   neomycin-polymyxin-hydrocortisone (CORTISPORIN) 3.5-36679-5 otic suspension SHAKE LIQUID AND INSTILL 4 DROPS TO AFFECTED EAR THREE TIMES DAILY 12/7/22  Yes ProviderVipin MD   nystatin (MYCOSTATIN) 100,000 unit/mL suspension SWISH AND SWALLOW 5 ML BY MOUTH FOUR TIMES DAILY 3/7/22  Yes Gladys Soni APRN   omeprazole (priLOSEC)  20 MG capsule Take 1 capsule by mouth 2 (Two) Times a Day. 4/27/23  Yes Jack Ellis MD   ondansetron (ZOFRAN) 4 MG tablet ondansetron HCl 4 mg tablet   TAKE 1/2 TO 1 TABLET BY MOUTH TWICE DAILY FOR 15 DAYS AS NEEDED FOR NAUSEA   Yes Vipin Wright MD   sucralfate (Carafate) 1 GM/10ML suspension Take 10 mL by mouth 4 (Four) Times a Day. 4/27/23  Yes Jack Ellis MD   tolterodine LA (DETROL LA) 4 MG 24 hr capsule Take 1 capsule by mouth Daily. 6/6/22  Yes Maggi William APRN   zolpidem (AMBIEN) 5 MG tablet TAKE 1/2 TO 1 TABLET BY MOUTH EVERY NIGHT AT BEDTIME AS NEEDED FOR INSOMNIA 9/6/22  Yes Maggi William APRN   omeprazole (priLOSEC) 20 MG capsule Take 1 capsule by mouth 2 (Two) Times a Day. 10/27/22 4/27/23 Yes Jack Ellis MD   sucralfate (Carafate) 1 GM/10ML suspension Take 10 mL by mouth 4 (Four) Times a Day. 3/13/23 4/27/23 Yes Jack Ellis MD       Allergies:  Robaxin [methocarbamol], Aleve [naproxen sodium], Atenolol, Gabapentin, Keflex [cephalexin], Lisinopril, Relafen [nabumetone], Tramadol, Zanaflex [tizanidine], Acetaminophen, Aspirin, Codeine, Diflucan [fluconazole], and Sulfa antibiotics    ROS:    Review of Systems   Constitutional: Negative for activity change, appetite change, chills, diaphoresis, fatigue, fever and unexpected weight change.   HENT: Negative for sore throat and trouble swallowing.    Respiratory: Negative for shortness of breath.    Gastrointestinal: Negative for abdominal distention, abdominal pain, anal bleeding, blood in stool, constipation, diarrhea, nausea, rectal pain and vomiting.   Endocrine: Negative for polydipsia, polyphagia and polyuria.   Genitourinary: Negative for difficulty urinating.   Musculoskeletal: Negative for arthralgias.   Skin: Negative for pallor.   Allergic/Immunologic: Negative for food allergies.   Neurological: Negative for weakness and light-headedness.   Psychiatric/Behavioral: Negative for behavioral problems.     Objective  "    Blood pressure 176/64, pulse 58, height 152.4 cm (60\"), weight 60.9 kg (134 lb 3.2 oz), not currently breastfeeding.    Physical Exam  Constitutional:       General: She is not in acute distress.     Appearance: She is well-developed. She is not diaphoretic.   HENT:      Head: Normocephalic and atraumatic.   Cardiovascular:      Rate and Rhythm: Normal rate and regular rhythm.      Heart sounds: Normal heart sounds. No murmur heard.    No friction rub. No gallop.   Pulmonary:      Effort: No respiratory distress.      Breath sounds: Normal breath sounds. No wheezing or rales.   Chest:      Chest wall: No tenderness.   Abdominal:      General: Bowel sounds are normal. There is no distension.      Palpations: Abdomen is soft. There is no mass.      Tenderness: There is no abdominal tenderness. There is no guarding or rebound.      Hernia: No hernia is present.   Musculoskeletal:         General: Normal range of motion.   Skin:     General: Skin is warm and dry.      Coloration: Skin is not pale.      Findings: No erythema or rash.   Neurological:      Mental Status: She is alert and oriented to person, place, and time.   Psychiatric:         Behavior: Behavior normal.         Thought Content: Thought content normal.         Judgment: Judgment normal.          Assessment & Plan   Diagnoses and all orders for this visit:    1. Esophageal dysphagia (Primary)    2. Gastroesophageal reflux disease, unspecified whether esophagitis present  -     omeprazole (priLOSEC) 20 MG capsule; Take 1 capsule by mouth 2 (Two) Times a Day.  Dispense: 180 capsule; Refill: 1    Other orders  -     sucralfate (Carafate) 1 GM/10ML suspension; Take 10 mL by mouth 4 (Four) Times a Day.  Dispense: 1200 mL; Refill: 5        * Surgery not found *     Diagnosis Plan   1. Esophageal dysphagia        2. Gastroesophageal reflux disease, unspecified whether esophagitis present  omeprazole (priLOSEC) 20 MG capsule          Anticipated Surgical " Procedure:  No orders of the defined types were placed in this encounter.      The risks, benefits, and alternatives of this procedure have been discussed with the patient or the responsible party- the patient understands and agrees to proceed.

## 2023-05-04 ENCOUNTER — OFFICE VISIT (OUTPATIENT)
Dept: CARDIOLOGY | Facility: CLINIC | Age: 88
End: 2023-05-04
Payer: MEDICARE

## 2023-05-04 VITALS
SYSTOLIC BLOOD PRESSURE: 138 MMHG | TEMPERATURE: 97.1 F | DIASTOLIC BLOOD PRESSURE: 58 MMHG | OXYGEN SATURATION: 97 % | WEIGHT: 135 LBS | BODY MASS INDEX: 26.5 KG/M2 | HEIGHT: 60 IN | HEART RATE: 70 BPM

## 2023-05-04 DIAGNOSIS — I10 ESSENTIAL HYPERTENSION: ICD-10-CM

## 2023-05-04 DIAGNOSIS — I35.1 NONRHEUMATIC AORTIC VALVE INSUFFICIENCY: ICD-10-CM

## 2023-05-04 DIAGNOSIS — I48.0 PAROXYSMAL ATRIAL FIBRILLATION: Primary | ICD-10-CM

## 2023-05-04 PROCEDURE — 99214 OFFICE O/P EST MOD 30 MIN: CPT | Performed by: INTERNAL MEDICINE

## 2023-05-04 RX ORDER — CHLORTHALIDONE 25 MG/1
TABLET ORAL
COMMUNITY
End: 2023-05-04

## 2023-05-04 RX ORDER — LOSARTAN POTASSIUM AND HYDROCHLOROTHIAZIDE 12.5; 5 MG/1; MG/1
TABLET ORAL
COMMUNITY
End: 2023-05-04

## 2023-05-04 RX ORDER — PREGABALIN 75 MG/1
CAPSULE ORAL
COMMUNITY

## 2023-05-04 NOTE — PROGRESS NOTES
Kristy Ramirez  89 y.o. female      1. Paroxysmal atrial fibrillation    2. Essential hypertension    3. Nonrheumatic aortic valve insufficiency        History of Present Illness  Kristy Ramirez is an 89-year-old female who was a patient of Dr. Banda in the past.  Her history is remarkable for evaluation for intermittent palpitation.  Apparently there is one documented episode of paroxysmal atrial fibrillation which occurred during a hospitalization for flareup of temporal arteritis couple of years prior.  She was placed on metoprolol XL but was not started on anticoagulation.    On review of the record the following tests were performed.  Event monitor in September 2021 showed:  The patient was monitored for 13 days 23 hours and 45 minutes. Indications for this exam include afib. Average HR:  66. Min HR:  48. Max HR:  93.     Study Findings    Patient diary was not submitted. Palpitations was reported during the monitoring period. Palpitations correlated with episodes of premature ventricular contractions. Patient reported rare episodes of palpitations. The diary states 1 episodes of palpitations occurred. No complications noted. The predominant rhythm noted during the testing period was sinus rhythm. Premature atrial contractions occured rarely. Evidence of non-specified atrial arrhythmias was noted.     Echocardiogram in September 2021 showed:  • Left ventricular ejection fraction appears to be 56 - 60%. Left ventricular systolic function is normal.  • Left ventricular diastolic function was normal.  • Systolic anterior motion of the anterior mitral leaflet is present.  • Estimated right ventricular systolic pressure from tricuspid regurgitation is normal (<35 mmHg).  • There is moderate calcification of the aortic valve.  • Mild to moderate aortic insufficiency.    The patient was noted to have an elevated blood pressure by her primary care physician and suspected to have a carotid bruit and hence referred to  [facility] for further testing was performed.  CT scan of the head and neck with contrast did not reveal any stenosis/aneurysm or dissection.    Her blood pressure has since optimized and was in the normal range today.  Episodes of palpitation a few and far between.    SUBJECTIVE    Allergies   Allergen Reactions   • Robaxin [Methocarbamol] Rash   • Aleve [Naproxen Sodium] Itching   • Atenolol    • Gabapentin Itching   • Keflex [Cephalexin] Itching   • Lisinopril Angioedema   • Relafen [Nabumetone] Itching   • Tramadol Unknown - Low Severity   • Zanaflex [Tizanidine] Itching   • Acetaminophen Itching   • Aspirin Itching and Rash   • Codeine Rash   • Diflucan [Fluconazole] Itching   • Sulfa Antibiotics Rash and Itching         Past Medical History:   Diagnosis Date   • Abnormal CT scan     per patient   • Acute bronchitis    • Acute sinusitis    • Allergic conjunctivitis    • Angular cheilitis    • Ankle edema    • Backache     improved   • Blood in urine      UTI resolved      • Bradycardia    • Chest discomfort     described as heart racing      • Chronic low back pain     RIGHT leg radiation      • Cold feet     c/o - and lowerlegs      • Contusion     of dorsum of foot   • Cough    • COVID-19 virus infection    • Depressive disorder    • Dizziness and giddiness    • Dyspnea    • Dysuria    • Edema of lower extremity    • Essential hypertension    • Exanthematous disorder    • Fatigue    • Foot pain, left    • Grade II hemorrhoids 3/12/2018   • Hematoma    • Hip pain, right    • History of palpitations    • Hormone replacement therapy (postmenopausal)    • Impacted cerumen    • Joint pain    • Knee pain    • Low back pain    • Malaise and fatigue    • Multiple joint pain    • Muscle pain    • Muscle weakness    • Neck pain    • Obesity (BMI 30.0-34.9) 3/12/2018   • Osteoarthritis of knee    • Osteoarthritis of multiple joints    • Otitis externa    • Pain in lower limb    • Peripheral venous  insufficiency    • POP-Q stage 2 rectocele 3/12/2018   • POP-Q stage 4 cystocele 3/12/2018   • Sacroiliitis, not elsewhere classified     R LE   • Shoulder pain    • Temporal arteritis 11/8/2020    Recent COVID infection with symptoms concerning for uncomplicated GCA without visual loss/changes.  -ESR on admission 107 - IV steroids 60 mg daily; transition to oral when dysphagia resolved for at minimum 2-4 weeks, then taper by 10 mg weekly - ESR and CRP q48 hrs -consulted Dr Olea, appreciate recommendations- pt underwent Temporal artery biopsy on 11/10/20        • Upper respiratory infection    • Urinary frequency    • Urinary tract infectious disease    • Wheezing          Past Surgical History:   Procedure Laterality Date   • ANKLE SURGERY     • BACK SURGERY     • COLONOSCOPY  12/14/2009   • ENDOSCOPY N/A 1/6/2021    Procedure: ESOPHAGOGASTRODUODENOSCOPY;  Surgeon: Jack Ellis MD;  Location: [facility] ENDOSCOPY;  Service: Gastroenterology;  Laterality: N/A;   • ENDOSCOPY N/A 1/28/2021    Procedure: ESOPHAGOGASTRODUODENOSCOPY;  Surgeon: Jack Ellis MD;  Location: [facility] ENDOSCOPY;  Service: Gastroenterology;  Laterality: N/A;  eso dilation with ballon to 30FR   • ENDOSCOPY N/A 6/21/2021    Procedure: ESOPHAGOGASTRODUODENOSCOPY;  Surgeon: Jack Ellis MD;  Location: [facility] ENDOSCOPY;  Service: Gastroenterology;  Laterality: N/A;   • ENDOSCOPY N/A 8/6/2021    Procedure: ESOPHAGOGASTRODUODENOSCOPY;  Surgeon: Jack Ellis MD;  Location: [facility] ENDOSCOPY;  Service: Gastroenterology;  Laterality: N/A;   • ENDOSCOPY N/A 8/25/2021    Procedure: ESOPHAGOGASTRODUODENOSCOPY;  Surgeon: Jack Ellis MD;  Location: [facility] ENDOSCOPY;  Service: Gastroenterology;  Laterality: N/A;   • ENDOSCOPY N/A 10/5/2021    Procedure: ESOPHAGOGASTRODUODENOSCOPY;  Surgeon: Jack Ellis MD;  Location: [facility] ENDOSCOPY;  Service: Gastroenterology;  Laterality: N/A;  39-45  blue/yellow eso dilation   • ENDOSCOPY N/A 3/23/2022     Procedure: ESOPHAGOGASTRODUODENOSCOPY;  Surgeon: Jack Ellis MD;  Location: [facility] ENDOSCOPY;  Service: Gastroenterology;  Laterality: N/A;   • ENDOSCOPY N/A 5/25/2022    Procedure: ESOPHAGOGASTRODUODENOSCOPY;  Surgeon: Jack Ellis MD;  Location: [facility] ENDOSCOPY;  Service: Gastroenterology;  Laterality: N/A;   • EYE SURGERY Bilateral 02/2018    B cataract   • HAMMER TOE REPAIR  08/02/2011    Hammertoe repair, left second toe.   • HYSTERECTOMY     • INCISION AND DRAINAGE ABSCESS  01/21/2015   • INJECTION OF MEDICATION  11/15/2013   • INJECTION OF MEDICATION  05/06/2013    Celestone (betamethasone) (1)      • INJECTION OF MEDICATION  03/17/2016    Kenalog (3)      • NECK SURGERY  01/2018   • TEMPORAL ARTERY BIOPSY Left 11/10/2020    Procedure: LEFT TEMPORAL ARTERY BIOPSY;  Surgeon: Anjel Olea MD;  Location: [facility] OR;  Service: General;  Laterality: Left;   • UPPER GASTROINTESTINAL ENDOSCOPY  12/14/2009   • UPPER GASTROINTESTINAL ENDOSCOPY  01/06/2021   • UPPER GASTROINTESTINAL ENDOSCOPY  01/28/2021   • UPPER GASTROINTESTINAL ENDOSCOPY  06/21/2021   • UPPER GASTROINTESTINAL ENDOSCOPY  08/25/2021   • UPPER GASTROINTESTINAL ENDOSCOPY  10/05/2021         Family History   Problem Relation Age of Onset   • Diabetes Other    • Heart disease Other    • Stroke Other    • Coronary artery disease Mother    • Coronary artery disease Father          Social History     Socioeconomic History   • Marital status:    Tobacco Use   • Smoking status: Former     Types: Cigarettes   • Smokeless tobacco: Never   • Tobacco comments:     10/14/2021 - Patient states 1 pack of Cigarettes would last X 3 days.  Patient can not recall number of years she utilized Cigarettes.    Vaping Use   • Vaping Use: Never used   Substance and Sexual Activity   • Alcohol use: No   • Drug use: No   • Sexual activity: Defer     Birth control/protection: Surgical     Comment: Hysterectomy In 1973.         Current Outpatient Medications  "  Medication Sig Dispense Refill   • allopurinol (ZYLOPRIM) 100 MG tablet Take 1 tablet by mouth Daily. take with food 90 tablet 3   • amLODIPine (NORVASC) 5 MG tablet Take 1 tablet by mouth Daily. 90 tablet 1   • Diclofenac Sodium (VOLTAREN) 1 % gel gel APPLY 4 GRAMS TOPICALLY TO THE APPROPRIATE AREA AS DIRECTED 4 (FOUR) TIMES A DAY AS NEEDED (JOINT PAIN). 200 g 0   • furosemide (LASIX) 20 MG tablet Take 1 tablet by mouth Daily As Needed.     • loperamide (IMODIUM) 2 MG capsule Take 1 capsule by mouth 4 (Four) Times a Day As Needed for Diarrhea.     • losartan (COZAAR) 50 MG tablet Take 1 tablet by mouth Daily. 90 tablet 3   • magnesium oxide (MAG-OX) 400 MG tablet Take 1 tablet by mouth Daily. 90 tablet 1   • metoprolol succinate XL (TOPROL-XL) 25 MG 24 hr tablet Take 1 tablet by mouth Daily. 90 tablet 1   • Moderna COVID-19 Bival Booster 50 MCG/0.5ML suspension      • mupirocin (BACTROBAN) 2 % ointment      • neomycin-polymyxin-hydrocortisone (CORTISPORIN) 3.5-42476-2 otic suspension SHAKE LIQUID AND INSTILL 4 DROPS TO AFFECTED EAR THREE TIMES DAILY     • pregabalin (LYRICA) 75 MG capsule      • sucralfate (Carafate) 1 GM/10ML suspension Take 10 mL by mouth 4 (Four) Times a Day. 1200 mL 5   • tolterodine LA (DETROL LA) 4 MG 24 hr capsule Take 1 capsule by mouth Daily. 90 capsule 3   • zolpidem (AMBIEN) 5 MG tablet TAKE 1/2 TO 1 TABLET BY MOUTH EVERY NIGHT AT BEDTIME AS NEEDED FOR INSOMNIA 30 tablet 0     No current facility-administered medications for this visit.         OBJECTIVE    /58 (BP Location: Left arm, Patient Position: Sitting, Cuff Size: Adult)   Pulse 70   Temp 97.1 °F (36.2 °C)   Ht 152.4 cm (60\")   Wt 61.2 kg (135 lb)   LMP  (LMP Unknown)   SpO2 97%   BMI 26.37 kg/m²         Review of Systems: The following systems were reviewed and changes noted as indicated in history of present illness and below    Constitutional:  Denies recent weight loss, weight gain, fever or chills     HENT:  " Denies any hearing loss, epistaxis, hoarseness, or difficulty speaking.     Eyes: Wears eyeglasses or contact lenses     Respiratory:  Denies dyspnea with exertion, no cough, wheezing, or hemoptysis.     Cardiovascular: Palpitation few and far between.  No chest pain    Gastrointestinal:  Denies change in bowel habits, dyspepsia, ulcer disease, hematochezia, or melena.     Endocrine: Negative for cold intolerance, heat intolerance, polydipsia, polyphagia and polyuria.     Genitourinary: Negative.      Musculoskeletal: DJD.  History of pain in the back of the neck    Skin:  Denies any change in hair or nails, rashes, or skin lesions.     Allergic/Immunologic: Negative.  Negative for environmental allergies, food allergies and/or immunocompromised state.     Neurological:  Denies any history of recurrent headaches, strokes, TIA, or seizure disorder.  Has tingling left arm    Hematological: Denies any food allergies, seasonal allergies, bleeding disorders, or lymphadenopathy.     Psychiatric/Behavioral: Denies any history of depression, substance abuse, or change in cognitive function.       Physical Exam the following systems were reviewed and no changes noted    Constitutional: Cooperative, alert and oriented, in no acute distress.     HENT:   Head: Normocephalic, normal hair patterns, no masses or tenderness.  Ears, Nose, and Throat: No gross abnormalities. No pallor or cyanosis. Dentition good.   Eyes: EOMS intact, PERRL, conjunctivae and lids unremarkable. Fundoscopic exam and visual fields not performed.   Neck: No palpable masses or adenopathy, no thyromegaly, no JVD, carotid pulses are full and equal bilaterally and without bruit.     Cardiovascular: Regular rhythm, S1 and S2 normal, no S3 or S4.  No murmurs, gallops, or rubs detected.     Pulmonary/Chest: Chest: normal symmetry, normal respiratory excursion, no intercostal retraction, no use of accessory muscles.     Pulmonary: Normal breath sounds. No rales  or rhonchi.    Abdominal: Abdomen soft, bowel sounds normoactive, no masses, no hepatosplenomegaly, nontender, no bruit.     Musculoskeletal: No deformities, clubbing, cyanosis, erythema, or edema observed.    Neurological: No gross motor or sensory deficits noted, affect appropriate, oriented to time, person, place.     Skin: Warm and dry to the touch, no apparent skin lesion or mass noted.     Psychiatric: She has a normal mood and affect. Her behavior is normal. Judgment and thought content normal.         Procedures      Lab Results   Component Value Date    WBC 8.07 02/23/2021    HGB 10.8 (L) 02/23/2021    HCT 32.2 (L) 02/23/2021    MCV 87.5 02/23/2021     02/23/2021     Lab Results   Component Value Date    GLUCOSE 83 06/30/2021    BUN 28 (H) 06/30/2021    CREATININE 1.40 (H) 06/30/2021    EGFRIFAFRI 43 (L) 06/30/2021    BCR 20.0 06/30/2021    CO2 27.3 06/30/2021    CALCIUM 9.9 06/30/2021    ALBUMIN 3.70 11/08/2020    AST 11 11/08/2020    ALT 5 11/08/2020     No results found for: CHOL  No results found for: TRIG  No results found for: HDL  No components found for: LDLCALC  No results found for: LDL  No results found for: HDLLDLRATIO  No components found for: CHOLHDL  Lab Results   Component Value Date    HGBA1C 5.6 02/14/2022     Lab Results   Component Value Date    TSH 2.450 06/30/2021           ASSESSMENT AND PLAN  Kristy Lopez is an 89-year-old female with multiple medical issues as discussed in the history of present illness.  She is stable at this time with no evidence of significant palpitation.  Her symptoms are few and far between.  She has been compliant with her medications.  She does have mild to moderate aortic valve insufficiency which is clinically stable at this time.  No signs of angina or congestive heart failure was noted.    After reviewing her medicines, I have continued antihypertensive therapy with losartan, amlodipine, metoprolol succinate and low-dose diuretic have been  continued.      She had lab work done at [facility] on 5/2/2023 and CBC and CMP were within acceptable normal limits.  Hemoglobin was 11.7 and hematocrit 36.3.  Potassium was 4.3.  BUN 26 and creatinine 1.02.  CT scan of the head and neck did not reveal any evidence of stenosis in the carotid arteries.  No dissection or aneurysm was noted.    Diagnoses and all orders for this visit:    1. Paroxysmal atrial fibrillation (Primary)    2. Essential hypertension    3. Nonrheumatic aortic valve insufficiency        BMI is >= 25 and <30. (Overweight) The following options were offered after discussion;: nutrition counseling/recommendations      Kristy Ramirez  reports that she has quit smoking. Her smoking use included cigarettes. She has never used smokeless tobacco.          Alicia Dunn MD  5/4/2023  13:58 CDT

## 2023-07-19 PROBLEM — K58.0 IRRITABLE BOWEL SYNDROME WITH DIARRHEA: Status: ACTIVE | Noted: 2022-10-12

## 2023-07-19 PROBLEM — N32.81 OVERACTIVE BLADDER: Status: ACTIVE | Noted: 2022-10-12

## 2023-07-19 PROBLEM — K22.70 BARRETT'S ESOPHAGUS: Status: ACTIVE | Noted: 2022-10-12

## 2023-08-02 RX ORDER — OMEPRAZOLE 20 MG/1
CAPSULE, DELAYED RELEASE ORAL
Qty: 180 CAPSULE | Refills: 2 | Status: SHIPPED | OUTPATIENT
Start: 2023-08-02

## 2023-08-02 RX ORDER — SUCRALFATE ORAL 1 G/10ML
SUSPENSION ORAL
Qty: 3726 ML | Refills: 2 | Status: SHIPPED | OUTPATIENT
Start: 2023-08-02

## 2023-08-03 ENCOUNTER — OFFICE VISIT (OUTPATIENT)
Dept: FAMILY MEDICINE CLINIC | Facility: CLINIC | Age: 88
End: 2023-08-03
Payer: MEDICARE

## 2023-08-03 VITALS
DIASTOLIC BLOOD PRESSURE: 62 MMHG | HEIGHT: 60 IN | HEART RATE: 74 BPM | OXYGEN SATURATION: 100 % | TEMPERATURE: 97.3 F | SYSTOLIC BLOOD PRESSURE: 110 MMHG | BODY MASS INDEX: 25.91 KG/M2 | WEIGHT: 132 LBS

## 2023-08-03 DIAGNOSIS — R09.82 POST-NASAL DRIP: ICD-10-CM

## 2023-08-03 DIAGNOSIS — M79.89 LEG SWELLING: Primary | ICD-10-CM

## 2023-08-03 DIAGNOSIS — L29.9 ITCHING: ICD-10-CM

## 2023-08-03 PROCEDURE — 99214 OFFICE O/P EST MOD 30 MIN: CPT

## 2023-08-03 RX ORDER — FUROSEMIDE 20 MG/1
20 TABLET ORAL DAILY PRN
Qty: 90 TABLET | Refills: 1 | Status: SHIPPED | OUTPATIENT
Start: 2023-08-03 | End: 2023-08-04 | Stop reason: SDUPTHER

## 2023-08-03 RX ORDER — HYDROXYZINE HYDROCHLORIDE 25 MG/1
12.5-25 TABLET, FILM COATED ORAL 3 TIMES DAILY PRN
Qty: 30 TABLET | Refills: 0 | Status: SHIPPED | OUTPATIENT
Start: 2023-08-03

## 2023-08-03 RX ORDER — FLUTICASONE PROPIONATE 50 MCG
2 SPRAY, SUSPENSION (ML) NASAL DAILY
Qty: 16 G | Refills: 1 | Status: SHIPPED | OUTPATIENT
Start: 2023-08-03 | End: 2023-08-03

## 2023-08-03 RX ORDER — FLUTICASONE PROPIONATE 50 MCG
SPRAY, SUSPENSION (ML) NASAL
Qty: 48 G | Refills: 0 | Status: SHIPPED | OUTPATIENT
Start: 2023-08-03

## 2023-08-04 DIAGNOSIS — I10 ESSENTIAL HYPERTENSION: Chronic | ICD-10-CM

## 2023-08-04 DIAGNOSIS — M79.89 LEG SWELLING: ICD-10-CM

## 2023-08-07 RX ORDER — LOSARTAN POTASSIUM 50 MG/1
50 TABLET ORAL DAILY
Qty: 90 TABLET | Refills: 1 | Status: SHIPPED | OUTPATIENT
Start: 2023-08-07

## 2023-08-07 RX ORDER — FUROSEMIDE 20 MG/1
20 TABLET ORAL DAILY PRN
Qty: 90 TABLET | Refills: 1 | Status: SHIPPED | OUTPATIENT
Start: 2023-08-07

## 2023-08-08 NOTE — ANESTHESIA POSTPROCEDURE EVALUATION
Patient: Kristy Ramirez    Procedure Summary     Date: 01/06/21 Room / Location: [facility] ENDOSCOPY 1 / [facility] ENDOSCOPY    Anesthesia Start: 1200 Anesthesia Stop: 1218    Procedure: ESOPHAGOGASTRODUODENOSCOPY (N/A ) Diagnosis:       Other dysphagia      (Other dysphagia [R13.19])    Surgeon: Jack Ellis MD Provider: Martha Chen CRNA    Anesthesia Type: MAC ASA Status: 3          Anesthesia Type: MAC    Vitals  No vitals data found for the desired time range.          Post Anesthesia Care and Evaluation    Patient location during evaluation: bedside  Patient participation: waiting for patient participation  Level of consciousness: sleepy but conscious  Pain score: 0  Pain management: adequate  Airway patency: patent  Anesthetic complications: No anesthetic complications  PONV Status: none  Cardiovascular status: acceptable  Respiratory status: acceptable  Hydration status: acceptable       Please have lab do CPK on yesterday's blood work

## 2023-08-11 ENCOUNTER — OFFICE VISIT (OUTPATIENT)
Dept: CARDIAC SURGERY | Facility: CLINIC | Age: 88
End: 2023-08-11
Payer: MEDICARE

## 2023-08-11 VITALS
HEART RATE: 65 BPM | OXYGEN SATURATION: 97 % | BODY MASS INDEX: 25.91 KG/M2 | SYSTOLIC BLOOD PRESSURE: 146 MMHG | WEIGHT: 132 LBS | HEIGHT: 60 IN | DIASTOLIC BLOOD PRESSURE: 78 MMHG

## 2023-08-11 DIAGNOSIS — G58.9 MONONEUROPATHY: ICD-10-CM

## 2023-08-11 DIAGNOSIS — N18.31 STAGE 3A CHRONIC KIDNEY DISEASE: ICD-10-CM

## 2023-08-11 DIAGNOSIS — I87.2 VENOUS INSUFFICIENCY OF RIGHT LOWER EXTREMITY: Primary | ICD-10-CM

## 2023-08-11 RX ORDER — GABAPENTIN 300 MG/1
300 CAPSULE ORAL NIGHTLY
Qty: 90 CAPSULE | Refills: 1 | Status: SHIPPED | OUTPATIENT
Start: 2023-08-11

## 2023-08-11 NOTE — PATIENT INSTRUCTIONS
Normal arterial flow bilateral lower extremities  Venous insufficiency RIGHT lower extremities-asymptomatic    Neuropathy bilateral lower extremities  -Gabapentin nightly  -Further Rx to come from PCP    Follow up Vascular as needed

## 2023-08-18 NOTE — PROGRESS NOTES
CVTS Office Progress Note       Subjective   Patient ID: Kristy Ramirez is a 89 y.o. female is being seen for consultation today at the request of NADYA Duke    Chief Complaint:    Chief Complaint   Patient presents with    Leg Swelling       The following portions of the patient's history were reviewed and updated as appropriate: allergies, current medications, past family history, past medical history, past social history, past surgical history and problem list.  Recent images independently reviewed.  Available laboratory values reviewed.    PCP:  Rena Chua APRN    89 y.o. female with HTN(stable, increased risk stroke, rupture) and Chronic Kidney Disease(stable, increased risk renal failure)GERD..  former smoker.  Burning pain bilateral feet. Mild swelling. Referred for vascular evaluation.  No TIA stroke amaurosis.  No MI claudication. No other associated signs, symptoms or modifying factors.    8/2023: RHODA: RIGHT 1.0 Triphasic LEFT 0.9 Triphasic  8/2023: LE Venous duplex: Negative DVT. RGSV reflux 3s. GSV small. LGSV negative    Past Medical History:   Diagnosis Date    Abnormal CT scan     per patient    Acute bronchitis     Acute sinusitis     Allergic conjunctivitis     Angular cheilitis     Ankle edema     Backache     improved    Blood in urine      UTI resolved       Bradycardia     Chest discomfort     described as heart racing       Chronic low back pain     RIGHT leg radiation       Cold feet     c/o - and lowerlegs       Contusion     of dorsum of foot    Cough     COVID-19 virus infection     Depressive disorder     Dizziness and giddiness     Dyspnea     Dysuria     Edema of lower extremity     Essential hypertension     Exanthematous disorder     Fatigue     Foot pain, left     Grade II hemorrhoids 3/12/2018    Hematoma     Hip pain, right     History of palpitations     Hormone replacement therapy (postmenopausal)     Impacted cerumen     Joint pain     Knee pain     Low back  pain     Malaise and fatigue     Multiple joint pain     Muscle pain     Muscle weakness     Neck pain     Obesity (BMI 30.0-34.9) 3/12/2018    Osteoarthritis of knee     Osteoarthritis of multiple joints     Otitis externa     Pain in lower limb     Peripheral venous insufficiency     POP-Q stage 2 rectocele 3/12/2018    POP-Q stage 4 cystocele 3/12/2018    Sacroiliitis, not elsewhere classified     R LE    Shoulder pain     Temporal arteritis 11/8/2020    Recent COVID infection with symptoms concerning for uncomplicated GCA without visual loss/changes.  -ESR on admission 107 - IV steroids 60 mg daily; transition to oral when dysphagia resolved for at minimum 2-4 weeks, then taper by 10 mg weekly - ESR and CRP q48 hrs -consulted Dr Olea, appreciate recommendations- pt underwent Temporal artery biopsy on 11/10/20         Upper respiratory infection     Urinary frequency     Urinary tract infectious disease     Wheezing      Past Surgical History:   Procedure Laterality Date    ANKLE SURGERY      BACK SURGERY      COLONOSCOPY  12/14/2009    ENDOSCOPY N/A 1/6/2021    Procedure: ESOPHAGOGASTRODUODENOSCOPY;  Surgeon: Jack Ellis MD;  Location: [facility] ENDOSCOPY;  Service: Gastroenterology;  Laterality: N/A;    ENDOSCOPY N/A 1/28/2021    Procedure: ESOPHAGOGASTRODUODENOSCOPY;  Surgeon: Jack Ellis MD;  Location: [facility] ENDOSCOPY;  Service: Gastroenterology;  Laterality: N/A;  eso dilation with ballon to 30FR    ENDOSCOPY N/A 6/21/2021    Procedure: ESOPHAGOGASTRODUODENOSCOPY;  Surgeon: Jack Ellis MD;  Location: [facility] ENDOSCOPY;  Service: Gastroenterology;  Laterality: N/A;    ENDOSCOPY N/A 8/6/2021    Procedure: ESOPHAGOGASTRODUODENOSCOPY;  Surgeon: Jack Ellis MD;  Location: [facility] ENDOSCOPY;  Service: Gastroenterology;  Laterality: N/A;    ENDOSCOPY N/A 8/25/2021    Procedure: ESOPHAGOGASTRODUODENOSCOPY;  Surgeon: Jakc Ellis MD;  Location: [facility] ENDOSCOPY;  Service: Gastroenterology;   Laterality: N/A;    ENDOSCOPY N/A 10/5/2021    Procedure: ESOPHAGOGASTRODUODENOSCOPY;  Surgeon: Jack Ellis MD;  Location: [facility] ENDOSCOPY;  Service: Gastroenterology;  Laterality: N/A;  39-45  blue/yellow eso dilation    ENDOSCOPY N/A 3/23/2022    Procedure: ESOPHAGOGASTRODUODENOSCOPY;  Surgeon: Jack Ellis MD;  Location: [facility] ENDOSCOPY;  Service: Gastroenterology;  Laterality: N/A;    ENDOSCOPY N/A 5/25/2022    Procedure: ESOPHAGOGASTRODUODENOSCOPY;  Surgeon: Jack Ellis MD;  Location: [facility] ENDOSCOPY;  Service: Gastroenterology;  Laterality: N/A;    EYE SURGERY Bilateral 02/2018    B cataract    HAMMER TOE REPAIR  08/02/2011    Hammertoe repair, left second toe.    HYSTERECTOMY      INCISION AND DRAINAGE ABSCESS  01/21/2015    INJECTION OF MEDICATION  11/15/2013    INJECTION OF MEDICATION  05/06/2013    Celestone (betamethasone) (1)       INJECTION OF MEDICATION  03/17/2016    Kenalog (3)       NECK SURGERY  01/2018    TEMPORAL ARTERY BIOPSY Left 11/10/2020    Procedure: LEFT TEMPORAL ARTERY BIOPSY;  Surgeon: Anjel Olea MD;  Location: [facility] OR;  Service: General;  Laterality: Left;    UPPER GASTROINTESTINAL ENDOSCOPY  12/14/2009    UPPER GASTROINTESTINAL ENDOSCOPY  01/06/2021    UPPER GASTROINTESTINAL ENDOSCOPY  01/28/2021    UPPER GASTROINTESTINAL ENDOSCOPY  06/21/2021    UPPER GASTROINTESTINAL ENDOSCOPY  08/25/2021    UPPER GASTROINTESTINAL ENDOSCOPY  10/05/2021     Family History   Problem Relation Age of Onset    Diabetes Other     Heart disease Other     Stroke Other     Coronary artery disease Mother     Coronary artery disease Father      Social History     Tobacco Use    Smoking status: Former     Types: Cigarettes    Smokeless tobacco: Never    Tobacco comments:     10/14/2021 - Patient states 1 pack of Cigarettes would last X 3 days.  Patient can not recall number of years she utilized Cigarettes.    Vaping Use    Vaping Use: Never used   Substance Use Topics    Alcohol use: No     Drug use: No       ALLERGIES:   Robaxin [methocarbamol], Aleve [naproxen sodium], Atenolol, Gabapentin, Keflex [cephalexin], Lisinopril, Relafen [nabumetone], Tramadol, Zanaflex [tizanidine], Acetaminophen, Aspirin, Codeine, Diflucan [fluconazole], and Sulfa antibiotics    MEDICATIONS:      Current Outpatient Medications:     allopurinol (ZYLOPRIM) 100 MG tablet, Take 1 tablet by mouth Daily. take with food, Disp: 90 tablet, Rfl: 3    amLODIPine (NORVASC) 5 MG tablet, Take 1 tablet by mouth Daily., Disp: 90 tablet, Rfl: 1    Diclofenac Sodium (VOLTAREN) 1 % gel gel, APPLY 4 GRAMS TOPICALLY TO THE APPROPRIATE AREA AS DIRECTED 4 (FOUR) TIMES A DAY AS NEEDED (JOINT PAIN)., Disp: 200 g, Rfl: 0    fluticasone (FLONASE) 50 MCG/ACT nasal spray, 2 SPRAYS IN EACH NOSTRIL DAILY AS DIRECTED., Disp: 48 g, Rfl: 0    furosemide (LASIX) 20 MG tablet, Take 1 tablet by mouth Daily As Needed (leg swelling)., Disp: 90 tablet, Rfl: 1    hydrOXYzine (ATARAX) 25 MG tablet, Take 0.5-1 tablets by mouth 3 (Three) Times a Day As Needed for Itching., Disp: 30 tablet, Rfl: 0    loperamide (IMODIUM) 2 MG capsule, Take 1 capsule by mouth 4 (Four) Times a Day As Needed for Diarrhea., Disp: , Rfl:     losartan (COZAAR) 50 MG tablet, TAKE 1 TABLET BY MOUTH DAILY, Disp: 90 tablet, Rfl: 1    magnesium oxide (MAG-OX) 400 MG tablet, Take 1 tablet by mouth Daily., Disp: 90 tablet, Rfl: 1    metoprolol succinate XL (TOPROL-XL) 25 MG 24 hr tablet, Take 1 tablet by mouth Daily., Disp: 90 tablet, Rfl: 1    mupirocin (BACTROBAN) 2 % ointment, , Disp: , Rfl:     omeprazole (priLOSEC) 20 MG capsule, TAKE 1 CAPSULE TWICE DAILY, Disp: 180 capsule, Rfl: 2    sucralfate (CARAFATE) 1 GM/10ML suspension, TAKE 10 ML BY MOUTH 4 (FOUR) TIMES DAILY, Disp: 3726 mL, Rfl: 2    tolterodine LA (DETROL LA) 4 MG 24 hr capsule, Take 1 capsule by mouth Daily., Disp: 90 capsule, Rfl: 3    zolpidem (AMBIEN) 5 MG tablet, TAKE 1/2 TO 1 TABLET BY MOUTH EVERY NIGHT AT BEDTIME AS  "NEEDED FOR INSOMNIA, Disp: 30 tablet, Rfl: 0    gabapentin (NEURONTIN) 300 MG capsule, Take 1 capsule by mouth Every Night. Indications: Disease of the Peripheral Nerves, Disp: 90 capsule, Rfl: 1    Review of Systems   Eyes:  Negative for visual disturbance.   Cardiovascular:  Positive for leg swelling. Negative for claudication and cyanosis.   Respiratory:  Negative for shortness of breath.    Skin:  Negative for color change and nail changes.   Musculoskeletal:  Positive for arthritis, joint pain and muscle weakness.   Gastrointestinal:  Negative for dysphagia.   Neurological:  Positive for numbness. Negative for focal weakness, light-headedness, loss of balance, paresthesias and weakness.   Psychiatric/Behavioral:  Negative for altered mental status.    All other systems reviewed and are negative.     Objective   Vitals:    08/11/23 1004   BP: 146/78   BP Location: Left arm   Pulse: 65   SpO2: 97%   Weight: 59.9 kg (132 lb)   Height: 152.4 cm (60\")     Body mass index is 25.78 kg/mý.  Physical Exam  Vitals reviewed.   Constitutional:       Appearance: Normal appearance.   HENT:      Head: Normocephalic.      Mouth/Throat:      Mouth: Mucous membranes are moist.   Eyes:      Extraocular Movements: Extraocular movements intact.   Neck:      Vascular: No carotid bruit.   Cardiovascular:      Rate and Rhythm: Normal rate and regular rhythm.      Pulses:           Dorsalis pedis pulses are 2+ on the right side and 2+ on the left side.        Posterior tibial pulses are 2+ on the right side and 2+ on the left side.      Heart sounds: Normal heart sounds.      Comments: Varicose veins bilaterally-small  Pulmonary:      Effort: Pulmonary effort is normal.      Breath sounds: Normal breath sounds.   Abdominal:      General: Bowel sounds are normal.      Palpations: Abdomen is soft.   Musculoskeletal:         General: Normal range of motion.      Cervical back: Normal range of motion.      Right lower leg: No edema.      " Left lower leg: No edema.   Skin:     General: Skin is warm and dry.      Capillary Refill: Capillary refill takes less than 2 seconds.   Neurological:      General: No focal deficit present.      Mental Status: She is alert and oriented to person, place, and time.      Gait: Gait normal.   Psychiatric:         Mood and Affect: Mood normal.         Behavior: Behavior normal.         Assessment & Plan     Independent Review of Radiographic Studies:  Detailed discussion regarding risks, benefits, and treatment plan. Images independently reviewed. Patient understands, agrees, and wishes to proceed with plan.     1. Venous insufficiency of right lower extremity  Normal arterial flow bilateral lower extremities  Venous insufficiency RIGHT lower extremities-asymptomatic    2. Mononeuropathy  Neuropathy bilateral lower extremities  -Gabapentin nightly  -Further Rx to come from PCP    - gabapentin (NEURONTIN) 300 MG capsule; Take 1 capsule by mouth Every Night. Indications: Disease of the Peripheral Nerves  Dispense: 90 capsule; Refill: 1    3. Stage 3a chronic kidney disease        Advance Care Planning discussed and  Informational packet given to patient. Advance Care Plan on file: Yes    Time spent 45 minutes in face to face evaluation, reviewing of medical history, tests, and procedures. Independent interpretation of vascular studies, ordering additional tests, documentation, and coordination of care.   Counseling and education with the patient and family regarding treatment options, plan of care, and hoped for outcomes. All questions answered.         This document has been electronically signed by NADYA Ovalle on August 18, 2023 13:16 CDT

## 2023-09-05 NOTE — PROGRESS NOTES
Chief Complaint  Establish Care  Postnasal drip, leg swelling.  Subjective          Kristy Ramirez presents to [facility] [place]    Past Medical History:   Diagnosis Date    Abnormal CT scan     per patient    Acute bronchitis     Acute sinusitis     Allergic conjunctivitis     Angular cheilitis     Ankle edema     Backache     improved    Blood in urine      UTI resolved       Bradycardia     Chest discomfort     described as heart racing       Chronic low back pain     RIGHT leg radiation       Cold feet     c/o - and lowerlegs       Contusion     of dorsum of foot    Cough     COVID-19 virus infection     Depressive disorder     Dizziness and giddiness     Dyspnea     Dysuria     Edema of lower extremity     Essential hypertension     Exanthematous disorder     Fatigue     Foot pain, left     Grade II hemorrhoids 3/12/2018    Hematoma     Hip pain, right     History of palpitations     Hormone replacement therapy (postmenopausal)     Impacted cerumen     Joint pain     Knee pain     Low back pain     Malaise and fatigue     Multiple joint pain     Muscle pain     Muscle weakness     Neck pain     Obesity (BMI 30.0-34.9) 3/12/2018    Osteoarthritis of knee     Osteoarthritis of multiple joints     Otitis externa     Pain in lower limb     Peripheral venous insufficiency     POP-Q stage 2 rectocele 3/12/2018    POP-Q stage 4 cystocele 3/12/2018    Sacroiliitis, not elsewhere classified     R LE    Shoulder pain     Temporal arteritis 11/8/2020    Recent COVID infection with symptoms concerning for uncomplicated GCA without visual loss/changes.  -ESR on admission 107 - IV steroids 60 mg daily; transition to oral when dysphagia resolved for at minimum 2-4 weeks, then taper by 10 mg weekly - ESR and CRP q48 hrs -consulted Dr Olea, appreciate recommendations- pt underwent Temporal artery biopsy on 11/10/20         Upper respiratory infection     Urinary frequency      Urinary tract infectious disease     Wheezing       Past Surgical History:   Procedure Laterality Date    ANKLE SURGERY      BACK SURGERY      COLONOSCOPY  12/14/2009    ENDOSCOPY N/A 1/6/2021    Procedure: ESOPHAGOGASTRODUODENOSCOPY;  Surgeon: Jack Ellis MD;  Location: [facility] ENDOSCOPY;  Service: Gastroenterology;  Laterality: N/A;    ENDOSCOPY N/A 1/28/2021    Procedure: ESOPHAGOGASTRODUODENOSCOPY;  Surgeon: Jack Ellis MD;  Location: [facility] ENDOSCOPY;  Service: Gastroenterology;  Laterality: N/A;  eso dilation with ballon to 30FR    ENDOSCOPY N/A 6/21/2021    Procedure: ESOPHAGOGASTRODUODENOSCOPY;  Surgeon: Jack Ellis MD;  Location: [facility] ENDOSCOPY;  Service: Gastroenterology;  Laterality: N/A;    ENDOSCOPY N/A 8/6/2021    Procedure: ESOPHAGOGASTRODUODENOSCOPY;  Surgeon: Jack Ellis MD;  Location: [facility] ENDOSCOPY;  Service: Gastroenterology;  Laterality: N/A;    ENDOSCOPY N/A 8/25/2021    Procedure: ESOPHAGOGASTRODUODENOSCOPY;  Surgeon: Jack Ellis MD;  Location: [facility] ENDOSCOPY;  Service: Gastroenterology;  Laterality: N/A;    ENDOSCOPY N/A 10/5/2021    Procedure: ESOPHAGOGASTRODUODENOSCOPY;  Surgeon: Jack Ellis MD;  Location: [facility] ENDOSCOPY;  Service: Gastroenterology;  Laterality: N/A;  39-45  blue/yellow eso dilation    ENDOSCOPY N/A 3/23/2022    Procedure: ESOPHAGOGASTRODUODENOSCOPY;  Surgeon: Jack Ellis MD;  Location: [facility] ENDOSCOPY;  Service: Gastroenterology;  Laterality: N/A;    ENDOSCOPY N/A 5/25/2022    Procedure: ESOPHAGOGASTRODUODENOSCOPY;  Surgeon: Jack Ellis MD;  Location: [facility] ENDOSCOPY;  Service: Gastroenterology;  Laterality: N/A;    EYE SURGERY Bilateral 02/2018    B cataract    HAMMER TOE REPAIR  08/02/2011    Hammertoe repair, left second toe.    HYSTERECTOMY      INCISION AND DRAINAGE ABSCESS  01/21/2015    INJECTION OF MEDICATION  11/15/2013    INJECTION OF MEDICATION  05/06/2013    Celestone (betamethasone) (1)       INJECTION OF  MEDICATION  03/17/2016    Kenalog (3)       NECK SURGERY  01/2018    TEMPORAL ARTERY BIOPSY Left 11/10/2020    Procedure: LEFT TEMPORAL ARTERY BIOPSY;  Surgeon: Anjel Olea MD;  Location: [facility];  Service: General;  Laterality: Left;    UPPER GASTROINTESTINAL ENDOSCOPY  12/14/2009    UPPER GASTROINTESTINAL ENDOSCOPY  01/06/2021    UPPER GASTROINTESTINAL ENDOSCOPY  01/28/2021    UPPER GASTROINTESTINAL ENDOSCOPY  06/21/2021    UPPER GASTROINTESTINAL ENDOSCOPY  08/25/2021    UPPER GASTROINTESTINAL ENDOSCOPY  10/05/2021        Current Outpatient Medications   Medication Instructions    allopurinol (ZYLOPRIM) 100 mg, Oral, Daily, take with food    amLODIPine (NORVASC) 5 mg, Oral, Daily    Diclofenac Sodium (VOLTAREN) 1 % gel gel APPLY 4 GRAMS TOPICALLY TO THE APPROPRIATE AREA AS DIRECTED 4 (FOUR) TIMES A DAY AS NEEDED (JOINT PAIN).    fluticasone (FLONASE) 50 MCG/ACT nasal spray 2 SPRAYS IN EACH NOSTRIL DAILY AS DIRECTED.    furosemide (LASIX) 20 mg, Oral, Daily PRN    gabapentin (NEURONTIN) 300 mg, Oral, Nightly    hydrOXYzine (ATARAX) 12.5-25 mg, Oral, 3 Times Daily PRN    loperamide (IMODIUM) 2 mg, Oral, 4 Times Daily PRN    losartan (COZAAR) 50 mg, Oral, Daily    magnesium oxide (MAG-OX) 400 mg, Oral, Daily    metoprolol succinate XL (TOPROL-XL) 25 mg, Oral, Daily    mupirocin (BACTROBAN) 2 % ointment No dose, route, or frequency recorded.    omeprazole (priLOSEC) 20 MG capsule TAKE 1 CAPSULE TWICE DAILY    sucralfate (CARAFATE) 1 GM/10ML suspension TAKE 10 ML BY MOUTH 4 (FOUR) TIMES DAILY    tolterodine LA (DETROL LA) 4 mg, Oral, Daily    zolpidem (AMBIEN) 5 MG tablet TAKE 1/2 TO 1 TABLET BY MOUTH EVERY NIGHT AT BEDTIME AS NEEDED FOR INSOMNIA      Allergies   Allergen Reactions    Robaxin [Methocarbamol] Rash    Aleve [Naproxen Sodium] Itching    Atenolol     Gabapentin Itching    Keflex [Cephalexin] Itching    Lisinopril Angioedema    Relafen [Nabumetone] Itching    Tramadol Unknown - Low Severity    Zanaflex  [Tizanidine] Itching    Acetaminophen Itching    Aspirin Itching and Rash    Codeine Rash    Diflucan [Fluconazole] Itching    Sulfa Antibiotics Rash and Itching        History of Present Illness  Kristy Ramirez, 89-year-old female presents to office to establish care.  Patient reports previous PCP: NADYA Perez, most recently seen by PCP at [facility], last labs in May of 2023, limited results obtained from Care Everywhere.  Sees Dr. Nelson neurosurgery for spondylosis of L4-L5, no surgery due to age.  Uses walker to ambulate.  Does have history of Dye's esophagus.  Is seen by Dr. Ignacio from gastroenterology.  Patient also has lower extremity swelling intermittent with suspected venous insufficiency patient has referral to vascular surgery already in place from Maggi William to NADYA Huerta.  ABIs and venous duplex Doppler scheduled.  Patient also sees cardiology: Dr. Dunn.  Cardiology history consists of hypertension, paroxysmal small A-fib, bradycardia, and aortic valve insufficiency.  Patient also reports stress incontinence sees Gladys Soni for pessary maintenance.    Patient recently seen in same-day clinic by NADYA Romero.  Patient requesting blood pressure medication until able to establish.  Refill was needed on losartan 50 mg tablets once daily.  Patient also takes amlodipine 5 mg daily, Toprol-XL 25 mg daily, and patient is also on Lasix 20 mg daily as needed for lower extremity swelling.    Patient has several notable allergies.  Many medications cause itching/rash.  Notable reaction to ACE inhibitor: Lisinopril, did cause angioedema.  Patient does report itching and upon physical exam postnasal drip was noted.  Patient reports drainage.  Review of Systems   Constitutional: Negative.  Negative for chills and fever.   HENT:  Positive for postnasal drip.    Eyes: Negative.    Respiratory: Negative.  Negative for shortness of breath.    Cardiovascular:  Positive for leg swelling.  "Negative for chest pain.   Gastrointestinal: Negative.  Negative for constipation and diarrhea.   Endocrine: Negative.    Genitourinary:  Positive for difficulty urinating (pessary).   Musculoskeletal:  Positive for arthralgias and myalgias.   Skin: Negative.    Neurological:         Nerve pain LE   Psychiatric/Behavioral: Negative.        Objective   Vital Signs:   /62 (BP Location: Left arm, Patient Position: Sitting, Cuff Size: Adult)   Pulse 74   Temp 97.3 °F (36.3 °C)   Ht 152.4 cm (60\")   Wt 59.9 kg (132 lb)   SpO2 100%   BMI 25.78 kg/m²       Physical Exam  Vitals reviewed.   Constitutional:       General: She is not in acute distress.     Appearance: Normal appearance. She is not ill-appearing.   HENT:      Head: Normocephalic and atraumatic.      Right Ear: External ear normal.      Left Ear: External ear normal.      Nose: Nose normal.      Mouth/Throat:      Pharynx: Oropharynx is clear. Posterior oropharyngeal erythema present. No oropharyngeal exudate.   Eyes:      General:         Right eye: No discharge.         Left eye: No discharge.   Cardiovascular:      Rate and Rhythm: Normal rate and regular rhythm.      Heart sounds: Murmur heard.   Pulmonary:      Effort: Pulmonary effort is normal. No respiratory distress.      Breath sounds: Normal breath sounds. No stridor. No wheezing, rhonchi or rales.   Abdominal:      General: There is no distension.      Palpations: Abdomen is soft.   Musculoskeletal:         General: Normal range of motion.      Cervical back: Normal range of motion and neck supple.      Right lower leg: No edema.      Left lower leg: No edema.   Neurological:      General: No focal deficit present.      Mental Status: She is alert and oriented to person, place, and time. Mental status is at baseline.      Gait: Gait normal.   Psychiatric:         Mood and Affect: Mood normal.         Behavior: Behavior normal.         Thought Content: Thought content normal.         " Judgment: Judgment normal.        Result Review :   The following data was reviewed by: NADYA Cheek on 08/03/2023:    Results    Results  Complete Blood Count + Automated Diff - Ordered On: 1-May-2023 16:06     1-May-2023 16:26 WBC Count 7.5 (X10e3/uL} (Normal) Range: 4.5 - 11.0 X10e3/uL     Red Blood Count (RBC) 4.30 (X10e6/uL} (Normal) Range: 4.00 - 5.10 X10e6/uL     Hemoglobin 11.7 g/dL (Low) Range: 12.0 - 16.0 g/dL     Hematocrit 36.3 % (Normal) Range: 36.0 - 46.0 %     MCV 84.3 fL (Normal) Range: 82.0 - 98.0 fL     MCH (RL)2 27.2 pg (Low) Range: 27.5 - 33.2 pg     MCHC 32.2 g/dL (Low) Range: 33.4 - 35.5 g/dL     RDW 16.1 % (High) Range: 11.6 - 14.6 %     Platelet Count 243 (X10e3/uL} (Normal) Range: 150 - 450 X10e3/uL     Mean Platelet Volume 9.0 fL (Normal) Range: 6.5 - 12.0 fL     Neutrophil Absolute Count 5.0 (X10e3/uL} (Normal) Range: 1.8 - 7.7 X10e3/uL     Neutrophil % 66.1 % (Normal) Range: 49.0 - 79.0 %     Lymphocyte Absolute Count 1.8 (X10e3/uL} (Normal) Range: 1.0 - 4.8 X10e3/uL     Lymphocyte % 24.5 % (Normal) Range: 12.0 - 40.0 %     Monocyte Absolute Count 0.6 (X10e3/uL} (Normal) Range: 0.0 - 0.8 X10e3/uL     Monocyte % 7.5 % (Normal) Range: 2.0 - 11.0 %     Eosinophil Absolute Count 0.1 (X10e3/uL} (Normal) Range: 0.0 - 0.5 X10e3/uL     Eosinophil % 1.5 % (Normal) Range: 0.0 - 7.0 %     Basophils # (RO) 0.0 (X10e3/uL} (Normal) Range: 0.0 - 0.2 X10e3/uL     Basophils % (RO) 0.4 % (Normal) Range: 0.0 - 2.0 %   Comprehensive Metabolic Panel Ordered On: 1-May-2023 16:06     1-May-2023 16:43 Sodium Level 139 mmol/L (Normal) Range: 136 - 145 mmol/L     Potassium Level 4.3 mmol/L (Normal) Range: 3.5 - 5.0 mmol/L     Chloride Level 105 mmol/L (Normal) Range: 98 - 108 mmol/L     Carbon Dioxide 27 mmol/L (Normal) Range: 22 - 32 mmol/L     Blood Urea Nitrogen 26 mg/dL (High) Range: 7 - 22 mg/dL     Creatinine Result 1.02 mg/dL (Normal) Range: 0.60 - 1.20 mg/dL     Anion Gap 11.3 mmol/L (Normal)  Range: 4.0 - 12.0 mmol/L     Protein, Total 7.6 g/dL (Normal) Range: 6.0 - 8.0 g/dL     Albumin Level 4.2 g/dL (Normal) Range: 3.5 - 5.0 g/dL     Albumin:Globulin Ratio 1.2       Bilirubin, Total 0.4 mg/dL (Normal) Range: 0.1 - 1.2 mg/dL     Alkaline Phosphatase 113 U/L (Normal) Range: 21 - 130 U/L     Aspartate Transaminase 24 U/L (Normal) Range: 13 - 39 U/L     Calcium Level 10.3 mg/dL (High) Range: 8.4 - 10.2 mg/dL     Alanine Aminotransferase 10 U/L (Normal) Range: 7 - 52 U/L     Glucose Level 91 mg/dL (Normal) Range: 70 - 110 mg/dL  Comments: Glucose normal reference range is for fasting specimens.     eGFR 53       BUN/Creatinine Ratio 25 mg/dL (High) Range: 12 - 20 mg/dL     Globulin 3.4 g/dL       Osmolality, Calculated 273 (mOsm/L} (Normal) Range: 250 - 325 mOsm/L   PT + INR ... Ordered On: 1-May-2023 16:06     1-May-2023 16:33 Prothrombin Time-(RO) 11.0 (Seconds} (Normal) Range: 10.1 - 13.1 Seconds     International Normalized Ratio 0.95 (Low) Range: 1.00 - 1.20  Comments: Condition: Prophylaxis/treatment of Venous Thrombosis, Pulmonary Embolism, Myocardial Infarctio       Data reviewed : Radiologic studies CT Head w/o contrast, Angio head with contrast, Angio neck with contrast, MRI cervical spine, MRI lumbar spine    Procedures  CT Head without Contrast PACSI  Status: Completed 2-May-2023 6:16   CT Head without Contrast CT Head without Contrast   Result: CLINICAL INDICATION: New carotid bruit Axial imaging is obtained from the base of the skull through the calvarium. The bony structures are intact. The sinuses are clear. The brain reveals no evidence of mass, mass effect or midline shift. There is no hemorrhage. There is no subdural or epidural fluid collection. IMPRESSION: Normal CT of the head without contrast. Finalized By: Scott Clay MD Date: 5/2/2023 5:17 PM Status: Completed 2-May-2023 6:16   CT Angio Head with Contrast PACSI  Status: Completed 2-May-2023 6:13   CT Angio Head with Contrast CT  Angio Head with Contrast   Result: CLINICAL INDICATION: New carotid bruit Enhanced axial imaging is obtained from the aortic arch through the calvarium. Sagittal and coronal and 3-D maximum-intensity projections are generated. The innominate and right subclavian arteries are normal. The right common carotid artery origin, course and bifurcation are negative with only minimal calcified plaque at the bifurcation. The right internal carotid artery reveals minimal calcified plaque within the cavernous portion. The left common carotid artery origin and course are negative. There is minimal calcified plaque at the bifurcation. The internal carotid artery reveals minimal calcified plaque within the cavernous portion. The left subclavian artery reveals minimal calcified plaque at its origin. There is no stenosis. The vertebral artery origins are normal bilaterally. There is flow throughout. Imaging through the calvarium reveals normal flow within the major cerebral vasculature. There is no evidence of stenosis, aneurysm, dissection or occlusion. The soft tissues reveal no acute process. There is degenerative and anterior cervical fusion change of the cervical spine. IMPRESSION: No stenosis, aneurysm, dissection or occlusion. Finalized By: Scott Clay MD Date: 5/2/2023 5:17 PM Status: Completed 2-May-2023 6:13   CT Angio Neck with Contrast PACSI  Status: Completed 2-May-2023 6:13   CT Angio Neck with Contrast CT Angio Neck with Contrast   Result: CLINICAL INDICATION: New carotid bruit Enhanced axial imaging is obtained from the aortic arch through the calvarium. Sagittal and coronal and 3-D maximum-intensity projections are generated. The innominate and right subclavian arteries are normal. The right common carotid artery origin, course and bifurcation are negative with only minimal calcified plaque at the bifurcation. The right internal carotid artery reveals minimal calcified plaque within the cavernous portion. The  left common carotid artery origin and course are negative. There is minimal calcified plaque at the bifurcation. The internal carotid artery reveals minimal calcified plaque within the cavernous portion. The left subclavian artery reveals minimal calcified plaque at its origin. There is no stenosis. The vertebral artery origins are normal bilaterally. There is flow throughout. Imaging through the calvarium reveals normal flow within the major cerebral vasculature. There is no evidence of stenosis, aneurysm, dissection or occlusion. The soft tissues reveal no acute process. There is degenerative and anterior cervical fusion change of the cervical spine. IMPRESSION: No stenosis, aneurysm, dissection or occlusion. Finalized By: Scott Clay MD Date: 5/2/2023 5:17 PM Status: Completed 2-May-2023 6:13   MI SPINE, CERVICAL (3 VIEWS OR LESS) MI SPINE, CERVICAL (3 VIEWS OR LESS)   Result: CLINICAL INDICATION: Neck pain Anterior cervical fusion changes are present with anterior plates and screws spanning from C3 to C5. There are metallic intervertebral spacers. There is severe disc space narrowing at C5-C6 and C6-C7 as well as C7-T1. There is marginal osteophyte formation. There is facet hypertrophy. The prevertebral soft tissues are normal. IMPRESSION: 1. No fracture or subluxation. 2. Postsurgical and degenerative change. Finalized By: Scott Clay MD Date: 11/5/2022 8:04 AM Status: Completed 3-Nov-2022 15:43   Culture, Urine(PARAGON) Clinical Report[PG]   Result: Specimen: Urine, Void or Clean Catch Collected: 10/04/2022 17:02 Status: Final Last Updated: 10/06/2022 07:50 (1) do-[facility] Culture Result (Final) (Final) No Significant Growth after 2 days incubation (Final) Status: Completed 4-Oct-2022 17:02   ISTAT Creatinine(PARAGON) iSTAT Creatinine[PG]   Result: 1.3 Status: Completed 23-Nov-2021 11:01   MI MR SPINE, LUMBAR W/WO CONT. MI MR SPINE, LUMBAR W/WO CONT.   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 Bilateral renal cysts are partially included on this exam. No acute findings within the included paraspinal soft tissues identified. Since the MRI examination of 2016, there has been interval postsurgical change with unroofing laminectomy at L4-L5. Additionally, there is significant progression of the listhesis at this level estimated at 17 mm currently compared to approximately 4-5 mm on the 2016 exam. The alignment otherwise is anatomic. No compression fracture. There is endplate degenerative signal alteration seen at L4-L5 and L5-S1. Redemonstration of intraosseous hemangioma at T12. The conus terminates at the L1-L2 level and demonstrates normal signal and morphology. No pathologic contrast enhancement seen. There is loss of disk signal at all levels and severe loss of height at L4-L5 and L5-S1. T12/L1: There is mild disk bulge without protrusion/extrusion or stenosis. L1/L2: There is mild generalized disk bulge without focal protrusion or extrusion. There is some facet arthropathy and thickening of the ligamentum flavum. There are contour changes and mild stenosis of the thecal sac, however, not critical. There is mild inferior foraminal narrowing, greater on the right. No compromise of exiting nerves. L2/L3: There is mild generalized disk bulge without focal protrusion or extrusion. There is facet arthropathy and greater degree of ligamentous thickening. There are contour changes and mild thecal sac stenosis. There is mild bilateral inferior foraminal stenosis without compromise of exiting nerve. L3/L4: There is mild-to-moderate disk bulge slightly asymmetric to the right. There is moderate/severe facet arthropathy and thickening of the  ligamentum flavum. There are contour changes and moderate thecal sac stenosis. This encroaches upon both lateral recesses. There is mild-to-moderate right greater than left foraminal stenosis. There is abutment of the nerve on the right at its just extraforaminal margin. L4/L5: There is a large component of uncovering of the posterior disk margin relating to the significant listhesis at this level. There is facet arthropathy. There is attenuation of the thecal sac mildly relating to the degree of slippage present. This also contributes to elongation of both of the foramina. There is moderate/severe foraminal stenosis bilaterally with abutment and compression of exiting nerves. L5/S1: There is disk osteophyte complex and findings of old postsurgical changes with laminectomy. There is facet arthropathy and thickening of the ligamentum flavum. There are contour changes and mild thecal sac stenosis. There is encroachment upon both lateral recesses. There is moderate/severe bilateral foraminal stenosis with abutment and compression of exiting nerves. IMPRESSION: Multilevel degenerative and postsurgical changes as detailed above. There is significant interval change when compared to the prior MRI of 2016. Finalized By: Yu MAE, Paresh Date: 11/23/2021 12:29 PM Stat            Assessment and Plan    Diagnoses and all orders for this visit:    1. Leg swelling (Primary)  -     furosemide (LASIX) 20 MG tablet; Take 1 tablet by mouth Daily As Needed (leg swelling).  Dispense: 90 tablet; Refill: 1    2. Post-nasal drip  -     fluticasone (FLONASE) 50 MCG/ACT nasal spray; 2 sprays into the nostril(s) as directed by provider Daily.  Dispense: 16 g; Refill: 1    3. Itching  -     hydrOXYzine (ATARAX) 25 MG tablet; Take 0.5-1 tablets by mouth 3 (Three) Times a Day As Needed for Itching.  Dispense: 30 tablet; Refill: 0        -Discussed plan of care with patient.    -Answered all of patient's questions.  -Patient agreeable to plan  of care.    EMR Dragon/Transcription Disclaimer: Some of this note may be an electronic transcription/translation of spoken language to printed text.  The electronic translation of spoken language may permit erroneous, or at times, nonsensical words or phrases to be inadvertently transcribed. Although I have reviewed the note for such errors, some may still exist.     I spent 37 minutes caring for Kristy on this date of service. This time includes time spent by me in the following activities:preparing for the visit, reviewing tests, performing a medically appropriate examination and/or evaluation , counseling and educating the patient/family/caregiver, ordering medications, tests, or procedures, documenting information in the medical record, and care coordination  Follow Up   Return in about 3 months (around 11/3/2023).  Patient was given instructions and counseling regarding her condition or for health maintenance advice. Please see specific information pulled into the AVS if appropriate.       This document has been electronically signed by NADYA Cheek on September 5, 2023 04:31 CDT,.

## 2023-09-13 ENCOUNTER — OFFICE VISIT (OUTPATIENT)
Dept: OBSTETRICS AND GYNECOLOGY | Facility: CLINIC | Age: 88
End: 2023-09-13
Payer: MEDICARE

## 2023-09-13 VITALS
HEIGHT: 60 IN | WEIGHT: 132 LBS | BODY MASS INDEX: 25.91 KG/M2 | DIASTOLIC BLOOD PRESSURE: 60 MMHG | SYSTOLIC BLOOD PRESSURE: 140 MMHG

## 2023-09-13 DIAGNOSIS — N81.6 POP-Q STAGE 2 RECTOCELE: ICD-10-CM

## 2023-09-13 DIAGNOSIS — Z46.89 PESSARY MAINTENANCE: Primary | ICD-10-CM

## 2023-09-13 DIAGNOSIS — N81.10 POP-Q STAGE 4 CYSTOCELE: ICD-10-CM

## 2023-09-13 PROCEDURE — 1160F RVW MEDS BY RX/DR IN RCRD: CPT | Performed by: NURSE PRACTITIONER

## 2023-09-13 PROCEDURE — 1159F MED LIST DOCD IN RCRD: CPT | Performed by: NURSE PRACTITIONER

## 2023-09-13 PROCEDURE — 99213 OFFICE O/P EST LOW 20 MIN: CPT | Performed by: NURSE PRACTITIONER

## 2023-09-13 NOTE — PROGRESS NOTES
"Subjective   Chief Complaint   Patient presents with    Pessary Check     Kristy Ramirez is a 89 y.o. year old who presents to be seen for follow-up of her pessary.  Currently she is using Foldable ring w/ support - #7 w/o urethral bar.  She reports no problems with her pessary at this time.     No Additional Complaints Reported    The following portions of the patient's history were reviewed and updated as appropriate:problem list, current medications and allergies    Review of Systems   Constitutional:  Negative for activity change, appetite change, chills, diaphoresis, fatigue, fever, unexpected weight gain and unexpected weight loss.   Gastrointestinal:  Negative for abdominal distention, abdominal pain, anal bleeding, blood in stool, constipation, diarrhea, nausea, rectal pain and vomiting.   Genitourinary:  Negative for decreased urine volume, difficulty urinating, pelvic pain, pelvic pressure, urgency, urinary incontinence, vaginal bleeding, vaginal discharge and vaginal pain.      Objective   /60   Ht 152.4 cm (60\")   Wt 59.9 kg (132 lb)   LMP  (LMP Unknown)   BMI 25.78 kg/m²     General:  well developed; well nourished  no acute distress   Pelvis: Clinical staff was present for exam  External genitalia:  normal appearance of the external genitalia including [place]th[place]'s and [place]'s glands.  :  urethral meatus normal; urethra hypermobile; prominent caruncle present; bladder is tender to palpation  Vaginal:  atrophic mucosal changes are present.   Cervix:  absent.  Uterus:  absent.  Adnexa:  non palpable bilaterally.  Cystocele GRADE 4  Rectocele GRADE 2  Pessary removed and cleaned. After vaginal inspection the pessary was lubricated and placed back into the vaginal vault. She tolerated this well.     Lab Review   No data reviewed    Imaging   No data reviewed         Diagnoses and all orders for this visit:    Pessary maintenance    POP-Q stage 4 cystocele    POP-Q stage 2 rectocele        No " orders of the defined types were placed in this encounter.    F/U in 8 weeks for routine pessary maintenance.       This document was electronically signed.     NADYA Cadena  September 13, 2023

## 2023-09-29 DIAGNOSIS — G47.00 INSOMNIA, UNSPECIFIED TYPE: ICD-10-CM

## 2023-09-29 RX ORDER — ZOLPIDEM TARTRATE 5 MG/1
5 TABLET ORAL NIGHTLY PRN
Qty: 30 TABLET | Refills: 0 | Status: SHIPPED | OUTPATIENT
Start: 2023-09-29

## 2025-05-22 NOTE — PROGRESS NOTES
Subjective   Kristy Ramirez is a 86 y.o. female.     Here today for wili on the pain and swelling in her right hand.  Has been going on for over a month.  She has seen the walk in provider and has been treated for both gout and cellulitis.  Has not gotten any better.  Is still very painful.     Hand Pain    The incident occurred more than 1 week ago. There was no injury mechanism. The pain is present in the right fingers and right hand. The quality of the pain is described as aching. The pain does not radiate. The pain is at a severity of 7/10. The pain is severe. The pain has been worsening since the incident. Pertinent negatives include no chest pain, muscle weakness, numbness or tingling. The symptoms are aggravated by movement and palpation. She has tried NSAIDs, immobilization and elevation for the symptoms. The treatment provided no relief.        The following portions of the patient's history were reviewed and updated as appropriate: allergies, current medications, past family history, past medical history, past social history, past surgical history and problem list.    Review of Systems   Constitutional: Negative.    HENT: Negative.    Eyes: Negative.    Respiratory: Negative.    Cardiovascular: Negative.  Negative for chest pain.   Gastrointestinal: Negative.    Endocrine: Negative.    Genitourinary: Negative.    Musculoskeletal: Positive for arthralgias (right hand pain).   Skin: Negative.    Allergic/Immunologic: Negative.    Neurological: Negative.  Negative for tingling and numbness.   Hematological: Negative.    Psychiatric/Behavioral: Negative.        Objective   Physical Exam   Constitutional: She is oriented to person, place, and time. She appears well-developed and well-nourished. No distress.   HENT:   Head: Normocephalic and atraumatic.   Mouth/Throat: No oropharyngeal exudate.   Eyes: Pupils are equal, round, and reactive to light.   Neck: Normal range of motion. Neck supple. No thyromegaly  present.   Cardiovascular: Normal rate, regular rhythm and normal heart sounds. Exam reveals no friction rub.   No murmur heard.  Pulmonary/Chest: Effort normal and breath sounds normal. No respiratory distress. She has no wheezes. She has no rales.   Abdominal: Soft.   Musculoskeletal: Normal range of motion.   Right hand cont to be red and swollen with poor rom.  Last xrays reviewed of right hand and shows degenerative changes.   Neurological: She is alert and oriented to person, place, and time.   Skin: Skin is warm and dry.   Psychiatric: She has a normal mood and affect. Thought content normal.   Nursing note and vitals reviewed.        Assessment/Plan   Kristy was seen today for hand pain.    Diagnoses and all orders for this visit:    Pain of right hand  -     amoxicillin-clavulanate (AUGMENTIN) 875-125 MG per tablet; Take 1 tablet by mouth 2 (Two) Times a Day.  -     methylPREDNISolone (MEDROL, RICARDO,) 4 MG tablet; Take as directed on package instructions.      Uric Acid   Date Value Ref Range Status   01/29/2020 4.8 2.4 - 5.7 mg/dL Final             oral hygiene

## (undated) DEVICE — CANN SMPL SOFTECH BIFLO ETCO2 A/M 7FT

## (undated) DEVICE — BITEBLOCK ENDO W/STRAP 60F A/ LF DISP

## (undated) DEVICE — CLEANGUIDE DISPOSABLE MARKED SPRING TIP GUIDEWIRE, 1.86 MM X 210 CM: Brand: CLEANGUIDE

## (undated) DEVICE — PREP PVP-I 7.5P BT 4OZ

## (undated) DEVICE — ANTIBACTERIAL UNDYED BRAIDED (POLYGLACTIN 910), SYNTHETIC ABSORBABLE SUTURE: Brand: COATED VICRYL

## (undated) DEVICE — PREP SOL POVIDONE/IODINE BT 4OZ

## (undated) DEVICE — 9165 UNIVERSAL PATIENT PLATE: Brand: 3M™

## (undated) DEVICE — DILATOR CONTRL RADL 12/15MM 8CM BX5

## (undated) DEVICE — DEV INFL BALN BIG60 W/GAUGE 60ML

## (undated) DEVICE — KT BRST TISS EXPANDR CUSTOM FILL

## (undated) DEVICE — PK MAJ PROC LF 60

## (undated) DEVICE — STERILE POLYISOPRENE POWDER-FREE SURGICAL GLOVES WITH EMOLLIENT COATING: Brand: PROTEXIS

## (undated) DEVICE — SPNG GZ WOVN 4X4IN 12PLY 10/BX STRL

## (undated) DEVICE — 3M™ STERI-STRIP™ REINFORCED ADHESIVE SKIN CLOSURES, R1547, 1/2 IN X 4 IN (12 MM X 100 MM), 6 STRIPS/ENVELOPE: Brand: 3M™ STERI-STRIP™

## (undated) DEVICE — ESOPHAGEAL BALLOON DILATATION CATHETER: Brand: CRE FIXED WIRE

## (undated) DEVICE — GLV SURG TRIUMPH PF LTX 6.5 STRL

## (undated) DEVICE — SUT VIC 3/0 SH 27IN J416H